# Patient Record
Sex: FEMALE | Race: ASIAN | NOT HISPANIC OR LATINO
[De-identification: names, ages, dates, MRNs, and addresses within clinical notes are randomized per-mention and may not be internally consistent; named-entity substitution may affect disease eponyms.]

---

## 2023-07-23 ENCOUNTER — APPOINTMENT (OUTPATIENT)
Dept: NEUROSURGERY | Facility: HOSPITAL | Age: 68
End: 2023-07-23

## 2023-07-23 ENCOUNTER — INPATIENT (INPATIENT)
Facility: HOSPITAL | Age: 68
LOS: 17 days | Discharge: HOME HEALTH | DRG: 24 | End: 2023-08-10
Attending: NEUROLOGICAL SURGERY | Admitting: NEUROLOGICAL SURGERY
Payer: MEDICAID

## 2023-07-23 VITALS
OXYGEN SATURATION: 95 % | SYSTOLIC BLOOD PRESSURE: 119 MMHG | TEMPERATURE: 98 F | DIASTOLIC BLOOD PRESSURE: 73 MMHG | RESPIRATION RATE: 20 BRPM | WEIGHT: 125 LBS | HEIGHT: 60 IN | HEART RATE: 79 BPM

## 2023-07-23 DIAGNOSIS — I63.9 CEREBRAL INFARCTION, UNSPECIFIED: ICD-10-CM

## 2023-07-23 LAB
ALBUMIN SERPL ELPH-MCNC: 4.1 G/DL — SIGNIFICANT CHANGE UP (ref 3.3–5)
ALBUMIN SERPL ELPH-MCNC: 4.4 G/DL — SIGNIFICANT CHANGE UP (ref 3.3–5)
ALP SERPL-CCNC: 84 U/L — SIGNIFICANT CHANGE UP (ref 40–120)
ALP SERPL-CCNC: 86 U/L — SIGNIFICANT CHANGE UP (ref 40–120)
ALT FLD-CCNC: 25 U/L — SIGNIFICANT CHANGE UP (ref 10–45)
ALT FLD-CCNC: 27 U/L — SIGNIFICANT CHANGE UP (ref 10–45)
ANION GAP SERPL CALC-SCNC: 10 MMOL/L — SIGNIFICANT CHANGE UP (ref 5–17)
ANION GAP SERPL CALC-SCNC: 13 MMOL/L — SIGNIFICANT CHANGE UP (ref 5–17)
ANISOCYTOSIS BLD QL: SLIGHT — SIGNIFICANT CHANGE UP
APTT BLD: 25.3 SEC — LOW (ref 27.5–35.5)
APTT BLD: 25.4 SEC — LOW (ref 27.5–35.5)
AST SERPL-CCNC: 25 U/L — SIGNIFICANT CHANGE UP (ref 10–40)
AST SERPL-CCNC: 25 U/L — SIGNIFICANT CHANGE UP (ref 10–40)
BASOPHILS # BLD AUTO: 0.24 K/UL — HIGH (ref 0–0.2)
BASOPHILS NFR BLD AUTO: 3.5 % — HIGH (ref 0–2)
BILIRUB SERPL-MCNC: 0.1 MG/DL — LOW (ref 0.2–1.2)
BILIRUB SERPL-MCNC: 0.2 MG/DL — SIGNIFICANT CHANGE UP (ref 0.2–1.2)
BLD GP AB SCN SERPL QL: NEGATIVE — SIGNIFICANT CHANGE UP
BUN SERPL-MCNC: 24 MG/DL — HIGH (ref 7–23)
BUN SERPL-MCNC: 32 MG/DL — HIGH (ref 7–23)
CALCIUM SERPL-MCNC: 8.8 MG/DL — SIGNIFICANT CHANGE UP (ref 8.4–10.5)
CALCIUM SERPL-MCNC: 9.1 MG/DL — SIGNIFICANT CHANGE UP (ref 8.4–10.5)
CHLORIDE SERPL-SCNC: 103 MMOL/L — SIGNIFICANT CHANGE UP (ref 96–108)
CHLORIDE SERPL-SCNC: 106 MMOL/L — SIGNIFICANT CHANGE UP (ref 96–108)
CO2 SERPL-SCNC: 24 MMOL/L — SIGNIFICANT CHANGE UP (ref 22–31)
CO2 SERPL-SCNC: 25 MMOL/L — SIGNIFICANT CHANGE UP (ref 22–31)
CREAT SERPL-MCNC: 0.69 MG/DL — SIGNIFICANT CHANGE UP (ref 0.5–1.3)
CREAT SERPL-MCNC: 0.81 MG/DL — SIGNIFICANT CHANGE UP (ref 0.5–1.3)
DACRYOCYTES BLD QL SMEAR: SLIGHT — SIGNIFICANT CHANGE UP
EGFR: 79 ML/MIN/1.73M2 — SIGNIFICANT CHANGE UP
EGFR: 94 ML/MIN/1.73M2 — SIGNIFICANT CHANGE UP
ELLIPTOCYTES BLD QL SMEAR: SLIGHT — SIGNIFICANT CHANGE UP
EOSINOPHIL # BLD AUTO: 0.06 K/UL — SIGNIFICANT CHANGE UP (ref 0–0.5)
EOSINOPHIL NFR BLD AUTO: 0.9 % — SIGNIFICANT CHANGE UP (ref 0–6)
GLUCOSE SERPL-MCNC: 116 MG/DL — HIGH (ref 70–99)
GLUCOSE SERPL-MCNC: 255 MG/DL — HIGH (ref 70–99)
HCT VFR BLD CALC: 31.1 % — LOW (ref 34.5–45)
HCT VFR BLD CALC: 32.7 % — LOW (ref 34.5–45)
HGB BLD-MCNC: 10.2 G/DL — LOW (ref 11.5–15.5)
HGB BLD-MCNC: 9.7 G/DL — LOW (ref 11.5–15.5)
INR BLD: 0.97 RATIO — SIGNIFICANT CHANGE UP (ref 0.88–1.16)
INR BLD: 0.98 RATIO — SIGNIFICANT CHANGE UP (ref 0.88–1.16)
LYMPHOCYTES # BLD AUTO: 2.37 K/UL — SIGNIFICANT CHANGE UP (ref 1–3.3)
LYMPHOCYTES # BLD AUTO: 33.9 % — SIGNIFICANT CHANGE UP (ref 13–44)
MAGNESIUM SERPL-MCNC: 2.4 MG/DL — SIGNIFICANT CHANGE UP (ref 1.6–2.6)
MANUAL SMEAR VERIFICATION: SIGNIFICANT CHANGE UP
MCHC RBC-ENTMCNC: 20.9 PG — LOW (ref 27–34)
MCHC RBC-ENTMCNC: 21.1 PG — LOW (ref 27–34)
MCHC RBC-ENTMCNC: 31.2 GM/DL — LOW (ref 32–36)
MCHC RBC-ENTMCNC: 31.2 GM/DL — LOW (ref 32–36)
MCV RBC AUTO: 67 FL — LOW (ref 80–100)
MCV RBC AUTO: 67.6 FL — LOW (ref 80–100)
MICROCYTES BLD QL: SLIGHT — SIGNIFICANT CHANGE UP
MONOCYTES # BLD AUTO: 0 K/UL — SIGNIFICANT CHANGE UP (ref 0–0.9)
MONOCYTES NFR BLD AUTO: 0 % — LOW (ref 2–14)
NEUTROPHILS # BLD AUTO: 4.31 K/UL — SIGNIFICANT CHANGE UP (ref 1.8–7.4)
NEUTROPHILS NFR BLD AUTO: 61.7 % — SIGNIFICANT CHANGE UP (ref 43–77)
NRBC # BLD: 0 /100 WBCS — SIGNIFICANT CHANGE UP (ref 0–0)
OVALOCYTES BLD QL SMEAR: SLIGHT — SIGNIFICANT CHANGE UP
PHOSPHATE SERPL-MCNC: 3.9 MG/DL — SIGNIFICANT CHANGE UP (ref 2.5–4.5)
PLAT MORPH BLD: NORMAL — SIGNIFICANT CHANGE UP
PLATELET # BLD AUTO: 247 K/UL — SIGNIFICANT CHANGE UP (ref 150–400)
PLATELET # BLD AUTO: 264 K/UL — SIGNIFICANT CHANGE UP (ref 150–400)
POIKILOCYTOSIS BLD QL AUTO: SIGNIFICANT CHANGE UP
POTASSIUM SERPL-MCNC: 3.8 MMOL/L — SIGNIFICANT CHANGE UP (ref 3.5–5.3)
POTASSIUM SERPL-MCNC: 4.2 MMOL/L — SIGNIFICANT CHANGE UP (ref 3.5–5.3)
POTASSIUM SERPL-SCNC: 3.8 MMOL/L — SIGNIFICANT CHANGE UP (ref 3.5–5.3)
POTASSIUM SERPL-SCNC: 4.2 MMOL/L — SIGNIFICANT CHANGE UP (ref 3.5–5.3)
PROT SERPL-MCNC: 7.1 G/DL — SIGNIFICANT CHANGE UP (ref 6–8.3)
PROT SERPL-MCNC: 7.6 G/DL — SIGNIFICANT CHANGE UP (ref 6–8.3)
PROTHROM AB SERPL-ACNC: 11.2 SEC — SIGNIFICANT CHANGE UP (ref 10.5–13.4)
PROTHROM AB SERPL-ACNC: 11.4 SEC — SIGNIFICANT CHANGE UP (ref 10.5–13.4)
RBC # BLD: 4.64 M/UL — SIGNIFICANT CHANGE UP (ref 3.8–5.2)
RBC # BLD: 4.84 M/UL — SIGNIFICANT CHANGE UP (ref 3.8–5.2)
RBC # FLD: 15.7 % — HIGH (ref 10.3–14.5)
RBC # FLD: 15.7 % — HIGH (ref 10.3–14.5)
RBC BLD AUTO: ABNORMAL
RH IG SCN BLD-IMP: POSITIVE — SIGNIFICANT CHANGE UP
SCHISTOCYTES BLD QL AUTO: SLIGHT — SIGNIFICANT CHANGE UP
SODIUM SERPL-SCNC: 140 MMOL/L — SIGNIFICANT CHANGE UP (ref 135–145)
SODIUM SERPL-SCNC: 141 MMOL/L — SIGNIFICANT CHANGE UP (ref 135–145)
TROPONIN T, HIGH SENSITIVITY RESULT: 22 NG/L — SIGNIFICANT CHANGE UP (ref 0–51)
WBC # BLD: 6.99 K/UL — SIGNIFICANT CHANGE UP (ref 3.8–10.5)
WBC # BLD: 7.22 K/UL — SIGNIFICANT CHANGE UP (ref 3.8–10.5)
WBC # FLD AUTO: 6.99 K/UL — SIGNIFICANT CHANGE UP (ref 3.8–10.5)
WBC # FLD AUTO: 7.22 K/UL — SIGNIFICANT CHANGE UP (ref 3.8–10.5)

## 2023-07-23 PROCEDURE — 70498 CT ANGIOGRAPHY NECK: CPT | Mod: 26,MA

## 2023-07-23 PROCEDURE — 99291 CRITICAL CARE FIRST HOUR: CPT

## 2023-07-23 PROCEDURE — 70496 CT ANGIOGRAPHY HEAD: CPT | Mod: 26,MA

## 2023-07-23 PROCEDURE — 70450 CT HEAD/BRAIN W/O DYE: CPT | Mod: 26,MA,59

## 2023-07-23 PROCEDURE — 0042T: CPT | Mod: MA

## 2023-07-23 PROCEDURE — 76377 3D RENDER W/INTRP POSTPROCES: CPT | Mod: 26

## 2023-07-23 PROCEDURE — 61645 PERQ ART M-THROMBECT &/NFS: CPT | Mod: LT

## 2023-07-23 RX ORDER — ACETAMINOPHEN 500 MG
1000 TABLET ORAL ONCE
Refills: 0 | Status: COMPLETED | OUTPATIENT
Start: 2023-07-23 | End: 2023-07-23

## 2023-07-23 RX ADMIN — Medication 1000 MILLIGRAM(S): at 23:30

## 2023-07-23 RX ADMIN — Medication 400 MILLIGRAM(S): at 23:00

## 2023-07-23 NOTE — ED ADULT NURSE NOTE - NSFALLHARMRISKINTERV_ED_ALL_ED
Assistance OOB with selected safe patient handling equipment if applicable/Assistance with ambulation/Communicate risk of Fall with Harm to all staff, patient, and family/Monitor gait and stability/Provide visual cue: red socks, yellow wristband, yellow gown, etc/Reinforce activity limits and safety measures with patient and family/Bed in lowest position, wheels locked, appropriate side rails in place/Call bell, personal items and telephone in reach/Instruct patient to call for assistance before getting out of bed/chair/stretcher/Non-slip footwear applied when patient is off stretcher/[place] to call system/Physically safe environment - no spills, clutter or unnecessary equipment/Purposeful Proactive Rounding/Room/bathroom lighting operational, light cord in reach

## 2023-07-23 NOTE — H&P ADULT - NSHPADDITIONALINFOADULT_GEN_ALL_CORE
Ms. Gomez is a 67 year old lady with history of  CVA (on clopidogrel), epilepsy (on LEV), who ordinally presented with AMS. NIHSS of 12 on initial evaluation. There was aphasia, R facial droop and RUE drift. CTH/CTA with concern for right M2 LVO. Patient was taken for angio suit for possible EVT and was found to have right M2 stenosis with distal reconstitution. NeuroIR was not able to aspirate the thrombus, which suggests that it is likely more chronic in nature. On the rounds this AM patient is almost near her baseline per daughter with some mild aphasia. Notably history is also significant for several prior episodes of speech arrests lasting 20-30 min within the past 2-3 weeks. Patient is right handed which makes less likely right hemisphere lesion to produce aphasia. Constellation of clinical findings is more concerning for seizure rather than ischemic event.     - NC q2   - MRI brain   - Continue ASA 81 and Clopidogrel 75   - Continue home  mg BID for now, may need an increased dose   - cvEEG  - DVT ppx     Casey Juan MD   Vascular Nerology Fellow

## 2023-07-23 NOTE — ED PROVIDER NOTE - PHYSICAL EXAMINATION
Gen: Patient is NAD, AAOx2, able to follow commands  HEENT: NCAT, EOMI, PERRLA, normal conjunctiva, tongue midline, oral mucosa moist  Lung: CTAB, no respiratory distress, no wheezes/rhonchi/rales B/L  CV: RRR, no murmurs, rubs or gallops, distal pulses 2+ b/l  Abd: soft, NT, ND, no guarding, no rigidity, no rebound tenderness, no CVA tenderness   MSK: no visible deformities, ROM normal in UE/LE, no back TTP  Neuro: difficulty finding words, No focal sensory or motor deficits, reduced strength 4/5 of b/l LE, strength is 5/5 in b/l UE, no dysmetria, unable to see with R eye, otherwise normal CN exam   Skin: Warm, well perfused, no leg swelling  Psych: normal affect, calm

## 2023-07-23 NOTE — CHART NOTE - NSCHARTNOTEFT_GEN_A_CORE
Interventional Neuro Radiology  Pre-Procedure Note    This is a 66yo female, history of diabetes, ?Stroke, on Plavix, right eye blindness, presenting with 1 day of cute onset of difficulty speech, difficulty ambulating.  Patient is accompanied by daughter who reports that patient started developing acute onset of symptoms at 12 PM while having lunch.  Daughters report that patient had similar symptoms in the past intermittently which resolved spontaneously.  Today patient's symptoms been persistent which is why she brought her to the emergency room for further evaluation.  Patient was noted notably having difficulty finding words during the interview.  Poor historian.  History obtained via Cantonese translation by daughter.    Neuro exam: visual acuity is 0 with R eye, no facial droop noted, slow speech noted, difficulty finding words at times, normal strength 5/5 of b/l UE, strength is 4/5 in both LE, no dysmetria, no sensory deficit.    [facility] on arrival    PAST MEDICAL & SURGICAL HISTORY:  DM (diabetes mellitus)  HTN (hypertension)      Social History:   Denies tobacco use    FAMILY HISTORY:  No pertinent family history    Allergies:   No Known Allergies      Current Medications:     Labs:                         10.2   6.99  )-----------( 264      ( 23 Jul 2023 16:17 )             32.7       07-23    141  |  103  |  32<H>  ----------------------------<  255<H>  4.2   |  25  |  0.81    Ca    9.1      23 Jul 2023 16:17    TPro  7.6  /  Alb  4.4  /  TBili  0.1<L>  /  DBili  x   /  AST  25  /  ALT  27  /  AlkPhos  86  07-23        Assessment/Plan:   This is a 66yo female  presents with  left M2 occlusion. Procedure/ risks/ benefits/ goals/ alternatives were explained. Risks include but are not limited to stroke/ vessel injury/ hemorrhage/ groin hematoma. All questions answered.   Informed content obtained from patient's family. Consent placed in chart.     H & P not completed prior to pre procedure note due to emergent nature of procedure.     INR fellow contacted at 1806h.     Dania Maciel PA-C  [phone]

## 2023-07-23 NOTE — ED PROVIDER NOTE - OBJECTIVE STATEMENT
67-year-old female, history of diabetes, ?Stroke, on Plavix, right eye blindness, presenting with 1 day of cute onset of difficulty speech, difficulty ambulating.  Patient is accompanied by daughter who reports that patient started developing acute onset of symptoms at 12 PM while having lunch.  Daughters report that patient had similar symptoms in the past intermittently which resolved spontaneously.  Today patient's symptoms been persistent which is why she brought her to the emergency room for further evaluation.  Patient was noted notably having difficulty finding words during the interview.  Poor historian.  History obtained via Cantonese translation by daughter.

## 2023-07-23 NOTE — H&P ADULT - REASON FOR ADMISSION
stroke
I performed the initial face to face bedside interview with this patient regarding history of present illness, review of symptoms and relevant past medical, social and family history.  I completed an independent physical examination.  I was the initial provider who evaluated this patient. I have signed out the follow up of any pending tests (i.e. labs, radiological studies) to the resident.  I have communicated the patient’s plan of care and disposition with the resident.

## 2023-07-23 NOTE — ED ADULT NURSE NOTE - OBJECTIVE STATEMENT
67 yr old female with h/o strokes, diabetes, high bp came in after having lunch with her daughter around 12 and then started to have speech issues and dizziness. this has been happening for 2-3 weeks. on assessment a and o x 1 daughter states shes not really making much sense. lungs clear abd soft non tender no swelling in extremities no n/v/d no fevers no other complaints, normally walks slowly but now needs some assistance, follows commands. blind in right eye

## 2023-07-23 NOTE — CONSULT NOTE ADULT - SUBJECTIVE AND OBJECTIVE BOX
Neurology - Consult Note    -  Spectra: [phone] ([facility]), [phone] ([facility])  -    HPI: Patient GABY HARDY is a 67y (1955) woman with a PMHx significant for CVA (on Plavix), seizure (on Keppra), HLD, HTN, who presents w/ CC of AMS. Patient unable to provide any history. Daughter at bedside assisting w/ history. Patient had stroke in past, but unclear when this happened and if any residual deficits. Per daughter, patient is usually independent, fully oriented, and speaks in normal conversation. However, over the past 2-3 weeks, she has been having 20-30 min episodes of sudden blank staring and unresponsiveness. She will be barely able to speak and the words will be nonsensical. She does not have generalized shaking, incontinence, or tongue biting. She comes back to her baseline afterwards. These episodes have generally occurred every other day. Daughter became concerned when she had episode starting at noon today that persisted for hours. Brought to ED for further evaluation. Code stroke called for AMS within 24h window. 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 When asked orientation questions, she stutters an answer ("[age]") and continues speaking nonsense. She follows simple commands with help of daughter speaking Cantonese. Home medications are: Plavix, amlodipine-ibresartan, metoprolol, rosuvastatin, Ceumid (aka Keppra), omeprezole, jarinu, folic acid, B complex, ferrous sulfate      Review of Systems:  INCOMPLETE   CONSTITUTIONAL: No fevers or chills  EYES AND ENT: No visual changes or no throat pain   NECK: No pain or stiffness  RESPIRATORY: No hemoptysis or shortness of breath  CARDIOVASCULAR: No chest pain or palpitations  GASTROINTESTINAL: No melena or hematochezia  GENITOURINARY: No dysuria or hematuria  NEUROLOGICAL: +As stated in HPI above  SKIN: No itching, burning, rashes, or lesions   All other review of systems is negative unless indicated above.    Allergies:  No Known Allergies      PMHx/PSHx/Family Hx: As above, otherwise see below   DM (diabetes mellitus)    HTN (hypertension)        Social Hx:  No current use of tobacco, alcohol, or illicit drugs  Lives with ***    Medications:  MEDICATIONS  (STANDING):    MEDICATIONS  (PRN):      Vitals:  T(C): 36.8 (07-23-23 @ 15:58), Max: 36.8 (07-23-23 @ 15:58)  HR: 79 (07-23-23 @ 15:58) (79 - 79)  BP: 119/73 (07-23-23 @ 15:58) (119/73 - 119/73)  RR: 20 (07-23-23 @ 15:58) (20 - 20)  SpO2: 95% (07-23-23 @ 15:58) (95% - 95%)    Physical Examination: INCOMPLETE  General - NAD  Cardiovascular - Peripheral pulses palpable, no edema  Eyes - Fundoscopy with flat, sharp optic discs and no hemorrhage or exudates; Fundoscopy not well visualized; Fundoscopy not performed due to safety precautions in the setting of the COVID-19 pandemic    Neurologic Exam:  Mental status - Awake, Alert, Oriented to person, place, and time. Speech fluent, repetition and naming intact. Follows simple and complex commands. Attention/concentration, recent and remote memory (including registration and recall), and fund of knowledge intact    Cranial nerves - PERRLA, VFF, EOMI, face sensation (V1-V3) intact b/l, facial strength intact without asymmetry b/l, hearing intact b/l, palate with symmetric elevation, trapezius OR sternocleidomastiod 5/5 strength b/l, tongue midline on protrusion with full lateral movement    Motor - Normal bulk and tone throughout. No pronator drift.  Strength testing            Deltoid      Biceps      Triceps     Wrist Extension    Wrist Flexion     Interossei         R            5                 5               5                     5                              5                        5                 5  L             5                 5               5                     5                              5                        5                 5              Hip Flexion    Hip Extension    Knee Flexion    Knee Extension    Dorsiflexion    Plantar Flexion  R              5                           5                       5                           5                            5                          5  L              5                           5                        5                           5                            5                          5    Sensation - Light touch/temperature OR pain/vibration intact throughout    DTR's -             Biceps      Triceps     Brachioradialis      Patellar    Ankle    Toes/plantar response  R             2+             2+                  2+                       2+            2+                 Down  L              2+             2+                 2+                        2+           2+                 Down    Coordination - Finger to Nose intact b/l. No tremors appreciated    Gait and station - Normal casual gait. Romberg (-)    Labs:                        10.2   6.99  )-----------( 264      ( 23 Jul 2023 16:17 )             32.7     07-23    141  |  103  |  32<H>  ----------------------------<  255<H>  4.2   |  25  |  0.81    Ca    9.1      23 Jul 2023 16:17    TPro  7.6  /  Alb  4.4  /  TBili  0.1<L>  /  DBili  x   /  AST  25  /  ALT  27  /  AlkPhos  86  07-23    CAPILLARY BLOOD GLUCOSE      POCT Blood Glucose.: 270 mg/dL (23 Jul 2023 16:02)    LIVER FUNCTIONS - ( 23 Jul 2023 16:17 )  Alb: 4.4 g/dL / Pro: 7.6 g/dL / ALK PHOS: 86 U/L / ALT: 27 U/L / AST: 25 U/L / GGT: x             PT/INR - ( 23 Jul 2023 16:17 )   PT: 11.2 sec;   INR: 0.97 ratio         PTT - ( 23 Jul 2023 16:17 )  PTT:25.4 sec  CSF:                  Radiology:     Neurology - Consult Note    -  Spectra: [phone] ([facility]), [phone] ([facility])  -    HPI: Patient GABY HARDY is a 67y (1955) woman with a PMHx significant for CVA (on Plavix), seizure (on Keppra), HLD, HTN, who presents w/ CC of AMS. Patient unable to provide any history. Daughter at bedside assisting w/ history. Patient had stroke in past, but unclear when this happened and if any residual deficits. Per daughter, patient is usually independent, fully oriented, and speaks in normal conversation. However, over the past 2-3 weeks, she has been having 20-30 min episodes of sudden blank staring and unresponsiveness. She will be barely able to speak and the words will be nonsensical. She does not have generalized shaking, incontinence, or tongue biting. She comes back to her baseline afterwards. These episodes have generally occurred every other day. Daughter became concerned when she had episode starting at noon today that persisted for hours. Brought to ED for further evaluation. Code stroke called for AMS within 24h window. 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 When asked orientation questions, she stutters an answer ("[age]") and continues speaking nonsense. She follows simple commands with help of daughter speaking Cantonese. Home medications are: Plavix, amlodipine-ibresartan, metoprolol, rosuvastatin, Ceumid (aka Keppra), omeprezole, jarinu, folic acid, B complex, ferrous sulfate      Review of Systems:  unable to assess due to mental status      Allergies:  No Known Allergies      PMHx/PSHx/Family Hx: As above, otherwise see below   DM (diabetes mellitus)    HTN (hypertension)        Social Hx:  Cantonese speaking    Medications:  MEDICATIONS  (STANDING):    MEDICATIONS  (PRN):      Vitals:  T(C): 36.8 (07-23-23 @ 15:58), Max: 36.8 (07-23-23 @ 15:58)  HR: 79 (07-23-23 @ 15:58) (79 - 79)  BP: 119/73 (07-23-23 @ 15:58) (119/73 - 119/73)  RR: 20 (07-23-23 @ 15:58) (20 - 20)  SpO2: 95% (07-23-23 @ 15:58) (95% - 95%)    Physical Examination: INCOMPLETE  General - NAD, confused female  Cardiovascular - Peripheral pulses palpable, no edema  Eyes - Fundoscopy with flat, sharp optic discs and no hemorrhage or exudates; Fundoscopy not well visualized; Fundoscopy not performed due to safety precautions in the setting of the COVID-19 pandemic    Neurologic Exam:  Mental status - Awake, Alert, Oriented to person, place, and time. Speech fluent, repetition and naming intact. Follows simple and complex commands. Attention/concentration, recent and remote memory (including registration and recall), and fund of knowledge intact    Cranial nerves - PERRLA, VFF, EOMI, face sensation (V1-V3) intact b/l, facial strength intact without asymmetry b/l, hearing intact b/l, palate with symmetric elevation, trapezius OR sternocleidomastiod 5/5 strength b/l, tongue midline on protrusion with full lateral movement    Motor - Normal bulk and tone throughout. No pronator drift.  Strength testing            Deltoid      Biceps      Triceps     Wrist Extension    Wrist Flexion     Interossei         R            5                 5               5                     5                              5                        5                 5  L             5                 5               5                     5                              5                        5                 5              Hip Flexion    Hip Extension    Knee Flexion    Knee Extension    Dorsiflexion    Plantar Flexion  R              5                           5                       5                           5                            5                          5  L              5                           5                        5                           5                            5                          5    Sensation - Light touch/temperature OR pain/vibration intact throughout    DTR's -             Biceps      Triceps     Brachioradialis      Patellar    Ankle    Toes/plantar response  R             2+             2+                  2+                       2+            2+                 Down  L              2+             2+                 2+                        2+           2+                 Down    Coordination - Finger to Nose intact b/l. No tremors appreciated    Gait and station - Normal casual gait. Romberg (-)    Labs:                        10.2   6.99  )-----------( 264      ( 23 Jul 2023 16:17 )             32.7     07-23    141  |  103  |  32<H>  ----------------------------<  255<H>  4.2   |  25  |  0.81    Ca    9.1      23 Jul 2023 16:17    TPro  7.6  /  Alb  4.4  /  TBili  0.1<L>  /  DBili  x   /  AST  25  /  ALT  27  /  AlkPhos  86  07-23    CAPILLARY BLOOD GLUCOSE      POCT Blood Glucose.: 270 mg/dL (23 Jul 2023 16:02)    LIVER FUNCTIONS - ( 23 Jul 2023 16:17 )  Alb: 4.4 g/dL / Pro: 7.6 g/dL / ALK PHOS: 86 U/L / ALT: 27 U/L / AST: 25 U/L / GGT: x             PT/INR - ( 23 Jul 2023 16:17 )   PT: 11.2 sec;   INR: 0.97 ratio         PTT - ( 23 Jul 2023 16:17 )  PTT:25.4 sec  CSF:                  Radiology:     Neurology - Consult Note    -  Spectra: [phone] ([facility]), [phone] ([facility])  -    HPI: Patient GABY HARDY is a 67y (1955) woman with a PMHx significant for CVA (on Plavix), seizure (on Keppra), HLD, HTN, who presents w/ CC of AMS. Patient unable to provide any history. Daughter at bedside assisting w/ history. Patient had stroke in past, but unclear when this happened and if any residual deficits. Per daughter, patient is usually independent, fully oriented, and speaks in normal conversation. However, over the past 2-3 weeks, she has been having 20-30 min episodes of sudden blank staring and unresponsiveness. She will be barely able to speak and the words will be nonsensical. She does not have generalized shaking, incontinence, or tongue biting. She comes back to her baseline afterwards. These episodes have generally occurred every other day. Daughter became concerned when she had episode starting at noon today that persisted for hours. Brought to ED for further evaluation. Code stroke called for AMS within 24h window. 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 Initial NIHSS 12. When asked orientation questions, she stutters an answer ("[age]") and continues speaking nonsense. She follows simple commands with help of daughter speaking Cantonese. Home medications are: Plavix, amlodipine-ibresartan, metoprolol, rosuvastatin, Ceumid (aka Keppra), omeprezole, jarinu, folic acid, B complex, ferrous sulfate. After discussions with stroke/neuro-IR fellows and re-assessment, CTA showing concerning occlusion in L MCA distribution. Although NIHSS improving to 5 (mild dysarthria, aphasia, droop, RLE/RUE drift), patient taken for diagnostic cerebral angiogram. Family aware of diagnosis and provided written consent to plan.      Review of Systems:  unable to assess due to mental status      Allergies:  No Known Allergies      PMHx/PSHx/Family Hx: As above, otherwise see below   DM (diabetes mellitus)    HTN (hypertension)        Social Hx:  Cantonese speaking    Medications:  MEDICATIONS  (STANDING):    MEDICATIONS  (PRN):      Vitals:  T(C): 36.8 (07-23-23 @ 15:58), Max: 36.8 (07-23-23 @ 15:58)  HR: 79 (07-23-23 @ 15:58) (79 - 79)  BP: 119/73 (07-23-23 @ 15:58) (119/73 - 119/73)  RR: 20 (07-23-23 @ 15:58) (20 - 20)  SpO2: 95% (07-23-23 @ 15:58) (95% - 95%)    Physical Examination:   General - NAD, confused female  Cardiovascular - Peripheral pulses palpable, no edema  Eyes - Fundoscopy not performed due to safety precautions in the setting of the COVID-19 pandemic    Neurologic Exam:  Mental status - Awake, Alert, not oriented to name or month. Speech mildly/moderately dysarthric. Follows simple commands. Unable to elaborate on own history.    Cranial nerves - PERRLA, R monocular blindness, counting fingers in L eye, EOMI, face sensation (V1-V3) intact b/l, moderate/severe droop, hearing intact b/l, palate with symmetric elevation, trapezius 5/5 strength b/l, tongue midline on protrusion    Motor - Normal bulk throughout. Mild drift in RUE/RLE. Hand  5/5 b/l. Moving all extremities anti-gravity      Sensation - Light touch intact throughout    DTR's - did not assess due to acute illness    Coordination - Finger to Nose intact b/l. No tremors appreciated    Gait and station - did not assess due to acute illness and fall risk    Labs:                        10.2   6.99  )-----------( 264      ( 23 Jul 2023 16:17 )             32.7     07-23    141  |  103  |  32<H>  ----------------------------<  255<H>  4.2   |  25  |  0.81    Ca    9.1      23 Jul 2023 16:17    TPro  7.6  /  Alb  4.4  /  TBili  0.1<L>  /  DBili  x   /  AST  25  /  ALT  27  /  AlkPhos  86  07-23    CAPILLARY BLOOD GLUCOSE      POCT Blood Glucose.: 270 mg/dL (23 Jul 2023 16:02)    LIVER FUNCTIONS - ( 23 Jul 2023 16:17 )  Alb: 4.4 g/dL / Pro: 7.6 g/dL / ALK PHOS: 86 U/L / ALT: 27 U/L / AST: 25 U/L / GGT: x             PT/INR - ( 23 Jul 2023 16:17 )   PT: 11.2 sec;   INR: 0.97 ratio         PTT - ( 23 Jul 2023 16:17 )  PTT:25.4 sec                  Radiology:  CT brain 7/23/23:  No acute hemorrhage or midline shift.

## 2023-07-23 NOTE — PROGRESS NOTE ADULT - ASSESSMENT
ASSESSMENT/PLAN: chronic M2 occlusion    NEURO:  cont plavix  blood pressure augmentation and Q1 hr neurochecks  MRI  cont keppra home dose, check vEEG given presenting symptomrs  stroke core measures, stroke neurology following   Activity: [] mobilize as tolerated [x] Bedrest till 1am groin safeguard in place [x] PT [x] OT [] PMNR    PULM:  incentive spirometry as able  O2sat>92%    CV:  SBP goal 140-180  phenylephrine prn  start midodrine 5mg tid  EKGsinus  TTE pending  cont atorvastatin  hold all home antihypertensives    RENAL:  Fluids: IVF    GI:  Diet: dysphagia post op and advance diet as tolerated  GI prophylaxis [] not indicated [x] PPI home med [] other:  Bowel regimen [] colace [x] senna [x] other: miralax     ENDO:   Goal euglycemia (-180)  A1C pending  TFT pending   EDISON    HEME/ONC:  VTE prophylaxis: [x] SCDs [x] chemoprophylaxis [] hold chemoprophylaxis due to: [] high risk of DVT/PE on admission due to:    ID: monitor for fever    MISC:    SOCIAL/FAMILY:  [] awaiting [x] updated at bedside--two daughters [] family meeting    CODE STATUS:  [x] Full Code [] DNR [] DNI [] Palliative/Comfort Care    DISPOSITION:  [x] ICU [] Stroke Unit [] Floor [] EMU [] RCU [] PCU    [x] Patient is at high risk of neurologic deterioration/death due to: acute stroke, seizures     Contact: [phone]

## 2023-07-23 NOTE — H&P ADULT - ASSESSMENT
67y M with Hx HTN, HLD, CVA, presenting w/ CC of AMS    LKW: 12pm 7/23/23  NIHSS: 12 --> 5  MRS: 0  Not tenecteplase candidate due to outside of time window    Impression: acute disorientation, aphasia, dysarthria, R facial droop, RLE/RUE drift. Consistent with L brain dysfunction, possibly due to ischemia. Found to have CTA findings concerning for occlusion in L MCA distribution. Taken to IR suite for emergent diagnostic cerebral angiogram with possible intervention (thrombectomy and/or intra-arterial thrombolytics)    Plan:  [] permissive HTN for now (goal BP parameters per neurosurgery)  [] c/w home Plavix and Lipitor  [] hold home anti-hypertensives  [] MRI brain w/o  [] repeat CTH w/o in 24h  [] TTE  [] telemetry  [] check A1c and lipid panel  [] frequent neuro checks and vitals per stroke protocol  [] PT/OT/SLP evaluations when able    Case discussed w/ stroke fellow Dr. Kurtis Chappell under supervision of attending Dr. Santino Recinos. Recommendations preliminary until attested. Will be formally staffed on attending rounds.

## 2023-07-23 NOTE — H&P ADULT - ATTENDING COMMENTS
I reviewed available diagnostic studies, and reviewed images personally. I agree with resident's history, exam, orders placed, and plan of care. Medical issues needing to be addressed include: evaluation for cerebral ischemia, R hemipareiss, aphasia, R M2 stenosis/occlusion, AMS, hx of CVA on plavix, sz on keppra, HLD, HTN. Exam improved. cEEG so far unremarkable, would be ok with DC. Cont Keppra. Vessel findings possibly chronic and ddx include cerebral infarction vs sz. CTH appears unremarkable.  Clinically blind R eye. Per daughter pt appears to be baseline, except for some slowing with her speech and occasional difficulty with words.

## 2023-07-23 NOTE — ED PROVIDER NOTE - NS ED ROS FT
Constitutional:  (-) fever, (-) chills, (-) lethargy  Eyes:  (-) eye pain (-) visual changes  ENMT: (-) nasal discharge, (-) sore throat. (-) neck pain or stiffness  Cardiac: (-) chest pain (-) palpitations  Respiratory:  (-) cough (-) respiratory distress.   GI:  (-) nausea (-) vomiting (-) diarrhea (-) abdominal pain.  :  (-) dysuria (-) frequency (-) burning.  MS:  (-) back pain (-) joint pain.  Neuro:  (-) headache (-) numbness (-) tingling (-) focal weakness (+) difficulty ambulating, finding words   Skin:  (-) rash  Except as documented in the HPI,  all other systems are negative

## 2023-07-23 NOTE — PROGRESS NOTE ADULT - SUBJECTIVE AND OBJECTIVE BOX
HOSPITAL COURSE:      Admission Scores  GCS:   HH:   MF:   NIHSS:   RASS:    CAM-ICU:   ICH:    24 hour Events:       Allergies    No Known Allergies    Intolerances        REVIEW OF SYSTEMS: [ ] Unable to Assess due to neurologic exam   [ ] All ROS addressed below are non-contributory, except:  Neuro: [ ] Headache [ ] Back pain [ ] Numbness [ ] Weakness [ ] Ataxia [ ] Dizziness [ ] Aphasia [ ] Dysarthria [ ] Visual disturbance  Resp: [ ] Shortness of breath/dyspnea, [ ] Orthopnea [ ] Cough  CV: [ ] Chest pain [ ] Palpitation [ ] Lightheadedness [ ] Syncope  Renal: [ ] Thirst [ ] Edema  GI: [ ] Nausea [ ] Emesis [ ] Abdominal pain [ ] Constipation [ ] Diarrhea  Hem: [ ] Hematemesis [ ] bright red blood per rectum  ID: [ ] Fever [ ] Chills [ ] Dysuria  ENT: [ ] Rhinorrhea      DEVICES:   [ ] Restraints [ ] ET tube [ ] central line [ ] arterial line [ ] gonzalez [ ] NGT/OGT [ ] EVD [ ] LD [ ] FRANTZ/HMV [ ] Trach [ ] PEG [ ] Chest Tube     VITALS:   Vital Signs Last 24 Hrs  T(C): 36.5 (23 Jul 2023 20:41), Max: 36.8 (23 Jul 2023 15:58)  T(F): 97.7 (23 Jul 2023 20:41), Max: 98.2 (23 Jul 2023 15:58)  HR: 76 (23 Jul 2023 21:30) (76 - 82)  BP: 129/71 (23 Jul 2023 21:15) (116/62 - 182/84)  BP(mean): 88 (23 Jul 2023 21:15) (79 - 113)  RR: 20 (23 Jul 2023 21:30) (14 - 20)  SpO2: 99% (23 Jul 2023 21:30) (95% - 100%)    Parameters below as of 23 Jul 2023 20:41  Patient On (Oxygen Delivery Method): nasal cannula  O2 Flow (L/min): 2    CAPILLARY BLOOD GLUCOSE      POCT Blood Glucose.: 270 mg/dL (23 Jul 2023 16:02)    I&O's Summary      Respiratory:        LABS:                        9.7    7.22  )-----------( 247      ( 23 Jul 2023 21:37 )             31.1     07-23    140  |  106  |  24<H>  ----------------------------<  116<H>  3.8   |  24  |  0.69             MEDICATION LEVELS:     IVF FLUIDS/MEDICATIONS:   MEDICATIONS  (STANDING):  acetaminophen   IVPB .. 1000 milliGRAM(s) IV Intermittent once    MEDICATIONS  (PRN):        IMAGING:      EXAMINATION:  PHYSICAL EXAM:    Constitutional: No Acute Distress     Neurological: Awake, alert oriented to person, place and time, Following Commands, PERRL, EOMI, No Gaze Preference, Face Symmetrical, Speech Fluent, No dysmetria, No ataxia, No nystagmus     Motor exam:          Upper extremity                         Delt     Bicep     Tricep    HG                                                 R         5/5        5/5        5/5       5/5                                               L          5/5        5/5        5/5       5/5          Lower extremity                        HF         KF        KE       DF         PF                                                  R        5/5        5/5        5/5       5/5         5/5                                               L         5/5        5/5       5/5       5/5          5/5                                                 Sensation: [ ] intact to light touch  [ ] decreased:     Reflexes: Deep Tendon Reflexes Intact     Pulmonary: Clear to Auscultation, No rales, No rhonchi, No wheezes     Cardiovascular: S1, S2, Regular rate and rhythm     Gastrointestinal: Soft, Non-tender, Non-distended     Extremities: No calf tenderness     Incision:    HOSPITAL COURSE: 66yo woman PMH seizure on keppra, CVA on plavix with unknown residual deficit, HLD, HTN p/w worsening and more prolonged episodes of aphasia/blank staring/unresponsiveness over two weeks. The episodes became more frequent and today, per daughter, it lasted for hours. Brought to ED, where she was found to have R facial droop, RUE weakness, dysarthria, and global aphasia. NIHSS 12. CTA showing c/f L MCA occlusion.     7/23 Adm NSCU s/p angiogram showing R M2/MCA occlusion with distal retrograde reconstitution, aspiration thrombectomy attempted without recanalization.     Admission Scores  GCS:   HH:   MF:   NIHSS: 12  RASS:    CAM-ICU:   ICH:    Allergies    No Known Allergies    REVIEW OF SYSTEMS: [ x] Unable to Assess due to neurologic exam   [ ] All ROS addressed below are non-contributory, except:  Neuro: [ ] Headache [ ] Back pain [ ] Numbness [ ] Weakness [ ] Ataxia [ ] Dizziness [ ] Aphasia [ ] Dysarthria [ ] Visual disturbance  Resp: [ ] Shortness of breath/dyspnea, [ ] Orthopnea [ ] Cough  CV: [ ] Chest pain [ ] Palpitation [ ] Lightheadedness [ ] Syncope  Renal: [ ] Thirst [ ] Edema  GI: [ ] Nausea [ ] Emesis [ ] Abdominal pain [ ] Constipation [ ] Diarrhea  Hem: [ ] Hematemesis [ ] bright red blood per rectum  ID: [ ] Fever [ ] Chills [ ] Dysuria  ENT: [ ] Rhinorrhea      DEVICES:   [ ] Restraints [ ] ET tube [ ] central line [ ] arterial line [ ] gonzalez [ ] NGT/OGT [ ] EVD [ ] LD [ ] FRANTZ/HMV [ ] Trach [ ] PEG [ ] Chest Tube     VITALS:   Vital Signs Last 24 Hrs  T(C): 36.5 (23 Jul 2023 20:41), Max: 36.8 (23 Jul 2023 15:58)  T(F): 97.7 (23 Jul 2023 20:41), Max: 98.2 (23 Jul 2023 15:58)  HR: 76 (23 Jul 2023 21:30) (76 - 82)  BP: 129/71 (23 Jul 2023 21:15) (116/62 - 182/84)  BP(mean): 88 (23 Jul 2023 21:15) (79 - 113)  RR: 20 (23 Jul 2023 21:30) (14 - 20)  SpO2: 99% (23 Jul 2023 21:30) (95% - 100%)    Parameters below as of 23 Jul 2023 20:41  Patient On (Oxygen Delivery Method): nasal cannula  O2 Flow (L/min): 2    CAPILLARY BLOOD GLUCOSE      POCT Blood Glucose.: 270 mg/dL (23 Jul 2023 16:02)    I&O's Summary      LABS:                        9.7    7.22  )-----------( 247      ( 23 Jul 2023 21:37 )             31.1     07-23    140  |  106  |  24<H>  ----------------------------<  116<H>  3.8   |  24  |  0.69      MEDICATION LEVELS:     IVF FLUIDS/MEDICATIONS:   MEDICATIONS  (STANDING):  acetaminophen   IVPB .. 1000 milliGRAM(s) IV Intermittent once    MEDICATIONS  (PRN):    IMAGING:    EXAMINATION:  PHYSICAL EXAM:    Constitutional: No Acute Distress     Neurological: Awake, alert oriented to person and place with choices, expressive aphasia>>receptive, Following Commands, PERRL, EOMI, No Gaze Preference, Face Symmetrical,   Motor exam:          Upper extremity                         Delt     Bicep     Tricep    HG                                                 R         4+/5        4+/5        4+/5       4+/5                                               L          5/5        5/5        5/5       5/5          Lower extremity                        HF         KF        KE       DF         PF                                                  R       no tested, safeguard in place, distally  4+/5                                               L         5/5        5/5       5/5       5/5          5/5                                                 Sensation: [s ] intact to light touch  [ ] decreased:     Reflexes: Deep Tendon Reflexes Intact     Pulmonary: Clear to Auscultation, No rales, No rhonchi, No wheezes     Cardiovascular: S1, S2, Regular rate and rhythm     Gastrointestinal: Soft, Non-tender, Non-distended     Extremities: No calf tenderness     Incision:    HOSPITAL COURSE: 68yo woman PMH seizure on keppra, CVA on plavix with unknown residual deficit, HLD, HTN p/w worsening and more prolonged episodes of aphasia/blank staring/unresponsiveness over two weeks. The episodes became more frequent and today, per daughter, it lasted for hours. Brought to ED, where she was found to have R facial droop, RUE weakness, dysarthria, and global aphasia. NIHSS 12. CTA showing c/f L MCA occlusion.     7/23 Adm NSCU s/p angiogram showing R M2/MCA occlusion with distal retrograde reconstitution, aspiration thrombectomy attempted without recanalization.     Admission Scores  GCS:   HH:   MF:   NIHSS: 12  RASS:    CAM-ICU:   ICH:    Allergies    No Known Allergies    REVIEW OF SYSTEMS: [ x] Unable to Assess due to neurologic exam   [ ] All ROS addressed below are non-contributory, except:  Neuro: [ ] Headache [ ] Back pain [ ] Numbness [ ] Weakness [ ] Ataxia [ ] Dizziness [ ] Aphasia [ ] Dysarthria [ ] Visual disturbance  Resp: [ ] Shortness of breath/dyspnea, [ ] Orthopnea [ ] Cough  CV: [ ] Chest pain [ ] Palpitation [ ] Lightheadedness [ ] Syncope  Renal: [ ] Thirst [ ] Edema  GI: [ ] Nausea [ ] Emesis [ ] Abdominal pain [ ] Constipation [ ] Diarrhea  Hem: [ ] Hematemesis [ ] bright red blood per rectum  ID: [ ] Fever [ ] Chills [ ] Dysuria  ENT: [ ] Rhinorrhea      DEVICES:   [ ] Restraints [ ] ET tube [ ] central line [ ] arterial line [ ] gonzalez [ ] NGT/OGT [ ] EVD [ ] LD [ ] FRANTZ/HMV [ ] Trach [ ] PEG [ ] Chest Tube     VITALS:   Vital Signs Last 24 Hrs  T(C): 36.5 (23 Jul 2023 20:41), Max: 36.8 (23 Jul 2023 15:58)  T(F): 97.7 (23 Jul 2023 20:41), Max: 98.2 (23 Jul 2023 15:58)  HR: 76 (23 Jul 2023 21:30) (76 - 82)  BP: 129/71 (23 Jul 2023 21:15) (116/62 - 182/84)  BP(mean): 88 (23 Jul 2023 21:15) (79 - 113)  RR: 20 (23 Jul 2023 21:30) (14 - 20)  SpO2: 99% (23 Jul 2023 21:30) (95% - 100%)    Parameters below as of 23 Jul 2023 20:41  Patient On (Oxygen Delivery Method): nasal cannula  O2 Flow (L/min): 2    CAPILLARY BLOOD GLUCOSE      POCT Blood Glucose.: 270 mg/dL (23 Jul 2023 16:02)    I&O's Summary      LABS:                        9.7    7.22  )-----------( 247      ( 23 Jul 2023 21:37 )             31.1     07-23    140  |  106  |  24<H>  ----------------------------<  116<H>  3.8   |  24  |  0.69      MEDICATION LEVELS:     IVF FLUIDS/MEDICATIONS:   MEDICATIONS  (STANDING):  acetaminophen   IVPB .. 1000 milliGRAM(s) IV Intermittent once    MEDICATIONS  (PRN):    IMAGING:    EXAMINATION:  PHYSICAL EXAM:    Constitutional: No Acute Distress     Neurological: Awake, alert oriented to person and place with choices, expressive aphasia>>receptive, Following Commands, 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, L pupil 3mm reactive, EOMI, No Gaze Preference, Face Symmetrical,   Motor exam:          Upper extremity                         Delt     Bicep     Tricep    HG                                                 R         4+/5        4+/5        4+/5       4+/5                                               L          5/5        5/5        5/5       5/5          Lower extremity                        HF         KF        KE       DF         PF                                                  R       no tested, safeguard in place, distally  4+/5                                               L         5/5        5/5       5/5       5/5          5/5                                                 Sensation: [s ] intact to light touch  [ ] decreased:     Reflexes: Deep Tendon Reflexes Intact     Pulmonary: Clear to Auscultation, No rales, No rhonchi, No wheezes     Cardiovascular: S1, S2, Regular rate and rhythm     Gastrointestinal: Soft, Non-tender, Non-distended     Extremities: No calf tenderness     Incision:

## 2023-07-23 NOTE — ED PROVIDER NOTE - ATTENDING CONTRIBUTION TO CARE
67-year-old female Cantonese speaking with history of stroke back in December and [place] as well as possible coronary artery disease presented here today with 1 week of difficulty with her speech and ambulation signified by right-sided facial droop as well as right-sided weakness progressively worse since  Today patient's daughter says that she has been feeling well and she had prior episodes of waxing and waning neurologic symptoms but this time she is not got better and she is not making any sense  No report of infectious symptoms  Code stroke was called  exam notable for rt sided facial droop, rt sided motor weakness. nl motor on contral lateral side. clear lungs, S1-2. soft non tender abd.   CT imaging shows a possible area of ischemia, discussed with neuro who involved neuro interventionalist and plan for endovascular procedure.

## 2023-07-23 NOTE — ED PROVIDER NOTE - CLINICAL SUMMARY MEDICAL DECISION MAKING FREE TEXT BOX
67-year-old female, history of diabetes, ?Stroke, on Plavix, right eye blindness, presenting with 1 day of cute onset of difficulty speech, difficulty ambulating.  Patient is accompanied by daughter who reports that patient started developing acute onset of symptoms at 12 PM while having lunch.  Daughters report that patient had similar symptoms in the past intermittently which resolved spontaneously.  Today patient's symptoms been persistent which is why she brought her to the emergency room for further evaluation.  Patient was noted notably having difficulty finding words during the interview.  Poor historian.  History obtained via Cantonese translation by daughter. Code stroke activated.  Fingerstick was 270.  Vital signs were within normal limits.  Physical exam as noted above.  Patient's not in acute distress.  Normal respiratory effort, clear lung exam bilaterally, abdomen soft, nontender.  Neuro exam: visual acuity is 0 with R eye, no facial droop noted, slow speech noted, difficulty finding words at times, normal strength 5/5 of b/l UE, strength is 4/5 in both LE, no dysmetria, no sensory deficit. Will follow stroke labs, CT findings, touch base with neurology to coordinate care.

## 2023-07-23 NOTE — CHART NOTE - NSCHARTNOTEFT_GEN_A_CORE
Interventional Neuro- Radiology   Procedure Note    Procedure: Selective Cerebral Angiography and thrombectomy   Pre- Procedure Diagnosis: Left M2 occlusion     : Dr. Dung MD  Fellow: Dr. Tiffany MD   Physician Assistant: Dania Maciel PA-C    RN: Chidi   Tech: Clyde/ Miles     Anesthesia: Dr. Chew    (general anesthesia)    I/Os:  Fluids: 800cc  Posada: 1200cc  Contrast: 42cc  Estimated Blood Loss: <10cc    NIHSS post- 5     Preliminary Report:  Under general anesthesia, using a 8Fr short sheath to the right femoral artery examination of left internal carotid artery via selective cerebral angiography demonstrates left M2 occlsion, s/p thrombectomy. ( Official note to follow).    Patient tolerated procedure well, vital signs stable, hemodynamically stable, no change in neurological status compared to baseline. Results discussed with patient and their family. Groin sheath d/c'ed, manual compression held to hemostasis, no active bleeding, no hematoma, Vascade applied, quick clot and safeguard balloon dressing applied at 2030h.  Patient transferred to P NSCU for further care/ monitoring.

## 2023-07-23 NOTE — ED ADULT TRIAGE NOTE - CHIEF COMPLAINT QUOTE
as per family, pt "is word searching and has slurred speech" as of 1200   right sided numbness, loss of balance  hx stroke

## 2023-07-23 NOTE — H&P ADULT - NSHPPHYSICALEXAM_GEN_ALL_CORE
General - NAD, confused female  Cardiovascular - Peripheral pulses palpable, no edema  Eyes - Fundoscopy not performed due to safety precautions in the setting of the COVID-19 pandemic    Neurologic Exam:  Mental status - Awake, Alert, not oriented to name or month. Speech mildly/moderately dysarthric. Follows simple commands. Unable to elaborate on own history.    Cranial nerves - PERRLA, R monocular blindness, counting fingers in L eye, EOMI, face sensation (V1-V3) intact b/l, moderate/severe droop, hearing intact b/l, palate with symmetric elevation, trapezius 5/5 strength b/l, tongue midline on protrusion    Motor - Normal bulk throughout. Mild drift in RUE/RLE. Hand  5/5 b/l. Moving all extremities anti-gravity    Sensation - Light touch intact throughout    DTR's - did not assess due to acute illness    Coordination - Finger to Nose intact b/l. No tremors appreciated    Gait and station - did not assess due to acute illness and fall risk

## 2023-07-23 NOTE — H&P ADULT - HISTORY OF PRESENT ILLNESS
Patient GABY HARDY is a 67y (1955) woman with a PMHx significant for CVA (on Plavix), seizure (on Keppra), HLD, HTN, who presents w/ CC of AMS. Patient unable to provide any history. Daughter at bedside assisting w/ history. Patient had stroke in past, but unclear when this happened and if any residual deficits. Per daughter, patient is usually independent, fully oriented, and speaks in normal conversation. However, over the past 2-3 weeks, she has been having 20-30 min episodes of sudden blank staring and unresponsiveness. She will be barely able to speak and the words will be nonsensical. She does not have generalized shaking, incontinence, or tongue biting. She comes back to her baseline afterwards. These episodes have generally occurred every other day. Daughter became concerned when she had episode starting at noon today that persisted for hours. Brought to ED for further evaluation. Code stroke called for AMS within 24h window. 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 Initial NIHSS 12. When asked orientation questions, she stutters an answer ("[age] and continues speaking nonsense. She follows simple commands with help of daughter speaking Cantonese. Home medications are: Plavix, amlodipine-ibresartan, metoprolol, rosuvastatin, Ceumid (aka Keppra), omeprezole, jarinu, folic acid, B complex, ferrous sulfate. After discussions with stroke/neuro-IR fellows and re-assessment, CTA showing concerning occlusion in L MCA distribution. Although NIHSS improving to 5 (mild dysarthria, aphasia, droop, RLE/RUE drift), patient taken for diagnostic cerebral angiogram. Family aware of diagnosis and provided written consent to plan.

## 2023-07-24 ENCOUNTER — TRANSCRIPTION ENCOUNTER (OUTPATIENT)
Age: 68
End: 2023-07-24

## 2023-07-24 LAB
A1C WITH ESTIMATED AVERAGE GLUCOSE RESULT: 8.5 % — HIGH (ref 4–5.6)
CHOLEST SERPL-MCNC: 142 MG/DL — SIGNIFICANT CHANGE UP
ESTIMATED AVERAGE GLUCOSE: 197 MG/DL — HIGH (ref 68–114)
GLUCOSE BLDC GLUCOMTR-MCNC: 114 MG/DL — HIGH (ref 70–99)
GLUCOSE BLDC GLUCOMTR-MCNC: 166 MG/DL — HIGH (ref 70–99)
GLUCOSE BLDC GLUCOMTR-MCNC: 213 MG/DL — HIGH (ref 70–99)
GLUCOSE BLDC GLUCOMTR-MCNC: 66 MG/DL — LOW (ref 70–99)
GLUCOSE BLDC GLUCOMTR-MCNC: 68 MG/DL — LOW (ref 70–99)
HDLC SERPL-MCNC: 65 MG/DL — SIGNIFICANT CHANGE UP
LIPID PNL WITH DIRECT LDL SERPL: 59 MG/DL — SIGNIFICANT CHANGE UP
NON HDL CHOLESTEROL: 77 MG/DL — SIGNIFICANT CHANGE UP
TRIGL SERPL-MCNC: 96 MG/DL — SIGNIFICANT CHANGE UP
TSH SERPL-MCNC: 1.45 UIU/ML — SIGNIFICANT CHANGE UP (ref 0.27–4.2)

## 2023-07-24 PROCEDURE — 70450 CT HEAD/BRAIN W/O DYE: CPT | Mod: 26

## 2023-07-24 PROCEDURE — 93306 TTE W/DOPPLER COMPLETE: CPT | Mod: 26

## 2023-07-24 PROCEDURE — 99223 1ST HOSP IP/OBS HIGH 75: CPT

## 2023-07-24 PROCEDURE — 95720 EEG PHY/QHP EA INCR W/VEEG: CPT

## 2023-07-24 PROCEDURE — 99291 CRITICAL CARE FIRST HOUR: CPT

## 2023-07-24 RX ORDER — DEXTROSE 50 % IN WATER 50 %
5 SYRINGE (ML) INTRAVENOUS ONCE
Refills: 0 | Status: COMPLETED | OUTPATIENT
Start: 2023-07-24 | End: 2023-07-24

## 2023-07-24 RX ORDER — MIDODRINE HYDROCHLORIDE 2.5 MG/1
5 TABLET ORAL ONCE
Refills: 0 | Status: COMPLETED | OUTPATIENT
Start: 2023-07-24 | End: 2023-07-24

## 2023-07-24 RX ORDER — PANTOPRAZOLE SODIUM 20 MG/1
40 TABLET, DELAYED RELEASE ORAL
Refills: 0 | Status: DISCONTINUED | OUTPATIENT
Start: 2023-07-24 | End: 2023-07-25

## 2023-07-24 RX ORDER — SENNA PLUS 8.6 MG/1
2 TABLET ORAL AT BEDTIME
Refills: 0 | Status: DISCONTINUED | OUTPATIENT
Start: 2023-07-24 | End: 2023-08-02

## 2023-07-24 RX ORDER — MIDODRINE HYDROCHLORIDE 2.5 MG/1
10 TABLET ORAL EVERY 8 HOURS
Refills: 0 | Status: DISCONTINUED | OUTPATIENT
Start: 2023-07-24 | End: 2023-07-24

## 2023-07-24 RX ORDER — LEVETIRACETAM 250 MG/1
500 TABLET, FILM COATED ORAL
Refills: 0 | Status: DISCONTINUED | OUTPATIENT
Start: 2023-07-24 | End: 2023-08-02

## 2023-07-24 RX ORDER — SODIUM CHLORIDE 9 MG/ML
1000 INJECTION INTRAMUSCULAR; INTRAVENOUS; SUBCUTANEOUS
Refills: 0 | Status: DISCONTINUED | OUTPATIENT
Start: 2023-07-24 | End: 2023-07-24

## 2023-07-24 RX ORDER — MIDODRINE HYDROCHLORIDE 2.5 MG/1
5 TABLET ORAL EVERY 8 HOURS
Refills: 0 | Status: DISCONTINUED | OUTPATIENT
Start: 2023-07-24 | End: 2023-07-24

## 2023-07-24 RX ORDER — LEVETIRACETAM 250 MG/1
500 TABLET, FILM COATED ORAL EVERY 12 HOURS
Refills: 0 | Status: DISCONTINUED | OUTPATIENT
Start: 2023-07-24 | End: 2023-07-24

## 2023-07-24 RX ORDER — ATORVASTATIN CALCIUM 80 MG/1
80 TABLET, FILM COATED ORAL AT BEDTIME
Refills: 0 | Status: DISCONTINUED | OUTPATIENT
Start: 2023-07-24 | End: 2023-08-02

## 2023-07-24 RX ORDER — SODIUM CHLORIDE 9 MG/ML
500 INJECTION INTRAMUSCULAR; INTRAVENOUS; SUBCUTANEOUS ONCE
Refills: 0 | Status: COMPLETED | OUTPATIENT
Start: 2023-07-24 | End: 2023-07-24

## 2023-07-24 RX ORDER — PHENYLEPHRINE HYDROCHLORIDE 10 MG/ML
0.2 INJECTION INTRAVENOUS
Qty: 40 | Refills: 0 | Status: DISCONTINUED | OUTPATIENT
Start: 2023-07-24 | End: 2023-07-24

## 2023-07-24 RX ORDER — HYDRALAZINE HCL 50 MG
10 TABLET ORAL ONCE
Refills: 0 | Status: COMPLETED | OUTPATIENT
Start: 2023-07-24 | End: 2023-07-24

## 2023-07-24 RX ORDER — CLOPIDOGREL BISULFATE 75 MG/1
75 TABLET, FILM COATED ORAL DAILY
Refills: 0 | Status: DISCONTINUED | OUTPATIENT
Start: 2023-07-24 | End: 2023-07-26

## 2023-07-24 RX ORDER — SODIUM CHLORIDE 9 MG/ML
250 INJECTION INTRAMUSCULAR; INTRAVENOUS; SUBCUTANEOUS ONCE
Refills: 0 | Status: COMPLETED | OUTPATIENT
Start: 2023-07-24 | End: 2023-07-24

## 2023-07-24 RX ORDER — ENOXAPARIN SODIUM 100 MG/ML
40 INJECTION SUBCUTANEOUS
Refills: 0 | Status: DISCONTINUED | OUTPATIENT
Start: 2023-07-24 | End: 2023-08-02

## 2023-07-24 RX ORDER — MIDODRINE HYDROCHLORIDE 2.5 MG/1
0.5 TABLET ORAL EVERY 8 HOURS
Refills: 0 | Status: DISCONTINUED | OUTPATIENT
Start: 2023-07-24 | End: 2023-07-24

## 2023-07-24 RX ORDER — INSULIN LISPRO 100/ML
VIAL (ML) SUBCUTANEOUS
Refills: 0 | Status: DISCONTINUED | OUTPATIENT
Start: 2023-07-24 | End: 2023-07-31

## 2023-07-24 RX ORDER — ASPIRIN/CALCIUM CARB/MAGNESIUM 324 MG
81 TABLET ORAL DAILY
Refills: 0 | Status: DISCONTINUED | OUTPATIENT
Start: 2023-07-24 | End: 2023-07-24

## 2023-07-24 RX ORDER — INSULIN LISPRO 100/ML
VIAL (ML) SUBCUTANEOUS EVERY 6 HOURS
Refills: 0 | Status: DISCONTINUED | OUTPATIENT
Start: 2023-07-24 | End: 2023-07-24

## 2023-07-24 RX ADMIN — Medication 10 MILLIGRAM(S): at 17:20

## 2023-07-24 RX ADMIN — Medication 2: at 17:13

## 2023-07-24 RX ADMIN — Medication 5 MILLILITER(S): at 08:44

## 2023-07-24 RX ADMIN — CLOPIDOGREL BISULFATE 75 MILLIGRAM(S): 75 TABLET, FILM COATED ORAL at 12:15

## 2023-07-24 RX ADMIN — LEVETIRACETAM 400 MILLIGRAM(S): 250 TABLET, FILM COATED ORAL at 17:12

## 2023-07-24 RX ADMIN — MIDODRINE HYDROCHLORIDE 5 MILLIGRAM(S): 2.5 TABLET ORAL at 09:35

## 2023-07-24 RX ADMIN — MIDODRINE HYDROCHLORIDE 5 MILLIGRAM(S): 2.5 TABLET ORAL at 03:00

## 2023-07-24 RX ADMIN — PANTOPRAZOLE SODIUM 40 MILLIGRAM(S): 20 TABLET, DELAYED RELEASE ORAL at 07:30

## 2023-07-24 RX ADMIN — ATORVASTATIN CALCIUM 80 MILLIGRAM(S): 80 TABLET, FILM COATED ORAL at 22:27

## 2023-07-24 RX ADMIN — ENOXAPARIN SODIUM 40 MILLIGRAM(S): 100 INJECTION SUBCUTANEOUS at 17:13

## 2023-07-24 RX ADMIN — LEVETIRACETAM 400 MILLIGRAM(S): 250 TABLET, FILM COATED ORAL at 06:00

## 2023-07-24 RX ADMIN — MIDODRINE HYDROCHLORIDE 5 MILLIGRAM(S): 2.5 TABLET ORAL at 22:27

## 2023-07-24 RX ADMIN — SENNA PLUS 2 TABLET(S): 8.6 TABLET ORAL at 22:00

## 2023-07-24 RX ADMIN — PHENYLEPHRINE HYDROCHLORIDE 4.25 MICROGRAM(S)/KG/MIN: 10 INJECTION INTRAVENOUS at 07:48

## 2023-07-24 RX ADMIN — SODIUM CHLORIDE 500 MILLILITER(S): 9 INJECTION INTRAMUSCULAR; INTRAVENOUS; SUBCUTANEOUS at 18:27

## 2023-07-24 RX ADMIN — SODIUM CHLORIDE 1000 MILLILITER(S): 9 INJECTION INTRAMUSCULAR; INTRAVENOUS; SUBCUTANEOUS at 09:35

## 2023-07-24 RX ADMIN — SODIUM CHLORIDE 50 MILLILITER(S): 9 INJECTION INTRAMUSCULAR; INTRAVENOUS; SUBCUTANEOUS at 07:48

## 2023-07-24 RX ADMIN — MIDODRINE HYDROCHLORIDE 5 MILLIGRAM(S): 2.5 TABLET ORAL at 18:49

## 2023-07-24 RX ADMIN — MIDODRINE HYDROCHLORIDE 10 MILLIGRAM(S): 2.5 TABLET ORAL at 13:47

## 2023-07-24 NOTE — PROGRESS NOTE ADULT - SUBJECTIVE AND OBJECTIVE BOX
Patient seen and examined at bedside.    --Anticoagulation--  clopidogrel Tablet 75 milliGRAM(s) Oral daily    T(C): 36.5 (07-23-23 @ 20:41), Max: 36.8 (07-23-23 @ 15:58)  HR: 76 (07-23-23 @ 21:30) (76 - 82)  BP: 129/71 (07-23-23 @ 21:15) (116/62 - 182/84)  RR: 20 (07-23-23 @ 21:30) (14 - 20)  SpO2: 99% (07-23-23 @ 21:30) (95% - 100%)  Wt(kg): --    Exam: AOx2-3 (w/choices), FC, FRANCIS antigravity    .  LABS:                         9.7    7.22  )-----------( 247      ( 23 Jul 2023 21:37 )             31.1     07-23    140  |  106  |  24<H>  ----------------------------<  116<H>  3.8   |  24  |  0.69    Ca    8.8      23 Jul 2023 21:37  Phos  3.9     07-23  Mg     2.4     07-23    TPro  7.1  /  Alb  4.1  /  TBili  0.2  /  DBili  x   /  AST  25  /  ALT  25  /  AlkPhos  84  07-23    PT/INR - ( 23 Jul 2023 21:37 )   PT: 11.4 sec;   INR: 0.98 ratio         PTT - ( 23 Jul 2023 21:37 )  PTT:25.3 sec  Urinalysis Basic - ( 23 Jul 2023 21:37 )    Color: x / Appearance: x / SG: x / pH: x  Gluc: 116 mg/dL / Ketone: x  / Bili: x / Urobili: x   Blood: x / Protein: x / Nitrite: x   Leuk Esterase: x / RBC: x / WBC x   Sq Epi: x / Non Sq Epi: x / Bacteria: x            RADIOLOGY, EKG & ADDITIONAL TESTS: Reviewed.

## 2023-07-24 NOTE — PHYSICAL THERAPY INITIAL EVALUATION ADULT - PERTINENT HX OF CURRENT PROBLEM, REHAB EVAL
67y F with a PMHx significant for CVA (on Plavix), seizure (on Keppra), HLD, HTN, who presents w/ CC of AMS. Patient unable to provide any history. Daughter at bedside assisting w/ history. Patient had stroke in past, but unclear when this happened and if any residual deficits. Per daughter, patient is usually independent, fully oriented, and speaks in normal conversation. However, over the past 2-3 weeks, she has been having 20-30 min episodes of sudden blank staring and unresponsiveness. She will be barely able to speak and the words will be nonsensical. She does not have generalized shaking, incontinence, or tongue biting. She comes back to her baseline afterwards. These episodes have generally occurred every other day. Daughter became concerned when she had episode starting at noon today that persisted for hours. Brought to ED for further evaluation. Code stroke called for AMS within 24h window. 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 Initial NIHSS 12. When asked orientation questions, she stutters an answer ("[age]") and continues speaking nonsense. She follows simple commands with help of daughter speaking Cantonese. After discussions with stroke/neuro-IR fellows and re-assessment, CTA showing concerning occlusion in L MCA distribution. Although NIHSS improving to 5 (mild dysarthria, aphasia, droop, RLE/RUE drift), patient taken for diagnostic cerebral angiogram. Family aware of diagnosis and provided written consent to plan.7/23 Adm NSCU s/p angiogram showing R M2/MCA occlusion with distal retrograde reconstitution, aspiration thrombectomy attempted without recanalization. CT Brain 7/23: No acute hemorrhage or midline shift. CT angiography neck: No hemodynamically significant stenosis by NASCET criteria. No vascular dissection. CT angiography brain: No major vessel occlusion or proximal stenosis. 2 mm left M1 bifurcation aneurysm CT perfusion: Approximately 7 mL of ischemic penumbra is seen in the left frontoparietal cortex. Pt is a 68 y/o woman PMH seizure on keppra, CVA on plavix with unknown residual deficit, HLD, HTN p/w worsening and more prolonged episodes of aphasia/blank staring/unresponsiveness over two weeks. The episodes became more frequent and today, per daughter, it lasted for hours. Brought to ED, where she was found to have R facial droop, RUE weakness, dysarthria, and global aphasia. NIHSS 12. 7/23 Adm NSCU s/p angiogram showing R M2/MCA occlusion with distal retrograde reconstitution, aspiration thrombectomy attempted without recanalization. CT Brain 7/23: No acute hemorrhage or midline shift. CT angiography neck: No hemodynamically significant stenosis by NASCET criteria. No vascular dissection. CT angiography brain: No major vessel occlusion or proximal stenosis. 2 mm left M1 bifurcation aneurysm CT perfusion: Approximately 7 mL of ischemic penumbra is seen in the left frontoparietal cortex. 7/24 showing R M2/MCA occlusion with distal retrograde reconstitution, aspiration thrombectomy attempted without recanalization.

## 2023-07-24 NOTE — PROGRESS NOTE ADULT - SUBJECTIVE AND OBJECTIVE BOX
HOSPITAL COURSE: 66yo woman PMH seizure on keppra, CVA on plavix with unknown residual deficit, HLD, HTN p/w worsening and more prolonged episodes of aphasia/blank staring/unresponsiveness over two weeks. The episodes became more frequent and today, per daughter, it lasted for hours. Brought to ED, where she was found to have R facial droop, RUE weakness, dysarthria, and global aphasia. NIHSS 12. CTA showing c/f L MCA occlusion.     7/23 Adm NSCU s/p angiogram showing R M2/MCA occlusion with distal retrograde reconstitution, aspiration thrombectomy attempted without recanalization.   7/24:     Admission Scores  GCS:   HH:   MF:   NIHSS: 12  RASS:    CAM-ICU:   ICH:    Allergies    No Known Allergies    REVIEW OF SYSTEMS: [ x] Unable to Assess due to neurologic exam   [ ] All ROS addressed below are non-contributory, except:  Neuro: [ ] Headache [ ] Back pain [ ] Numbness [ ] Weakness [ ] Ataxia [ ] Dizziness [ ] Aphasia [ ] Dysarthria [ ] Visual disturbance  Resp: [ ] Shortness of breath/dyspnea, [ ] Orthopnea [ ] Cough  CV: [ ] Chest pain [ ] Palpitation [ ] Lightheadedness [ ] Syncope  Renal: [ ] Thirst [ ] Edema  GI: [ ] Nausea [ ] Emesis [ ] Abdominal pain [ ] Constipation [ ] Diarrhea  Hem: [ ] Hematemesis [ ] bright red blood per rectum  ID: [ ] Fever [ ] Chills [ ] Dysuria  ENT: [ ] Rhinorrhea      DEVICES:   [ ] Restraints [ ] ET tube [ ] central line [ ] arterial line [ ] gonzalez [ ] NGT/OGT [ ] EVD [ ] LD [ ] FRANTZ/HMV [ ] Trach [ ] PEG [ ] Chest Tube     ICU Vital Signs Last 24 Hrs  T(C): 36.8 (24 Jul 2023 03:00), Max: 36.8 (23 Jul 2023 15:58)  T(F): 98.3 (24 Jul 2023 03:00), Max: 98.3 (24 Jul 2023 03:00)  HR: 77 (24 Jul 2023 07:00) (70 - 84)  BP: 143/50 (24 Jul 2023 02:00) (101/56 - 210/90)  BP(mean): 72 (24 Jul 2023 02:00) (70 - 124)  ABP: 159/62 (24 Jul 2023 06:00) (140/58 - 179/69)  ABP(mean): 98 (24 Jul 2023 06:00) (88 - 130)  RR: 13 (24 Jul 2023 06:00) (10 - 20)  SpO2: 100% (24 Jul 2023 06:00) (94% - 100%)    O2 Parameters below as of 23 Jul 2023 20:41  Patient On (Oxygen Delivery Method): nasal cannula  O2 Flow (L/min): 2    I&O's Detail    23 Jul 2023 07:01  -  24 Jul 2023 07:00  --------------------------------------------------------  IN:    Phenylephrine: 36.9 mL    sodium chloride 0.9%: 200 mL  Total IN: 236.9 mL    OUT:    Indwelling Catheter - Urethral (mL): 600 mL  Total OUT: 600 mL    Total NET: -363.1 mL    MEDICATIONS  (STANDING):  atorvastatin 80 milliGRAM(s) Oral at bedtime  clopidogrel Tablet 75 milliGRAM(s) Oral daily  enoxaparin Injectable 40 milliGRAM(s) SubCutaneous <User Schedule>  insulin lispro (ADMELOG) corrective regimen sliding scale   SubCutaneous three times a day before meals  levETIRAcetam  IVPB 500 milliGRAM(s) IV Intermittent every 12 hours  midodrine 5 milliGRAM(s) Oral every 8 hours  pantoprazole    Tablet 40 milliGRAM(s) Oral before breakfast  phenylephrine    Infusion 0.2 MICROgram(s)/kG/Min (4.25 mL/Hr) IV Continuous <Continuous>  senna 2 Tablet(s) Oral at bedtime  sodium chloride 0.9%. 1000 milliLiter(s) (50 mL/Hr) IV Continuous <Continuous>    MEDICATIONS  (PRN):      IMAGING:  CTP 7/23  IMPRESSION:    CT angiography neck: No hemodynamically significant stenosis byNASCET   criteria. No vascular dissection.    CT angiography brain: No major vessel occlusion or proximal stenosis. 2   mm left M1 bifurcation aneurysm    CT perfusion: Approximately 7 mL of ischemic penumbra is seen in the left   frontoparietal cortex.      EXAMINATION:  PHYSICAL EXAM:    Constitutional: No Acute Distress     Neurological: Awake, alert oriented to person and place with choices, expressive aphasia>>receptive, Following Commands, 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, L pupil 3mm reactive, EOMI, No Gaze Preference, Face Symmetrical,   Motor exam:          Upper extremity                         Delt     Bicep     Tricep    HG                                                 R         4+/5        4+/5        4+/5       4+/5                                               L          5/5        5/5        5/5       5/5          Lower extremity                        HF         KF        KE       DF         PF                                                  R       no tested, safeguard in place, distally  4+/5                                               L         5/5        5/5       5/5       5/5          5/5                                                 Sensation: [s ] intact to light touch  [ ] decreased:     Reflexes: Deep Tendon Reflexes Intact     Pulmonary: Clear to Auscultation, No rales, No rhonchi, No wheezes     Cardiovascular: S1, S2, Regular rate and rhythm     Gastrointestinal: Soft, Non-tender, Non-distended     Extremities: No calf tenderness     Incision:       LABS:                          9.7    7.22  )-----------( 247      ( 23 Jul 2023 21:37 )             31.1     07-23    140  |  106  |  24<H>  ----------------------------<  116<H>  3.8   |  24  |  0.69    Ca    8.8      23 Jul 2023 21:37  Phos  3.9     07-23  Mg     2.4     07-23    TPro  7.1  /  Alb  4.1  /  TBili  0.2  /  DBili  x   /  AST  25  /  ALT  25  /  AlkPhos  84  07-23    LIVER FUNCTIONS - ( 23 Jul 2023 21:37 )  Alb: 4.1 g/dL / Pro: 7.1 g/dL / ALK PHOS: 84 U/L / ALT: 25 U/L / AST: 25 U/L / GGT: x           PT/INR - ( 23 Jul 2023 21:37 )   PT: 11.4 sec;   INR: 0.98 ratio         PTT - ( 23 Jul 2023 21:37 )  PTT:25.3 sec  Urinalysis Basic - ( 23 Jul 2023 21:37 )    Color: x / Appearance: x / SG: x / pH: x  Gluc: 116 mg/dL / Ketone: x  / Bili: x / Urobili: x   Blood: x / Protein: x / Nitrite: x   Leuk Esterase: x / RBC: x / WBC x   Sq Epi: x / Non Sq Epi: x / Bacteria: x       HOSPITAL COURSE: 66yo woman PMH seizure on keppra, CVA on plavix with unknown residual deficit, HLD, HTN p/w worsening and more prolonged episodes of aphasia/blank staring/unresponsiveness over two weeks. The episodes became more frequent and today, per daughter, it lasted for hours. Brought to ED, where she was found to have R facial droop, RUE weakness, dysarthria, and global aphasia. NIHSS 12. CTA showing c/f L MCA occlusion.     7/23 Adm NSCU s/p angiogram showing R M2/MCA occlusion with distal retrograde reconstitution, aspiration thrombectomy attempted without recanalization.   7/24: no acute overnight events    Admission Scores  GCS:   HH:   MF:   NIHSS: 12  RASS:    CAM-ICU:   ICH:    Allergies    No Known Allergies    REVIEW OF SYSTEMS: [ x] Unable to Assess due to neurologic exam   [ ] All ROS addressed below are non-contributory, except:  Neuro: [ ] Headache [ ] Back pain [ ] Numbness [ ] Weakness [ ] Ataxia [ ] Dizziness [ ] Aphasia [ ] Dysarthria [ ] Visual disturbance  Resp: [ ] Shortness of breath/dyspnea, [ ] Orthopnea [ ] Cough  CV: [ ] Chest pain [ ] Palpitation [ ] Lightheadedness [ ] Syncope  Renal: [ ] Thirst [ ] Edema  GI: [ ] Nausea [ ] Emesis [ ] Abdominal pain [ ] Constipation [ ] Diarrhea  Hem: [ ] Hematemesis [ ] bright red blood per rectum  ID: [ ] Fever [ ] Chills [ ] Dysuria  ENT: [ ] Rhinorrhea      DEVICES:   [ ] Restraints [ ] ET tube [ ] central line [ ] arterial line [ ] gonzalez [ ] NGT/OGT [ ] EVD [ ] LD [ ] FRANTZ/HMV [ ] Trach [ ] PEG [ ] Chest Tube     ICU Vital Signs Last 24 Hrs  T(C): 36.8 (24 Jul 2023 03:00), Max: 36.8 (23 Jul 2023 15:58)  T(F): 98.3 (24 Jul 2023 03:00), Max: 98.3 (24 Jul 2023 03:00)  HR: 77 (24 Jul 2023 07:00) (70 - 84)  BP: 143/50 (24 Jul 2023 02:00) (101/56 - 210/90)  BP(mean): 72 (24 Jul 2023 02:00) (70 - 124)  ABP: 159/62 (24 Jul 2023 06:00) (140/58 - 179/69)  ABP(mean): 98 (24 Jul 2023 06:00) (88 - 130)  RR: 13 (24 Jul 2023 06:00) (10 - 20)  SpO2: 100% (24 Jul 2023 06:00) (94% - 100%)    O2 Parameters below as of 23 Jul 2023 20:41  Patient On (Oxygen Delivery Method): nasal cannula  O2 Flow (L/min): 2    I&O's Detail    23 Jul 2023 07:01  -  24 Jul 2023 07:00  --------------------------------------------------------  IN:    Phenylephrine: 36.9 mL    sodium chloride 0.9%: 200 mL  Total IN: 236.9 mL    OUT:    Indwelling Catheter - Urethral (mL): 600 mL  Total OUT: 600 mL    Total NET: -363.1 mL    MEDICATIONS  (STANDING):  atorvastatin 80 milliGRAM(s) Oral at bedtime  clopidogrel Tablet 75 milliGRAM(s) Oral daily  enoxaparin Injectable 40 milliGRAM(s) SubCutaneous <User Schedule>  insulin lispro (ADMELOG) corrective regimen sliding scale   SubCutaneous three times a day before meals  levETIRAcetam  IVPB 500 milliGRAM(s) IV Intermittent every 12 hours  midodrine 5 milliGRAM(s) Oral every 8 hours  pantoprazole    Tablet 40 milliGRAM(s) Oral before breakfast  phenylephrine    Infusion 0.2 MICROgram(s)/kG/Min (4.25 mL/Hr) IV Continuous <Continuous>  senna 2 Tablet(s) Oral at bedtime  sodium chloride 0.9%. 1000 milliLiter(s) (50 mL/Hr) IV Continuous <Continuous>    MEDICATIONS  (PRN):      IMAGING:  CTP 7/23  IMPRESSION:    CT angiography neck: No hemodynamically significant stenosis byNASCET   criteria. No vascular dissection.    CT angiography brain: No major vessel occlusion or proximal stenosis. 2   mm left M1 bifurcation aneurysm    CT perfusion: Approximately 7 mL of ischemic penumbra is seen in the left   frontoparietal cortex.      EXAMINATION:  PHYSICAL EXAM:    Constitutional: No Acute Distress     Neurological: Awake, alert oriented to person and place with choices, expressive aphasia>>receptive, Following Commands, 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, L pupil 3mm reactive, EOMI, No Gaze Preference, Face Symmetrical,   Motor exam:          Upper extremity                         Delt     Bicep     Tricep    HG                                                 R         4+/5        4+/5        4+/5       4+/5                                               L          5/5        5/5        5/5       5/5          Lower extremity                        HF         KF        KE       DF         PF                                                  R       no tested, safeguard in place, distally  4+/5                                               L         5/5        5/5       5/5       5/5          5/5                                                 Sensation: [s ] intact to light touch  [ ] decreased:     Reflexes: Deep Tendon Reflexes Intact     Pulmonary: Clear to Auscultation, No rales, No rhonchi, No wheezes     Cardiovascular: S1, S2, Regular rate and rhythm     Gastrointestinal: Soft, Non-tender, Non-distended     Extremities: No calf tenderness     Incision:       LABS:                          9.7    7.22  )-----------( 247      ( 23 Jul 2023 21:37 )             31.1     07-23    140  |  106  |  24<H>  ----------------------------<  116<H>  3.8   |  24  |  0.69    Ca    8.8      23 Jul 2023 21:37  Phos  3.9     07-23  Mg     2.4     07-23    TPro  7.1  /  Alb  4.1  /  TBili  0.2  /  DBili  x   /  AST  25  /  ALT  25  /  AlkPhos  84  07-23    LIVER FUNCTIONS - ( 23 Jul 2023 21:37 )  Alb: 4.1 g/dL / Pro: 7.1 g/dL / ALK PHOS: 84 U/L / ALT: 25 U/L / AST: 25 U/L / GGT: x           PT/INR - ( 23 Jul 2023 21:37 )   PT: 11.4 sec;   INR: 0.98 ratio         PTT - ( 23 Jul 2023 21:37 )  PTT:25.3 sec  Urinalysis Basic - ( 23 Jul 2023 21:37 )    Color: x / Appearance: x / SG: x / pH: x  Gluc: 116 mg/dL / Ketone: x  / Bili: x / Urobili: x   Blood: x / Protein: x / Nitrite: x   Leuk Esterase: x / RBC: x / WBC x   Sq Epi: x / Non Sq Epi: x / Bacteria: x       HOSPITAL COURSE: 68yo woman PMH seizure on keppra, CVA on plavix with unknown residual deficit, HLD, HTN p/w worsening and more prolonged episodes of aphasia/blank staring/unresponsiveness over two weeks. The episodes became more frequent and today, per daughter, it lasted for hours. Brought to ED, where she was found to have R facial droop, RUE weakness, dysarthria, and global aphasia. NIHSS 12. CTA showing c/f L MCA occlusion.     7/23 Adm NSCU s/p angiogram showing R M2/MCA occlusion with distal retrograde reconstitution, aspiration thrombectomy attempted without recanalization.   7/24: no acute overnight events    Admission Scores  GCS:   HH:   MF:   NIHSS: 12  RASS:    CAM-ICU:   ICH:    Allergies    No Known Allergies    REVIEW OF SYSTEMS: [ x] Unable to Assess due to neurologic exam   [ ] All ROS addressed below are non-contributory, except:  Neuro: [ ] Headache [ ] Back pain [ ] Numbness [ ] Weakness [ ] Ataxia [ ] Dizziness [ ] Aphasia [ ] Dysarthria [ ] Visual disturbance  Resp: [ ] Shortness of breath/dyspnea, [ ] Orthopnea [ ] Cough  CV: [ ] Chest pain [ ] Palpitation [ ] Lightheadedness [ ] Syncope  Renal: [ ] Thirst [ ] Edema  GI: [ ] Nausea [ ] Emesis [ ] Abdominal pain [ ] Constipation [ ] Diarrhea  Hem: [ ] Hematemesis [ ] bright red blood per rectum  ID: [ ] Fever [ ] Chills [ ] Dysuria  ENT: [ ] Rhinorrhea      DEVICES:   [ ] Restraints [ ] ET tube [ ] central line [ ] arterial line [ ] gonzalez [ ] NGT/OGT [ ] EVD [ ] LD [ ] FRANTZ/HMV [ ] Trach [ ] PEG [ ] Chest Tube     ICU Vital Signs Last 24 Hrs  T(C): 36.8 (24 Jul 2023 03:00), Max: 36.8 (23 Jul 2023 15:58)  T(F): 98.3 (24 Jul 2023 03:00), Max: 98.3 (24 Jul 2023 03:00)  HR: 77 (24 Jul 2023 07:00) (70 - 84)  BP: 143/50 (24 Jul 2023 02:00) (101/56 - 210/90)  BP(mean): 72 (24 Jul 2023 02:00) (70 - 124)  ABP: 159/62 (24 Jul 2023 06:00) (140/58 - 179/69)  ABP(mean): 98 (24 Jul 2023 06:00) (88 - 130)  RR: 13 (24 Jul 2023 06:00) (10 - 20)  SpO2: 100% (24 Jul 2023 06:00) (94% - 100%)    O2 Parameters below as of 23 Jul 2023 20:41  Patient On (Oxygen Delivery Method): nasal cannula  O2 Flow (L/min): 2    I&O's Detail    23 Jul 2023 07:01  -  24 Jul 2023 07:00  --------------------------------------------------------  IN:    Phenylephrine: 36.9 mL    sodium chloride 0.9%: 200 mL  Total IN: 236.9 mL    OUT:    Indwelling Catheter - Urethral (mL): 600 mL  Total OUT: 600 mL    Total NET: -363.1 mL    MEDICATIONS  (STANDING):  atorvastatin 80 milliGRAM(s) Oral at bedtime  clopidogrel Tablet 75 milliGRAM(s) Oral daily  enoxaparin Injectable 40 milliGRAM(s) SubCutaneous <User Schedule>  insulin lispro (ADMELOG) corrective regimen sliding scale   SubCutaneous three times a day before meals  levETIRAcetam  IVPB 500 milliGRAM(s) IV Intermittent every 12 hours  midodrine 5 milliGRAM(s) Oral every 8 hours  pantoprazole    Tablet 40 milliGRAM(s) Oral before breakfast  phenylephrine    Infusion 0.2 MICROgram(s)/kG/Min (4.25 mL/Hr) IV Continuous <Continuous>  senna 2 Tablet(s) Oral at bedtime  sodium chloride 0.9%. 1000 milliLiter(s) (50 mL/Hr) IV Continuous <Continuous>    MEDICATIONS  (PRN):      IMAGING:  CTP 7/23  IMPRESSION:    CT angiography neck: No hemodynamically significant stenosis byNASCET   criteria. No vascular dissection.    CT angiography brain: No major vessel occlusion or proximal stenosis. 2   mm left M1 bifurcation aneurysm    CT perfusion: Approximately 7 mL of ischemic penumbra is seen in the left   frontoparietal cortex.      EXAMINATION:  PHYSICAL EXAM:    Constitutional: No Acute Distress     Neurological: Awake, alert oriented to person and place with choices, expressive aphasia>>receptive, Following Commands, 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, L pupil 3mm reactive, EOMI, No Gaze Preference, Face Symmetrical,   Motor exam:          Upper extremity                         Delt     Bicep     Tricep    HG                                                 R         5/5        5/5        5/5       5/5                                               L          5/5        5/5        5/5       5/5          Lower extremity                        HF         KF        KE       DF         PF                                                  R       no tested, safeguard in place, distally  4+/5                                               L         5/5        5/5       5/5       5/5          5/5                                                 Sensation: [s ] intact to light touch  [ ] decreased:     Reflexes: Deep Tendon Reflexes Intact     Pulmonary: Clear to Auscultation, No rales, No rhonchi, No wheezes     Cardiovascular: S1, S2, Regular rate and rhythm     Gastrointestinal: Soft, Non-tender, Non-distended     Extremities: No calf tenderness     LABS:                          9.7    7.22  )-----------( 247      ( 23 Jul 2023 21:37 )             31.1     07-23    140  |  106  |  24<H>  ----------------------------<  116<H>  3.8   |  24  |  0.69    Ca    8.8      23 Jul 2023 21:37  Phos  3.9     07-23  Mg     2.4     07-23    TPro  7.1  /  Alb  4.1  /  TBili  0.2  /  DBili  x   /  AST  25  /  ALT  25  /  AlkPhos  84  07-23    LIVER FUNCTIONS - ( 23 Jul 2023 21:37 )  Alb: 4.1 g/dL / Pro: 7.1 g/dL / ALK PHOS: 84 U/L / ALT: 25 U/L / AST: 25 U/L / GGT: x           PT/INR - ( 23 Jul 2023 21:37 )   PT: 11.4 sec;   INR: 0.98 ratio         PTT - ( 23 Jul 2023 21:37 )  PTT:25.3 sec  Urinalysis Basic - ( 23 Jul 2023 21:37 )    Color: x / Appearance: x / SG: x / pH: x  Gluc: 116 mg/dL / Ketone: x  / Bili: x / Urobili: x   Blood: x / Protein: x / Nitrite: x   Leuk Esterase: x / RBC: x / WBC x   Sq Epi: x / Non Sq Epi: x / Bacteria: x

## 2023-07-24 NOTE — PROVIDER CONTACT NOTE (HYPOGLYCEMIA EVENT) - NS PROVIDER CONTACT BACKGROUND-HYPO
Age: 67y    Gender: Female    POCT Blood Glucose:  213 mg/dL (07-24-23 @ 08:59)  68 mg/dL (07-24-23 @ 08:31)  66 mg/dL (07-24-23 @ 08:28)  270 mg/dL (07-23-23 @ 16:02)      eMAR:  dextrose 50% Injectable   5 milliLiter(s) IV Push (07-24-23 @ 08:44)

## 2023-07-24 NOTE — DISCHARGE NOTE NURSING/CASE MANAGEMENT/SOCIAL WORK - NSSCTYPOFSERV_GEN_ALL_CORE
; Nurse and physical therapist to arrange visit to your home 24-48 hrs after discharge from the hospital

## 2023-07-24 NOTE — PATIENT PROFILE ADULT - NSPROPTRIGHTSUPPORTPERSON_GEN_A_NUR
Niurka Schaeffer 7 hours ago (11:09 PM)         Xifaxan Pending     Insurance response  Prescription Drug Insurance: [employer]  Notes: Prior authorization submitted - will update provider when decision has been made by insurance.            same name as above

## 2023-07-24 NOTE — PROGRESS NOTE ADULT - SUBJECTIVE AND OBJECTIVE BOX
HOSPITAL COURSE: 68yo woman PMH seizure on keppra, CVA on plavix with unknown residual deficit, HLD, HTN p/w worsening and more prolonged episodes of aphasia/blank staring/unresponsiveness over two weeks. The episodes became more frequent and today, per daughter, it lasted for hours. Brought to ED, where she was found to have R facial droop, RUE weakness, dysarthria, and global aphasia. NIHSS 12. CTA showing c/f L MCA occlusion.     7/23 Adm NSCU s/p angiogram showing R M2/MCA occlusion with distal retrograde reconstitution, aspiration thrombectomy attempted without recanalization.   started on midodrine     Allergies    No Known Allergies    REVIEW OF SYSTEMS: [ x] Unable to Assess due to neurologic exam   [ ] All ROS addressed below are non-contributory, except:  Neuro: [ ] Headache [ ] Back pain [ ] Numbness [ ] Weakness [ ] Ataxia [ ] Dizziness [ ] Aphasia [ ] Dysarthria [ ] Visual disturbance  Resp: [ ] Shortness of breath/dyspnea, [ ] Orthopnea [ ] Cough  CV: [ ] Chest pain [ ] Palpitation [ ] Lightheadedness [ ] Syncope  Renal: [ ] Thirst [ ] Edema  GI: [ ] Nausea [ ] Emesis [ ] Abdominal pain [ ] Constipation [ ] Diarrhea  Hem: [ ] Hematemesis [ ] bright red blood per rectum  ID: [ ] Fever [ ] Chills [ ] Dysuria  ENT: [ ] Rhinorrhea    DEVICES:   [ ] Restraints [ ] ET tube [ ] central line [x ] arterial line [ ] gonzalez [ ] NGT/OGT [ ] EVD [ ] LD [ ] FRANTZ/HMV [ ] Trach [ ] PEG [ ] Chest Tube     ICU Vital Signs Last 24 Hrs  T(C): 36.6 (24 Jul 2023 15:00), Max: 36.8 (24 Jul 2023 03:00)  T(F): 97.9 (24 Jul 2023 15:00), Max: 98.3 (24 Jul 2023 03:00)  HR: 89 (24 Jul 2023 19:15) (70 - 89)  BP: 218/86 (24 Jul 2023 17:20) (101/56 - 218/86)  BP(mean): 124 (24 Jul 2023 17:20) (70 - 124)  ABP: 141/51 (24 Jul 2023 19:15) (133/48 - 236/91)  ABP(mean): 83 (24 Jul 2023 19:15) (77 - 150)  RR: 20 (24 Jul 2023 19:15) (10 - 20)  SpO2: 98% (24 Jul 2023 19:15) (93% - 100%)    O2 Parameters below as of 24 Jul 2023 07:00  Patient On (Oxygen Delivery Method): nasal cannula  O2 Flow (L/min): 2    LABS: reviewed    MEDICATIONS  (STANDING):  atorvastatin 80 milliGRAM(s) Oral at bedtime  clopidogrel Tablet 75 milliGRAM(s) Oral daily  enoxaparin Injectable 40 milliGRAM(s) SubCutaneous <User Schedule>  insulin lispro (ADMELOG) corrective regimen sliding scale   SubCutaneous three times a day before meals  levETIRAcetam  IVPB 500 milliGRAM(s) IV Intermittent every 12 hours  midodrine 5 milliGRAM(s) Oral every 8 hours  pantoprazole    Tablet 40 milliGRAM(s) Oral before breakfast  senna 2 Tablet(s) Oral at bedtime    MEDICATIONS  (PRN):    PHYSICAL EXAM:    Constitutional: No Acute Distress     Neurological: Awake, alert oriented to person and place with choices, expressive aphasia, Following Commands, 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, L pupil 3mm reactive, EOMI, No Gaze Preference, Face Symmetrical,   Motor exam:          Upper extremity                         Delt     Bicep     Tricep    HG                                                 R         5/5        5/5       5/5       4+/5                                               L          5/5        5/5        5/5       5/5          Lower extremity                        HF         KF        KE       DF         PF                                                  R       full strength 5/5                                                L         5/5        5/5       5/5       5/5          5/5                                                 Sensation: [s ] intact to light touch  [ ] decreased:     Reflexes: Deep Tendon Reflexes Intact     Pulmonary: Clear to Auscultation, No rales, No rhonchi, No wheezes     Cardiovascular: S1, S2, Regular rate and rhythm     Gastrointestinal: Soft, Non-tender, Non-distended     Extremities: No calf tenderness     Incision:

## 2023-07-24 NOTE — PROGRESS NOTE ADULT - ASSESSMENT
ASSESSMENT/PLAN: chronic M2 occlusion    NEURO:  cont plavix  blood pressure augmentation and Q2 hr neurochecks  MRI  cont keppra home dose, check vEEG negative thus far   stroke core measures, stroke neurology following   Activity: [x] mobilize as tolerated [] Bedrest till 1am groin safeguard in place [x] PT [x] OT [] PMNR    PULM:  incentive spirometry as able  O2sat>92%    CV:  SBP goal 140-180  cont midodrine 5mg tid  off phenylephrine drip   EKG sinus  TTE EF62%  cont atorvastatin  hold all home antihypertensives    RENAL:  Fluids: IVL    GI:  Diet: CCD  GI prophylaxis [] not indicated [x] PPI home med [] other:  Bowel regimen [] colace [x] senna [x] other: miralax     ENDO:   Goal euglycemia (-180)  A1C 8.5  TST WNL  EDISON    HEME/ONC:  VTE prophylaxis: [x] SCDs [x] chemoprophylaxis [] hold chemoprophylaxis due to: [] high risk of DVT/PE on admission due to:    ID: monitor for fever    MISC:    SOCIAL/FAMILY:  [] awaiting [x] updated at bedside--daughters [] family meeting    CODE STATUS:  [x] Full Code [] DNR [] DNI [] Palliative/Comfort Care    DISPOSITION:  [x] ICU [] Stroke Unit [] Floor [] EMU [] RCU [] PCU    [x] Patient is at high risk of neurologic deterioration/death due to: acute stroke, seizures     Contact: [phone]

## 2023-07-24 NOTE — CHART NOTE - NSCHARTNOTEFT_GEN_A_CORE
***Primary Vascular Neurology Chart Note***    Patient seen today with attending and team on rounds. Please also refer to attending addendum on initial admission note dated 7/23/23    Exam stable except: Improved mental status. Daughter reports patient speech mildly slow, otherwise she is close to baseline    Impression: Acute disorientation, aphasia, dysarthria, R facial droop, RLE/RUE drift due to L brain dysfunction possibly from seizure vs TIA vs acute ischemic stroke   Angiogram showed R M2/MCA occlusion with distal retrograde reconstitution, aspiration thrombectomy attempted without recanalization    Plan:  [] vEEG prelim no seizures, continue for total 24 hours  [] ANTITHROMBOTIC THERAPY: Continue home Plavix 75 mg daily   [] MRI B w/o   [x] TTE  [x] A1C 8.5, LDL 59  [x] Atorvastatin 80 mg HS  [] Physical therapy/OT/Speech Language    SBP goal: Gradual Normotension     Other:    Case & Plan discussed with patient and family. All questions answered.    CORE MEASURES:         Admission NIHSS: 12     ICH score: n/a     Ramos and Castellon Score:   n/a     Tenecteplase: [] YES [x] NO     Statin Therapy: [x] YES [] NO     Smoking [] YES [x] NO     Afib [] YES [x] NO     Stroke Education [x] YES [] NO    Dysphagia screen : [] Passed [] Failed- S&S pending [x] Pending    DVT ppx: [x] LMWH ***Primary Vascular Neurology Chart Note***    Patient seen today with attending and team on rounds. Please also refer to attending addendum on initial admission note dated 7/23/23    Exam stable except: Improved mental status. Daughter reports patient speech mildly slow, otherwise she is close to baseline    Impression: Acute disorientation, aphasia, dysarthria, R facial droop, RLE/RUE drift due to L brain dysfunction possibly from seizure vs TIA vs acute ischemic stroke   Angiogram showed R M2/MCA occlusion with distal retrograde reconstitution, aspiration thrombectomy attempted without recanalization    Plan:  [] vEEG prelim no seizures  [] ANTITHROMBOTIC THERAPY: Continue home Plavix 75 mg daily   [] MRI B w/o   [x] TTE  [x] A1C 8.5, LDL 59  [x] Atorvastatin 80 mg HS  [] Physical therapy/OT/Speech Language    SBP goal: Gradual Normotension     Other:    Case & Plan discussed with patient and family. All questions answered.    CORE MEASURES:         Admission NIHSS: 12     ICH score: n/a     Ramos and Castellon Score:   n/a     Tenecteplase: [] YES [x] NO     Statin Therapy: [x] YES [] NO     Smoking [] YES [x] NO     Afib [] YES [x] NO     Stroke Education [x] YES [] NO    Dysphagia screen : [] Passed [] Failed- S&S pending [x] Pending    DVT ppx: [x] LMWH

## 2023-07-24 NOTE — DISCHARGE NOTE NURSING/CASE MANAGEMENT/SOCIAL WORK - PATIENT PORTAL LINK FT
You can access the FollowMyHealth Patient Portal offered by [facility] by registering at the following website: [email or web address]. By joining [facility]’s FollowMyHealth portal, you will also be able to view your health information using other applications (apps) compatible with our system.

## 2023-07-24 NOTE — PHYSICAL THERAPY INITIAL EVALUATION ADULT - ADDITIONAL COMMENTS
Pt lives with daughter in PH [address] and 1 flight to second floor. Pt uses walk in shower. Pt reports independence with all aspects of self care and functional mobility. Per daughter, pt owns and occasionally uses RW. Pt requires some assistance for IADLs.

## 2023-07-24 NOTE — SPEECH LANGUAGE PATHOLOGY EVALUATION - COMMENTS
Code stroke called for AMS within 24h window. 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 Initial NIHSS 12. When asked orientation questions, she stutters an answer ("[age]") and continues speaking nonsense. She follows simple commands with help of daughter speaking Cantonese. 7/24/23 CTA findings concerning for occlusion in L MCA distribution. Taken to IR suite; cerebral angiography demonstrates left M2 occlusion, s/p thrombectomy.  Pt with episode of hypoglycemia.  PT/OT assessments are held today given vEEG however per d/w PA, Pt is cleared for speech language assessment at b/s. Pt self corrected error on clock drawing when spacing numbers however Pt unable to demonstrate time of 2;00. Pt evidenced difficulty with naming items and generating complete sentence during multi scene picture task although Pt did identify two actions of the picture and skills improved with moderate verbal cueing although naming difficulty persisted with some items.  Pt and daughter state Pt's skills have fluctuated pta and appear to be improving today. n/a given Pt is Cantonese speaking n/a

## 2023-07-24 NOTE — PATIENT PROFILE ADULT - FUNCTIONAL ASSESSMENT - BASIC MOBILITY 6.
ANTICOAGULATION MANAGEMENT     Haresh Rock 72 year old male is on warfarin with subtherapeutic INR result. (Goal INR 2.0-3.0)    Recent labs: (last 7 days)     01/15/22  0937   INR 2.2*       ASSESSMENT     Source(s): Chart Review and Patient/Caregiver Call       Warfarin doses taken: Warfarin taken as instructed    Diet: No new diet changes identified    New illness, injury, or hospitalization: No    Medication/supplement changes: None noted    Signs or symptoms of bleeding or clotting: No    Previous INR: Therapeutic last 2(+) visits    Additional findings: Refill needed today     PLAN     Recommended plan for no diet, medication or health factor changes affecting INR     Dosing Instructions: Continue your current warfarin dose with next INR in 1 week       Summary  As of 1/21/2022    Next INR check:               Detailed voice message left for Haresh with dosing instructions and follow up date.   Sent [employer] message with dosing and follow up instructions    Patient to recheck with home meter    Education provided: Please call back if any changes to your diet, medications or how you've been taking warfarin and Contact [phone]  with any changes, questions or concerns.     Plan made per [facility] anticoagulation protocol    Macrina Preston RN  Anticoagulation Clinic  1/21/2022    _______________________________________________________________________     Anticoagulation Episode Summary     Current INR goal:  2.0-3.0   TTR:  61.7 % (11.5 mo)   Target end date:  Indefinite   Send INR reminders to:  CHELSEA CHILEL    Indications    Long-term (current) use of anticoagulants [Z79.01] [Z79.01]  Atrial fibrillation (H) [I48.91]  Antiphospholipid antibody syndrome (H) (Resolved) [D68.61]  Personal history of DVT (deep vein thrombosis) (Resolved) [Z86.718]  Atrial fibrillation  unspecified type (H) (Resolved) [I48.91]  Paroxysmal atrial fibrillation (H) [I48.0]           Comments:  JESSICA okay to manage by exception          Anticoagulation Care Providers     Provider Role Specialty Phone number    Anya Nowak MD Referring Family Medicine [phone]          Macrina Avila RN, BSN, PHN  Anticoagulation Nurse  [phone]     3 = A little assistance

## 2023-07-24 NOTE — SPEECH LANGUAGE PATHOLOGY EVALUATION - SLP PERTINENT HISTORY OF CURRENT PROBLEM
Pt is a 67y (1955) woman with a PMHx significant for CVA (on Plavix), seizure (on Keppra), HLD, HTN, who presents w/ CC of AMS. Patient unable to provide any history. Daughter at bedside assisting w/ history. Patient had stroke in past, but unclear when this happened and if any residual deficits. Per daughter, patient is usually independent, fully oriented, and speaks in normal conversation. However, over the past 2-3 weeks, she has been having 20-30 min episodes of sudden blank staring and unresponsiveness. She will be barely able to speak and the words will be nonsensical.

## 2023-07-24 NOTE — PATIENT PROFILE ADULT - FALL HARM RISK - HARM RISK INTERVENTIONS

## 2023-07-24 NOTE — SPEECH LANGUAGE PATHOLOGY EVALUATION - SLP ORIENTATION
Problem: Wound:  Intervention: Assess ankle, calf, or foot circumference blilaterally  See flowsheet  Intervention: Assess pain status  See flowsheet  Intervention: Assess wound size, appearance and drainage  See flowsheet  Intervention: Assess pedal pulses bilaterally if patient has a foot or leg ulcer  See flowsheet  Intervention: Doppler if unable to palpate pedal pulse  See flowsheet    Goal: Will show signs of wound healing; wound closure and no evidence of infection  Will show signs of wound healing; wound closure and no evidence of infection  Outcome: Ongoing
to self and hospital

## 2023-07-24 NOTE — PHYSICAL THERAPY INITIAL EVALUATION ADULT - TRANSFER TRAINING, PT EVAL
GOAL: Patient will perform sit to stand transfers independently with least restrictive assistive device in 2 weeks with proper hand placement.

## 2023-07-24 NOTE — SPEECH LANGUAGE PATHOLOGY EVALUATION - SLP DIAGNOSIS
Pt presents with mild mixed aphasia as evident with deficits during more complex tasks. Articulatory precision and speech intelligibility judged to be good per translators for Cantonese. Pt's speech is fluent overall with occasional breakdown in setting of word finding difficulty. Pt presents with mild mixed aphasia as evident with deficits during more complex tasks. Articulatory precision and speech intelligibility judged to be good per translators for Cantonese. Pt's speech is fluent overall with occasional breakdown in setting of word finding difficulty. Auditory comprehension deficits noted with more complex y/n question and at paragraph level.  Suspected underlying cognitive deficits suspected as decreased sustained attention noted and repetition of verbal stimuli improved responses. Pt is a 68 y/o female s/p thrombectomy for left M2 occlusion without recanalization who presents with mild mixed aphasia as evident with deficits during more complex tasks per findings with use of  for Cantonese. Articulatory precision and speech intelligibility judged to be good per  and diadochokinetic task. Pt's speech is fluent overall with occasional breakdown in setting of word finding difficulty. Auditory comprehension deficits noted with more complex y/n question and at paragraph level.  Suspected underlying cognitive deficits suspected as decreased sustained attention noted and repetition of verbal stimuli improved responses. Of note, Pt's family report that she has had fluctuation in speech language skills prior to admission and they endorse improvement of her skills during this assessment. Further probing of more complex skills with Cantonese  is recommended during therapy, including reading/writing skills in Cantonese

## 2023-07-24 NOTE — PROVIDER CONTACT NOTE (HYPOGLYCEMIA EVENT) - NS PROVIDER CONTACT RECOMMEND-HYPO
First FS 66, repeat 68. CHELO Ronquillo notified, D50 5mL push ordered and given. Repeat , no insulin given as per ordered.

## 2023-07-24 NOTE — CHART NOTE - NSCHARTNOTEFT_GEN_A_CORE
EEG preliminary read (not final) on the initial recording hour(s) = Approximately 1.5 hr    No epileptiform abnormalities.  Left frontotemporal focal cerebral dysfunction can be structural or functional in etiology.     Final report to follow tomorrow morning after completion of study.    [facility] EEG Reading Room Ph#: [phone]  Epilepsy Answering Service after 5PM and before 8:30AM: Ph#: [phone]

## 2023-07-24 NOTE — PROGRESS NOTE ADULT - ASSESSMENT
ASSESSMENT/PLAN: chronic M2 occlusion    NEURO:  cont plavix  blood pressure augmentation and Q1 hr neurochecks  CT Head in AM  cont keppra home dose, check vEEG given presenting symptomrs  stroke core measures, stroke neurology following   Activity: [] mobilize as tolerated [x] Bedrest till 1am groin safeguard in place [x] PT [x] OT [] PMNR    PULM:  incentive spirometry as able  O2sat>92%    CV:  SBP goal 140-180  phenylephrine prn  start midodrine 5mg tid  EKGsinus  TTE pending  cont atorvastatin  hold all home antihypertensives    RENAL:  Fluids: IVF    GI:  Diet: dysphagia post op and advance diet as tolerated  GI prophylaxis [] not indicated [x] PPI home med [] other:  Bowel regimen [] colace [x] senna [x] other: miralax     ENDO:   Goal euglycemia (-180)  A1C pending  TFT pending   EDISON    HEME/ONC:  VTE prophylaxis: [x] SCDs [x] chemoprophylaxis [] hold chemoprophylaxis due to: [] high risk of DVT/PE on admission due to:    ID: monitor for fever    MISC:    SOCIAL/FAMILY:  [] awaiting [x] updated at bedside--two daughters [] family meeting    CODE STATUS:  [x] Full Code [] DNR [] DNI [] Palliative/Comfort Care    DISPOSITION:  [x] ICU [] Stroke Unit [] Floor [] EMU [] RCU [] PCU    [x] Patient is at high risk of neurologic deterioration/death due to: acute stroke, seizures     Contact: [phone] ASSESSMENT/PLAN: chronic M2 occlusion with recurrent TIAs    NEURO:  cont plavix  blood pressure augmentation and Q1 hr neurochecks  CT Head in AM  cont keppra home dose, check vEEG given presenting symptoms  stroke core measures, stroke neurology following   Activity: [] mobilize as tolerated [x] Bedrest till 1am groin safeguard in place [x] PT [x] OT [] PMNR    PULM:  incentive spirometry as able  O2sat>92%    CV:  SBP goal 140-180  phenylephrine prn  start midodrine 10mg tid  TTE pending  cont atorvastatin  hold all home antihypertensives    RENAL:  Fluids: IVF    GI:  Diet: dysphagia post op and advance diet as tolerated  GI prophylaxis [] not indicated [x] PPI home med [] other:  Bowel regimen [] colace [x] senna [x] other: miralax     ENDO:   Goal euglycemia (-180)  A1C pending  TFT pending   EDISON    HEME/ONC:  VTE prophylaxis: [x] SCDs [x] chemoprophylaxis [] hold chemoprophylaxis due to: [] high risk of DVT/PE on admission due to:    ID: monitor for fever

## 2023-07-25 LAB
ANION GAP SERPL CALC-SCNC: 10 MMOL/L — SIGNIFICANT CHANGE UP (ref 5–17)
BUN SERPL-MCNC: 16 MG/DL — SIGNIFICANT CHANGE UP (ref 7–23)
CALCIUM SERPL-MCNC: 8.5 MG/DL — SIGNIFICANT CHANGE UP (ref 8.4–10.5)
CHLORIDE SERPL-SCNC: 108 MMOL/L — SIGNIFICANT CHANGE UP (ref 96–108)
CO2 SERPL-SCNC: 22 MMOL/L — SIGNIFICANT CHANGE UP (ref 22–31)
CREAT SERPL-MCNC: 0.72 MG/DL — SIGNIFICANT CHANGE UP (ref 0.5–1.3)
EGFR: 91 ML/MIN/1.73M2 — SIGNIFICANT CHANGE UP
GLUCOSE BLDC GLUCOMTR-MCNC: 116 MG/DL — HIGH (ref 70–99)
GLUCOSE BLDC GLUCOMTR-MCNC: 121 MG/DL — HIGH (ref 70–99)
GLUCOSE BLDC GLUCOMTR-MCNC: 143 MG/DL — HIGH (ref 70–99)
GLUCOSE SERPL-MCNC: 146 MG/DL — HIGH (ref 70–99)
HCT VFR BLD CALC: 28.8 % — LOW (ref 34.5–45)
HGB BLD-MCNC: 8.9 G/DL — LOW (ref 11.5–15.5)
MAGNESIUM SERPL-MCNC: 2.3 MG/DL — SIGNIFICANT CHANGE UP (ref 1.6–2.6)
MCHC RBC-ENTMCNC: 20.6 PG — LOW (ref 27–34)
MCHC RBC-ENTMCNC: 30.9 GM/DL — LOW (ref 32–36)
MCV RBC AUTO: 66.7 FL — LOW (ref 80–100)
NRBC # BLD: 0 /100 WBCS — SIGNIFICANT CHANGE UP (ref 0–0)
PHOSPHATE SERPL-MCNC: 3.5 MG/DL — SIGNIFICANT CHANGE UP (ref 2.5–4.5)
PLATELET # BLD AUTO: 231 K/UL — SIGNIFICANT CHANGE UP (ref 150–400)
POTASSIUM SERPL-MCNC: 3.6 MMOL/L — SIGNIFICANT CHANGE UP (ref 3.5–5.3)
POTASSIUM SERPL-SCNC: 3.6 MMOL/L — SIGNIFICANT CHANGE UP (ref 3.5–5.3)
RBC # BLD: 4.32 M/UL — SIGNIFICANT CHANGE UP (ref 3.8–5.2)
RBC # FLD: 15.7 % — HIGH (ref 10.3–14.5)
SODIUM SERPL-SCNC: 140 MMOL/L — SIGNIFICANT CHANGE UP (ref 135–145)
WBC # BLD: 7.05 K/UL — SIGNIFICANT CHANGE UP (ref 3.8–10.5)
WBC # FLD AUTO: 7.05 K/UL — SIGNIFICANT CHANGE UP (ref 3.8–10.5)

## 2023-07-25 PROCEDURE — 70551 MRI BRAIN STEM W/O DYE: CPT | Mod: 26

## 2023-07-25 PROCEDURE — 99233 SBSQ HOSP IP/OBS HIGH 50: CPT

## 2023-07-25 RX ORDER — MIDODRINE HYDROCHLORIDE 2.5 MG/1
5 TABLET ORAL
Refills: 0 | Status: DISCONTINUED | OUTPATIENT
Start: 2023-07-25 | End: 2023-07-25

## 2023-07-25 RX ORDER — POTASSIUM CHLORIDE 20 MEQ
40 PACKET (EA) ORAL ONCE
Refills: 0 | Status: COMPLETED | OUTPATIENT
Start: 2023-07-25 | End: 2023-07-25

## 2023-07-25 RX ORDER — POLYETHYLENE GLYCOL 3350 17 G/17G
17 POWDER, FOR SOLUTION ORAL
Refills: 0 | Status: DISCONTINUED | OUTPATIENT
Start: 2023-07-25 | End: 2023-08-02

## 2023-07-25 RX ORDER — CHLORHEXIDINE GLUCONATE 213 G/1000ML
1 SOLUTION TOPICAL DAILY
Refills: 0 | Status: DISCONTINUED | OUTPATIENT
Start: 2023-07-25 | End: 2023-08-02

## 2023-07-25 RX ORDER — HYDRALAZINE HCL 50 MG
5 TABLET ORAL ONCE
Refills: 0 | Status: COMPLETED | OUTPATIENT
Start: 2023-07-25 | End: 2023-07-25

## 2023-07-25 RX ORDER — ACETAMINOPHEN 500 MG
650 TABLET ORAL EVERY 6 HOURS
Refills: 0 | Status: DISCONTINUED | OUTPATIENT
Start: 2023-07-25 | End: 2023-08-02

## 2023-07-25 RX ORDER — PHENYLEPHRINE HYDROCHLORIDE 10 MG/ML
0.1 INJECTION INTRAVENOUS
Qty: 40 | Refills: 0 | Status: DISCONTINUED | OUTPATIENT
Start: 2023-07-25 | End: 2023-07-28

## 2023-07-25 RX ORDER — MIDODRINE HYDROCHLORIDE 2.5 MG/1
7.5 TABLET ORAL
Refills: 0 | Status: DISCONTINUED | OUTPATIENT
Start: 2023-07-25 | End: 2023-07-26

## 2023-07-25 RX ORDER — PHENYLEPHRINE HYDROCHLORIDE 10 MG/ML
0.1 INJECTION INTRAVENOUS
Qty: 40 | Refills: 0 | Status: DISCONTINUED | OUTPATIENT
Start: 2023-07-25 | End: 2023-07-25

## 2023-07-25 RX ADMIN — MIDODRINE HYDROCHLORIDE 7.5 MILLIGRAM(S): 2.5 TABLET ORAL at 22:18

## 2023-07-25 RX ADMIN — MIDODRINE HYDROCHLORIDE 5 MILLIGRAM(S): 2.5 TABLET ORAL at 17:19

## 2023-07-25 RX ADMIN — PANTOPRAZOLE SODIUM 40 MILLIGRAM(S): 20 TABLET, DELAYED RELEASE ORAL at 07:05

## 2023-07-25 RX ADMIN — POLYETHYLENE GLYCOL 3350 17 GRAM(S): 17 POWDER, FOR SOLUTION ORAL at 17:19

## 2023-07-25 RX ADMIN — LEVETIRACETAM 500 MILLIGRAM(S): 250 TABLET, FILM COATED ORAL at 05:11

## 2023-07-25 RX ADMIN — Medication 5 MILLIGRAM(S): at 20:00

## 2023-07-25 RX ADMIN — MIDODRINE HYDROCHLORIDE 5 MILLIGRAM(S): 2.5 TABLET ORAL at 11:27

## 2023-07-25 RX ADMIN — ATORVASTATIN CALCIUM 80 MILLIGRAM(S): 80 TABLET, FILM COATED ORAL at 22:18

## 2023-07-25 RX ADMIN — PHENYLEPHRINE HYDROCHLORIDE 2.13 MICROGRAM(S)/KG/MIN: 10 INJECTION INTRAVENOUS at 22:19

## 2023-07-25 RX ADMIN — Medication 40 MILLIEQUIVALENT(S): at 05:11

## 2023-07-25 RX ADMIN — ENOXAPARIN SODIUM 40 MILLIGRAM(S): 100 INJECTION SUBCUTANEOUS at 17:19

## 2023-07-25 RX ADMIN — Medication 650 MILLIGRAM(S): at 22:00

## 2023-07-25 RX ADMIN — CHLORHEXIDINE GLUCONATE 1 APPLICATION(S): 213 SOLUTION TOPICAL at 11:29

## 2023-07-25 RX ADMIN — LEVETIRACETAM 500 MILLIGRAM(S): 250 TABLET, FILM COATED ORAL at 17:19

## 2023-07-25 RX ADMIN — MIDODRINE HYDROCHLORIDE 5 MILLIGRAM(S): 2.5 TABLET ORAL at 05:11

## 2023-07-25 RX ADMIN — CLOPIDOGREL BISULFATE 75 MILLIGRAM(S): 75 TABLET, FILM COATED ORAL at 11:27

## 2023-07-25 RX ADMIN — SENNA PLUS 2 TABLET(S): 8.6 TABLET ORAL at 22:19

## 2023-07-25 RX ADMIN — Medication 650 MILLIGRAM(S): at 22:30

## 2023-07-25 NOTE — OCCUPATIONAL THERAPY INITIAL EVALUATION ADULT - DIAGNOSIS, OT EVAL
Pt p/w impaired balance, strength, endurance, cognition impacting independence with ADLs and functional mobility.

## 2023-07-25 NOTE — OCCUPATIONAL THERAPY INITIAL EVALUATION ADULT - PERTINENT HX OF CURRENT PROBLEM, REHAB EVAL
67y F with a PMHx significant for CVA (on Plavix), seizure (on Keppra), HLD, HTN, who presents w/ CC of AMS. Patient unable to provide any history. Daughter at bedside assisting w/ history. Patient had stroke in past, but unclear when this happened and if any residual deficits. Per daughter, patient is usually independent, fully oriented, and speaks in normal conversation. However, over the past 2-3 weeks, she has been having 20-30 min episodes of sudden blank staring and unresponsiveness. She will be barely able to speak and the words will be nonsensical. She does not have generalized shaking, incontinence, or tongue biting. She comes back to her baseline afterwards. These episodes have generally occurred every other day. Daughter became concerned when she had episode starting at noon today that persisted for hours. Brought to ED for further evaluation. Code stroke called for AMS within 24h window. 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 Initial NIHSS 12. When asked orientation questions, she stutters an answer ("[age]") and continues speaking nonsense. She follows simple commands with help of daughter speaking Cantonese. After discussions with stroke/neuro-IR fellows and re-assessment, CTA showing concerning occlusion in L MCA distribution. Although NIHSS improving to 5 (mild dysarthria, aphasia, droop, RLE/RUE drift), patient taken for diagnostic cerebral angiogram. Family aware of diagnosis and provided written consent to plan.7/23 Adm NSCU s/p angiogram showing R M2/MCA occlusion with distal retrograde reconstitution, aspiration thrombectomy attempted without recanalization. CT Brain 7/23: No acute hemorrhage or midline shift. CT angiography neck: No hemodynamically significant stenosis by NASCET criteria. No vascular dissection. CT angiography brain: No major vessel occlusion or proximal stenosis. 2 mm left M1 bifurcation aneurysm CT perfusion: Approximately 7 mL of ischemic penumbra is seen in the left frontoparietal cortex.
67F with a PMHx significant for CVA (on Plavix), seizure (on Keppra), HLD, HTN, who presents w/ CC of AMS. Patient unable to provide any history. Daughter at bedside assisting w/ history. Patient had stroke in past, but unclear when this happened and if any residual deficits. Per daughter, patient is usually independent, fully oriented, and speaks in normal conversation. However, over the past 2-3 weeks, she has been having 20-30 min episodes of sudden blank staring and unresponsiveness. She will be barely able to speak and the words will be nonsensical. She does not have generalized shaking, incontinence, or tongue biting. She comes back to her baseline afterwards. These episodes have generally occurred every other day. Daughter became concerned when she had episode starting at noon today that persisted for hours. Brought to ED for further evaluation. Code stroke called for AMS within 24h window. 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 Initial NIHSS 12. When asked orientation questions, she stutters an answer ("[age]") and continues speaking nonsense. She follows simple commands with help of daughter speaking Cantonese. Home medications are: Plavix, amlodipine-ibresartan, metoprolol, rosuvastatin, Ceumid (aka Keppra), omeprezole, jarinu, folic acid, B complex, ferrous sulfate. After discussions with stroke/neuro-IR fellows and re-assessment, CTA showing concerning occlusion in L MCA distribution. Although NIHSS improving to 5 (mild dysarthria, aphasia, droop, RLE/RUE drift), patient taken for diagnostic cerebral angiogram. Family aware of diagnosis and provided written consent to plan. MRI HEAD (7/25): Acute left subinsular and scattered left frontal and parietal white matter infarcts in the distribution of the left middle cerebral artery. Chronic left parietal/occipital infarct. CT HEAD (7/24): No acute intracranial hemorrhage CTA BRAIN (7/24): No major vessel occlusion or proximal stenosis. 2 mm left M1 bifurcation aneurysm. s/p angiogram 7/24 showing R M2/MCA occlusion with distal retrograde reconstitution, aspiration thrombectomy attempted without recanalization.

## 2023-07-25 NOTE — PROGRESS NOTE ADULT - SUBJECTIVE AND OBJECTIVE BOX
HOSPITAL COURSE: 68yo woman PMH seizure on keppra, CVA on plavix with unknown residual deficit, HLD, HTN p/w worsening and more prolonged episodes of aphasia/blank staring/unresponsiveness over two weeks. The episodes became more frequent and today, per daughter, it lasted for hours. Brought to ED, where she was found to have R facial droop, RUE weakness, dysarthria, and global aphasia. NIHSS 12. CTA showing c/f L MCA occlusion.     7/23 Adm NSCU s/p angiogram showing R M2/MCA occlusion with distal retrograde reconstitution, aspiration thrombectomy attempted without recanalization.   7/24: no acute overnight events  7/25:     Admission Scores  GCS:   HH:   MF:   NIHSS: 12  RASS:    CAM-ICU:   ICH:    Allergies    No Known Allergies    REVIEW OF SYSTEMS: [ x] Unable to Assess due to neurologic exam   [ ] All ROS addressed below are non-contributory, except:  Neuro: [ ] Headache [ ] Back pain [ ] Numbness [ ] Weakness [ ] Ataxia [ ] Dizziness [ ] Aphasia [ ] Dysarthria [ ] Visual disturbance  Resp: [ ] Shortness of breath/dyspnea, [ ] Orthopnea [ ] Cough  CV: [ ] Chest pain [ ] Palpitation [ ] Lightheadedness [ ] Syncope  Renal: [ ] Thirst [ ] Edema  GI: [ ] Nausea [ ] Emesis [ ] Abdominal pain [ ] Constipation [ ] Diarrhea  Hem: [ ] Hematemesis [ ] bright red blood per rectum  ID: [ ] Fever [ ] Chills [ ] Dysuria  ENT: [ ] Rhinorrhea      DEVICES:   [ ] Restraints [ ] ET tube [ ] central line [ ] arterial line [ ] gonzalez [ ] NGT/OGT [ ] EVD [ ] LD [ ] FRANTZ/HMV [ ] Trach [ ] PEG [ ] Chest Tube     ICU Vital Signs Last 24 Hrs  T(C): 37 (24 Jul 2023 23:00), Max: 37.1 (24 Jul 2023 19:00)  T(F): 98.6 (24 Jul 2023 23:00), Max: 98.8 (24 Jul 2023 19:00)  HR: 81 (25 Jul 2023 06:00) (72 - 89)  BP: 172/80 (25 Jul 2023 06:00) (130/63 - 218/86)  BP(mean): 108 (25 Jul 2023 06:00) (84 - 124)  ABP: 178/67 (25 Jul 2023 06:00) (129/55 - 236/91)  ABP(mean): 108 (25 Jul 2023 06:00) (77 - 150)  RR: 17 (25 Jul 2023 06:00) (10 - 20)  SpO2: 95% (25 Jul 2023 02:00) (93% - 100%)    I&O's Detail    24 Jul 2023 07:01  -  25 Jul 2023 07:00  --------------------------------------------------------  IN:    IV PiggyBack: 100 mL    Oral Fluid: 1680 mL    Phenylephrine: 6.3 mL    sodium chloride 0.9%: 350 mL    Sodium Chloride 0.9% Bolus: 750 mL  Total IN: 2886.3 mL    OUT:    Incontinent per Collection Bag (mL): 1770 mL    Phenylephrine: 0 mL    Voided (mL): 500 mL  Total OUT: 2270 mL    Total NET: 616.3 mL      MEDICATIONS  (STANDING):  atorvastatin 80 milliGRAM(s) Oral at bedtime  clopidogrel Tablet 75 milliGRAM(s) Oral daily  enoxaparin Injectable 40 milliGRAM(s) SubCutaneous <User Schedule>  insulin lispro (ADMELOG) corrective regimen sliding scale   SubCutaneous three times a day before meals  levETIRAcetam 500 milliGRAM(s) Oral two times a day  midodrine 5 milliGRAM(s) Oral <User Schedule>  pantoprazole    Tablet 40 milliGRAM(s) Oral before breakfast  phenylephrine    Infusion 0.1 MICROgram(s)/kG/Min (2.13 mL/Hr) IV Continuous <Continuous>  senna 2 Tablet(s) Oral at bedtime    MEDICATIONS  (PRN):    IMAGING:  CTP 7/23  IMPRESSION:    CT angiography neck: No hemodynamically significant stenosis byNASCET   criteria. No vascular dissection.    CT angiography brain: No major vessel occlusion or proximal stenosis. 2   mm left M1 bifurcation aneurysm    CT perfusion: Approximately 7 mL of ischemic penumbra is seen in the left   frontoparietal cortex.      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, L pupil 3mm reactive, EOMI, No Gaze Preference, Face Symmetrical,   Motor exam:          Upper extremity                         Delt     Bicep     Tricep    HG                                                 R         5/5        5/5        5/5       5/5                                               L          5/5        5/5        5/5       5/5          Lower extremity                        HF         KF        KE       DF         PF                                                  R       no tested, safeguard in place, distally  4+/5                                               L         5/5        5/5       5/5       5/5          5/5                                                 Sensation: [s ] intact to light touch  [ ] decreased:     Reflexes: Deep Tendon Reflexes Intact     Pulmonary: Clear to Auscultation, No rales, No rhonchi, No wheezes     Cardiovascular: S1, S2, Regular rate and rhythm     Gastrointestinal: Soft, Non-tender, Non-distended     Extremities: No calf tenderness       LABS:                          8.9    7.05  )-----------( 231      ( 25 Jul 2023 02:14 )             28.8     07-25    140  |  108  |  16  ----------------------------<  146<H>  3.6   |  22  |  0.72    Ca    8.5      25 Jul 2023 02:14  Phos  3.5     07-25  Mg     2.3     07-25    TPro  7.1  /  Alb  4.1  /  TBili  0.2  /  DBili  x   /  AST  25  /  ALT  25  /  AlkPhos  84  07-23    LIVER FUNCTIONS - ( 23 Jul 2023 21:37 )  Alb: 4.1 g/dL / Pro: 7.1 g/dL / ALK PHOS: 84 U/L / ALT: 25 U/L / AST: 25 U/L / GGT: x           PT/INR - ( 23 Jul 2023 21:37 )   PT: 11.4 sec;   INR: 0.98 ratio         PTT - ( 23 Jul 2023 21:37 )  PTT:25.3 sec  Urinalysis Basic - ( 25 Jul 2023 02:14 )    Color: x / Appearance: x / SG: x / pH: x  Gluc: 146 mg/dL / Ketone: x  / Bili: x / Urobili: x   Blood: x / Protein: x / Nitrite: x   Leuk Esterase: x / RBC: x / WBC x   Sq Epi: x / Non Sq Epi: x / Bacteria: x             HOSPITAL COURSE: 66yo woman PMH seizure on keppra, CVA on plavix with unknown residual deficit, HLD, HTN p/w worsening and more prolonged episodes of aphasia/blank staring/unresponsiveness over two weeks. The episodes became more frequent and today, per daughter, it lasted for hours. Brought to ED, where she was found to have R facial droop, RUE weakness, dysarthria, and global aphasia. NIHSS 12. CTA showing c/f L MCA occlusion.     7/23 Adm NSCU s/p angiogram showing R M2/MCA occlusion with distal retrograde reconstitution, aspiration thrombectomy attempted without recanalization.   7/24: no acute overnight events  7/25: Aphasic when SBP dropped to 110 overnight, daughter reports slowed speech this morning     Admission Scores  GCS:   HH:   MF:   NIHSS: 12  RASS:    CAM-ICU:   ICH:    Allergies    No Known Allergies    REVIEW OF SYSTEMS: [ x] Unable to Assess due to neurologic exam   [ ] All ROS addressed below are non-contributory, except:  Neuro: [ ] Headache [ ] Back pain [ ] Numbness [ ] Weakness [ ] Ataxia [ ] Dizziness [ ] Aphasia [ ] Dysarthria [ ] Visual disturbance  Resp: [ ] Shortness of breath/dyspnea, [ ] Orthopnea [ ] Cough  CV: [ ] Chest pain [ ] Palpitation [ ] Lightheadedness [ ] Syncope  Renal: [ ] Thirst [ ] Edema  GI: [ ] Nausea [ ] Emesis [ ] Abdominal pain [ ] Constipation [ ] Diarrhea  Hem: [ ] Hematemesis [ ] bright red blood per rectum  ID: [ ] Fever [ ] Chills [ ] Dysuria  ENT: [ ] Rhinorrhea      DEVICES:   [ ] Restraints [ ] ET tube [ ] central line [ ] arterial line [ ] gonzalez [ ] NGT/OGT [ ] EVD [ ] LD [ ] FRANTZ/HMV [ ] Trach [ ] PEG [ ] Chest Tube     ICU Vital Signs Last 24 Hrs  T(C): 37 (24 Jul 2023 23:00), Max: 37.1 (24 Jul 2023 19:00)  T(F): 98.6 (24 Jul 2023 23:00), Max: 98.8 (24 Jul 2023 19:00)  HR: 81 (25 Jul 2023 06:00) (72 - 89)  BP: 172/80 (25 Jul 2023 06:00) (130/63 - 218/86)  BP(mean): 108 (25 Jul 2023 06:00) (84 - 124)  ABP: 178/67 (25 Jul 2023 06:00) (129/55 - 236/91)  ABP(mean): 108 (25 Jul 2023 06:00) (77 - 150)  RR: 17 (25 Jul 2023 06:00) (10 - 20)  SpO2: 95% (25 Jul 2023 02:00) (93% - 100%)    I&O's Detail    24 Jul 2023 07:01  -  25 Jul 2023 07:00  --------------------------------------------------------  IN:    IV PiggyBack: 100 mL    Oral Fluid: 1680 mL    Phenylephrine: 6.3 mL    sodium chloride 0.9%: 350 mL    Sodium Chloride 0.9% Bolus: 750 mL  Total IN: 2886.3 mL    OUT:    Incontinent per Collection Bag (mL): 1770 mL    Phenylephrine: 0 mL    Voided (mL): 500 mL  Total OUT: 2270 mL    Total NET: 616.3 mL      MEDICATIONS  (STANDING):  atorvastatin 80 milliGRAM(s) Oral at bedtime  clopidogrel Tablet 75 milliGRAM(s) Oral daily  enoxaparin Injectable 40 milliGRAM(s) SubCutaneous <User Schedule>  insulin lispro (ADMELOG) corrective regimen sliding scale   SubCutaneous three times a day before meals  levETIRAcetam 500 milliGRAM(s) Oral two times a day  midodrine 5 milliGRAM(s) Oral <User Schedule>  pantoprazole    Tablet 40 milliGRAM(s) Oral before breakfast  phenylephrine    Infusion 0.1 MICROgram(s)/kG/Min (2.13 mL/Hr) IV Continuous <Continuous>  senna 2 Tablet(s) Oral at bedtime    MEDICATIONS  (PRN):    IMAGING:  CTP 7/23  IMPRESSION:    CT angiography neck: No hemodynamically significant stenosis byNASCET   criteria. No vascular dissection.    CT angiography brain: No major vessel occlusion or proximal stenosis. 2   mm left M1 bifurcation aneurysm    CT perfusion: Approximately 7 mL of ischemic penumbra is seen in the left   frontoparietal cortex.      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, L pupil 3mm reactive, EOMI, No Gaze Preference, Face Symmetrical,   Motor exam:          Upper extremity                         Delt     Bicep     Tricep    HG                                                 R         5/5        5/5        5/5       5/5                                               L          5/5        5/5        5/5       5/5          Lower extremity                        HF         KF        KE       DF         PF                                                  R       no tested, safeguard in place, distally  4+/5                                               L         5/5        5/5       5/5       5/5          5/5                                                 Sensation: [s ] intact to light touch  [ ] decreased:     Reflexes: Deep Tendon Reflexes Intact     Pulmonary: Clear to Auscultation, No rales, No rhonchi, No wheezes     Cardiovascular: S1, S2, Regular rate and rhythm     Gastrointestinal: Soft, Non-tender, Non-distended     Extremities: No calf tenderness       LABS:                          8.9    7.05  )-----------( 231      ( 25 Jul 2023 02:14 )             28.8     07-25    140  |  108  |  16  ----------------------------<  146<H>  3.6   |  22  |  0.72    Ca    8.5      25 Jul 2023 02:14  Phos  3.5     07-25  Mg     2.3     07-25    TPro  7.1  /  Alb  4.1  /  TBili  0.2  /  DBili  x   /  AST  25  /  ALT  25  /  AlkPhos  84  07-23    LIVER FUNCTIONS - ( 23 Jul 2023 21:37 )  Alb: 4.1 g/dL / Pro: 7.1 g/dL / ALK PHOS: 84 U/L / ALT: 25 U/L / AST: 25 U/L / GGT: x           PT/INR - ( 23 Jul 2023 21:37 )   PT: 11.4 sec;   INR: 0.98 ratio         PTT - ( 23 Jul 2023 21:37 )  PTT:25.3 sec  Urinalysis Basic - ( 25 Jul 2023 02:14 )    Color: x / Appearance: x / SG: x / pH: x  Gluc: 146 mg/dL / Ketone: x  / Bili: x / Urobili: x   Blood: x / Protein: x / Nitrite: x   Leuk Esterase: x / RBC: x / WBC x   Sq Epi: x / Non Sq Epi: x / Bacteria: x             HOSPITAL COURSE: 66yo woman PMH seizure on keppra, CVA on plavix with unknown residual deficit, HLD, HTN p/w worsening and more prolonged episodes of aphasia/blank staring/unresponsiveness over two weeks. The episodes became more frequent and today, per daughter, it lasted for hours. Brought to ED, where she was found to have R facial droop, RUE weakness, dysarthria, and global aphasia. NIHSS 12. CTA showing c/f L MCA occlusion.     7/23 Adm NSCU s/p angiogram showing R M2/MCA occlusion with distal retrograde reconstitution, aspiration thrombectomy attempted without recanalization.   7/24: no acute overnight events  7/25: Aphasic when SBP dropped to 110 overnight, daughter reports slowed speech this morning     Admission Scores  GCS:   HH:   MF:   NIHSS: 12  RASS:    CAM-ICU:   ICH:    Allergies    No Known Allergies    REVIEW OF SYSTEMS: [ x] Unable to Assess due to neurologic exam   [ ] All ROS addressed below are non-contributory, except:  Neuro: [ ] Headache [ ] Back pain [ ] Numbness [ ] Weakness [ ] Ataxia [ ] Dizziness [ ] Aphasia [ ] Dysarthria [ ] Visual disturbance  Resp: [ ] Shortness of breath/dyspnea, [ ] Orthopnea [ ] Cough  CV: [ ] Chest pain [ ] Palpitation [ ] Lightheadedness [ ] Syncope  Renal: [ ] Thirst [ ] Edema  GI: [ ] Nausea [ ] Emesis [ ] Abdominal pain [ ] Constipation [ ] Diarrhea  Hem: [ ] Hematemesis [ ] bright red blood per rectum  ID: [ ] Fever [ ] Chills [ ] Dysuria  ENT: [ ] Rhinorrhea      DEVICES:   [ ] Restraints [ ] ET tube [ ] central line [ ] arterial line [ ] gonzalez [ ] NGT/OGT [ ] EVD [ ] LD [ ] FRANTZ/HMV [ ] Trach [ ] PEG [ ] Chest Tube     ICU Vital Signs Last 24 Hrs  T(C): 37 (24 Jul 2023 23:00), Max: 37.1 (24 Jul 2023 19:00)  T(F): 98.6 (24 Jul 2023 23:00), Max: 98.8 (24 Jul 2023 19:00)  HR: 81 (25 Jul 2023 06:00) (72 - 89)  BP: 172/80 (25 Jul 2023 06:00) (130/63 - 218/86)  BP(mean): 108 (25 Jul 2023 06:00) (84 - 124)  ABP: 178/67 (25 Jul 2023 06:00) (129/55 - 236/91)  ABP(mean): 108 (25 Jul 2023 06:00) (77 - 150)  RR: 17 (25 Jul 2023 06:00) (10 - 20)  SpO2: 95% (25 Jul 2023 02:00) (93% - 100%)    I&O's Detail    24 Jul 2023 07:01  -  25 Jul 2023 07:00  --------------------------------------------------------  IN:    IV PiggyBack: 100 mL    Oral Fluid: 1680 mL    Phenylephrine: 6.3 mL    sodium chloride 0.9%: 350 mL    Sodium Chloride 0.9% Bolus: 750 mL  Total IN: 2886.3 mL    OUT:    Incontinent per Collection Bag (mL): 1770 mL    Phenylephrine: 0 mL    Voided (mL): 500 mL  Total OUT: 2270 mL    Total NET: 616.3 mL      MEDICATIONS  (STANDING):  atorvastatin 80 milliGRAM(s) Oral at bedtime  clopidogrel Tablet 75 milliGRAM(s) Oral daily  enoxaparin Injectable 40 milliGRAM(s) SubCutaneous <User Schedule>  insulin lispro (ADMELOG) corrective regimen sliding scale   SubCutaneous three times a day before meals  levETIRAcetam 500 milliGRAM(s) Oral two times a day  midodrine 5 milliGRAM(s) Oral <User Schedule>  pantoprazole    Tablet 40 milliGRAM(s) Oral before breakfast  phenylephrine    Infusion 0.1 MICROgram(s)/kG/Min (2.13 mL/Hr) IV Continuous <Continuous>  senna 2 Tablet(s) Oral at bedtime    MEDICATIONS  (PRN):    IMAGING:  < from: MR Head No Cont (07.25.23 @ 08:33) >  Acute left subinsular and scattered left frontal and parietal white   matter infarcts in the distribution of the left middle cerebral artery.  Chronic left parietal/occipital infarct.    < end of copied text >        CTP 7/23  IMPRESSION:    CT angiography neck: No hemodynamically significant stenosis byNASCET   criteria. No vascular dissection.    CT angiography brain: No major vessel occlusion or proximal stenosis. 2   mm left M1 bifurcation aneurysm    CT perfusion: Approximately 7 mL of ischemic penumbra is seen in the left   frontoparietal cortex.      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, L pupil 3mm reactive, EOMI, No Gaze Preference, Face Symmetrical,   Motor exam:          Upper extremity                         Delt     Bicep     Tricep    HG                                                 R         5/5        5/5        5/5       5/5                                               L          5/5        5/5        5/5       5/5          Lower extremity                        HF         KF        KE       DF         PF                                                  R       no tested, safeguard in place, distally  4+/5                                               L         5/5        5/5       5/5       5/5          5/5                                                 Sensation: [s ] intact to light touch  [ ] decreased:     Reflexes: Deep Tendon Reflexes Intact     Pulmonary: Clear to Auscultation, No rales, No rhonchi, No wheezes     Cardiovascular: S1, S2, Regular rate and rhythm     Gastrointestinal: Soft, Non-tender, Non-distended     Extremities: No calf tenderness       LABS:                          8.9    7.05  )-----------( 231      ( 25 Jul 2023 02:14 )             28.8     07-25    140  |  108  |  16  ----------------------------<  146<H>  3.6   |  22  |  0.72    Ca    8.5      25 Jul 2023 02:14  Phos  3.5     07-25  Mg     2.3     07-25    TPro  7.1  /  Alb  4.1  /  TBili  0.2  /  DBili  x   /  AST  25  /  ALT  25  /  AlkPhos  84  07-23    LIVER FUNCTIONS - ( 23 Jul 2023 21:37 )  Alb: 4.1 g/dL / Pro: 7.1 g/dL / ALK PHOS: 84 U/L / ALT: 25 U/L / AST: 25 U/L / GGT: x           PT/INR - ( 23 Jul 2023 21:37 )   PT: 11.4 sec;   INR: 0.98 ratio         PTT - ( 23 Jul 2023 21:37 )  PTT:25.3 sec  Urinalysis Basic - ( 25 Jul 2023 02:14 )    Color: x / Appearance: x / SG: x / pH: x  Gluc: 146 mg/dL / Ketone: x  / Bili: x / Urobili: x   Blood: x / Protein: x / Nitrite: x   Leuk Esterase: x / RBC: x / WBC x   Sq Epi: x / Non Sq Epi: x / Bacteria: x

## 2023-07-25 NOTE — EEG REPORT - NS EEG TEXT BOX
GABY HARDY [id number]     Study Date: 07/24/23 10:25 - 07/25/23 07:47 (EEG disconnected at that time)  Duration: 21 hr 12 min  --------------------------------------------------------------------------------------------------  History:  CC/ HPI Patient is a 67y old  Female who presents with a chief complaint of stroke (25 Jul 2023 07:03)    MEDICATIONS  (STANDING):  atorvastatin 80 milliGRAM(s) Oral at bedtime  chlorhexidine 4% Liquid 1 Application(s) Topical daily  clopidogrel Tablet 75 milliGRAM(s) Oral daily  enoxaparin Injectable 40 milliGRAM(s) SubCutaneous <User Schedule>  insulin lispro (ADMELOG) corrective regimen sliding scale   SubCutaneous three times a day before meals  levETIRAcetam 500 milliGRAM(s) Oral two times a day  midodrine 5 milliGRAM(s) Oral <User Schedule>  phenylephrine    Infusion 0.1 MICROgram(s)/kG/Min (2.13 mL/Hr) IV Continuous <Continuous>  polyethylene glycol 3350 17 Gram(s) Oral two times a day  senna 2 Tablet(s) Oral at bedtime    --------------------------------------------------------------------------------------------------  Study Interpretation:    [[[Abbreviation Key:  PDR=alpha rhythm/posterior dominant rhythm. A-P=anterior posterior.  Amplitude: ‘very low’:<20; ‘low’:20-49; ‘medium’:; ‘high’:>150uV.  Persistence for periodic/rhythmic patterns (% of epoch) ‘rare’:<1%; ‘occasional’:1-10%; ‘frequent’:10-50%; ‘abundant’:50-90%; ‘continuous’:>90%.  Persistence for sporadic discharges: ‘rare’:<1/hr; ‘occasional’:1/min-1/hr; ‘frequent’:>1/min; ‘abundant’:>1/10 sec.  RPP=rhythmic and periodic patterns; GRDA=generalized rhythmic delta activity; FIRDA=frontal intermittent GRDA; LRDA=lateralized rhythmic delta activity; TIRDA=temporal intermittent rhythmic delta activity;  LPD=PLED=lateralized periodic discharges; GPD=generalized periodic discharges; BIPDs =bilateral independent periodic discharges; Mf=multifocal; SIRPDs=stimulus induced rhythmic, periodic, or ictal appearing discharges; BIRDs=brief potentially ictal rhythmic discharges >4 Hz, lasting .5-10s; PFA (paroxysmal bursts >13 Hz or =8 Hz <10s).  Modifiers: +F=with fast component; +S=with spike component; +R=with rhythmic component.  S-B=burst suppression pattern.  Max=maximal. N1-drowsy; N2-stage II sleep; N3-slow wave sleep. SSS/BETS=small sharp spikes/benign epileptiform transients of sleep. HV=hyperventilation; PS=photic stimulation]]]    Daily EEG Visual Analysis    FINDINGS:      Background:  Continuity: Continuous  Symmetry: Asymmetric  Posterior dominant rhythm (PDR): 9 Hz, reactive to eye closure. Symmetric low-amplitude frontal beta in wakefulness.  State Change: Present  Voltage: Normal  Anterior Posterior Gradient: Present  Other background findings: None  Breach: Absent    Background Slowing:  Generalized slowing: None  Focal slowing: Abundant left frontotemporal focal polymorphic delta slowing    State Changes:   Drowsiness is characterized by fragmentation, attenuation, and slowing of the background activity.  Stage 2 sleep is characterized by symmetric vertex waves, K complexes and sleep spindles.     Interictal Findings:  None    Electrographic and Electroclinical seizures:  None    Other Clinical Events:  None    Activation Procedures:   Hyperventilation is not performed.    Photic stimulation is not performed.    Artifacts:  Intermittent myogenic and movement artifacts are present.    EKG:  Single-lead EKG shows regular rhythm with occasional premature complexes.    EEG Classification / Summary:  Abnormal EEG in the awake, drowsy, and asleep states.   Abundant left frontotemporal focal slowing.  No epileptiform abnormalities are captured.     Clinical Impression:  Left frontotemporal focal cerebral dysfunction can be structural or functional in etiology.   No epileptiform abnormalities are captured.         -------------------------------------------------------------------------------------------------------  [facility] EEG Reading Room Ph#: [phone]  Epilepsy Answering Service after 5PM and before 8:30AM: Ph#: [phone]    Lizette Isbell MD  Attending Physician, [facility]

## 2023-07-25 NOTE — PROVIDER CONTACT NOTE (CHANGE IN STATUS NOTIFICATION) - BACKGROUND
68 yo female admitted on 7/23 s/p AMS at home per daughter. 7/23 s/p mechanical thrombectomy w/o recanalization. Pt has been on and off jose for pressure dependent exam. Pt received 5 mg hydralazine at 20:00 for hypertension outside of parameters with no change in exam [id number] with brisk answers to questions. Pt off jose for the entire day shift as per day shift RN. Pt See A&I flowsheets for neuro exam.

## 2023-07-25 NOTE — PROGRESS NOTE ADULT - SUBJECTIVE AND OBJECTIVE BOX
HOSPITAL COURSE: 68yo woman PMH seizure on keppra, CVA on plavix with unknown residual deficit, HLD, HTN p/w worsening and more prolonged episodes of aphasia/blank staring/unresponsiveness over two weeks. The episodes became more frequent and today, per daughter, it lasted for hours. Brought to ED, where she was found to have R facial droop, RUE weakness, dysarthria, and global aphasia. NIHSS 12. CTA showing c/f L MCA occlusion.     7/23 Adm NSCU s/p angiogram showing R M2/MCA occlusion with distal retrograde reconstitution, aspiration thrombectomy attempted without recanalization.   started on midodrine     7/25 worsening aphasia at SBP in 150's, started back on phenylephrine with marked improvement, midodrine increased     Allergies    No Known Allergies    REVIEW OF SYSTEMS: [ x] Unable to Assess due to neurologic exam   [ ] All ROS addressed below are non-contributory, except:  Neuro: [ ] Headache [ ] Back pain [ ] Numbness [ ] Weakness [ ] Ataxia [ ] Dizziness [ ] Aphasia [ ] Dysarthria [ ] Visual disturbance  Resp: [ ] Shortness of breath/dyspnea, [ ] Orthopnea [ ] Cough  CV: [ ] Chest pain [ ] Palpitation [ ] Lightheadedness [ ] Syncope  Renal: [ ] Thirst [ ] Edema  GI: [ ] Nausea [ ] Emesis [ ] Abdominal pain [ ] Constipation [ ] Diarrhea  Hem: [ ] Hematemesis [ ] bright red blood per rectum  ID: [ ] Fever [ ] Chills [ ] Dysuria  ENT: [ ] Rhinorrhea    DEVICES:   [ ] Restraints [ ] ET tube [ ] central line [x ] arterial line [ ] gonzalez [ ] NGT/OGT [ ] EVD [ ] LD [ ] FRANTZ/HMV [ ] Trach [ ] PEG [ ] Chest Tube     ICU Vital Signs Last 24 Hrs  T(C): 37 (25 Jul 2023 23:00), Max: 37 (25 Jul 2023 23:00)  T(F): 98.6 (25 Jul 2023 23:00), Max: 98.6 (25 Jul 2023 23:00)  HR: 92 (25 Jul 2023 23:30) (74 - 99)  BP: 167/73 (25 Jul 2023 22:00) (154/71 - 191/85)  BP(mean): 100 (25 Jul 2023 22:00) (95 - 117)  ABP: 198/82 (25 Jul 2023 23:30) (125/52 - 211/85)  ABP(mean): 130 (25 Jul 2023 23:30) (74 - 155)  RR: 17 (25 Jul 2023 23:30) (12 - 20)  SpO2: 100% (25 Jul 2023 23:30) (95% - 100%)    O2 Parameters below as of 25 Jul 2023 19:00  Patient On (Oxygen Delivery Method): room air    LABS: reviewed    MEDICATIONS  (STANDING):  atorvastatin 80 milliGRAM(s) Oral at bedtime  clopidogrel Tablet 75 milliGRAM(s) Oral daily  enoxaparin Injectable 40 milliGRAM(s) SubCutaneous <User Schedule>  insulin lispro (ADMELOG) corrective regimen sliding scale   SubCutaneous three times a day before meals  levETIRAcetam  IVPB 500 milliGRAM(s) IV Intermittent every 12 hours  midodrine 5 milliGRAM(s) Oral every 8 hours  pantoprazole    Tablet 40 milliGRAM(s) Oral before breakfast  senna 2 Tablet(s) Oral at bedtime    MEDICATIONS  (PRN):    PHYSICAL EXAM:    Constitutional: No Acute Distress     Neurological: with Cantonese , Awake, alert oriented to self/hospital/month/year @196SBP, mild expressive aphasia, Following Commands, 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, L pupil 3mm reactive, EOMI, No Gaze Preference, Face Symmetrical,   Motor exam:          Upper extremity                         Delt     Bicep     Tricep    HG                                                 R         5/5        5/5       5/5       4+/5                                               L          5/5        5/5        5/5       5/5          Lower extremity                        HF         KF        KE       DF         PF                                                  R       full strength 5/5                                                L         5/5        5/5       5/5       5/5          5/5                                                 Sensation: [s ] intact to light touch  [ ] decreased:     Pulmonary: Clear to Auscultation, No rales, No rhonchi, No wheezes     Cardiovascular: S1, S2, Regular rate and rhythm     Gastrointestinal: Soft, Non-tender, Non-distended     Extremities: No calf tenderness

## 2023-07-25 NOTE — OCCUPATIONAL THERAPY INITIAL EVALUATION ADULT - ADL RETRAINING, OT EVAL
Initiate Treatment: clobetasol 0.05 % scalp solution - Apply to affected itchy areas on the scalp only BID 2 weeks per month
Detail Level: Zone
Plan: Discussed treatment options with patient at this time
Plan: Instructed for patient to refrain from scratching scalp and to avoid hot water, use cool/luke warm for washing. Recommended for patient to begin treatment as directed today
Otc Regimen: \\n
Pt will complete lower body dressing with independence and AD as needed within 4 weeks.

## 2023-07-25 NOTE — PROGRESS NOTE ADULT - ASSESSMENT
ASSESSMENT/PLAN: chronic M2 occlusion with recurrent TIAs    NEURO:  MRI today 7/25  cont plavix  blood pressure augmentation and Q1 hr neurochecks  CT Head in AM  cont keppra home dose, check vEEG given presenting symptoms  stroke core measures, stroke neurology following   Activity: [] mobilize as tolerated [x] Bedrest till 1am groin safeguard in place [x] PT [x] OT [] PMNR    PULM:  incentive spirometry as able  O2sat>92%    CV:  SBP goal 140-180  phenylephrine prn  start midodrine 10mg tid  TTE pending  cont atorvastatin  hold all home antihypertensives    RENAL:  Fluids: IVF    GI:  Diet: dysphagia post op and advance diet as tolerated  GI prophylaxis [] not indicated [x] PPI home med [] other:  Bowel regimen [] colace [x] senna [x] other: miralax     ENDO:   Goal euglycemia (-180)  A1C pending  TFT pending   EDISON    HEME/ONC:  VTE prophylaxis: [x] SCDs [x] chemoprophylaxis [] hold chemoprophylaxis due to: [] high risk of DVT/PE on admission due to:    ID: monitor for fever   ASSESSMENT/PLAN: chronic M2 occlusion with recurrent TIAs    NEURO:  MRI today 7/25  cont plavix  blood pressure augmentation and Q1 hr neurochecks  CT Head in AM  cont keppra home dose  d/c vEEG  stroke core measures, stroke neurology following   f/u on starting ASA  Activity: [] mobilize as tolerated [x] Bedrest till 1am groin safeguard in place [x] PT [x] OT [] PMNR    PULM:  incentive spirometry as able  O2sat>92%    CV:  SBP goal 140-180  midodrine  Phenylephrine for SBP<140  TTE - 63% ejection fraction  cont atorvastatin  hold all home antihypertensives    RENAL:  Fluids: IVF    GI:  Diet: dysphagia post op and advance diet as tolerated  GI prophylaxis [] not indicated [x] PPI home med [] other:  Bowel regimen [] colace [x] senna [x] other: miralax     ENDO:   Goal euglycemia (-180)  A1C 8.5  TSH WNL  EDISON    HEME/ONC:  VTE prophylaxis: [x] SCDs [x] chemoprophylaxis [] hold chemoprophylaxis due to: [] high risk of DVT/PE on admission due to:    ID: monitor for fever   ASSESSMENT/PLAN: chronic M2 occlusion with recurrent TIAs    NEURO:  cont plavix. consider adding aspirin  blood pressure augmentation and Q1 hr neurochecks  cont keppra home dose  d/c vEEG  stroke core measures, stroke neurology following   f/u on starting ASA  Activity: [] mobilize as tolerated [x] Bedrest till 1am groin safeguard in place [x] PT [x] OT [] PMNR    PULM:  incentive spirometry as able  O2sat>92%    CV:  SBP goal 140-180  midodrine  Phenylephrine for SBP<140  TTE - 63% ejection fraction  cont atorvastatin  hold all home antihypertensives    RENAL:  Fluids: IVF    GI:  Diet: dysphagia post op and advance diet as tolerated  GI prophylaxis [] not indicated [x] PPI home med [] other:  Bowel regimen [] colace [x] senna [x] other: miralax     ENDO:   Goal euglycemia (-180)  A1C 8.5  TSH WNL  EDISON    HEME/ONC:  VTE prophylaxis: [x] SCDs [x] chemoprophylaxis [] hold chemoprophylaxis due to: [] high risk of DVT/PE on admission due to:    ID: monitor for fever

## 2023-07-25 NOTE — PROGRESS NOTE ADULT - SUBJECTIVE AND OBJECTIVE BOX
Patient seen and examined at bedside.    --Anticoagulation--  clopidogrel Tablet 75 milliGRAM(s) Oral daily  enoxaparin Injectable 40 milliGRAM(s) SubCutaneous <User Schedule>    T(C): 37 (07-24-23 @ 23:00), Max: 37.1 (07-24-23 @ 19:00)  HR: 74 (07-25-23 @ 05:00) (70 - 89)  BP: 163/71 (07-25-23 @ 04:00) (130/63 - 218/86)  RR: 14 (07-25-23 @ 05:00) (10 - 20)  SpO2: 95% (07-25-23 @ 02:00) (93% - 100%)  Wt(kg): --    Exam: AOx3 w/ choices, FC, PERRL, All ext 5/5 except for Right upper 4+/5

## 2023-07-25 NOTE — PROGRESS NOTE ADULT - ASSESSMENT
ASSESSMENT/PLAN: chronic M2 occlusion    NEURO:  cont plavix ?d/w neurology re: DAPT   blood pressure augmentation and Q2 hr neurochecks  MRI Acute left subinsular and scattered left frontal and parietal white matter infarcts in the distribution of the left middle cerebral artery. Chronic left parietal/occipital infarct.  cont keppra home dose, check vEEG negative thus far d/c'ed   stroke core measures, stroke neurology following   Activity: [x] mobilize as tolerated [] Bedrest till 1am groin safeguard in place [x] PT [x] OT [] PMNR    PULM:  incentive spirometry as able  O2sat>92%    CV:  SBP goal 160-200  cont midodrine increase to 7.5mg Q6  phenylephrine drip prn   EKG sinus  TTE EF62%  cont atorvastatin  hold all home antihypertensives    RENAL:  Fluids: IVL    GI:  Diet: CCD  GI prophylaxis [] not indicated [x] PPI home med [] other:  Bowel regimen [] colace [x] senna [x] other: miralax     ENDO:   Goal euglycemia (-180)  A1C 8.5  TST WNL  EDISON    HEME/ONC:  VTE prophylaxis: [x] SCDs [x] chemoprophylaxis [] hold chemoprophylaxis due to: [] high risk of DVT/PE on admission due to:    ID: monitor for fever    MISC:    CODE STATUS:  [x] Full Code [] DNR [] DNI [] Palliative/Comfort Care    DISPOSITION:  [x] ICU [] Stroke Unit [] Floor [] EMU [] RCU [] PCU    [x] Patient is at high risk of neurologic deterioration/death due to: acute stroke, seizures     Contact: [phone]

## 2023-07-26 LAB
ANION GAP SERPL CALC-SCNC: 15 MMOL/L — SIGNIFICANT CHANGE UP (ref 5–17)
BUN SERPL-MCNC: 23 MG/DL — SIGNIFICANT CHANGE UP (ref 7–23)
CALCIUM SERPL-MCNC: 9.1 MG/DL — SIGNIFICANT CHANGE UP (ref 8.4–10.5)
CHLORIDE SERPL-SCNC: 103 MMOL/L — SIGNIFICANT CHANGE UP (ref 96–108)
CO2 SERPL-SCNC: 20 MMOL/L — LOW (ref 22–31)
CREAT SERPL-MCNC: 0.81 MG/DL — SIGNIFICANT CHANGE UP (ref 0.5–1.3)
EGFR: 79 ML/MIN/1.73M2 — SIGNIFICANT CHANGE UP
GLUCOSE BLDC GLUCOMTR-MCNC: 102 MG/DL — HIGH (ref 70–99)
GLUCOSE BLDC GLUCOMTR-MCNC: 176 MG/DL — HIGH (ref 70–99)
GLUCOSE BLDC GLUCOMTR-MCNC: 276 MG/DL — HIGH (ref 70–99)
GLUCOSE SERPL-MCNC: 113 MG/DL — HIGH (ref 70–99)
HCT VFR BLD CALC: 33 % — LOW (ref 34.5–45)
HGB BLD-MCNC: 10.4 G/DL — LOW (ref 11.5–15.5)
MAGNESIUM SERPL-MCNC: 2.2 MG/DL — SIGNIFICANT CHANGE UP (ref 1.6–2.6)
MCHC RBC-ENTMCNC: 20.8 PG — LOW (ref 27–34)
MCHC RBC-ENTMCNC: 31.5 GM/DL — LOW (ref 32–36)
MCV RBC AUTO: 66.1 FL — LOW (ref 80–100)
NRBC # BLD: 0 /100 WBCS — SIGNIFICANT CHANGE UP (ref 0–0)
PHOSPHATE SERPL-MCNC: 3.7 MG/DL — SIGNIFICANT CHANGE UP (ref 2.5–4.5)
PLATELET # BLD AUTO: 265 K/UL — SIGNIFICANT CHANGE UP (ref 150–400)
POTASSIUM SERPL-MCNC: 4 MMOL/L — SIGNIFICANT CHANGE UP (ref 3.5–5.3)
POTASSIUM SERPL-SCNC: 4 MMOL/L — SIGNIFICANT CHANGE UP (ref 3.5–5.3)
RBC # BLD: 4.99 M/UL — SIGNIFICANT CHANGE UP (ref 3.8–5.2)
RBC # FLD: 15.4 % — HIGH (ref 10.3–14.5)
SODIUM SERPL-SCNC: 138 MMOL/L — SIGNIFICANT CHANGE UP (ref 135–145)
TROPONIN T, HIGH SENSITIVITY RESULT: 29 NG/L — SIGNIFICANT CHANGE UP (ref 0–51)
WBC # BLD: 9.57 K/UL — SIGNIFICANT CHANGE UP (ref 3.8–10.5)
WBC # FLD AUTO: 9.57 K/UL — SIGNIFICANT CHANGE UP (ref 3.8–10.5)

## 2023-07-26 PROCEDURE — 99291 CRITICAL CARE FIRST HOUR: CPT

## 2023-07-26 PROCEDURE — 93970 EXTREMITY STUDY: CPT | Mod: 26

## 2023-07-26 RX ORDER — MIDODRINE HYDROCHLORIDE 2.5 MG/1
10 TABLET ORAL EVERY 8 HOURS
Refills: 0 | Status: DISCONTINUED | OUTPATIENT
Start: 2023-07-26 | End: 2023-07-28

## 2023-07-26 RX ORDER — ASPIRIN/CALCIUM CARB/MAGNESIUM 324 MG
81 TABLET ORAL DAILY
Refills: 0 | Status: DISCONTINUED | OUTPATIENT
Start: 2023-07-26 | End: 2023-08-02

## 2023-07-26 RX ORDER — SODIUM CHLORIDE 9 MG/ML
500 INJECTION INTRAMUSCULAR; INTRAVENOUS; SUBCUTANEOUS ONCE
Refills: 0 | Status: COMPLETED | OUTPATIENT
Start: 2023-07-26 | End: 2023-07-26

## 2023-07-26 RX ADMIN — Medication 81 MILLIGRAM(S): at 17:25

## 2023-07-26 RX ADMIN — MIDODRINE HYDROCHLORIDE 7.5 MILLIGRAM(S): 2.5 TABLET ORAL at 12:11

## 2023-07-26 RX ADMIN — POLYETHYLENE GLYCOL 3350 17 GRAM(S): 17 POWDER, FOR SOLUTION ORAL at 17:24

## 2023-07-26 RX ADMIN — LEVETIRACETAM 500 MILLIGRAM(S): 250 TABLET, FILM COATED ORAL at 05:00

## 2023-07-26 RX ADMIN — POLYETHYLENE GLYCOL 3350 17 GRAM(S): 17 POWDER, FOR SOLUTION ORAL at 05:01

## 2023-07-26 RX ADMIN — MIDODRINE HYDROCHLORIDE 10 MILLIGRAM(S): 2.5 TABLET ORAL at 20:02

## 2023-07-26 RX ADMIN — ENOXAPARIN SODIUM 40 MILLIGRAM(S): 100 INJECTION SUBCUTANEOUS at 17:27

## 2023-07-26 RX ADMIN — CLOPIDOGREL BISULFATE 75 MILLIGRAM(S): 75 TABLET, FILM COATED ORAL at 12:11

## 2023-07-26 RX ADMIN — SODIUM CHLORIDE 2000 MILLILITER(S): 9 INJECTION INTRAMUSCULAR; INTRAVENOUS; SUBCUTANEOUS at 15:05

## 2023-07-26 RX ADMIN — LEVETIRACETAM 500 MILLIGRAM(S): 250 TABLET, FILM COATED ORAL at 17:25

## 2023-07-26 RX ADMIN — SENNA PLUS 2 TABLET(S): 8.6 TABLET ORAL at 21:47

## 2023-07-26 RX ADMIN — MIDODRINE HYDROCHLORIDE 7.5 MILLIGRAM(S): 2.5 TABLET ORAL at 05:00

## 2023-07-26 RX ADMIN — ATORVASTATIN CALCIUM 80 MILLIGRAM(S): 80 TABLET, FILM COATED ORAL at 21:47

## 2023-07-26 RX ADMIN — Medication 2: at 12:24

## 2023-07-26 RX ADMIN — CHLORHEXIDINE GLUCONATE 1 APPLICATION(S): 213 SOLUTION TOPICAL at 12:11

## 2023-07-26 RX ADMIN — PHENYLEPHRINE HYDROCHLORIDE 2.13 MICROGRAM(S)/KG/MIN: 10 INJECTION INTRAVENOUS at 19:02

## 2023-07-26 RX ADMIN — Medication 6: at 17:25

## 2023-07-26 NOTE — PROGRESS NOTE ADULT - ASSESSMENT
ASSESSMENT/PLAN: chronic M2 occlusion with recurrent TIAs    NEURO:  cont plavix. consider adding aspirin  blood pressure augmentation and Q1 hr neurochecks  cont keppra home dose  d/c vEEG  stroke core measures, stroke neurology following   f/u on starting ASA  Activity: [] mobilize as tolerated [x] Bedrest till 1am groin safeguard in place [x] PT [x] OT [] PMNR    PULM:  incentive spirometry as able  O2sat>92%    CV:  SBP goal 140-180  midodrine  Phenylephrine for SBP<140  TTE - 63% ejection fraction  cont atorvastatin  hold all home antihypertensives    RENAL:  Fluids: IVF    GI:  Diet: dysphagia post op and advance diet as tolerated  GI prophylaxis [] not indicated [x] PPI home med [] other:  Bowel regimen [] colace [x] senna [x] other: miralax     ENDO:   Goal euglycemia (-180)  A1C 8.5  TSH WNL  EDISON    HEME/ONC:  VTE prophylaxis: [x] SCDs [x] chemoprophylaxis [] hold chemoprophylaxis due to: [] high risk of DVT/PE on admission due to:    ID: monitor for fever   ASSESSMENT/PLAN: chronic M2 occlusion with recurrent TIAs    NEURO:  cont plavix. consider adding aspirin  blood pressure augmentation and Q1 hr neurochecks  cont keppra home dose  stroke core measures, stroke neurology following   f/u on starting ASA  Activity: [] mobilize as tolerated [x] Bedrest till 1am groin safeguard in place [x] PT [x] OT [] PMNR    PULM:  incentive spirometry as able  O2sat>92%    CV:  SBP goal 1600-200  midodrine  Phenylephrine for SBP<140  TTE - 63% ejection fraction  cont atorvastatin  hold all home antihypertensives  Serial Troponins and EKG     RENAL:  Fluids: IVF    GI:  Diet: dysphagia post op and advance diet as tolerated  GI prophylaxis [] not indicated [x] PPI home med [] other:  Bowel regimen [] colace [x] senna [x] other: miralax     ENDO:   Goal euglycemia (-180)  A1C 8.5  TSH WNL  EDISON    HEME/ONC:  VTE prophylaxis: [x] SCDs [x] chemoprophylaxis [] hold chemoprophylaxis due to: [] high risk of DVT/PE on admission due to:    ID: monitor for fever   ASSESSMENT/PLAN: chronic M2 occlusion with recurrent TIAs    NEURO:  cont plavix. consider adding aspirin  blood pressure augmentation and Q1 hr neurochecks  cont keppra home dose  stroke core measures, stroke neurology following   f/u on starting ASA  Activity: [] mobilize as tolerated [x] Bedrest till 1am groin safeguard in place [x] PT [x] OT [] PMNR    PULM:  incentive spirometry as able  O2sat>92%    CV:  SBP goal 1600-200  increase midodrine 10 q8  Phenylephrine for SBP<140  TTE - 63% ejection fraction  cont atorvastatin  hold all home antihypertensives  Serial Troponins and EKG are within normal    RENAL:  Fluids: IVF    GI:  Diet: dysphagia post op and advance diet as tolerated  GI prophylaxis [] not indicated [x] PPI home med [] other:  Bowel regimen [] colace [x] senna [x] other: miralax     ENDO:   Goal euglycemia (-180)  A1C 8.5  TSH WNL  EDISON    HEME/ONC:  VTE prophylaxis: [x] SCDs [x] chemoprophylaxis [] hold chemoprophylaxis due to: [] high risk of DVT/PE on admission due to:    ID: monitor for fever

## 2023-07-26 NOTE — PROGRESS NOTE ADULT - SUBJECTIVE AND OBJECTIVE BOX
HOSPITAL COURSE: 68yo woman PMH seizure on keppra, CVA on plavix with unknown residual deficit, HLD, HTN p/w worsening and more prolonged episodes of aphasia/blank staring/unresponsiveness over two weeks. The episodes became more frequent and today, per daughter, it lasted for hours. Brought to ED, where she was found to have R facial droop, RUE weakness, dysarthria, and global aphasia. NIHSS 12. CTA showing c/f L MCA occlusion.     7/23 Adm NSCU s/p angiogram showing R M2/MCA occlusion with distal retrograde reconstitution, aspiration thrombectomy attempted without recanalization.   7/24: no acute overnight events  7/25: Aphasic when SBP dropped to 110 overnight, daughter reports slowed speech this morning   7/26: Pressure dependent exam, exam worsened when SBP <160, SBP goal 160-200    Admission Scores  GCS:   HH:   MF:   NIHSS: 12  RASS:    CAM-ICU:   ICH:    Allergies    No Known Allergies    REVIEW OF SYSTEMS: [ x] Unable to Assess due to neurologic exam   [ ] All ROS addressed below are non-contributory, except:  Neuro: [ ] Headache [ ] Back pain [ ] Numbness [ ] Weakness [ ] Ataxia [ ] Dizziness [ ] Aphasia [ ] Dysarthria [ ] Visual disturbance  Resp: [ ] Shortness of breath/dyspnea, [ ] Orthopnea [ ] Cough  CV: [ ] Chest pain [ ] Palpitation [ ] Lightheadedness [ ] Syncope  Renal: [ ] Thirst [ ] Edema  GI: [ ] Nausea [ ] Emesis [ ] Abdominal pain [ ] Constipation [ ] Diarrhea  Hem: [ ] Hematemesis [ ] bright red blood per rectum  ID: [ ] Fever [ ] Chills [ ] Dysuria  ENT: [ ] Rhinorrhea      DEVICES:   [ ] Restraints [ ] ET tube [ ] central line [ ] arterial line [ ] gonzalez [ ] NGT/OGT [ ] EVD [ ] LD [ ] FRANTZ/HMV [ ] Trach [ ] PEG [ ] Chest Tube     Vitals  T(C): 36.7 (07-26-23 @ 03:00), Max: 37 (07-25-23 @ 23:00)  T(F): 98 (07-26-23 @ 03:00), Max: 98.6 (07-25-23 @ 23:00)  HR: 91 (07-26-23 @ 06:45) (80 - 99)  BP: 167/73 (07-25-23 @ 22:00) (167/73 - 191/85)  RR: 16 (07-26-23 @ 06:45) (12 - 20)  SpO2: 98% (07-26-23 @ 06:45) (91% - 100%)  Wt(kg): --    I&O's Detail    24 Jul 2023 07:01  -  25 Jul 2023 07:00  --------------------------------------------------------  IN:    IV PiggyBack: 100 mL    Oral Fluid: 1680 mL    Phenylephrine: 6.3 mL    sodium chloride 0.9%: 350 mL    Sodium Chloride 0.9% Bolus: 750 mL  Total IN: 2886.3 mL    OUT:    Incontinent per Collection Bag (mL): 1770 mL    Phenylephrine: 0 mL    Voided (mL): 500 mL  Total OUT: 2270 mL    Total NET: 616.3 mL      25 Jul 2023 07:01  -  26 Jul 2023 06:56  --------------------------------------------------------  IN:    Oral Fluid: 1420 mL    Phenylephrine: 68.7 mL  Total IN: 1488.7 mL    OUT:    Incontinent per Collection Bag (mL): 2525 mL    Phenylephrine: 0 mL  Total OUT: 2525 mL    Total NET: -1036.3 mL    MEDICATIONS  (STANDING):  atorvastatin 80 milliGRAM(s) Oral at bedtime  chlorhexidine 4% Liquid 1 Application(s) Topical daily  clopidogrel Tablet 75 milliGRAM(s) Oral daily  enoxaparin Injectable 40 milliGRAM(s) SubCutaneous <User Schedule>  insulin lispro (ADMELOG) corrective regimen sliding scale   SubCutaneous three times a day before meals  levETIRAcetam 500 milliGRAM(s) Oral two times a day  midodrine 7.5 milliGRAM(s) Oral <User Schedule>  phenylephrine    Infusion 0.1 MICROgram(s)/kG/Min (2.13 mL/Hr) IV Continuous <Continuous>  polyethylene glycol 3350 17 Gram(s) Oral two times a day  senna 2 Tablet(s) Oral at bedtime    MEDICATIONS  (PRN):  acetaminophen     Tablet .. 650 milliGRAM(s) Oral every 6 hours PRN Mild Pain (1 - 3)    IMAGING:  < from: MR Head No Cont (07.25.23 @ 08:33) >  Acute left subinsular and scattered left frontal and parietal white   matter infarcts in the distribution of the left middle cerebral artery.  Chronic left parietal/occipital infarct.    < end of copied text >    CTP 7/23  IMPRESSION:    CT angiography neck: No hemodynamically significant stenosis byNASCET   criteria. No vascular dissection.    CT angiography brain: No major vessel occlusion or proximal stenosis. 2   mm left M1 bifurcation aneurysm    CT perfusion: Approximately 7 mL of ischemic penumbra is seen in the left   frontoparietal cortex.      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, L pupil 3mm reactive, EOMI, No Gaze Preference, Face Symmetrical,   Motor exam:          Upper extremity                         Delt     Bicep     Tricep    HG                                                 R         5/5        5/5        5/5       5/5                                               L          5/5        5/5        5/5       5/5          Lower extremity                        HF         KF        KE       DF         PF                                                  R       no tested, safeguard in place, distally  4+/5                                               L         5/5        5/5       5/5       5/5          5/5                                                 Sensation: [s ] intact to light touch  [ ] decreased:     Reflexes: Deep Tendon Reflexes Intact     Pulmonary: Clear to Auscultation, No rales, No rhonchi, No wheezes     Cardiovascular: S1, S2, Regular rate and rhythm     Gastrointestinal: Soft, Non-tender, Non-distended     Extremities: No calf tenderness     LABS:                          10.4   9.57  )-----------( 265      ( 26 Jul 2023 05:26 )             33.0     07-26    138  |  103  |  23  ----------------------------<  113<H>  4.0   |  20<L>  |  0.81    Ca    9.1      26 Jul 2023 05:26  Phos  3.7     07-26  Mg     2.2     07-26          Urinalysis Basic - ( 26 Jul 2023 05:26 )    Color: x / Appearance: x / SG: x / pH: x  Gluc: 113 mg/dL / Ketone: x  / Bili: x / Urobili: x   Blood: x / Protein: x / Nitrite: x   Leuk Esterase: x / RBC: x / WBC x   Sq Epi: x / Non Sq Epi: x / Bacteria: x

## 2023-07-26 NOTE — PROGRESS NOTE ADULT - SUBJECTIVE AND OBJECTIVE BOX
HOSPITAL COURSE: 66yo woman PMH seizure on keppra, CVA on plavix with unknown residual deficit, HLD, HTN p/w worsening and more prolonged episodes of aphasia/blank staring/unresponsiveness over two weeks. The episodes became more frequent and today, per daughter, it lasted for hours. Brought to ED, where she was found to have R facial droop, RUE weakness, dysarthria, and global aphasia. NIHSS 12. CTA showing c/f L MCA occlusion.     7/23 Adm NSCU s/p angiogram showing R M2/MCA occlusion with distal retrograde reconstitution, aspiration thrombectomy attempted without recanalization.   7/24: no acute overnight events  7/25: Aphasic when SBP dropped to 110 overnight, daughter reports slowed speech this morning   7/26: Pressure dependent exam    Admission Scores  GCS:   HH:   MF:   NIHSS: 12  RASS:    CAM-ICU:   ICH:    Allergies    No Known Allergies    REVIEW OF SYSTEMS: [ x] Unable to Assess due to neurologic exam   [ ] All ROS addressed below are non-contributory, except:  Neuro: [ ] Headache [ ] Back pain [ ] Numbness [ ] Weakness [ ] Ataxia [ ] Dizziness [ ] Aphasia [ ] Dysarthria [ ] Visual disturbance  Resp: [ ] Shortness of breath/dyspnea, [ ] Orthopnea [ ] Cough  CV: [ ] Chest pain [ ] Palpitation [ ] Lightheadedness [ ] Syncope  Renal: [ ] Thirst [ ] Edema  GI: [ ] Nausea [ ] Emesis [ ] Abdominal pain [ ] Constipation [ ] Diarrhea  Hem: [ ] Hematemesis [ ] bright red blood per rectum  ID: [ ] Fever [ ] Chills [ ] Dysuria  ENT: [ ] Rhinorrhea      DEVICES:   [ ] Restraints [ ] ET tube [ ] central line [ ] arterial line [ ] gonzalez [ ] NGT/OGT [ ] EVD [ ] LD [ ] FRANTZ/HMV [ ] Trach [ ] PEG [ ] Chest Tube     Vitals  T(C): 36.7 (07-26-23 @ 03:00), Max: 37 (07-25-23 @ 23:00)  T(F): 98 (07-26-23 @ 03:00), Max: 98.6 (07-25-23 @ 23:00)  HR: 91 (07-26-23 @ 06:45) (80 - 99)  BP: 167/73 (07-25-23 @ 22:00) (167/73 - 191/85)  RR: 16 (07-26-23 @ 06:45) (12 - 20)  SpO2: 98% (07-26-23 @ 06:45) (91% - 100%)  Wt(kg): --    I&O's Detail    24 Jul 2023 07:01  -  25 Jul 2023 07:00  --------------------------------------------------------  IN:    IV PiggyBack: 100 mL    Oral Fluid: 1680 mL    Phenylephrine: 6.3 mL    sodium chloride 0.9%: 350 mL    Sodium Chloride 0.9% Bolus: 750 mL  Total IN: 2886.3 mL    OUT:    Incontinent per Collection Bag (mL): 1770 mL    Phenylephrine: 0 mL    Voided (mL): 500 mL  Total OUT: 2270 mL    Total NET: 616.3 mL      25 Jul 2023 07:01  -  26 Jul 2023 06:56  --------------------------------------------------------  IN:    Oral Fluid: 1420 mL    Phenylephrine: 68.7 mL  Total IN: 1488.7 mL    OUT:    Incontinent per Collection Bag (mL): 2525 mL    Phenylephrine: 0 mL  Total OUT: 2525 mL    Total NET: -1036.3 mL    MEDICATIONS  (STANDING):  atorvastatin 80 milliGRAM(s) Oral at bedtime  chlorhexidine 4% Liquid 1 Application(s) Topical daily  clopidogrel Tablet 75 milliGRAM(s) Oral daily  enoxaparin Injectable 40 milliGRAM(s) SubCutaneous <User Schedule>  insulin lispro (ADMELOG) corrective regimen sliding scale   SubCutaneous three times a day before meals  levETIRAcetam 500 milliGRAM(s) Oral two times a day  midodrine 7.5 milliGRAM(s) Oral <User Schedule>  phenylephrine    Infusion 0.1 MICROgram(s)/kG/Min (2.13 mL/Hr) IV Continuous <Continuous>  polyethylene glycol 3350 17 Gram(s) Oral two times a day  senna 2 Tablet(s) Oral at bedtime    MEDICATIONS  (PRN):  acetaminophen     Tablet .. 650 milliGRAM(s) Oral every 6 hours PRN Mild Pain (1 - 3)    IMAGING:  < from: MR Head No Cont (07.25.23 @ 08:33) >  Acute left subinsular and scattered left frontal and parietal white   matter infarcts in the distribution of the left middle cerebral artery.  Chronic left parietal/occipital infarct.    < end of copied text >    CTP 7/23  IMPRESSION:    CT angiography neck: No hemodynamically significant stenosis byNASCET   criteria. No vascular dissection.    CT angiography brain: No major vessel occlusion or proximal stenosis. 2   mm left M1 bifurcation aneurysm    CT perfusion: Approximately 7 mL of ischemic penumbra is seen in the left   frontoparietal cortex.      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, L pupil 3mm reactive, EOMI, No Gaze Preference, Face Symmetrical,   Motor exam:          Upper extremity                         Delt     Bicep     Tricep    HG                                                 R         5/5        5/5        5/5       5/5                                               L          5/5        5/5        5/5       5/5          Lower extremity                        HF         KF        KE       DF         PF                                                  R       no tested, safeguard in place, distally  4+/5                                               L         5/5        5/5       5/5       5/5          5/5                                                 Sensation: [s ] intact to light touch  [ ] decreased:     Reflexes: Deep Tendon Reflexes Intact     Pulmonary: Clear to Auscultation, No rales, No rhonchi, No wheezes     Cardiovascular: S1, S2, Regular rate and rhythm     Gastrointestinal: Soft, Non-tender, Non-distended     Extremities: No calf tenderness     LABS:                          10.4   9.57  )-----------( 265      ( 26 Jul 2023 05:26 )             33.0     07-26    138  |  103  |  23  ----------------------------<  113<H>  4.0   |  20<L>  |  0.81    Ca    9.1      26 Jul 2023 05:26  Phos  3.7     07-26  Mg     2.2     07-26          Urinalysis Basic - ( 26 Jul 2023 05:26 )    Color: x / Appearance: x / SG: x / pH: x  Gluc: 113 mg/dL / Ketone: x  / Bili: x / Urobili: x   Blood: x / Protein: x / Nitrite: x   Leuk Esterase: x / RBC: x / WBC x   Sq Epi: x / Non Sq Epi: x / Bacteria: x               HOSPITAL COURSE: 66yo woman PMH seizure on keppra, CVA on plavix with unknown residual deficit, HLD, HTN p/w worsening and more prolonged episodes of aphasia/blank staring/unresponsiveness over two weeks. The episodes became more frequent and today, per daughter, it lasted for hours. Brought to ED, where she was found to have R facial droop, RUE weakness, dysarthria, and global aphasia. NIHSS 12. CTA showing c/f L MCA occlusion.     7/23 Adm NSCU s/p angiogram showing R M2/MCA occlusion with distal retrograde reconstitution, aspiration thrombectomy attempted without recanalization.   7/24: no acute overnight events  7/25: Aphasic when SBP dropped to 110 overnight, daughter reports slowed speech this morning   7/26: Pressure dependent exam, exam worsened when SBP <160    Admission Scores  GCS:   HH:   MF:   NIHSS: 12  RASS:    CAM-ICU:   ICH:    Allergies    No Known Allergies    REVIEW OF SYSTEMS: [ x] Unable to Assess due to neurologic exam   [ ] All ROS addressed below are non-contributory, except:  Neuro: [ ] Headache [ ] Back pain [ ] Numbness [ ] Weakness [ ] Ataxia [ ] Dizziness [ ] Aphasia [ ] Dysarthria [ ] Visual disturbance  Resp: [ ] Shortness of breath/dyspnea, [ ] Orthopnea [ ] Cough  CV: [ ] Chest pain [ ] Palpitation [ ] Lightheadedness [ ] Syncope  Renal: [ ] Thirst [ ] Edema  GI: [ ] Nausea [ ] Emesis [ ] Abdominal pain [ ] Constipation [ ] Diarrhea  Hem: [ ] Hematemesis [ ] bright red blood per rectum  ID: [ ] Fever [ ] Chills [ ] Dysuria  ENT: [ ] Rhinorrhea      DEVICES:   [ ] Restraints [ ] ET tube [ ] central line [ ] arterial line [ ] gonzalez [ ] NGT/OGT [ ] EVD [ ] LD [ ] FRANTZ/HMV [ ] Trach [ ] PEG [ ] Chest Tube     Vitals  T(C): 36.7 (07-26-23 @ 03:00), Max: 37 (07-25-23 @ 23:00)  T(F): 98 (07-26-23 @ 03:00), Max: 98.6 (07-25-23 @ 23:00)  HR: 91 (07-26-23 @ 06:45) (80 - 99)  BP: 167/73 (07-25-23 @ 22:00) (167/73 - 191/85)  RR: 16 (07-26-23 @ 06:45) (12 - 20)  SpO2: 98% (07-26-23 @ 06:45) (91% - 100%)  Wt(kg): --    I&O's Detail    24 Jul 2023 07:01  -  25 Jul 2023 07:00  --------------------------------------------------------  IN:    IV PiggyBack: 100 mL    Oral Fluid: 1680 mL    Phenylephrine: 6.3 mL    sodium chloride 0.9%: 350 mL    Sodium Chloride 0.9% Bolus: 750 mL  Total IN: 2886.3 mL    OUT:    Incontinent per Collection Bag (mL): 1770 mL    Phenylephrine: 0 mL    Voided (mL): 500 mL  Total OUT: 2270 mL    Total NET: 616.3 mL      25 Jul 2023 07:01  -  26 Jul 2023 06:56  --------------------------------------------------------  IN:    Oral Fluid: 1420 mL    Phenylephrine: 68.7 mL  Total IN: 1488.7 mL    OUT:    Incontinent per Collection Bag (mL): 2525 mL    Phenylephrine: 0 mL  Total OUT: 2525 mL    Total NET: -1036.3 mL    MEDICATIONS  (STANDING):  atorvastatin 80 milliGRAM(s) Oral at bedtime  chlorhexidine 4% Liquid 1 Application(s) Topical daily  clopidogrel Tablet 75 milliGRAM(s) Oral daily  enoxaparin Injectable 40 milliGRAM(s) SubCutaneous <User Schedule>  insulin lispro (ADMELOG) corrective regimen sliding scale   SubCutaneous three times a day before meals  levETIRAcetam 500 milliGRAM(s) Oral two times a day  midodrine 7.5 milliGRAM(s) Oral <User Schedule>  phenylephrine    Infusion 0.1 MICROgram(s)/kG/Min (2.13 mL/Hr) IV Continuous <Continuous>  polyethylene glycol 3350 17 Gram(s) Oral two times a day  senna 2 Tablet(s) Oral at bedtime    MEDICATIONS  (PRN):  acetaminophen     Tablet .. 650 milliGRAM(s) Oral every 6 hours PRN Mild Pain (1 - 3)    IMAGING:  < from: MR Head No Cont (07.25.23 @ 08:33) >  Acute left subinsular and scattered left frontal and parietal white   matter infarcts in the distribution of the left middle cerebral artery.  Chronic left parietal/occipital infarct.    < end of copied text >    CTP 7/23  IMPRESSION:    CT angiography neck: No hemodynamically significant stenosis byNASCET   criteria. No vascular dissection.    CT angiography brain: No major vessel occlusion or proximal stenosis. 2   mm left M1 bifurcation aneurysm    CT perfusion: Approximately 7 mL of ischemic penumbra is seen in the left   frontoparietal cortex.      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, L pupil 3mm reactive, EOMI, No Gaze Preference, Face Symmetrical,   Motor exam:          Upper extremity                         Delt     Bicep     Tricep    HG                                                 R         5/5        5/5        5/5       5/5                                               L          5/5        5/5        5/5       5/5          Lower extremity                        HF         KF        KE       DF         PF                                                  R       no tested, safeguard in place, distally  4+/5                                               L         5/5        5/5       5/5       5/5          5/5                                                 Sensation: [s ] intact to light touch  [ ] decreased:     Reflexes: Deep Tendon Reflexes Intact     Pulmonary: Clear to Auscultation, No rales, No rhonchi, No wheezes     Cardiovascular: S1, S2, Regular rate and rhythm     Gastrointestinal: Soft, Non-tender, Non-distended     Extremities: No calf tenderness     LABS:                          10.4   9.57  )-----------( 265      ( 26 Jul 2023 05:26 )             33.0     07-26    138  |  103  |  23  ----------------------------<  113<H>  4.0   |  20<L>  |  0.81    Ca    9.1      26 Jul 2023 05:26  Phos  3.7     07-26  Mg     2.2     07-26          Urinalysis Basic - ( 26 Jul 2023 05:26 )    Color: x / Appearance: x / SG: x / pH: x  Gluc: 113 mg/dL / Ketone: x  / Bili: x / Urobili: x   Blood: x / Protein: x / Nitrite: x   Leuk Esterase: x / RBC: x / WBC x   Sq Epi: x / Non Sq Epi: x / Bacteria: x

## 2023-07-26 NOTE — PROGRESS NOTE ADULT - ASSESSMENT
Impression:    Plan:      [x] EEG: No epileptiform abnormalities. L frontotemporal focal cerebral dysfunction   [x] 7/23 s/p angiogram showing R M2/MCA occlusion with distal retrograde reconstitution, aspiration thrombectomy attempted without recanalization.  [] permissive HTN for now (goal BP parameters per neurosurgery)  [x] c/w home Plavix  [x] Lipitor 80   [] hold home anti-hypertensives  [x] MRI brain w/o  [x] repeat CTH w/o in 24h: neg  [x] TTE 63%, trace MR, mild AR, normal LA  [x] A1C 8.5, LDL 59  [x] PT - Home PT  [X] Diet CC  [x] DVT ppx: lovenox    ***Vascular Neurology Follow-up Chart Note***    Patient seen today with attending on rounds. Please also refer to attending addendum on initial consult note dated __    Exam stable except: __      Impression: __      Plan:    [] ANTITHROMBOTIC THERAPY:  [] MRI  [] Vessel imaging  [] TTE                   [] A1C, LDL  [] Atorvastatin 80 [] not on high intensity statin due to  []  Physical therapy/OT/Speech Language    SBP goal:  Other:    Case & Plan discussed with patient and family. All questions answered. Plan discussed with primary team       CORE MEASURES:         Admission NIHSS:     ICH score:     Ramos and Castellon Score:       Tenecteplase: [] YES [] NO     Statin Therapy: [] YES [] NO     Smoking [] YES [] NO     Afib [] YES [] NO     Stroke Education [] YES [] NO    Dysphagia screen : [] Passed [] Failed- S&S pending [] Pending  (Ensure medications are ordered via appropriate route)    DVT ppx: Venodynes [] Heparin s.c [] LMWH [] Not on chemical DVT ppx due to:         Impression: acute disorientation, aphasia, dysarthria, R facial droop, RLE/RUE drift. Consistent with L brain dysfunction, possibly due to ischemia. Found to have CTA findings concerning for occlusion in L MCA distribution. Taken to IR suite for emergent diagnostic cerebral angiogram with possible intervention (thrombectomy and/or intra-arterial thrombolytics)    Plan:  [x] EEG: No epileptiform abnormalities. L frontotemporal focal cerebral dysfunction   [x] 7/23 s/p angiogram showing R M2/MCA occlusion with distal retrograde reconstitution, aspiration thrombectomy attempted without recanalization.  [] permissive HTN for now (goal BP parameters per neurosurgery)  [x] c/w home Plavix  [x] Lipitor 80   [] hold home anti-hypertensives  [x] MRI brain w/o  [x] repeat CTH w/o in 24h: neg  [x] TTE 63%, trace MR, mild AR, normal LA  [x] A1C 8.5, LDL 59  [x] PT - Home PT  [X] Diet CC  [x] DVT ppx: lovenox    CORE MEASURES:         Admission NIHSS:     ICH score:     Ramos and Castellon Score:       Tenecteplase: [] YES [] NO     Statin Therapy: [] YES [] NO     Smoking [] YES [] NO     Afib [] YES [] NO     Stroke Education [] YES [] NO    Dysphagia screen : [] Passed [] Failed- S&S pending [] Pending  (Ensure medications are ordered via appropriate route)    DVT ppx: Venodynes [] Heparin s.c [] LMWH [] Not on chemical DVT ppx due to:    LKW: 12pm 7/23/23  NIHSS: 12 --> 5  MRS: 0  Not tenecteplase candidate due to outside of time window 66yo woman with PMH seizure on keppra, CVA on plavix with unknown residual deficit, HLD, HTN p/w worsening and more prolonged episodes of aphasia/blank staring/unresponsiveness over two weeks. The episodes became more frequent and today, per daughter, it lasted for hours. Brought to ED, where she was found to have R facial droop, RUE weakness, dysarthria, and global aphasia. NIHSS 12. CTA showing c/f L MCA occlusion. s/p angiogram and attempted aspiration thrombectomy without recannulization Found to have Acute left subinsular and scattered left frontal and parietal white matter infarcts in the distribution of the left middle cerebral artery    Impression: acute disorientation, aphasia, dysarthria, R facial droop, RLE/RUE drift. Found to have acute left subinsular and scattered left frontal and parietal white matter infarcts in the distribution of the left middle cerebral artery    Plan:  Not accepted to stroke unit as patient requiring IV phenylephrine for goal -200 per NSICU    [x] MRI brain w/o  [x] EEG: No epileptiform abnormalities. L frontotemporal focal cerebral dysfunction   [x] 7/23 s/p angiogram showing R M2/MCA occlusion with distal retrograde reconstitution, aspiration thrombectomy attempted without recanalization.  [] permissive HTN for now (goal BP parameters per NSICU) sbp 160-200  [x] c/w home Plavix  [x] Lipitor 80   [ ] hold home anti-hypertensives  [x] repeat CTH w/o in 24h: stable  [x] TTE 63%, trace MR, mild AR, normal LA  [x] A1C 8.5, LDL 59  [x] PT/OT - Home PT/OT  [X] Diet Consistent carb  [x] DVT ppx: lovenox  [ ] Consider ILR    CORE MEASURES:         Admission NIHSS: 12     ICH score: 0     Ramos and Castellon Score:  n/a     Tenecteplase: [] YES [x] NO     Statin Therapy: [x] YES [] NO     Smoking [] YES [x] NO     Afib [] YES [x] NO     Stroke Education [x] YES [] NO    Dysphagia screen : [x] Passed [] Failed- S&S pending [] Pending  (Ensure medications are ordered via appropriate route)    DVT ppx: Venodynes [] Heparin s.c [x] LMWH [] Not on chemical DVT ppx due to:

## 2023-07-26 NOTE — CHART NOTE - NSCHARTNOTEFT_GEN_A_CORE
CAPRINI SCORE [CLOT]    AGE RELATED RISK FACTORS                                                       MOBILITY RELATED FACTORS  [ ] Age 41-60 years                                            (1 Point)                  [ x] Bed rest                                                        (1 Point)  [x ] Age: 61-74 years                                           (2 Points)                 [ ] Plaster cast                                                   (2 Points)  [ ] Age= 75 years                                              (3 Points)                 [ ] Bed bound for more than 72 hours                 (2 Points)    DISEASE RELATED RISK FACTORS                                               GENDER SPECIFIC FACTORS  [ ] Edema in the lower extremities                       (1 Point)                  [ ] Pregnancy                                                     (1 Point)  [ ] Varicose veins                                               (1 Point)                  [ ] Post-partum < 6 weeks                                   (1 Point)             [ ] BMI > 25 Kg/m2                                            (1 Point)                  [ ] Hormonal therapy  or oral contraception          (1 Point)                 [ ] Sepsis (in the previous month)                        (1 Point)                  [ ] History of pregnancy complications                 (1 point)  [ ] Pneumonia or serious lung disease                                               [ ] Unexplained or recurrent                     (1 Point)           (in the previous month)                               (1 Point)  [ ] Abnormal pulmonary function test                     (1 Point)                 SURGERY RELATED RISK FACTORS  [ ] Acute myocardial infarction                              (1 Point)                 [ ]  Section                                             (1 Point)  [ ] Congestive heart failure (in the previous month)  (1 Point)               [ ] Minor surgery                                                  (1 Point)   [ ] Inflammatory bowel disease                             (1 Point)                 [ ] Arthroscopic surgery                                        (2 Points)  [ ] Central venous access                                      (2 Points)                [ ] General surgery lasting more than 45 minutes   (2 Points)       [x] Stroke (in the previous month)                          (5 Points)               [ ] Elective arthroplasty                                         (5 Points)                                                                                                                                               HEMATOLOGY RELATED FACTORS                                                 TRAUMA RELATED RISK FACTORS  [ ] Prior episodes of VTE                                     (3 Points)                [ ] Fracture of the hip, pelvis, or leg                       (5 Points)  [ ] Positive family history for VTE                         (3 Points)                 [ ] Acute spinal cord injury (in the previous month)  (5 Points)  [ ] Prothrombin 59765 A                                     (3 Points)                 [ ] Paralysis  (less than 1 month)                             (5 Points)  [ ] Factor V Leiden                                             (3 Points)                  [ ] Multiple Trauma within 1 month                        (5 Points)  [ ] Lupus anticoagulants                                     (3 Points)                                                           [ ] Anticardiolipin antibodies                               (3 Points)                                                       [ ] High homocysteine in the blood                      (3 Points)                                             [ ] Other congenital or acquired thrombophilia      (3 Points)                                                [ ] Heparin induced thrombocytopenia                  (3 Points)                                          Total Score [     8     ]    Caprini Score 0 - 2:  Low Risk, No VTE Prophylaxis required for most patients, encourage ambulation  Caprini Score 3 - 6:  At Risk, pharmacologic VTE prophylaxis is indicated for most patients (in the absence of a contraindication)  Caprini Score Greater than or = 7:  High Risk, pharmacologic VTE prophylaxis is indicated for most patients (in the absence of a contraindication)

## 2023-07-26 NOTE — PROGRESS NOTE ADULT - ASSESSMENT
ASSESSMENT/PLAN: chronic M2 occlusion with recurrent TIAs    NEURO:  cont plavix. consider adding aspirin  blood pressure augmentation and Q1 hr neurochecks  cont keppra home dose  stroke core measures, stroke neurology following   f/u on starting ASA  No epileptiform abnormalities are captured on EEG  Activity: [] mobilize as tolerated [x] Bedrest till 1am groin safeguard in place [x] PT [x] OT [] PMNR    PULM:  incentive spirometry as able  O2sat>92%    CV:  SBP goal 1600-200  increase midodrine 10 q8  Phenylephrine for SBP<140  TTE - 63% ejection fraction  cont atorvastatin  hold all home antihypertensives  Serial Troponins and EKG are within normal    RENAL:  Fluids: IVF    GI:  Diet: dysphagia post op and advance diet as tolerated  GI prophylaxis [] not indicated [x] PPI home med [] other:  Bowel regimen [] colace [x] senna [x] other: miralax     ENDO:   Goal euglycemia (-180)  A1C 8.5  TSH WNL  EDISON    HEME/ONC:  VTE prophylaxis: [x] SCDs [x] chemoprophylaxis [] hold chemoprophylaxis due to: [] high risk of DVT/PE on admission due to:    ID: monitor for fever   ASSESSMENT/PLAN: chronic M2 occlusion with recurrent TIAs    NEURO:  ASA  blood pressure augmentation and Q1 hr neurochecks  cont keppra home dose  stroke core measures, stroke neurology following   f/u on starting ASA  No epileptiform abnormalities are captured on EEG  Activity: [] mobilize as tolerated [x] Bedrest till 1am groin safeguard in place [x] PT [x] OT [] PMNR    PULM:  incentive spirometry as able  O2sat>92%    CV:  SBP goal 1600-200  increase midodrine 10 q8  Phenylephrine for SBP<140  TTE - 63% ejection fraction  cont atorvastatin  hold all home antihypertensives  Serial Troponins and EKG are within normal    RENAL:  No fluids    GI:  Diet: ccd  GI prophylaxis [] not indicated [x] PPI home med [] other:  Bowel regimen [] colace [x] senna [x] other: miralax     ENDO:   Goal euglycemia (-180)  A1C 8.5  TSH WNL  EDISON    HEME/ONC:  VTE prophylaxis: [x] SCDs [x] chemoprophylaxis [] hold chemoprophylaxis due to: [] high risk of DVT/PE on admission due to:    ID: monitor for fever

## 2023-07-27 LAB
ANION GAP SERPL CALC-SCNC: 13 MMOL/L — SIGNIFICANT CHANGE UP (ref 5–17)
BUN SERPL-MCNC: 24 MG/DL — HIGH (ref 7–23)
CALCIUM SERPL-MCNC: 9.3 MG/DL — SIGNIFICANT CHANGE UP (ref 8.4–10.5)
CHLORIDE SERPL-SCNC: 103 MMOL/L — SIGNIFICANT CHANGE UP (ref 96–108)
CO2 SERPL-SCNC: 22 MMOL/L — SIGNIFICANT CHANGE UP (ref 22–31)
CREAT SERPL-MCNC: 0.82 MG/DL — SIGNIFICANT CHANGE UP (ref 0.5–1.3)
EGFR: 78 ML/MIN/1.73M2 — SIGNIFICANT CHANGE UP
GLUCOSE BLDC GLUCOMTR-MCNC: 141 MG/DL — HIGH (ref 70–99)
GLUCOSE BLDC GLUCOMTR-MCNC: 156 MG/DL — HIGH (ref 70–99)
GLUCOSE BLDC GLUCOMTR-MCNC: 222 MG/DL — HIGH (ref 70–99)
GLUCOSE SERPL-MCNC: 177 MG/DL — HIGH (ref 70–99)
HCT VFR BLD CALC: 31.5 % — LOW (ref 34.5–45)
HGB BLD-MCNC: 10 G/DL — LOW (ref 11.5–15.5)
MAGNESIUM SERPL-MCNC: 2.3 MG/DL — SIGNIFICANT CHANGE UP (ref 1.6–2.6)
MCHC RBC-ENTMCNC: 20.8 PG — LOW (ref 27–34)
MCHC RBC-ENTMCNC: 31.7 GM/DL — LOW (ref 32–36)
MCV RBC AUTO: 65.5 FL — LOW (ref 80–100)
NRBC # BLD: 0 /100 WBCS — SIGNIFICANT CHANGE UP (ref 0–0)
PHOSPHATE SERPL-MCNC: 4.3 MG/DL — SIGNIFICANT CHANGE UP (ref 2.5–4.5)
PLATELET # BLD AUTO: 277 K/UL — SIGNIFICANT CHANGE UP (ref 150–400)
POTASSIUM SERPL-MCNC: 3.8 MMOL/L — SIGNIFICANT CHANGE UP (ref 3.5–5.3)
POTASSIUM SERPL-SCNC: 3.8 MMOL/L — SIGNIFICANT CHANGE UP (ref 3.5–5.3)
RBC # BLD: 4.81 M/UL — SIGNIFICANT CHANGE UP (ref 3.8–5.2)
RBC # FLD: 15.5 % — HIGH (ref 10.3–14.5)
SODIUM SERPL-SCNC: 138 MMOL/L — SIGNIFICANT CHANGE UP (ref 135–145)
TROPONIN T, HIGH SENSITIVITY RESULT: 28 NG/L — SIGNIFICANT CHANGE UP (ref 0–51)
WBC # BLD: 9.24 K/UL — SIGNIFICANT CHANGE UP (ref 3.8–10.5)
WBC # FLD AUTO: 9.24 K/UL — SIGNIFICANT CHANGE UP (ref 3.8–10.5)

## 2023-07-27 PROCEDURE — 93010 ELECTROCARDIOGRAM REPORT: CPT

## 2023-07-27 PROCEDURE — 99291 CRITICAL CARE FIRST HOUR: CPT

## 2023-07-27 RX ORDER — SODIUM CHLORIDE 9 MG/ML
1000 INJECTION, SOLUTION INTRAVENOUS
Refills: 0 | Status: DISCONTINUED | OUTPATIENT
Start: 2023-07-27 | End: 2023-07-30

## 2023-07-27 RX ORDER — SODIUM CHLORIDE 9 MG/ML
1000 INJECTION, SOLUTION INTRAVENOUS ONCE
Refills: 0 | Status: COMPLETED | OUTPATIENT
Start: 2023-07-27 | End: 2023-07-27

## 2023-07-27 RX ORDER — POTASSIUM CHLORIDE 20 MEQ
40 PACKET (EA) ORAL ONCE
Refills: 0 | Status: COMPLETED | OUTPATIENT
Start: 2023-07-27 | End: 2023-07-27

## 2023-07-27 RX ORDER — METOCLOPRAMIDE HCL 10 MG
10 TABLET ORAL ONCE
Refills: 0 | Status: COMPLETED | OUTPATIENT
Start: 2023-07-27 | End: 2023-07-27

## 2023-07-27 RX ADMIN — Medication 10 MILLIGRAM(S): at 08:41

## 2023-07-27 RX ADMIN — SODIUM CHLORIDE 75 MILLILITER(S): 9 INJECTION, SOLUTION INTRAVENOUS at 13:05

## 2023-07-27 RX ADMIN — POLYETHYLENE GLYCOL 3350 17 GRAM(S): 17 POWDER, FOR SOLUTION ORAL at 05:13

## 2023-07-27 RX ADMIN — MIDODRINE HYDROCHLORIDE 10 MILLIGRAM(S): 2.5 TABLET ORAL at 13:05

## 2023-07-27 RX ADMIN — LEVETIRACETAM 500 MILLIGRAM(S): 250 TABLET, FILM COATED ORAL at 18:08

## 2023-07-27 RX ADMIN — MIDODRINE HYDROCHLORIDE 10 MILLIGRAM(S): 2.5 TABLET ORAL at 22:25

## 2023-07-27 RX ADMIN — LEVETIRACETAM 500 MILLIGRAM(S): 250 TABLET, FILM COATED ORAL at 05:12

## 2023-07-27 RX ADMIN — CHLORHEXIDINE GLUCONATE 1 APPLICATION(S): 213 SOLUTION TOPICAL at 13:05

## 2023-07-27 RX ADMIN — ENOXAPARIN SODIUM 40 MILLIGRAM(S): 100 INJECTION SUBCUTANEOUS at 18:08

## 2023-07-27 RX ADMIN — Medication 81 MILLIGRAM(S): at 12:39

## 2023-07-27 RX ADMIN — SODIUM CHLORIDE 75 MILLILITER(S): 9 INJECTION, SOLUTION INTRAVENOUS at 19:29

## 2023-07-27 RX ADMIN — MIDODRINE HYDROCHLORIDE 10 MILLIGRAM(S): 2.5 TABLET ORAL at 05:12

## 2023-07-27 RX ADMIN — Medication 40 MILLIEQUIVALENT(S): at 05:12

## 2023-07-27 RX ADMIN — Medication 4: at 18:08

## 2023-07-27 RX ADMIN — SODIUM CHLORIDE 2000 MILLILITER(S): 9 INJECTION, SOLUTION INTRAVENOUS at 12:04

## 2023-07-27 RX ADMIN — ATORVASTATIN CALCIUM 80 MILLIGRAM(S): 80 TABLET, FILM COATED ORAL at 22:25

## 2023-07-27 RX ADMIN — PHENYLEPHRINE HYDROCHLORIDE 2.13 MICROGRAM(S)/KG/MIN: 10 INJECTION INTRAVENOUS at 21:00

## 2023-07-27 RX ADMIN — Medication 2: at 12:12

## 2023-07-27 NOTE — DIETITIAN INITIAL EVALUATION ADULT - REASON INDICATOR FOR ASSESSMENT
Nutrition Assessment warranted for length of stay.  Information obtained from: RN, comprehensive chart review, interdisciplinary medical rounds  Nutrition Assessment warranted for length of stay.  Information obtained from: RN, comprehensive chart review, interdisciplinary medical rounds, patient with  (ID# [id number])

## 2023-07-27 NOTE — DIETITIAN INITIAL EVALUATION ADULT - OTHER INFO
Dosing wt (7/23): 124.7 lbs Dosing wt (7/23): 124.7 lbs  Pt unable to recall UBW. Believes she may have had some weight loss in setting of decreased appetite/PO intake.

## 2023-07-27 NOTE — DIETITIAN INITIAL EVALUATION ADULT - ORAL INTAKE PTA/DIET HISTORY
-Pt reports very good appetite/PO intake at baseline. Consumed three meals daily.   -Confirms poor appetite x few days PTA and in-house (<75% of meals x >/=7 days). Endorsed dislike of "American foods" and prefers porridge, noodles, rice noodles. Family has been bringing in food from outside. Denies allergies or intolerance to chewing/swallowing. Denies nausea/vomiting/constipation/diarrhea. Admits to taking vitamins/supplements, however unsure of which ones (states "my daughter knows").   -Denies adherence to therapeutic diet PTA. Hx of DM noted. Unclear if pt adhered to antihyperglycemic regimen. A1c 8.5%.

## 2023-07-27 NOTE — DIETITIAN INITIAL EVALUATION ADULT - NSFNSGIIOFT_GEN_A_CORE
-No documented BM's record thus far. On bowel regimen (senna, Miralax).   -On Midodrine, Phenylephrine for SBP control.  -Prescribed insulin lispro sliding scale to aid in management of BG. A1c 8.5%. History of DM noted. Unclear if pt adhered to antihyperglycemic regimen PTA.

## 2023-07-27 NOTE — DIETITIAN INITIAL EVALUATION ADULT - PERTINENT MEDS FT
MEDICATIONS  (STANDING):  aspirin  chewable 81 milliGRAM(s) Oral daily  atorvastatin 80 milliGRAM(s) Oral at bedtime  chlorhexidine 4% Liquid 1 Application(s) Topical daily  enoxaparin Injectable 40 milliGRAM(s) SubCutaneous <User Schedule>  insulin lispro (ADMELOG) corrective regimen sliding scale   SubCutaneous three times a day before meals  levETIRAcetam 500 milliGRAM(s) Oral two times a day  midodrine 10 milliGRAM(s) Oral every 8 hours  phenylephrine    Infusion 0.1 MICROgram(s)/kG/Min (2.13 mL/Hr) IV Continuous <Continuous>  polyethylene glycol 3350 17 Gram(s) Oral two times a day  senna 2 Tablet(s) Oral at bedtime    MEDICATIONS  (PRN):  acetaminophen     Tablet .. 650 milliGRAM(s) Oral every 6 hours PRN Mild Pain (1 - 3)

## 2023-07-27 NOTE — DIETITIAN INITIAL EVALUATION ADULT - REASON FOR ADMISSION
Pt is a 68 yo F with PMH: seizures on Keppra, CVA on Plavix with unknown residual deficit, HLD, HTN. Presented with worsening and more prolonged episode of aphasia/blank staring/unresponsiveness for two weeks. Admitted to ED with R facial droop, RUE weakness, dysarthria and global aphasia. CTA consistent for MCA occlusion. Aspiration thrombectomy attempted without recanalization.

## 2023-07-27 NOTE — PROGRESS NOTE ADULT - ASSESSMENT
ASSESSMENT/PLAN: chronic M2 occlusion with recurrent TIAs    NEURO:  cont plavix. add aspirin  blood pressure augmentation and Q1 hr neurochecks  cont keppra home dose  stroke core measures, stroke neurology following   f/u on starting ASA  Possible MCA bypass next week  Activity: [x] mobilize as tolerated [] Bedrest [x] PT [x] OT [] PMNR    PULM:  incentive spirometry as able  O2sat>92%    CV:  SBP goal 160-200  midodrine 10 q8  Phenylephrine for SBP<140  TTE - 63% ejection fraction  cont atorvastatin  hold all home antihypertensives  Serial Troponins and EKG are WNL  Wean pressors as tolerated    RENAL:  Fluids: Plasmalyte @ 75/hr    GI:  Diet: CCD  GI prophylaxis [] not indicated [x] PPI home med [] other:  Bowel regimen [] colace [x] senna [x] other: miralax     ENDO:   Goal euglycemia (-180)  A1C 8.5  TSH WNL  EDISON    HEME/ONC:  VTE prophylaxis: [x] SCDs [x] chemoprophylaxis [] hold chemoprophylaxis due to: [] high risk of DVT/PE on admission due to:    ID: monitor for fever   ASSESSMENT/PLAN: chronic M2 occlusion with recurrent TIAs    NEURO:  cont plavix. add aspirin  blood pressure augmentation and Q1 hr neurochecks  cont keppra home dose  stroke core measures, stroke neurology following   f/u on starting ASA  Possible MCA bypass next week  Activity: [x] mobilize as tolerated [] Bedrest [x] PT [x] OT [] PMNR    PULM:  incentive spirometry as able  O2sat>92%    CV:  SBP goal 140-200  midodrine 10 q8  Phenylephrine for SBP<140  TTE - 63% ejection fraction  cont atorvastatin  hold all home antihypertensives  Serial Troponins and EKG are WNL  Wean pressors as tolerated    RENAL:  Fluids: Plasmalyte @ 75/hr    GI:  Diet: CCD  GI prophylaxis [] not indicated [x] PPI home med [] other:  Bowel regimen [] colace [x] senna [x] other: miralax     ENDO:   Goal euglycemia (-180)  A1C 8.5  TSH WNL  EDISON    HEME/ONC:  VTE prophylaxis: [x] SCDs [x] chemoprophylaxis [] hold chemoprophylaxis due to: [] high risk of DVT/PE on admission due to:    ID: monitor for fever

## 2023-07-27 NOTE — DIETITIAN INITIAL EVALUATION ADULT - PERTINENT LABORATORY DATA
07-27    138  |  103  |  24<H>  ----------------------------<  177<H>  3.8   |  22  |  0.82    Ca    9.3      27 Jul 2023 02:42  Phos  4.3     07-27  Mg     2.3     07-27    POCT Blood Glucose.: 276 mg/dL (07-26-23 @ 17:17)  A1C with Estimated Average Glucose Result: 8.5 % (07-23-23 @ 21:37)

## 2023-07-27 NOTE — PROGRESS NOTE ADULT - ASSESSMENT
ASSESSMENT/PLAN: chronic M2 occlusion with recurrent TIAs    NEURO:  cont plavix. consider adding aspirin  blood pressure augmentation and Q1 hr neurochecks  cont keppra home dose  stroke core measures, stroke neurology following   f/u on starting ASA  Activity: [] mobilize as tolerated [x] Bedrest till 1am groin safeguard in place [x] PT [x] OT [] PMNR    PULM:  incentive spirometry as able  O2sat>92%    CV:  SBP goal 1600-200  increase midodrine 10 q8  Phenylephrine for SBP<140  TTE - 63% ejection fraction  cont atorvastatin  hold all home antihypertensives  Serial Troponins and EKG are within normal    RENAL:  Fluids: IVF    GI:  Diet: dysphagia post op and advance diet as tolerated  GI prophylaxis [] not indicated [x] PPI home med [] other:  Bowel regimen [] colace [x] senna [x] other: miralax     ENDO:   Goal euglycemia (-180)  A1C 8.5  TSH WNL  EDISON    HEME/ONC:  VTE prophylaxis: [x] SCDs [x] chemoprophylaxis [] hold chemoprophylaxis due to: [] high risk of DVT/PE on admission due to:    ID: monitor for fever   ASSESSMENT/PLAN: chronic M2 occlusion with recurrent TIAs    NEURO:  cont plavix. consider adding aspirin  blood pressure augmentation and Q1 hr neurochecks  cont keppra home dose  stroke core measures, stroke neurology following   f/u on starting ASA  Possible MCA bypass next week  Activity: [] mobilize as tolerated [x] Bedrest till 1am groin safeguard in place [x] PT [x] OT [] PMNR    PULM:  incentive spirometry as able  O2sat>92%    CV:  SBP goal 1600-200  increase midodrine 10 q8  Phenylephrine for SBP<140  TTE - 63% ejection fraction  cont atorvastatin  hold all home antihypertensives  Serial Troponins and EKG are WNL  Wean pressors     RENAL:  Fluids: IVF    GI:  Diet: dysphagia post op and advance diet as tolerated  GI prophylaxis [] not indicated [x] PPI home med [] other:  Bowel regimen [] colace [x] senna [x] other: miralax     ENDO:   Goal euglycemia (-180)  A1C 8.5  TSH WNL  EDISON    HEME/ONC:  VTE prophylaxis: [x] SCDs [x] chemoprophylaxis [] hold chemoprophylaxis due to: [] high risk of DVT/PE on admission due to:    ID: monitor for fever   ASSESSMENT/PLAN: chronic M2 occlusion with recurrent TIAs    NEURO:  cont plavix. consider adding aspirin  blood pressure augmentation and Q1 hr neurochecks  cont keppra home dose  stroke core measures, stroke neurology following   f/u on starting ASA  Possible MCA bypass next week  Activity: [x] mobilize as tolerated [] Bedrest [x] PT [x] OT [] PMNR    PULM:  incentive spirometry as able  O2sat>92%    CV:  SBP goal 160-200  midodrine 10 q8  Phenylephrine for SBP<140  TTE - 63% ejection fraction  cont atorvastatin  hold all home antihypertensives  Serial Troponins and EKG are WNL  Wean pressors as tolerated    RENAL:  Fluids: Plasmalyte @ 75/hr    GI:  Diet: CCD  GI prophylaxis [] not indicated [x] PPI home med [] other:  Bowel regimen [] colace [x] senna [x] other: miralax     ENDO:   Goal euglycemia (-180)  A1C 8.5  TSH WNL  EDISON    HEME/ONC:  VTE prophylaxis: [x] SCDs [x] chemoprophylaxis [] hold chemoprophylaxis due to: [] high risk of DVT/PE on admission due to:    ID: monitor for fever   ASSESSMENT/PLAN: chronic M2 occlusion with recurrent TIAs    NEURO:  cont plavix. add aspirin  blood pressure augmentation and Q1 hr neurochecks  cont keppra home dose  stroke core measures, stroke neurology following   f/u on starting ASA  Possible MCA bypass next week  Activity: [x] mobilize as tolerated [] Bedrest [x] PT [x] OT [] PMNR    PULM:  incentive spirometry as able  O2sat>92%    CV:  SBP goal 160-200  midodrine 10 q8  Phenylephrine for SBP<140  TTE - 63% ejection fraction  cont atorvastatin  hold all home antihypertensives  Serial Troponins and EKG are WNL  Wean pressors as tolerated    RENAL:  Fluids: Plasmalyte @ 75/hr    GI:  Diet: CCD  GI prophylaxis [] not indicated [x] PPI home med [] other:  Bowel regimen [] colace [x] senna [x] other: miralax     ENDO:   Goal euglycemia (-180)  A1C 8.5  TSH WNL  EDISON    HEME/ONC:  VTE prophylaxis: [x] SCDs [x] chemoprophylaxis [] hold chemoprophylaxis due to: [] high risk of DVT/PE on admission due to:    ID: monitor for fever

## 2023-07-27 NOTE — DIETITIAN INITIAL EVALUATION ADULT - ADD RECOMMEND
1. Continue Consistent Carbohydrate diet as prescribed. Defer consistency to medical team, SLP.   2. Encourage and monitor PO intake. Encourage use of daily menus. Honor dietary preferences as expressed as able.  1. Continue Consistent Carbohydrate diet as prescribed. Defer consistency to medical team, SLP.   2. Encourage and monitor PO intake. Encourage use of daily menus. Honor dietary preferences as expressed as able.   3. Recommend multivitamin (if no medication contraindications) to aid in prevention of micronutrient deficiencies.   4. Denies offer for nutrition supplement this time. RD to remain available; re-approach subject if appetite remains poor. Family noted to have been brining in food preferences.  5. Malnutrition sticker placed.

## 2023-07-27 NOTE — PROGRESS NOTE ADULT - SUBJECTIVE AND OBJECTIVE BOX
HOSPITAL COURSE: 66yo woman PMH seizure on keppra, CVA on plavix with unknown residual deficit, HLD, HTN p/w worsening and more prolonged episodes of aphasia/blank staring/unresponsiveness over two weeks. The episodes became more frequent and today, per daughter, it lasted for hours. Brought to ED, where she was found to have R facial droop, RUE weakness, dysarthria, and global aphasia. NIHSS 12. CTA showing c/f L MCA occlusion.     7/23 Adm NSCU s/p angiogram showing R M2/MCA occlusion with distal retrograde reconstitution, aspiration thrombectomy attempted without recanalization.   7/24: no acute overnight events  7/25: Aphasic when SBP dropped to 110 overnight, daughter reports slowed speech this morning   7/26: Pressure dependent exam, exam worsened when SBP <160  7/27:     Admission Scores  GCS:   HH:   MF:   NIHSS: 12  RASS:    CAM-ICU:   ICH:    Allergies    No Known Allergies    REVIEW OF SYSTEMS: [ x] Unable to Assess due to neurologic exam   [ ] All ROS addressed below are non-contributory, except:  Neuro: [ ] Headache [ ] Back pain [ ] Numbness [ ] Weakness [ ] Ataxia [ ] Dizziness [ ] Aphasia [ ] Dysarthria [ ] Visual disturbance  Resp: [ ] Shortness of breath/dyspnea, [ ] Orthopnea [ ] Cough  CV: [ ] Chest pain [ ] Palpitation [ ] Lightheadedness [ ] Syncope  Renal: [ ] Thirst [ ] Edema  GI: [ ] Nausea [ ] Emesis [ ] Abdominal pain [ ] Constipation [ ] Diarrhea  Hem: [ ] Hematemesis [ ] bright red blood per rectum  ID: [ ] Fever [ ] Chills [ ] Dysuria  ENT: [ ] Rhinorrhea      DEVICES:   [ ] Restraints [ ] ET tube [ ] central line [ ] arterial line [ ] gonzalez [ ] NGT/OGT [ ] EVD [ ] LD [ ] FRANTZ/HMV [ ] Trach [ ] PEG [ ] Chest Tube     Vitals  T(C): 37.1 (07-27-23 @ 03:00), Max: 37.2 (07-26-23 @ 11:00)  T(F): 98.8 (07-27-23 @ 03:00), Max: 98.9 (07-26-23 @ 11:00)  HR: 81 (07-27-23 @ 06:30) (76 - 93)  BP: --  RR: 17 (07-27-23 @ 06:30) (12 - 23)  SpO2: 95% (07-27-23 @ 06:30) (85% - 100%)  Wt(kg): --    I&O's Detail    25 Jul 2023 07:01  -  26 Jul 2023 07:00  --------------------------------------------------------  IN:    Oral Fluid: 1420 mL    Phenylephrine: 68.7 mL  Total IN: 1488.7 mL    OUT:    Incontinent per Collection Bag (mL): 2525 mL    Phenylephrine: 0 mL  Total OUT: 2525 mL    Total NET: -1036.3 mL      26 Jul 2023 07:01  -  27 Jul 2023 06:42  --------------------------------------------------------  IN:    Oral Fluid: 550 mL    Phenylephrine: 164.3 mL    Sodium Chloride 0.9% Bolus: 500 mL  Total IN: 1214.3 mL    OUT:    Incontinent per Collection Bag (mL): 2700 mL  Total OUT: 2700 mL    Total NET: -1485.7 mL    MEDICATIONS  (STANDING):  aspirin  chewable 81 milliGRAM(s) Oral daily  atorvastatin 80 milliGRAM(s) Oral at bedtime  chlorhexidine 4% Liquid 1 Application(s) Topical daily  enoxaparin Injectable 40 milliGRAM(s) SubCutaneous <User Schedule>  insulin lispro (ADMELOG) corrective regimen sliding scale   SubCutaneous three times a day before meals  levETIRAcetam 500 milliGRAM(s) Oral two times a day  midodrine 10 milliGRAM(s) Oral every 8 hours  phenylephrine    Infusion 0.1 MICROgram(s)/kG/Min (2.13 mL/Hr) IV Continuous <Continuous>  polyethylene glycol 3350 17 Gram(s) Oral two times a day  senna 2 Tablet(s) Oral at bedtime    MEDICATIONS  (PRN):  acetaminophen     Tablet .. 650 milliGRAM(s) Oral every 6 hours PRN Mild Pain (1 - 3)    IMAGING:  < from: MR Head No Cont (07.25.23 @ 08:33) >  Acute left subinsular and scattered left frontal and parietal white   matter infarcts in the distribution of the left middle cerebral artery.  Chronic left parietal/occipital infarct.    < end of copied text >    CTP 7/23  IMPRESSION:    CT angiography neck: No hemodynamically significant stenosis byNASCET   criteria. No vascular dissection.    CT angiography brain: No major vessel occlusion or proximal stenosis. 2   mm left M1 bifurcation aneurysm    CT perfusion: Approximately 7 mL of ischemic penumbra is seen in the left   frontoparietal cortex.      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, L pupil 3mm reactive, EOMI, No Gaze Preference, Face Symmetrical,   Motor exam:          Upper extremity                         Delt     Bicep     Tricep    HG                                                 R         5/5        5/5        5/5       5/5                                               L          5/5        5/5        5/5       5/5          Lower extremity                        HF         KF        KE       DF         PF                                                  R        5/5        5/5       5/5       5/5          5/5                                               L         5/5        5/5       5/5       5/5          5/5                                                 Sensation: [s ] intact to light touch  [ ] decreased:     Reflexes: Deep Tendon Reflexes Intact     Pulmonary: Clear to Auscultation, No rales, No rhonchi, No wheezes     Cardiovascular: S1, S2, Regular rate and rhythm     Gastrointestinal: Soft, Non-tender, Non-distended     Extremities: No calf tenderness       LABS:                          10.0   9.24  )-----------( 277      ( 27 Jul 2023 02:42 )             31.5     07-27    138  |  103  |  24<H>  ----------------------------<  177<H>  3.8   |  22  |  0.82    Ca    9.3      27 Jul 2023 02:42  Phos  4.3     07-27  Mg     2.3     07-27          Urinalysis Basic - ( 27 Jul 2023 02:42 )    Color: x / Appearance: x / SG: x / pH: x  Gluc: 177 mg/dL / Ketone: x  / Bili: x / Urobili: x   Blood: x / Protein: x / Nitrite: x   Leuk Esterase: x / RBC: x / WBC x   Sq Epi: x / Non Sq Epi: x / Bacteria: x                   HOSPITAL COURSE: 68yo woman PMH seizure on keppra, CVA on plavix with unknown residual deficit, HLD, HTN p/w worsening and more prolonged episodes of aphasia/blank staring/unresponsiveness over two weeks. The episodes became more frequent and today, per daughter, it lasted for hours. Brought to ED, where she was found to have R facial droop, RUE weakness, dysarthria, and global aphasia. NIHSS 12. CTA showing c/f L MCA occlusion.     7/23 Adm NSCU s/p angiogram showing R M2/MCA occlusion with distal retrograde reconstitution, aspiration thrombectomy attempted without recanalization.   7/24: no acute overnight events  7/25: Aphasic when SBP dropped to 110 overnight, daughter reports slowed speech this morning   7/26: Pressure dependent exam, exam worsened when SBP <160  7/27: No acute overnight events    Admission Scores  GCS:   HH:   MF:   NIHSS: 12  RASS:    CAM-ICU:   ICH:    Allergies    No Known Allergies    REVIEW OF SYSTEMS: [ x] Unable to Assess due to neurologic exam   [ ] All ROS addressed below are non-contributory, except:  Neuro: [ ] Headache [ ] Back pain [ ] Numbness [ ] Weakness [ ] Ataxia [ ] Dizziness [ ] Aphasia [ ] Dysarthria [ ] Visual disturbance  Resp: [ ] Shortness of breath/dyspnea, [ ] Orthopnea [ ] Cough  CV: [ ] Chest pain [ ] Palpitation [ ] Lightheadedness [ ] Syncope  Renal: [ ] Thirst [ ] Edema  GI: [ ] Nausea [ ] Emesis [ ] Abdominal pain [ ] Constipation [ ] Diarrhea  Hem: [ ] Hematemesis [ ] bright red blood per rectum  ID: [ ] Fever [ ] Chills [ ] Dysuria  ENT: [ ] Rhinorrhea      DEVICES:   [ ] Restraints [ ] ET tube [ ] central line [ ] arterial line [ ] gonzalez [ ] NGT/OGT [ ] EVD [ ] LD [ ] FRANTZ/HMV [ ] Trach [ ] PEG [ ] Chest Tube     Vitals  T(C): 37.1 (07-27-23 @ 03:00), Max: 37.2 (07-26-23 @ 11:00)  T(F): 98.8 (07-27-23 @ 03:00), Max: 98.9 (07-26-23 @ 11:00)  HR: 81 (07-27-23 @ 06:30) (76 - 93)  BP: --  RR: 17 (07-27-23 @ 06:30) (12 - 23)  SpO2: 95% (07-27-23 @ 06:30) (85% - 100%)  Wt(kg): --    I&O's Detail    25 Jul 2023 07:01  -  26 Jul 2023 07:00  --------------------------------------------------------  IN:    Oral Fluid: 1420 mL    Phenylephrine: 68.7 mL  Total IN: 1488.7 mL    OUT:    Incontinent per Collection Bag (mL): 2525 mL    Phenylephrine: 0 mL  Total OUT: 2525 mL    Total NET: -1036.3 mL      26 Jul 2023 07:01  -  27 Jul 2023 06:42  --------------------------------------------------------  IN:    Oral Fluid: 550 mL    Phenylephrine: 164.3 mL    Sodium Chloride 0.9% Bolus: 500 mL  Total IN: 1214.3 mL    OUT:    Incontinent per Collection Bag (mL): 2700 mL  Total OUT: 2700 mL    Total NET: -1485.7 mL    MEDICATIONS  (STANDING):  aspirin  chewable 81 milliGRAM(s) Oral daily  atorvastatin 80 milliGRAM(s) Oral at bedtime  chlorhexidine 4% Liquid 1 Application(s) Topical daily  enoxaparin Injectable 40 milliGRAM(s) SubCutaneous <User Schedule>  insulin lispro (ADMELOG) corrective regimen sliding scale   SubCutaneous three times a day before meals  levETIRAcetam 500 milliGRAM(s) Oral two times a day  midodrine 10 milliGRAM(s) Oral every 8 hours  phenylephrine    Infusion 0.1 MICROgram(s)/kG/Min (2.13 mL/Hr) IV Continuous <Continuous>  polyethylene glycol 3350 17 Gram(s) Oral two times a day  senna 2 Tablet(s) Oral at bedtime    MEDICATIONS  (PRN):  acetaminophen     Tablet .. 650 milliGRAM(s) Oral every 6 hours PRN Mild Pain (1 - 3)    IMAGING:  < from: MR Head No Cont (07.25.23 @ 08:33) >  Acute left subinsular and scattered left frontal and parietal white   matter infarcts in the distribution of the left middle cerebral artery.  Chronic left parietal/occipital infarct.    < end of copied text >    CTP 7/23  IMPRESSION:    CT angiography neck: No hemodynamically significant stenosis byNASCET   criteria. No vascular dissection.    CT angiography brain: No major vessel occlusion or proximal stenosis. 2   mm left M1 bifurcation aneurysm    CT perfusion: Approximately 7 mL of ischemic penumbra is seen in the left   frontoparietal cortex.      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, L pupil 3mm reactive, EOMI, No Gaze Preference, Face Symmetrical,   Motor exam:          Upper extremity                         Delt     Bicep     Tricep    HG                                                 R         5/5        5/5        5/5       5/5                                               L          5/5        5/5        5/5       5/5          Lower extremity                        HF         KF        KE       DF         PF                                                  R        5/5        5/5       5/5       5/5          5/5                                               L         5/5        5/5       5/5       5/5          5/5                                                 Sensation: [s ] intact to light touch  [ ] decreased:     Reflexes: Deep Tendon Reflexes Intact     Pulmonary: Clear to Auscultation, No rales, No rhonchi, No wheezes     Cardiovascular: S1, S2, Regular rate and rhythm     Gastrointestinal: Soft, Non-tender, Non-distended     Extremities: No calf tenderness       LABS:                          10.0   9.24  )-----------( 277      ( 27 Jul 2023 02:42 )             31.5     07-27    138  |  103  |  24<H>  ----------------------------<  177<H>  3.8   |  22  |  0.82    Ca    9.3      27 Jul 2023 02:42  Phos  4.3     07-27  Mg     2.3     07-27      Urinalysis Basic - ( 27 Jul 2023 02:42 )    Color: x / Appearance: x / SG: x / pH: x  Gluc: 177 mg/dL / Ketone: x  / Bili: x / Urobili: x   Blood: x / Protein: x / Nitrite: x   Leuk Esterase: x / RBC: x / WBC x   Sq Epi: x / Non Sq Epi: x / Bacteria: x                   HOSPITAL COURSE: 68yo woman PMH seizure on keppra, CVA on plavix with unknown residual deficit, HLD, HTN p/w worsening and more prolonged episodes of aphasia/blank staring/unresponsiveness over two weeks. The episodes became more frequent and today, per daughter, it lasted for hours. Brought to ED, where she was found to have R facial droop, RUE weakness, dysarthria, and global aphasia. NIHSS 12. CTA showing c/f L MCA occlusion.     7/23 Adm NSCU s/p angiogram showing R M2/MCA occlusion with distal retrograde reconstitution, aspiration thrombectomy attempted without recanalization.   7/24: no acute overnight events  7/25: Aphasic when SBP dropped to 110 overnight, daughter reports slowed speech this morning   7/26: Pressure dependent exam, exam worsened when SBP <160  7/27: Mild confusion over night. Negative balance. Will put her on maintenance fluids to decrease the pressors requirements    Admission Scores  GCS:   HH:   MF:   NIHSS: 12  RASS:    CAM-ICU:   ICH:    Allergies    No Known Allergies    REVIEW OF SYSTEMS: [ x] Unable to Assess due to neurologic exam   [ ] All ROS addressed below are non-contributory, except:  Neuro: [ ] Headache [ ] Back pain [ ] Numbness [ ] Weakness [ ] Ataxia [ ] Dizziness [ ] Aphasia [ ] Dysarthria [ ] Visual disturbance  Resp: [ ] Shortness of breath/dyspnea, [ ] Orthopnea [ ] Cough  CV: [ ] Chest pain [ ] Palpitation [ ] Lightheadedness [ ] Syncope  Renal: [ ] Thirst [ ] Edema  GI: [ ] Nausea [ ] Emesis [ ] Abdominal pain [ ] Constipation [ ] Diarrhea  Hem: [ ] Hematemesis [ ] bright red blood per rectum  ID: [ ] Fever [ ] Chills [ ] Dysuria  ENT: [ ] Rhinorrhea      DEVICES:   [ ] Restraints [ ] ET tube [ ] central line [ ] arterial line [ ] gonzalez [ ] NGT/OGT [ ] EVD [ ] LD [ ] FRANTZ/HMV [ ] Trach [ ] PEG [ ] Chest Tube     Vitals  T(C): 37.1 (07-27-23 @ 03:00), Max: 37.2 (07-26-23 @ 11:00)  T(F): 98.8 (07-27-23 @ 03:00), Max: 98.9 (07-26-23 @ 11:00)  HR: 81 (07-27-23 @ 06:30) (76 - 93)  BP: --  RR: 17 (07-27-23 @ 06:30) (12 - 23)  SpO2: 95% (07-27-23 @ 06:30) (85% - 100%)  Wt(kg): --    I&O's Detail    25 Jul 2023 07:01  -  26 Jul 2023 07:00  --------------------------------------------------------  IN:    Oral Fluid: 1420 mL    Phenylephrine: 68.7 mL  Total IN: 1488.7 mL    OUT:    Incontinent per Collection Bag (mL): 2525 mL    Phenylephrine: 0 mL  Total OUT: 2525 mL    Total NET: -1036.3 mL      26 Jul 2023 07:01  -  27 Jul 2023 06:42  --------------------------------------------------------  IN:    Oral Fluid: 550 mL    Phenylephrine: 164.3 mL    Sodium Chloride 0.9% Bolus: 500 mL  Total IN: 1214.3 mL    OUT:    Incontinent per Collection Bag (mL): 2700 mL  Total OUT: 2700 mL    Total NET: -1485.7 mL    MEDICATIONS  (STANDING):  aspirin  chewable 81 milliGRAM(s) Oral daily  atorvastatin 80 milliGRAM(s) Oral at bedtime  chlorhexidine 4% Liquid 1 Application(s) Topical daily  enoxaparin Injectable 40 milliGRAM(s) SubCutaneous <User Schedule>  insulin lispro (ADMELOG) corrective regimen sliding scale   SubCutaneous three times a day before meals  levETIRAcetam 500 milliGRAM(s) Oral two times a day  midodrine 10 milliGRAM(s) Oral every 8 hours  phenylephrine    Infusion 0.1 MICROgram(s)/kG/Min (2.13 mL/Hr) IV Continuous <Continuous>  polyethylene glycol 3350 17 Gram(s) Oral two times a day  senna 2 Tablet(s) Oral at bedtime    MEDICATIONS  (PRN):  acetaminophen     Tablet .. 650 milliGRAM(s) Oral every 6 hours PRN Mild Pain (1 - 3)    IMAGING:  < from: MR Head No Cont (07.25.23 @ 08:33) >  Acute left subinsular and scattered left frontal and parietal white   matter infarcts in the distribution of the left middle cerebral artery.  Chronic left parietal/occipital infarct.    < end of copied text >    CTP 7/23  IMPRESSION:    CT angiography neck: No hemodynamically significant stenosis byNASCET   criteria. No vascular dissection.    CT angiography brain: No major vessel occlusion or proximal stenosis. 2   mm left M1 bifurcation aneurysm    CT perfusion: Approximately 7 mL of ischemic penumbra is seen in the left   frontoparietal cortex.      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, L pupil 3mm reactive, EOMI, No Gaze Preference, Face Symmetrical,   Motor exam:          Upper extremity                         Delt     Bicep     Tricep    HG                                                 R         5/5        5/5        5/5       5/5                                               L          5/5        5/5        5/5       5/5          Lower extremity                        HF         KF        KE       DF         PF                                                  R        5/5        5/5       5/5       5/5          5/5                                               L         5/5        5/5       5/5       5/5          5/5                                                 Sensation: [s ] intact to light touch  [ ] decreased:     Reflexes: Deep Tendon Reflexes Intact     Pulmonary: Clear to Auscultation, No rales, No rhonchi, No wheezes     Cardiovascular: S1, S2, Regular rate and rhythm     Gastrointestinal: Soft, Non-tender, Non-distended     Extremities: No calf tenderness       LABS:                          10.0   9.24  )-----------( 277      ( 27 Jul 2023 02:42 )             31.5     07-27    138  |  103  |  24<H>  ----------------------------<  177<H>  3.8   |  22  |  0.82    Ca    9.3      27 Jul 2023 02:42  Phos  4.3     07-27  Mg     2.3     07-27      Urinalysis Basic - ( 27 Jul 2023 02:42 )    Color: x / Appearance: x / SG: x / pH: x  Gluc: 177 mg/dL / Ketone: x  / Bili: x / Urobili: x   Blood: x / Protein: x / Nitrite: x   Leuk Esterase: x / RBC: x / WBC x   Sq Epi: x / Non Sq Epi: x / Bacteria: x

## 2023-07-27 NOTE — PROGRESS NOTE ADULT - SUBJECTIVE AND OBJECTIVE BOX
HOSPITAL COURSE: 68yo woman PMH seizure on keppra, CVA on plavix with unknown residual deficit, HLD, HTN p/w worsening and more prolonged episodes of aphasia/blank staring/unresponsiveness over two weeks. The episodes became more frequent and today, per daughter, it lasted for hours. Brought to ED, where she was found to have R facial droop, RUE weakness, dysarthria, and global aphasia. NIHSS 12. CTA showing c/f L MCA occlusion.     7/23 Adm NSCU s/p angiogram showing R M2/MCA occlusion with distal retrograde reconstitution, aspiration thrombectomy attempted without recanalization.   7/24: no acute overnight events  7/25: Aphasic when SBP dropped to 110 overnight, daughter reports slowed speech this morning   7/26: Pressure dependent exam, exam worsened when SBP <160  7/27: Mild confusion over night. Negative balance. Will put her on maintenance fluids to decrease the pressors requirements  7/28: no overnight events, exam stable    Admission Scores  GCS:   HH:   MF:   NIHSS: 12  RASS:    CAM-ICU:   ICH:    Allergies    No Known Allergies    REVIEW OF SYSTEMS: [ x] Unable to Assess due to neurologic exam   [ ] All ROS addressed below are non-contributory, except:  Neuro: [ ] Headache [ ] Back pain [ ] Numbness [ ] Weakness [ ] Ataxia [ ] Dizziness [ ] Aphasia [ ] Dysarthria [ ] Visual disturbance  Resp: [ ] Shortness of breath/dyspnea, [ ] Orthopnea [ ] Cough  CV: [ ] Chest pain [ ] Palpitation [ ] Lightheadedness [ ] Syncope  Renal: [ ] Thirst [ ] Edema  GI: [ ] Nausea [ ] Emesis [ ] Abdominal pain [ ] Constipation [ ] Diarrhea  Hem: [ ] Hematemesis [ ] bright red blood per rectum  ID: [ ] Fever [ ] Chills [ ] Dysuria  ENT: [ ] Rhinorrhea      DEVICES:   [ ] Restraints [ ] ET tube [ ] central line [ ] arterial line [ ] gonzalez [ ] NGT/OGT [ ] EVD [ ] LD [ ] FRANTZ/HMV [ ] Trach [ ] PEG [ ] Chest Tube     Vitals  T(C): 37.1 (07-27-23 @ 03:00), Max: 37.2 (07-26-23 @ 11:00)  T(F): 98.8 (07-27-23 @ 03:00), Max: 98.9 (07-26-23 @ 11:00)  HR: 81 (07-27-23 @ 06:30) (76 - 93)  BP: --  RR: 17 (07-27-23 @ 06:30) (12 - 23)  SpO2: 95% (07-27-23 @ 06:30) (85% - 100%)  Wt(kg): --    I&O's Detail    25 Jul 2023 07:01  -  26 Jul 2023 07:00  --------------------------------------------------------  IN:    Oral Fluid: 1420 mL    Phenylephrine: 68.7 mL  Total IN: 1488.7 mL    OUT:    Incontinent per Collection Bag (mL): 2525 mL    Phenylephrine: 0 mL  Total OUT: 2525 mL    Total NET: -1036.3 mL      26 Jul 2023 07:01  -  27 Jul 2023 06:42  --------------------------------------------------------  IN:    Oral Fluid: 550 mL    Phenylephrine: 164.3 mL    Sodium Chloride 0.9% Bolus: 500 mL  Total IN: 1214.3 mL    OUT:    Incontinent per Collection Bag (mL): 2700 mL  Total OUT: 2700 mL    Total NET: -1485.7 mL    MEDICATIONS  (STANDING):  aspirin  chewable 81 milliGRAM(s) Oral daily  atorvastatin 80 milliGRAM(s) Oral at bedtime  chlorhexidine 4% Liquid 1 Application(s) Topical daily  enoxaparin Injectable 40 milliGRAM(s) SubCutaneous <User Schedule>  insulin lispro (ADMELOG) corrective regimen sliding scale   SubCutaneous three times a day before meals  levETIRAcetam 500 milliGRAM(s) Oral two times a day  midodrine 10 milliGRAM(s) Oral every 8 hours  phenylephrine    Infusion 0.1 MICROgram(s)/kG/Min (2.13 mL/Hr) IV Continuous <Continuous>  polyethylene glycol 3350 17 Gram(s) Oral two times a day  senna 2 Tablet(s) Oral at bedtime    MEDICATIONS  (PRN):  acetaminophen     Tablet .. 650 milliGRAM(s) Oral every 6 hours PRN Mild Pain (1 - 3)    IMAGING:  < from: MR Head No Cont (07.25.23 @ 08:33) >  Acute left subinsular and scattered left frontal and parietal white   matter infarcts in the distribution of the left middle cerebral artery.  Chronic left parietal/occipital infarct.    < end of copied text >    CTP 7/23  IMPRESSION:    CT angiography neck: No hemodynamically significant stenosis byNASCET   criteria. No vascular dissection.    CT angiography brain: No major vessel occlusion or proximal stenosis. 2   mm left M1 bifurcation aneurysm    CT perfusion: Approximately 7 mL of ischemic penumbra is seen in the left   frontoparietal cortex.      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, L pupil 3mm reactive, EOMI, No Gaze Preference, Face Symmetrical,   Motor exam:          Upper extremity                         Delt     Bicep     Tricep    HG                                                 R         5/5        5/5        5/5       5/5                                               L          5/5        5/5        5/5       5/5          Lower extremity                        HF         KF        KE       DF         PF                                                  R        5/5        5/5       5/5       5/5          5/5                                               L         5/5        5/5       5/5       5/5          5/5                                                 Sensation: [s ] intact to light touch  [ ] decreased:     Reflexes: Deep Tendon Reflexes Intact     Pulmonary: Clear to Auscultation, No rales, No rhonchi, No wheezes     Cardiovascular: S1, S2, Regular rate and rhythm     Gastrointestinal: Soft, Non-tender, Non-distended     Extremities: No calf tenderness       LABS:                          10.0   9.24  )-----------( 277      ( 27 Jul 2023 02:42 )             31.5     07-27    138  |  103  |  24<H>  ----------------------------<  177<H>  3.8   |  22  |  0.82    Ca    9.3      27 Jul 2023 02:42  Phos  4.3     07-27  Mg     2.3     07-27      Urinalysis Basic - ( 27 Jul 2023 02:42 )    Color: x / Appearance: x / SG: x / pH: x  Gluc: 177 mg/dL / Ketone: x  / Bili: x / Urobili: x   Blood: x / Protein: x / Nitrite: x   Leuk Esterase: x / RBC: x / WBC x   Sq Epi: x / Non Sq Epi: x / Bacteria: x                   HOSPITAL COURSE: 66yo woman PMH seizure on keppra, CVA on plavix with unknown residual deficit, HLD, HTN p/w worsening and more prolonged episodes of aphasia/blank staring/unresponsiveness over two weeks. The episodes became more frequent and today, per daughter, it lasted for hours. Brought to ED, where she was found to have R facial droop, RUE weakness, dysarthria, and global aphasia. NIHSS 12. CTA showing c/f L MCA occlusion.     7/23 Adm NSCU s/p angiogram showing R M2/MCA occlusion with distal retrograde reconstitution, aspiration thrombectomy attempted without recanalization.   7/24: no acute overnight events  7/25: Aphasic when SBP dropped to 110 overnight, daughter reports slowed speech this morning   7/26: Pressure dependent exam, exam worsened when SBP <160  7/27: Mild confusion over night. Negative balance. Will put her on maintenance fluids to decrease the pressors requirements  7/28: no overnight events, exam stable  ID: [id number]    Admission Scores  GCS:   HH:   MF:   NIHSS: 12  RASS:    CAM-ICU:   ICH:    Allergies    No Known Allergies    REVIEW OF SYSTEMS: [ x] Unable to Assess due to neurologic exam   [ ] All ROS addressed below are non-contributory, except:  Neuro: [ ] Headache [ ] Back pain [ ] Numbness [ ] Weakness [ ] Ataxia [ ] Dizziness [ ] Aphasia [ ] Dysarthria [ ] Visual disturbance  Resp: [ ] Shortness of breath/dyspnea, [ ] Orthopnea [ ] Cough  CV: [ ] Chest pain [ ] Palpitation [ ] Lightheadedness [ ] Syncope  Renal: [ ] Thirst [ ] Edema  GI: [ ] Nausea [ ] Emesis [ ] Abdominal pain [ ] Constipation [ ] Diarrhea  Hem: [ ] Hematemesis [ ] bright red blood per rectum  ID: [ ] Fever [ ] Chills [ ] Dysuria  ENT: [ ] Rhinorrhea      DEVICES:   [ ] Restraints [ ] ET tube [ ] central line [ ] arterial line [ ] gonzalez [ ] NGT/OGT [ ] EVD [ ] LD [ ] FRANTZ/HMV [ ] Trach [ ] PEG [ ] Chest Tube     Vitals  T(C): 37.1 (07-27-23 @ 03:00), Max: 37.2 (07-26-23 @ 11:00)  T(F): 98.8 (07-27-23 @ 03:00), Max: 98.9 (07-26-23 @ 11:00)  HR: 81 (07-27-23 @ 06:30) (76 - 93)  BP: --  RR: 17 (07-27-23 @ 06:30) (12 - 23)  SpO2: 95% (07-27-23 @ 06:30) (85% - 100%)  Wt(kg): --    I&O's Detail    25 Jul 2023 07:01  -  26 Jul 2023 07:00  --------------------------------------------------------  IN:    Oral Fluid: 1420 mL    Phenylephrine: 68.7 mL  Total IN: 1488.7 mL    OUT:    Incontinent per Collection Bag (mL): 2525 mL    Phenylephrine: 0 mL  Total OUT: 2525 mL    Total NET: -1036.3 mL      26 Jul 2023 07:01  -  27 Jul 2023 06:42  --------------------------------------------------------  IN:    Oral Fluid: 550 mL    Phenylephrine: 164.3 mL    Sodium Chloride 0.9% Bolus: 500 mL  Total IN: 1214.3 mL    OUT:    Incontinent per Collection Bag (mL): 2700 mL  Total OUT: 2700 mL    Total NET: -1485.7 mL    MEDICATIONS  (STANDING):  aspirin  chewable 81 milliGRAM(s) Oral daily  atorvastatin 80 milliGRAM(s) Oral at bedtime  chlorhexidine 4% Liquid 1 Application(s) Topical daily  enoxaparin Injectable 40 milliGRAM(s) SubCutaneous <User Schedule>  insulin lispro (ADMELOG) corrective regimen sliding scale   SubCutaneous three times a day before meals  levETIRAcetam 500 milliGRAM(s) Oral two times a day  midodrine 10 milliGRAM(s) Oral every 8 hours  phenylephrine    Infusion 0.1 MICROgram(s)/kG/Min (2.13 mL/Hr) IV Continuous <Continuous>  polyethylene glycol 3350 17 Gram(s) Oral two times a day  senna 2 Tablet(s) Oral at bedtime    MEDICATIONS  (PRN):  acetaminophen     Tablet .. 650 milliGRAM(s) Oral every 6 hours PRN Mild Pain (1 - 3)    IMAGING:  < from: MR Head No Cont (07.25.23 @ 08:33) >  Acute left subinsular and scattered left frontal and parietal white   matter infarcts in the distribution of the left middle cerebral artery.  Chronic left parietal/occipital infarct.    < end of copied text >    CTP 7/23  IMPRESSION:    CT angiography neck: No hemodynamically significant stenosis byNASCET   criteria. No vascular dissection.    CT angiography brain: No major vessel occlusion or proximal stenosis. 2   mm left M1 bifurcation aneurysm    CT perfusion: Approximately 7 mL of ischemic penumbra is seen in the left   frontoparietal cortex.      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, L pupil 3mm reactive, EOMI, No Gaze Preference, Face Symmetrical,   Motor exam:          Upper extremity                         Delt     Bicep     Tricep    HG                                                 R         5/5        5/5        5/5       5/5                                               L          5/5        5/5        5/5       5/5          Lower extremity                        HF         KF        KE       DF         PF                                                  R        5/5        5/5       5/5       5/5          5/5                                               L         5/5        5/5       5/5       5/5          5/5                                                 Sensation: [s ] intact to light touch  [ ] decreased:     Reflexes: Deep Tendon Reflexes Intact     Pulmonary: Clear to Auscultation, No rales, No rhonchi, No wheezes     Cardiovascular: S1, S2, Regular rate and rhythm     Gastrointestinal: Soft, Non-tender, Non-distended     Extremities: No calf tenderness       LABS:                          10.0   9.24  )-----------( 277      ( 27 Jul 2023 02:42 )             31.5     07-27    138  |  103  |  24<H>  ----------------------------<  177<H>  3.8   |  22  |  0.82    Ca    9.3      27 Jul 2023 02:42  Phos  4.3     07-27  Mg     2.3     07-27      Urinalysis Basic - ( 27 Jul 2023 02:42 )    Color: x / Appearance: x / SG: x / pH: x  Gluc: 177 mg/dL / Ketone: x  / Bili: x / Urobili: x   Blood: x / Protein: x / Nitrite: x   Leuk Esterase: x / RBC: x / WBC x   Sq Epi: x / Non Sq Epi: x / Bacteria: x

## 2023-07-28 PROBLEM — E11.9 TYPE 2 DIABETES MELLITUS WITHOUT COMPLICATIONS: Chronic | Status: ACTIVE | Noted: 2023-07-23

## 2023-07-28 LAB
ANION GAP SERPL CALC-SCNC: 10 MMOL/L — SIGNIFICANT CHANGE UP (ref 5–17)
BUN SERPL-MCNC: 22 MG/DL — SIGNIFICANT CHANGE UP (ref 7–23)
CALCIUM SERPL-MCNC: 8.5 MG/DL — SIGNIFICANT CHANGE UP (ref 8.4–10.5)
CHLORIDE SERPL-SCNC: 105 MMOL/L — SIGNIFICANT CHANGE UP (ref 96–108)
CO2 SERPL-SCNC: 24 MMOL/L — SIGNIFICANT CHANGE UP (ref 22–31)
CREAT SERPL-MCNC: 0.73 MG/DL — SIGNIFICANT CHANGE UP (ref 0.5–1.3)
EGFR: 90 ML/MIN/1.73M2 — SIGNIFICANT CHANGE UP
GLUCOSE BLDC GLUCOMTR-MCNC: 178 MG/DL — HIGH (ref 70–99)
GLUCOSE BLDC GLUCOMTR-MCNC: 181 MG/DL — HIGH (ref 70–99)
GLUCOSE BLDC GLUCOMTR-MCNC: 229 MG/DL — HIGH (ref 70–99)
GLUCOSE SERPL-MCNC: 142 MG/DL — HIGH (ref 70–99)
HCT VFR BLD CALC: 29.2 % — LOW (ref 34.5–45)
HGB BLD-MCNC: 9.1 G/DL — LOW (ref 11.5–15.5)
MAGNESIUM SERPL-MCNC: 2.4 MG/DL — SIGNIFICANT CHANGE UP (ref 1.6–2.6)
MCHC RBC-ENTMCNC: 20.9 PG — LOW (ref 27–34)
MCHC RBC-ENTMCNC: 31.2 GM/DL — LOW (ref 32–36)
MCV RBC AUTO: 67 FL — LOW (ref 80–100)
NRBC # BLD: 0 /100 WBCS — SIGNIFICANT CHANGE UP (ref 0–0)
PHOSPHATE SERPL-MCNC: 3.1 MG/DL — SIGNIFICANT CHANGE UP (ref 2.5–4.5)
PLATELET # BLD AUTO: 252 K/UL — SIGNIFICANT CHANGE UP (ref 150–400)
POTASSIUM SERPL-MCNC: 4.2 MMOL/L — SIGNIFICANT CHANGE UP (ref 3.5–5.3)
POTASSIUM SERPL-SCNC: 4.2 MMOL/L — SIGNIFICANT CHANGE UP (ref 3.5–5.3)
RBC # BLD: 4.36 M/UL — SIGNIFICANT CHANGE UP (ref 3.8–5.2)
RBC # FLD: 15.5 % — HIGH (ref 10.3–14.5)
SODIUM SERPL-SCNC: 139 MMOL/L — SIGNIFICANT CHANGE UP (ref 135–145)
TROPONIN T, HIGH SENSITIVITY RESULT: 22 NG/L — SIGNIFICANT CHANGE UP (ref 0–51)
WBC # BLD: 8.25 K/UL — SIGNIFICANT CHANGE UP (ref 3.8–10.5)
WBC # FLD AUTO: 8.25 K/UL — SIGNIFICANT CHANGE UP (ref 3.8–10.5)

## 2023-07-28 PROCEDURE — 93010 ELECTROCARDIOGRAM REPORT: CPT

## 2023-07-28 PROCEDURE — 99291 CRITICAL CARE FIRST HOUR: CPT

## 2023-07-28 RX ORDER — MIDODRINE HYDROCHLORIDE 2.5 MG/1
15 TABLET ORAL EVERY 8 HOURS
Refills: 0 | Status: DISCONTINUED | OUTPATIENT
Start: 2023-07-28 | End: 2023-07-29

## 2023-07-28 RX ORDER — PHENYLEPHRINE HYDROCHLORIDE 10 MG/ML
0.1 INJECTION INTRAVENOUS
Qty: 40 | Refills: 0 | Status: DISCONTINUED | OUTPATIENT
Start: 2023-07-28 | End: 2023-08-02

## 2023-07-28 RX ORDER — POTASSIUM CHLORIDE 20 MEQ
20 PACKET (EA) ORAL ONCE
Refills: 0 | Status: DISCONTINUED | OUTPATIENT
Start: 2023-07-28 | End: 2023-07-28

## 2023-07-28 RX ORDER — PHENYLEPHRINE HYDROCHLORIDE 10 MG/ML
0.1 INJECTION INTRAVENOUS
Qty: 40 | Refills: 0 | Status: DISCONTINUED | OUTPATIENT
Start: 2023-07-28 | End: 2023-07-28

## 2023-07-28 RX ORDER — FERROUS SULFATE 325(65) MG
325 TABLET ORAL
Refills: 0 | Status: DISCONTINUED | OUTPATIENT
Start: 2023-07-28 | End: 2023-08-02

## 2023-07-28 RX ADMIN — MIDODRINE HYDROCHLORIDE 10 MILLIGRAM(S): 2.5 TABLET ORAL at 14:40

## 2023-07-28 RX ADMIN — SENNA PLUS 2 TABLET(S): 8.6 TABLET ORAL at 21:39

## 2023-07-28 RX ADMIN — MIDODRINE HYDROCHLORIDE 10 MILLIGRAM(S): 2.5 TABLET ORAL at 05:01

## 2023-07-28 RX ADMIN — PHENYLEPHRINE HYDROCHLORIDE 2.13 MICROGRAM(S)/KG/MIN: 10 INJECTION INTRAVENOUS at 17:22

## 2023-07-28 RX ADMIN — Medication 81 MILLIGRAM(S): at 12:28

## 2023-07-28 RX ADMIN — POLYETHYLENE GLYCOL 3350 17 GRAM(S): 17 POWDER, FOR SOLUTION ORAL at 17:21

## 2023-07-28 RX ADMIN — ATORVASTATIN CALCIUM 80 MILLIGRAM(S): 80 TABLET, FILM COATED ORAL at 21:39

## 2023-07-28 RX ADMIN — MIDODRINE HYDROCHLORIDE 15 MILLIGRAM(S): 2.5 TABLET ORAL at 21:39

## 2023-07-28 RX ADMIN — ENOXAPARIN SODIUM 40 MILLIGRAM(S): 100 INJECTION SUBCUTANEOUS at 17:27

## 2023-07-28 RX ADMIN — Medication 4: at 17:18

## 2023-07-28 RX ADMIN — SODIUM CHLORIDE 75 MILLILITER(S): 9 INJECTION, SOLUTION INTRAVENOUS at 08:15

## 2023-07-28 RX ADMIN — LEVETIRACETAM 500 MILLIGRAM(S): 250 TABLET, FILM COATED ORAL at 05:01

## 2023-07-28 RX ADMIN — Medication 2: at 12:28

## 2023-07-28 RX ADMIN — LEVETIRACETAM 500 MILLIGRAM(S): 250 TABLET, FILM COATED ORAL at 17:20

## 2023-07-28 RX ADMIN — Medication 2: at 09:14

## 2023-07-28 NOTE — PHARMACOTHERAPY INTERVENTION NOTE - COMMENTS
Reviewed appropriate routes of medication administration  MEDICATIONS  (STANDING):  aspirin  chewable 81 milliGRAM(s) Oral daily  atorvastatin 80 milliGRAM(s) Oral at bedtime  chlorhexidine 4% Liquid 1 Application(s) Topical daily  enoxaparin Injectable 40 milliGRAM(s) SubCutaneous <User Schedule>  ferrous    sulfate 325 milliGRAM(s) Oral <User Schedule>  insulin lispro (ADMELOG) corrective regimen sliding scale   SubCutaneous three times a day before meals  levETIRAcetam 500 milliGRAM(s) Oral two times a day  midodrine 10 milliGRAM(s) Oral every 8 hours  multiple electrolytes Injection Type 1 1000 milliLiter(s) (75 mL/Hr) IV Continuous <Continuous>  phenylephrine    Infusion 0.1 MICROgram(s)/kG/Min (2.13 mL/Hr) IV Continuous <Continuous>  polyethylene glycol 3350 17 Gram(s) Oral two times a day  senna 2 Tablet(s) Oral at bedtime    MEDICATIONS  (PRN):  acetaminophen     Tablet .. 650 milliGRAM(s) Oral every 6 hours PRN Mild Pain (1 - 3)

## 2023-07-28 NOTE — CHART NOTE - NSCHARTNOTEFT_GEN_A_CORE
***Vascular Neurology Chart Note***    68yo woman with PMH seizure on keppra, CVA on plavix with unknown residual deficit, HLD, HTN p/w worsening and more prolonged episodes of aphasia/blank staring/unresponsiveness over two weeks. The episodes became more frequent and today, per daughter, it lasted for hours. Brought to ED, where she was found to have R facial droop, RUE weakness, dysarthria, and global aphasia. NIHSS 12. CTA showing c/f L MCA occlusion. s/p angiogram and attempted aspiration thrombectomy without recannulization Found to have Acute left subinsular and scattered left frontal and parietal white matter infarcts in the distribution of the left middle cerebral artery.       Impression: Acute disorientation, aphasia, dysarthria, R facial droop, RLE/RUE drift due to L brain dysfunction possibly from seizure vs TIA vs acute ischemic stroke   Angiogram showed L M2/MCA occlusion with distal retrograde reconstitution, aspiration thrombectomy attempted without recanalization    Plan:  []  BP augmentation in ICU  []  Per neurosurgery, bypass Wednesday 8/2  [x] vEEG prelim no seizures  [] ANTITHROMBOTIC THERAPY: Continue home Plavix 75 mg daily   [x] MRI B w/o (reviewed)  [x] TTE  [x] A1C 8.5, LDL 59  [x] Atorvastatin 80 mg HS  [] Physical therapy/OT/Speech Language    SBP goal:  BP augmentation in ICU, Avoid hypotension     Other:    CORE MEASURES:         Admission NIHSS: 12     ICH score: n/a     Ramos and Castellon Score:   n/a     Tenecteplase: [] YES [x] NO     Statin Therapy: [x] YES [] NO     Smoking [] YES [x] NO     Afib [] YES [x] NO     Stroke Education [x] YES [] NO    Dysphagia screen : [x] Passed [] Failed- S&S pending [] Pending    DVT ppx:  LMWH.    Discussed with Dr. Schreiber attending..

## 2023-07-28 NOTE — PROGRESS NOTE ADULT - ASSESSMENT
ASSESSMENT/PLAN: chronic M2 occlusion with recurrent TIAs    NEURO:  blood pressure augmentation and Q1 hr neurochecks  cont keppra home dose  stroke core measures, stroke neurology following   ASA, hold plavix for bypass planned 7/31  Possible MCA bypass next week  Activity: [x] mobilize as tolerated [] Bedrest [x] PT [x] OT [] PMNR    PULM:  incentive spirometry as able  O2sat>92%    CV:  SBP goal 120-160  midodrine 10 q8  Hold Phenylephrine  TTE - 63% ejection fraction  cont atorvastatin  hold all home antihypertensives  Serial Troponins and EKG are WNL  Wean pressors as tolerated    RENAL:  Fluids: Plasmalyte @ 75/hr    GI:  Diet: CCD  GI prophylaxis [] not indicated [x] PPI home med [] other:  Bowel regimen [] colace [x] senna [x] other: miralax     ENDO:   Goal euglycemia (-180)  A1C 8.5  TSH WNL  EDISON    HEME/ONC:  VTE prophylaxis: [x] SCDs [x] chemoprophylaxis [] hold chemoprophylaxis due to: [] high risk of DVT/PE on admission due to:  Start iron for microcytic anemia, previously on iron o/p for AJAY    ID: monitor for fever

## 2023-07-28 NOTE — PROGRESS NOTE ADULT - SUBJECTIVE AND OBJECTIVE BOX
HOSPITAL COURSE: 68yo woman PMH seizure on keppra, CVA on plavix with unknown residual deficit, HLD, HTN p/w worsening and more prolonged episodes of aphasia/blank staring/unresponsiveness over two weeks. The episodes became more frequent and today, per daughter, it lasted for hours. Brought to ED, where she was found to have R facial droop, RUE weakness, dysarthria, and global aphasia. NIHSS 12. CTA showing c/f L MCA occlusion.     7/23 Adm NSCU s/p angiogram showing R M2/MCA occlusion with distal retrograde reconstitution, aspiration thrombectomy attempted without recanalization.   7/24: no acute overnight events  7/25: Aphasic when SBP dropped to 110 overnight, daughter reports slowed speech this morning   7/26: Pressure dependent exam, exam worsened when SBP <160  7/27: Mild confusion over night. Negative balance. Will put her on maintenance fluids to decrease the pressors requirements  7/28:     Admission Scores  GCS:   HH:   MF:   NIHSS: 12  RASS:    CAM-ICU:   ICH:    Allergies    No Known Allergies    REVIEW OF SYSTEMS: [ x] Unable to Assess due to neurologic exam   [ ] All ROS addressed below are non-contributory, except:  Neuro: [ ] Headache [ ] Back pain [ ] Numbness [ ] Weakness [ ] Ataxia [ ] Dizziness [ ] Aphasia [ ] Dysarthria [ ] Visual disturbance  Resp: [ ] Shortness of breath/dyspnea, [ ] Orthopnea [ ] Cough  CV: [ ] Chest pain [ ] Palpitation [ ] Lightheadedness [ ] Syncope  Renal: [ ] Thirst [ ] Edema  GI: [ ] Nausea [ ] Emesis [ ] Abdominal pain [ ] Constipation [ ] Diarrhea  Hem: [ ] Hematemesis [ ] bright red blood per rectum  ID: [ ] Fever [ ] Chills [ ] Dysuria  ENT: [ ] Rhinorrhea      DEVICES:   [ ] Restraints [ ] ET tube [ ] central line [ ] arterial line [ ] gonzalez [ ] NGT/OGT [ ] EVD [ ] LD [ ] FRANTZ/HMV [ ] Trach [ ] PEG [ ] Chest Tube     ICU Vital Signs Last 24 Hrs  T(C): 36.8 (28 Jul 2023 03:00), Max: 37 (27 Jul 2023 23:00)  T(F): 98.2 (28 Jul 2023 03:00), Max: 98.6 (27 Jul 2023 23:00)  HR: 80 (28 Jul 2023 06:30) (75 - 93)  BP: --  BP(mean): --  ABP: 163/65 (28 Jul 2023 06:30) (118/58 - 222/91)  ABP(mean): 104 (28 Jul 2023 06:30) (79 - 147)  RR: 14 (28 Jul 2023 06:30) (11 - 23)  SpO2: 100% (28 Jul 2023 06:30) (95% - 100%)    O2 Parameters below as of 27 Jul 2023 19:00  Patient On (Oxygen Delivery Method): room air    I&O's Detail    26 Jul 2023 07:01  -  27 Jul 2023 07:00  --------------------------------------------------------  IN:    Oral Fluid: 550 mL    Phenylephrine: 170.6 mL    Sodium Chloride 0.9% Bolus: 500 mL  Total IN: 1220.6 mL    OUT:    Incontinent per Collection Bag (mL): 2700 mL  Total OUT: 2700 mL    Total NET: -1479.4 mL      27 Jul 2023 07:01  -  28 Jul 2023 06:43  --------------------------------------------------------  IN:    multiple electrolytes Injection Type 1 Bolus: 2000 mL    multiple electrolytes Injection Type 1.: 1275 mL    Oral Fluid: 360 mL    Phenylephrine: 78.5 mL  Total IN: 3713.5 mL    OUT:    Incontinent per Collection Bag (mL): 2600 mL  Total OUT: 2600 mL    Total NET: 1113.5 mL    MEDICATIONS  (STANDING):  aspirin  chewable 81 milliGRAM(s) Oral daily  atorvastatin 80 milliGRAM(s) Oral at bedtime  chlorhexidine 4% Liquid 1 Application(s) Topical daily  enoxaparin Injectable 40 milliGRAM(s) SubCutaneous <User Schedule>  insulin lispro (ADMELOG) corrective regimen sliding scale   SubCutaneous three times a day before meals  levETIRAcetam 500 milliGRAM(s) Oral two times a day  midodrine 10 milliGRAM(s) Oral every 8 hours  multiple electrolytes Injection Type 1 1000 milliLiter(s) (75 mL/Hr) IV Continuous <Continuous>  phenylephrine    Infusion 0.1 MICROgram(s)/kG/Min (2.13 mL/Hr) IV Continuous <Continuous>  polyethylene glycol 3350 17 Gram(s) Oral two times a day  potassium chloride    Tablet ER 20 milliEquivalent(s) Oral once  senna 2 Tablet(s) Oral at bedtime    MEDICATIONS  (PRN):  acetaminophen     Tablet .. 650 milliGRAM(s) Oral every 6 hours PRN Mild Pain (1 - 3)      IMAGING:  < from: MR Head No Cont (07.25.23 @ 08:33) >  Acute left subinsular and scattered left frontal and parietal white   matter infarcts in the distribution of the left middle cerebral artery.  Chronic left parietal/occipital infarct.    < end of copied text >    CTP 7/23  IMPRESSION:    CT angiography neck: No hemodynamically significant stenosis byNASCET   criteria. No vascular dissection.    CT angiography brain: No major vessel occlusion or proximal stenosis. 2   mm left M1 bifurcation aneurysm    CT perfusion: Approximately 7 mL of ischemic penumbra is seen in the left   frontoparietal cortex.      EXAMINATION:  PHYSICAL EXAM:    Constitutional:     Neurological:   Motor exam:          Upper extremity                         Delt     Bicep     Tricep    HG                                                 R                                                        L                    Lower extremity                        HF         KF        KE       DF         PF                                                  R                                                      L                                                          Sensation: [s ] intact to light touch  [ ] decreased:     Reflexes: Deep Tendon Reflexes Intact     Pulmonary: Clear to Auscultation, No rales, No rhonchi, No wheezes     Cardiovascular: S1, S2, Regular rate and rhythm     Gastrointestinal: Soft, Non-tender, Non-distended     Extremities: No calf tenderness       LABS:                          9.1    8.25  )-----------( 252      ( 28 Jul 2023 04:03 )             29.2     07-28    139  |  105  |  22  ----------------------------<  142<H>  4.2   |  24  |  0.73    Ca    8.5      28 Jul 2023 04:03  Phos  3.1     07-28  Mg     2.4     07-28          Urinalysis Basic - ( 28 Jul 2023 04:03 )    Color: x / Appearance: x / SG: x / pH: x  Gluc: 142 mg/dL / Ketone: x  / Bili: x / Urobili: x   Blood: x / Protein: x / Nitrite: x   Leuk Esterase: x / RBC: x / WBC x   Sq Epi: x / Non Sq Epi: x / Bacteria: x                     HOSPITAL COURSE: 68yo woman PMH seizure on keppra, CVA on plavix with unknown residual deficit, HLD, HTN p/w worsening and more prolonged episodes of aphasia/blank staring/unresponsiveness over two weeks. The episodes became more frequent and today, per daughter, it lasted for hours. Brought to ED, where she was found to have R facial droop, RUE weakness, dysarthria, and global aphasia. NIHSS 12. CTA showing c/f L MCA occlusion.     7/23 Adm NSCU s/p angiogram showing R M2/MCA occlusion with distal retrograde reconstitution, aspiration thrombectomy attempted without recanalization.   7/24: no acute overnight events  7/25: Aphasic when SBP dropped to 110 overnight, daughter reports slowed speech this morning   7/26: Pressure dependent exam, exam worsened when SBP <160  7/27: Mild confusion over night. Negative balance. Will put her on maintenance fluids to decrease the pressors requirements  7/28:     Admission Scores  GCS:   HH:   MF:   NIHSS: 12  RASS:    CAM-ICU:   ICH:    Allergies    No Known Allergies    REVIEW OF SYSTEMS: [ x] Unable to Assess due to neurologic exam   [ ] All ROS addressed below are non-contributory, except:  Neuro: [ ] Headache [ ] Back pain [ ] Numbness [ ] Weakness [ ] Ataxia [ ] Dizziness [ ] Aphasia [ ] Dysarthria [ ] Visual disturbance  Resp: [ ] Shortness of breath/dyspnea, [ ] Orthopnea [ ] Cough  CV: [ ] Chest pain [ ] Palpitation [ ] Lightheadedness [ ] Syncope  Renal: [ ] Thirst [ ] Edema  GI: [ ] Nausea [ ] Emesis [ ] Abdominal pain [ ] Constipation [ ] Diarrhea  Hem: [ ] Hematemesis [ ] bright red blood per rectum  ID: [ ] Fever [ ] Chills [ ] Dysuria  ENT: [ ] Rhinorrhea      DEVICES:   [ ] Restraints [ ] ET tube [ ] central line [ ] arterial line [ ] gonzalez [ ] NGT/OGT [ ] EVD [ ] LD [ ] FRANTZ/HMV [ ] Trach [ ] PEG [ ] Chest Tube     ICU Vital Signs Last 24 Hrs  T(C): 36.8 (28 Jul 2023 03:00), Max: 37 (27 Jul 2023 23:00)  T(F): 98.2 (28 Jul 2023 03:00), Max: 98.6 (27 Jul 2023 23:00)  HR: 80 (28 Jul 2023 06:30) (75 - 93)  BP: --  BP(mean): --  ABP: 163/65 (28 Jul 2023 06:30) (118/58 - 222/91)  ABP(mean): 104 (28 Jul 2023 06:30) (79 - 147)  RR: 14 (28 Jul 2023 06:30) (11 - 23)  SpO2: 100% (28 Jul 2023 06:30) (95% - 100%)    O2 Parameters below as of 27 Jul 2023 19:00  Patient On (Oxygen Delivery Method): room air    I&O's Detail    26 Jul 2023 07:01  -  27 Jul 2023 07:00  --------------------------------------------------------  IN:    Oral Fluid: 550 mL    Phenylephrine: 170.6 mL    Sodium Chloride 0.9% Bolus: 500 mL  Total IN: 1220.6 mL    OUT:    Incontinent per Collection Bag (mL): 2700 mL  Total OUT: 2700 mL    Total NET: -1479.4 mL      27 Jul 2023 07:01  -  28 Jul 2023 06:43  --------------------------------------------------------  IN:    multiple electrolytes Injection Type 1 Bolus: 2000 mL    multiple electrolytes Injection Type 1.: 1275 mL    Oral Fluid: 360 mL    Phenylephrine: 78.5 mL  Total IN: 3713.5 mL    OUT:    Incontinent per Collection Bag (mL): 2600 mL  Total OUT: 2600 mL    Total NET: 1113.5 mL    MEDICATIONS  (STANDING):  aspirin  chewable 81 milliGRAM(s) Oral daily  atorvastatin 80 milliGRAM(s) Oral at bedtime  chlorhexidine 4% Liquid 1 Application(s) Topical daily  enoxaparin Injectable 40 milliGRAM(s) SubCutaneous <User Schedule>  insulin lispro (ADMELOG) corrective regimen sliding scale   SubCutaneous three times a day before meals  levETIRAcetam 500 milliGRAM(s) Oral two times a day  midodrine 10 milliGRAM(s) Oral every 8 hours  multiple electrolytes Injection Type 1 1000 milliLiter(s) (75 mL/Hr) IV Continuous <Continuous>  phenylephrine    Infusion 0.1 MICROgram(s)/kG/Min (2.13 mL/Hr) IV Continuous <Continuous>  polyethylene glycol 3350 17 Gram(s) Oral two times a day  potassium chloride    Tablet ER 20 milliEquivalent(s) Oral once  senna 2 Tablet(s) Oral at bedtime    MEDICATIONS  (PRN):  acetaminophen     Tablet .. 650 milliGRAM(s) Oral every 6 hours PRN Mild Pain (1 - 3)      IMAGING:  < from: MR Head No Cont (07.25.23 @ 08:33) >  Acute left subinsular and scattered left frontal and parietal white   matter infarcts in the distribution of the left middle cerebral artery.  Chronic left parietal/occipital infarct.    < end of copied text >    CTP 7/23  IMPRESSION:    CT angiography neck: No hemodynamically significant stenosis byNASCET   criteria. No vascular dissection.    CT angiography brain: No major vessel occlusion or proximal stenosis. 2   mm left M1 bifurcation aneurysm    CT perfusion: Approximately 7 mL of ischemic penumbra is seen in the left   frontoparietal cortex.      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, L EOMI, FC, speech improved per daughter  Motor exam:           Upper extremity                         Delt     Bicep     Tricep    HG                                                 R         5/5       5/5        5/5        5/5                                               L          5/5       5/5        5/5        5/5             Lower extremity                        HF         KF        KE       DF         PF                                                  R        5/5       5/5        5/5        5/5      5/5                                               L         5/5       5/5        5/5        5/5      5/5                                                  Sensation: [x] intact to light touch  [ ] decreased:     Reflexes: Deep Tendon Reflexes Intact     Pulmonary: Clear to Auscultation, No rales, No rhonchi, No wheezes     Cardiovascular: S1, S2, Regular rate and rhythm     Gastrointestinal: Soft, Non-tender, Non-distended     Extremities: No calf tenderness       LABS:                          9.1    8.25  )-----------( 252      ( 28 Jul 2023 04:03 )             29.2     07-28    139  |  105  |  22  ----------------------------<  142<H>  4.2   |  24  |  0.73    Ca    8.5      28 Jul 2023 04:03  Phos  3.1     07-28  Mg     2.4     07-28          Urinalysis Basic - ( 28 Jul 2023 04:03 )    Color: x / Appearance: x / SG: x / pH: x  Gluc: 142 mg/dL / Ketone: x  / Bili: x / Urobili: x   Blood: x / Protein: x / Nitrite: x   Leuk Esterase: x / RBC: x / WBC x   Sq Epi: x / Non Sq Epi: x / Bacteria: x                     HOSPITAL COURSE: 66yo woman PMH seizure on keppra, CVA on plavix with unknown residual deficit, HLD, HTN p/w worsening and more prolonged episodes of aphasia/blank staring/unresponsiveness over two weeks. The episodes became more frequent and today, per daughter, it lasted for hours. Brought to ED, where she was found to have R facial droop, RUE weakness, dysarthria, and global aphasia. NIHSS 12. CTA showing c/f L MCA occlusion.     7/23 Adm NSCU s/p angiogram showing R M2/MCA occlusion with distal retrograde reconstitution, aspiration thrombectomy attempted without recanalization.   7/24: no acute overnight events  7/25: Aphasic when SBP dropped to 110 overnight, daughter reports slowed speech this morning   7/26: Pressure dependent exam, exam worsened when SBP <160  7/27: Mild confusion over night. Negative balance. Will put her on maintenance fluids to decrease the pressors requirements  7/28: No acute overnight events    Admission Scores  GCS:   HH:   MF:   NIHSS: 12  RASS:    CAM-ICU:   ICH:    Allergies    No Known Allergies    REVIEW OF SYSTEMS: [ x] Unable to Assess due to neurologic exam   [ ] All ROS addressed below are non-contributory, except:  Neuro: [ ] Headache [ ] Back pain [ ] Numbness [ ] Weakness [ ] Ataxia [ ] Dizziness [ ] Aphasia [ ] Dysarthria [ ] Visual disturbance  Resp: [ ] Shortness of breath/dyspnea, [ ] Orthopnea [ ] Cough  CV: [ ] Chest pain [ ] Palpitation [ ] Lightheadedness [ ] Syncope  Renal: [ ] Thirst [ ] Edema  GI: [ ] Nausea [ ] Emesis [ ] Abdominal pain [ ] Constipation [ ] Diarrhea  Hem: [ ] Hematemesis [ ] bright red blood per rectum  ID: [ ] Fever [ ] Chills [ ] Dysuria  ENT: [ ] Rhinorrhea      DEVICES:   [ ] Restraints [ ] ET tube [ ] central line [ ] arterial line [ ] gonzalez [ ] NGT/OGT [ ] EVD [ ] LD [ ] FRANTZ/HMV [ ] Trach [ ] PEG [ ] Chest Tube     ICU Vital Signs Last 24 Hrs  T(C): 36.8 (28 Jul 2023 03:00), Max: 37 (27 Jul 2023 23:00)  T(F): 98.2 (28 Jul 2023 03:00), Max: 98.6 (27 Jul 2023 23:00)  HR: 80 (28 Jul 2023 06:30) (75 - 93)  BP: --  BP(mean): --  ABP: 163/65 (28 Jul 2023 06:30) (118/58 - 222/91)  ABP(mean): 104 (28 Jul 2023 06:30) (79 - 147)  RR: 14 (28 Jul 2023 06:30) (11 - 23)  SpO2: 100% (28 Jul 2023 06:30) (95% - 100%)    O2 Parameters below as of 27 Jul 2023 19:00  Patient On (Oxygen Delivery Method): room air    I&O's Detail    26 Jul 2023 07:01  -  27 Jul 2023 07:00  --------------------------------------------------------  IN:    Oral Fluid: 550 mL    Phenylephrine: 170.6 mL    Sodium Chloride 0.9% Bolus: 500 mL  Total IN: 1220.6 mL    OUT:    Incontinent per Collection Bag (mL): 2700 mL  Total OUT: 2700 mL    Total NET: -1479.4 mL      27 Jul 2023 07:01  -  28 Jul 2023 06:43  --------------------------------------------------------  IN:    multiple electrolytes Injection Type 1 Bolus: 2000 mL    multiple electrolytes Injection Type 1.: 1275 mL    Oral Fluid: 360 mL    Phenylephrine: 78.5 mL  Total IN: 3713.5 mL    OUT:    Incontinent per Collection Bag (mL): 2600 mL  Total OUT: 2600 mL    Total NET: 1113.5 mL    MEDICATIONS  (STANDING):  aspirin  chewable 81 milliGRAM(s) Oral daily  atorvastatin 80 milliGRAM(s) Oral at bedtime  chlorhexidine 4% Liquid 1 Application(s) Topical daily  enoxaparin Injectable 40 milliGRAM(s) SubCutaneous <User Schedule>  insulin lispro (ADMELOG) corrective regimen sliding scale   SubCutaneous three times a day before meals  levETIRAcetam 500 milliGRAM(s) Oral two times a day  midodrine 10 milliGRAM(s) Oral every 8 hours  multiple electrolytes Injection Type 1 1000 milliLiter(s) (75 mL/Hr) IV Continuous <Continuous>  phenylephrine    Infusion 0.1 MICROgram(s)/kG/Min (2.13 mL/Hr) IV Continuous <Continuous>  polyethylene glycol 3350 17 Gram(s) Oral two times a day  potassium chloride    Tablet ER 20 milliEquivalent(s) Oral once  senna 2 Tablet(s) Oral at bedtime    MEDICATIONS  (PRN):  acetaminophen     Tablet .. 650 milliGRAM(s) Oral every 6 hours PRN Mild Pain (1 - 3)      IMAGING:  < from: MR Head No Cont (07.25.23 @ 08:33) >  Acute left subinsular and scattered left frontal and parietal white   matter infarcts in the distribution of the left middle cerebral artery.  Chronic left parietal/occipital infarct.    < end of copied text >    CTP 7/23  IMPRESSION:    CT angiography neck: No hemodynamically significant stenosis byNASCET   criteria. No vascular dissection.    CT angiography brain: No major vessel occlusion or proximal stenosis. 2   mm left M1 bifurcation aneurysm    CT perfusion: Approximately 7 mL of ischemic penumbra is seen in the left   frontoparietal cortex.      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, L EOMI, FC, speech improved per daughter  Motor exam:           Upper extremity                         Delt     Bicep     Tricep    HG                                                 R         5/5       5/5        5/5        5/5                                               L          5/5       5/5        5/5        5/5             Lower extremity                        HF         KF        KE       DF         PF                                                  R        5/5       5/5        5/5        5/5      5/5                                               L         5/5       5/5        5/5        5/5      5/5                                                  Sensation: [x] intact to light touch  [ ] decreased:     Reflexes: Deep Tendon Reflexes Intact     Pulmonary: Clear to Auscultation, No rales, No rhonchi, No wheezes     Cardiovascular: S1, S2, Regular rate and rhythm     Gastrointestinal: Soft, Non-tender, Non-distended     Extremities: No calf tenderness       LABS:                          9.1    8.25  )-----------( 252      ( 28 Jul 2023 04:03 )             29.2     07-28    139  |  105  |  22  ----------------------------<  142<H>  4.2   |  24  |  0.73    Ca    8.5      28 Jul 2023 04:03  Phos  3.1     07-28  Mg     2.4     07-28          Urinalysis Basic - ( 28 Jul 2023 04:03 )    Color: x / Appearance: x / SG: x / pH: x  Gluc: 142 mg/dL / Ketone: x  / Bili: x / Urobili: x   Blood: x / Protein: x / Nitrite: x   Leuk Esterase: x / RBC: x / WBC x   Sq Epi: x / Non Sq Epi: x / Bacteria: x                     HOSPITAL COURSE: 68yo woman PMH seizure on keppra, CVA on plavix with unknown residual deficit, HLD, HTN p/w worsening and more prolonged episodes of aphasia/blank staring/unresponsiveness over two weeks. The episodes became more frequent and today, per daughter, it lasted for hours. Brought to ED, where she was found to have R facial droop, RUE weakness, dysarthria, and global aphasia. NIHSS 12. CTA showing c/f L MCA occlusion.     7/23 Adm NSCU s/p angiogram showing R M2/MCA occlusion with distal retrograde reconstitution, aspiration thrombectomy attempted without recanalization.   7/24: no acute overnight events  7/25: Aphasic when SBP dropped to 110 overnight, daughter reports slowed speech this morning   7/26: Pressure dependent exam, exam worsened when SBP <160  7/27: Mild confusion over night. Negative balance. Will put her on maintenance fluids to decrease the pressors requirements  7/28: No acute overnight events. Challenge with a lower BP parameter: 120-180    Admission Scores  GCS:   HH:   MF:   NIHSS: 12  RASS:    CAM-ICU:   ICH:    Allergies    No Known Allergies    REVIEW OF SYSTEMS: [ x] Unable to Assess due to neurologic exam   [ ] All ROS addressed below are non-contributory, except:  Neuro: [ ] Headache [ ] Back pain [ ] Numbness [ ] Weakness [ ] Ataxia [ ] Dizziness [ ] Aphasia [ ] Dysarthria [ ] Visual disturbance  Resp: [ ] Shortness of breath/dyspnea, [ ] Orthopnea [ ] Cough  CV: [ ] Chest pain [ ] Palpitation [ ] Lightheadedness [ ] Syncope  Renal: [ ] Thirst [ ] Edema  GI: [ ] Nausea [ ] Emesis [ ] Abdominal pain [ ] Constipation [ ] Diarrhea  Hem: [ ] Hematemesis [ ] bright red blood per rectum  ID: [ ] Fever [ ] Chills [ ] Dysuria  ENT: [ ] Rhinorrhea      DEVICES:   [ ] Restraints [ ] ET tube [ ] central line [ ] arterial line [ ] gonzalez [ ] NGT/OGT [ ] EVD [ ] LD [ ] FRANTZ/HMV [ ] Trach [ ] PEG [ ] Chest Tube     ICU Vital Signs Last 24 Hrs  T(C): 36.8 (28 Jul 2023 03:00), Max: 37 (27 Jul 2023 23:00)  T(F): 98.2 (28 Jul 2023 03:00), Max: 98.6 (27 Jul 2023 23:00)  HR: 80 (28 Jul 2023 06:30) (75 - 93)  BP: --  BP(mean): --  ABP: 163/65 (28 Jul 2023 06:30) (118/58 - 222/91)  ABP(mean): 104 (28 Jul 2023 06:30) (79 - 147)  RR: 14 (28 Jul 2023 06:30) (11 - 23)  SpO2: 100% (28 Jul 2023 06:30) (95% - 100%)    O2 Parameters below as of 27 Jul 2023 19:00  Patient On (Oxygen Delivery Method): room air    I&O's Detail    26 Jul 2023 07:01  -  27 Jul 2023 07:00  --------------------------------------------------------  IN:    Oral Fluid: 550 mL    Phenylephrine: 170.6 mL    Sodium Chloride 0.9% Bolus: 500 mL  Total IN: 1220.6 mL    OUT:    Incontinent per Collection Bag (mL): 2700 mL  Total OUT: 2700 mL    Total NET: -1479.4 mL      27 Jul 2023 07:01  -  28 Jul 2023 06:43  --------------------------------------------------------  IN:    multiple electrolytes Injection Type 1 Bolus: 2000 mL    multiple electrolytes Injection Type 1.: 1275 mL    Oral Fluid: 360 mL    Phenylephrine: 78.5 mL  Total IN: 3713.5 mL    OUT:    Incontinent per Collection Bag (mL): 2600 mL  Total OUT: 2600 mL    Total NET: 1113.5 mL    MEDICATIONS  (STANDING):  aspirin  chewable 81 milliGRAM(s) Oral daily  atorvastatin 80 milliGRAM(s) Oral at bedtime  chlorhexidine 4% Liquid 1 Application(s) Topical daily  enoxaparin Injectable 40 milliGRAM(s) SubCutaneous <User Schedule>  insulin lispro (ADMELOG) corrective regimen sliding scale   SubCutaneous three times a day before meals  levETIRAcetam 500 milliGRAM(s) Oral two times a day  midodrine 10 milliGRAM(s) Oral every 8 hours  multiple electrolytes Injection Type 1 1000 milliLiter(s) (75 mL/Hr) IV Continuous <Continuous>  phenylephrine    Infusion 0.1 MICROgram(s)/kG/Min (2.13 mL/Hr) IV Continuous <Continuous>  polyethylene glycol 3350 17 Gram(s) Oral two times a day  potassium chloride    Tablet ER 20 milliEquivalent(s) Oral once  senna 2 Tablet(s) Oral at bedtime    MEDICATIONS  (PRN):  acetaminophen     Tablet .. 650 milliGRAM(s) Oral every 6 hours PRN Mild Pain (1 - 3)      IMAGING:  < from: MR Head No Cont (07.25.23 @ 08:33) >  Acute left subinsular and scattered left frontal and parietal white   matter infarcts in the distribution of the left middle cerebral artery.  Chronic left parietal/occipital infarct.    < end of copied text >    CTP 7/23  IMPRESSION:    CT angiography neck: No hemodynamically significant stenosis byNASCET   criteria. No vascular dissection.    CT angiography brain: No major vessel occlusion or proximal stenosis. 2   mm left M1 bifurcation aneurysm    CT perfusion: Approximately 7 mL of ischemic penumbra is seen in the left   frontoparietal cortex.      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, L EOMI, FC, speech improved per daughter  Motor exam:           Upper extremity                         Delt     Bicep     Tricep    HG                                                 R         5/5       5/5        5/5        5/5                                               L          5/5       5/5        5/5        5/5             Lower extremity                        HF         KF        KE       DF         PF                                                  R        5/5       5/5        5/5        5/5      5/5                                               L         5/5       5/5        5/5        5/5      5/5                                                  Sensation: [x] intact to light touch  [ ] decreased:     Reflexes: Deep Tendon Reflexes Intact     Pulmonary: Clear to Auscultation, No rales, No rhonchi, No wheezes     Cardiovascular: S1, S2, Regular rate and rhythm     Gastrointestinal: Soft, Non-tender, Non-distended     Extremities: No calf tenderness       LABS:                          9.1    8.25  )-----------( 252      ( 28 Jul 2023 04:03 )             29.2     07-28    139  |  105  |  22  ----------------------------<  142<H>  4.2   |  24  |  0.73    Ca    8.5      28 Jul 2023 04:03  Phos  3.1     07-28  Mg     2.4     07-28          Urinalysis Basic - ( 28 Jul 2023 04:03 )    Color: x / Appearance: x / SG: x / pH: x  Gluc: 142 mg/dL / Ketone: x  / Bili: x / Urobili: x   Blood: x / Protein: x / Nitrite: x   Leuk Esterase: x / RBC: x / WBC x   Sq Epi: x / Non Sq Epi: x / Bacteria: x

## 2023-07-28 NOTE — PROGRESS NOTE ADULT - SUBJECTIVE AND OBJECTIVE BOX
HOSPITAL COURSE: 68yo woman PMH seizure on keppra, CVA on plavix with unknown residual deficit, HLD, HTN p/w worsening and more prolonged episodes of aphasia/blank staring/unresponsiveness over two weeks. The episodes became more frequent and today, per daughter, it lasted for hours. Brought to ED, where she was found to have R facial droop, RUE weakness, dysarthria, and global aphasia. NIHSS 12. CTA showing c/f L MCA occlusion.     7/23 Adm NSCU s/p angiogram showing R M2/MCA occlusion with distal retrograde reconstitution, aspiration thrombectomy attempted without recanalization.   7/24: no acute overnight events  7/25: Aphasic when SBP dropped to 110 overnight, daughter reports slowed speech this morning   7/26: Pressure dependent exam, exam worsened when SBP <160  7/27: Mild confusion over night. Negative balance. Will put her on maintenance fluids to decrease the pressors requirements  7/28: No acute overnight events. Challenge with a lower BP parameter: 120-180  7/28: Increased midodrine to 15 q 8 to attempt to win off pressors.    Admission Scores  GCS:   HH:   MF:   NIHSS: 12  RASS:    CAM-ICU:   ICH:    Allergies    No Known Allergies    REVIEW OF SYSTEMS: [ x] Unable to Assess due to neurologic exam   [ ] All ROS addressed below are non-contributory, except:  Neuro: [ ] Headache [ ] Back pain [ ] Numbness [ ] Weakness [ ] Ataxia [ ] Dizziness [ ] Aphasia [ ] Dysarthria [ ] Visual disturbance  Resp: [ ] Shortness of breath/dyspnea, [ ] Orthopnea [ ] Cough  CV: [ ] Chest pain [ ] Palpitation [ ] Lightheadedness [ ] Syncope  Renal: [ ] Thirst [ ] Edema  GI: [ ] Nausea [ ] Emesis [ ] Abdominal pain [ ] Constipation [ ] Diarrhea  Hem: [ ] Hematemesis [ ] bright red blood per rectum  ID: [ ] Fever [ ] Chills [ ] Dysuria  ENT: [ ] Rhinorrhea      DEVICES:   [ ] Restraints [ ] ET tube [ ] central line [ ] arterial line [ ] gonzalez [ ] NGT/OGT [ ] EVD [ ] LD [ ] FRANTZ/HMV [ ] Trach [ ] PEG [ ] Chest Tube     ICU Vital Signs Last 24 Hrs  T(C): 36.8 (28 Jul 2023 03:00), Max: 37 (27 Jul 2023 23:00)  T(F): 98.2 (28 Jul 2023 03:00), Max: 98.6 (27 Jul 2023 23:00)  HR: 80 (28 Jul 2023 06:30) (75 - 93)  BP: --  BP(mean): --  ABP: 163/65 (28 Jul 2023 06:30) (118/58 - 222/91)  ABP(mean): 104 (28 Jul 2023 06:30) (79 - 147)  RR: 14 (28 Jul 2023 06:30) (11 - 23)  SpO2: 100% (28 Jul 2023 06:30) (95% - 100%)    O2 Parameters below as of 27 Jul 2023 19:00  Patient On (Oxygen Delivery Method): room air    I&O's Detail    26 Jul 2023 07:01  -  27 Jul 2023 07:00  --------------------------------------------------------  IN:    Oral Fluid: 550 mL    Phenylephrine: 170.6 mL    Sodium Chloride 0.9% Bolus: 500 mL  Total IN: 1220.6 mL    OUT:    Incontinent per Collection Bag (mL): 2700 mL  Total OUT: 2700 mL    Total NET: -1479.4 mL      27 Jul 2023 07:01  -  28 Jul 2023 06:43  --------------------------------------------------------  IN:    multiple electrolytes Injection Type 1 Bolus: 2000 mL    multiple electrolytes Injection Type 1.: 1275 mL    Oral Fluid: 360 mL    Phenylephrine: 78.5 mL  Total IN: 3713.5 mL    OUT:    Incontinent per Collection Bag (mL): 2600 mL  Total OUT: 2600 mL    Total NET: 1113.5 mL    MEDICATIONS  (STANDING):  aspirin  chewable 81 milliGRAM(s) Oral daily  atorvastatin 80 milliGRAM(s) Oral at bedtime  chlorhexidine 4% Liquid 1 Application(s) Topical daily  enoxaparin Injectable 40 milliGRAM(s) SubCutaneous <User Schedule>  insulin lispro (ADMELOG) corrective regimen sliding scale   SubCutaneous three times a day before meals  levETIRAcetam 500 milliGRAM(s) Oral two times a day  midodrine 10 milliGRAM(s) Oral every 8 hours  multiple electrolytes Injection Type 1 1000 milliLiter(s) (75 mL/Hr) IV Continuous <Continuous>  phenylephrine    Infusion 0.1 MICROgram(s)/kG/Min (2.13 mL/Hr) IV Continuous <Continuous>  polyethylene glycol 3350 17 Gram(s) Oral two times a day  potassium chloride    Tablet ER 20 milliEquivalent(s) Oral once  senna 2 Tablet(s) Oral at bedtime    MEDICATIONS  (PRN):  acetaminophen     Tablet .. 650 milliGRAM(s) Oral every 6 hours PRN Mild Pain (1 - 3)      IMAGING:  < from: MR Head No Cont (07.25.23 @ 08:33) >  Acute left subinsular and scattered left frontal and parietal white   matter infarcts in the distribution of the left middle cerebral artery.  Chronic left parietal/occipital infarct.    < end of copied text >    CTP 7/23  IMPRESSION:    CT angiography neck: No hemodynamically significant stenosis byNASCET   criteria. No vascular dissection.    CT angiography brain: No major vessel occlusion or proximal stenosis. 2   mm left M1 bifurcation aneurysm    CT perfusion: Approximately 7 mL of ischemic penumbra is seen in the left   frontoparietal cortex.      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, L EOMI, FC, speech improved per daughter  Motor exam:           Upper extremity                         Delt     Bicep     Tricep    HG                                                 R         5/5       5/5        5/5        5/5                                               L          5/5       5/5        5/5        5/5             Lower extremity                        HF         KF        KE       DF         PF                                                  R        5/5       5/5        5/5        5/5      5/5                                               L         5/5       5/5        5/5        5/5      5/5                                                  Sensation: [x] intact to light touch  [ ] decreased:     Reflexes: Deep Tendon Reflexes Intact     Pulmonary: Clear to Auscultation, No rales, No rhonchi, No wheezes     Cardiovascular: S1, S2, Regular rate and rhythm     Gastrointestinal: Soft, Non-tender, Non-distended     Extremities: No calf tenderness       LABS:                          9.1    8.25  )-----------( 252      ( 28 Jul 2023 04:03 )             29.2     07-28    139  |  105  |  22  ----------------------------<  142<H>  4.2   |  24  |  0.73    Ca    8.5      28 Jul 2023 04:03  Phos  3.1     07-28  Mg     2.4     07-28          Urinalysis Basic - ( 28 Jul 2023 04:03 )    Color: x / Appearance: x / SG: x / pH: x  Gluc: 142 mg/dL / Ketone: x  / Bili: x / Urobili: x   Blood: x / Protein: x / Nitrite: x   Leuk Esterase: x / RBC: x / WBC x   Sq Epi: x / Non Sq Epi: x / Bacteria: x

## 2023-07-28 NOTE — PROGRESS NOTE ADULT - ASSESSMENT
ASSESSMENT/PLAN: chronic M2 occlusion with recurrent TIAs    NEURO:  cont plavix. add aspirin  blood pressure augmentation and Q1 hr neurochecks  cont keppra home dose  stroke core measures, stroke neurology following   f/u on starting ASA  Possible MCA bypass next week  Activity: [x] mobilize as tolerated [] Bedrest [x] PT [x] OT [] PMNR    PULM:  incentive spirometry as able  O2sat>92%    CV:  SBP goal 160-200  midodrine 10 q8  Phenylephrine for SBP<140  TTE - 63% ejection fraction  cont atorvastatin  hold all home antihypertensives  Serial Troponins and EKG are WNL  Wean pressors as tolerated    RENAL:  Fluids: Plasmalyte @ 75/hr    GI:  Diet: CCD  GI prophylaxis [] not indicated [x] PPI home med [] other:  Bowel regimen [] colace [x] senna [x] other: miralax     ENDO:   Goal euglycemia (-180)  A1C 8.5  TSH WNL  EDISON    HEME/ONC:  VTE prophylaxis: [x] SCDs [x] chemoprophylaxis [] hold chemoprophylaxis due to: [] high risk of DVT/PE on admission due to:    ID: monitor for fever   ASSESSMENT/PLAN: chronic M2 occlusion with recurrent TIAs    NEURO:  cont plavix. add aspirin  blood pressure augmentation and Q1 hr neurochecks  cont keppra home dose  stroke core measures, stroke neurology following   ASA/plavix  Possible MCA bypass next week  Activity: [x] mobilize as tolerated [] Bedrest [x] PT [x] OT [] PMNR    PULM:  incentive spirometry as able  O2sat>92%    CV:  SBP goal 120-200  midodrine 10 q8  Phenylephrine for SBP control  TTE - 63% ejection fraction  cont atorvastatin  hold all home antihypertensives  Serial Troponins and EKG are WNL  Wean pressors as tolerated    RENAL:  Fluids: Plasmalyte @ 75/hr    GI:  Diet: CCD  GI prophylaxis [] not indicated [x] PPI home med [] other:  Bowel regimen [] colace [x] senna [x] other: miralax     ENDO:   Goal euglycemia (-180)  A1C 8.5  TSH WNL  EDISON    HEME/ONC:  VTE prophylaxis: [x] SCDs [x] chemoprophylaxis [] hold chemoprophylaxis due to: [] high risk of DVT/PE on admission due to:  Start iron for microcytic anemia, previously on iron o/p for AJAY    ID: monitor for fever   ASSESSMENT/PLAN: chronic M2 occlusion with recurrent TIAs    NEURO:  cont plavix. add aspirin  blood pressure augmentation and Q1 hr neurochecks  cont keppra home dose  stroke core measures, stroke neurology following   ASA, hold plavix for bypass planned 7/31  Possible MCA bypass next week  Activity: [x] mobilize as tolerated [] Bedrest [x] PT [x] OT [] PMNR    PULM:  incentive spirometry as able  O2sat>92%    CV:  SBP goal 120-200  midodrine 10 q8  Phenylephrine for SBP control  TTE - 63% ejection fraction  cont atorvastatin  hold all home antihypertensives  Serial Troponins and EKG are WNL  Wean pressors as tolerated    RENAL:  Fluids: Plasmalyte @ 75/hr    GI:  Diet: CCD  GI prophylaxis [] not indicated [x] PPI home med [] other:  Bowel regimen [] colace [x] senna [x] other: miralax     ENDO:   Goal euglycemia (-180)  A1C 8.5  TSH WNL  EDISON    HEME/ONC:  VTE prophylaxis: [x] SCDs [x] chemoprophylaxis [] hold chemoprophylaxis due to: [] high risk of DVT/PE on admission due to:  Start iron for microcytic anemia, previously on iron o/p for AJAY    ID: monitor for fever   ASSESSMENT/PLAN: chronic M2 occlusion with recurrent TIAs    NEURO:  cont plavix. add aspirin  blood pressure augmentation and Q1 hr neurochecks  cont keppra home dose  stroke core measures, stroke neurology following   ASA, hold plavix for bypass planned 7/31  Possible MCA bypass next week  Activity: [x] mobilize as tolerated [] Bedrest [x] PT [x] OT [] PMNR    PULM:  incentive spirometry as able  O2sat>92%    CV:  SBP goal 120-160  midodrine 10 q8  Hold Phenylephrine  TTE - 63% ejection fraction  cont atorvastatin  hold all home antihypertensives  Serial Troponins and EKG are WNL  Wean pressors as tolerated    RENAL:  Fluids: Plasmalyte @ 75/hr    GI:  Diet: CCD  GI prophylaxis [] not indicated [x] PPI home med [] other:  Bowel regimen [] colace [x] senna [x] other: miralax     ENDO:   Goal euglycemia (-180)  A1C 8.5  TSH WNL  EDISON    HEME/ONC:  VTE prophylaxis: [x] SCDs [x] chemoprophylaxis [] hold chemoprophylaxis due to: [] high risk of DVT/PE on admission due to:  Start iron for microcytic anemia, previously on iron o/p for AJAY    ID: monitor for fever   ASSESSMENT/PLAN: chronic M2 occlusion with recurrent TIAs    NEURO:  blood pressure augmentation and Q1 hr neurochecks  cont keppra home dose  stroke core measures, stroke neurology following   ASA, hold plavix for bypass planned 7/31  Possible MCA bypass next week  Activity: [x] mobilize as tolerated [] Bedrest [x] PT [x] OT [] PMNR    PULM:  incentive spirometry as able  O2sat>92%    CV:  SBP goal 120-160  midodrine 10 q8  Hold Phenylephrine  TTE - 63% ejection fraction  cont atorvastatin  hold all home antihypertensives  Serial Troponins and EKG are WNL  Wean pressors as tolerated    RENAL:  Fluids: Plasmalyte @ 75/hr    GI:  Diet: CCD  GI prophylaxis [] not indicated [x] PPI home med [] other:  Bowel regimen [] colace [x] senna [x] other: miralax     ENDO:   Goal euglycemia (-180)  A1C 8.5  TSH WNL  EDISON    HEME/ONC:  VTE prophylaxis: [x] SCDs [x] chemoprophylaxis [] hold chemoprophylaxis due to: [] high risk of DVT/PE on admission due to:  Start iron for microcytic anemia, previously on iron o/p for AJAY    ID: monitor for fever

## 2023-07-28 NOTE — PROGRESS NOTE ADULT - ASSESSMENT
Keep -200  Preop for bypass next week (Wednesday 8/2)  Wean pressors as tolerated  Daily trops and EKGs while on Pressors.

## 2023-07-28 NOTE — PROGRESS NOTE ADULT - SUBJECTIVE AND OBJECTIVE BOX
Patient seen and examined at bedside.    --Anticoagulation--  aspirin  chewable 81 milliGRAM(s) Oral daily  enoxaparin Injectable 40 milliGRAM(s) SubCutaneous <User Schedule>    T(C): 37 (07-27-23 @ 23:00), Max: 37 (07-27-23 @ 23:00)  HR: 76 (07-28-23 @ 02:30) (76 - 93)  BP: --  RR: 15 (07-28-23 @ 02:30) (11 - 23)  SpO2: 100% (07-28-23 @ 02:30) (94% - 100%)  Wt(kg): --     Exam: AOx3 w/ choices, FC, Rt eye blind, FRANCIS 5/5

## 2023-07-29 LAB
ANION GAP SERPL CALC-SCNC: 12 MMOL/L — SIGNIFICANT CHANGE UP (ref 5–17)
BUN SERPL-MCNC: 20 MG/DL — SIGNIFICANT CHANGE UP (ref 7–23)
CALCIUM SERPL-MCNC: 9.1 MG/DL — SIGNIFICANT CHANGE UP (ref 8.4–10.5)
CHLORIDE SERPL-SCNC: 103 MMOL/L — SIGNIFICANT CHANGE UP (ref 96–108)
CO2 SERPL-SCNC: 24 MMOL/L — SIGNIFICANT CHANGE UP (ref 22–31)
CREAT SERPL-MCNC: 0.75 MG/DL — SIGNIFICANT CHANGE UP (ref 0.5–1.3)
EGFR: 87 ML/MIN/1.73M2 — SIGNIFICANT CHANGE UP
GLUCOSE BLDC GLUCOMTR-MCNC: 188 MG/DL — HIGH (ref 70–99)
GLUCOSE BLDC GLUCOMTR-MCNC: 217 MG/DL — HIGH (ref 70–99)
GLUCOSE BLDC GLUCOMTR-MCNC: 217 MG/DL — HIGH (ref 70–99)
GLUCOSE BLDC GLUCOMTR-MCNC: 260 MG/DL — HIGH (ref 70–99)
GLUCOSE SERPL-MCNC: 195 MG/DL — HIGH (ref 70–99)
HCT VFR BLD CALC: 29.4 % — LOW (ref 34.5–45)
HGB BLD-MCNC: 9.3 G/DL — LOW (ref 11.5–15.5)
MAGNESIUM SERPL-MCNC: 2.5 MG/DL — SIGNIFICANT CHANGE UP (ref 1.6–2.6)
MCHC RBC-ENTMCNC: 21.1 PG — LOW (ref 27–34)
MCHC RBC-ENTMCNC: 31.6 GM/DL — LOW (ref 32–36)
MCV RBC AUTO: 66.8 FL — LOW (ref 80–100)
NRBC # BLD: 0 /100 WBCS — SIGNIFICANT CHANGE UP (ref 0–0)
PHOSPHATE SERPL-MCNC: 3.6 MG/DL — SIGNIFICANT CHANGE UP (ref 2.5–4.5)
PLATELET # BLD AUTO: 265 K/UL — SIGNIFICANT CHANGE UP (ref 150–400)
POTASSIUM SERPL-MCNC: 4.1 MMOL/L — SIGNIFICANT CHANGE UP (ref 3.5–5.3)
POTASSIUM SERPL-SCNC: 4.1 MMOL/L — SIGNIFICANT CHANGE UP (ref 3.5–5.3)
RBC # BLD: 4.4 M/UL — SIGNIFICANT CHANGE UP (ref 3.8–5.2)
RBC # FLD: 15.3 % — HIGH (ref 10.3–14.5)
SODIUM SERPL-SCNC: 139 MMOL/L — SIGNIFICANT CHANGE UP (ref 135–145)
TROPONIN T, HIGH SENSITIVITY RESULT: 17 NG/L — SIGNIFICANT CHANGE UP (ref 0–51)
WBC # BLD: 8.35 K/UL — SIGNIFICANT CHANGE UP (ref 3.8–10.5)
WBC # FLD AUTO: 8.35 K/UL — SIGNIFICANT CHANGE UP (ref 3.8–10.5)

## 2023-07-29 PROCEDURE — 93010 ELECTROCARDIOGRAM REPORT: CPT

## 2023-07-29 PROCEDURE — 99291 CRITICAL CARE FIRST HOUR: CPT

## 2023-07-29 RX ORDER — MIDODRINE HYDROCHLORIDE 2.5 MG/1
20 TABLET ORAL EVERY 8 HOURS
Refills: 0 | Status: DISCONTINUED | OUTPATIENT
Start: 2023-07-29 | End: 2023-07-29

## 2023-07-29 RX ORDER — MIDODRINE HYDROCHLORIDE 2.5 MG/1
15 TABLET ORAL EVERY 8 HOURS
Refills: 0 | Status: DISCONTINUED | OUTPATIENT
Start: 2023-07-29 | End: 2023-07-29

## 2023-07-29 RX ORDER — INSULIN GLARGINE 100 [IU]/ML
5 INJECTION, SOLUTION SUBCUTANEOUS AT BEDTIME
Refills: 0 | Status: DISCONTINUED | OUTPATIENT
Start: 2023-07-29 | End: 2023-07-30

## 2023-07-29 RX ORDER — MIDODRINE HYDROCHLORIDE 2.5 MG/1
10 TABLET ORAL
Refills: 0 | Status: DISCONTINUED | OUTPATIENT
Start: 2023-07-29 | End: 2023-07-30

## 2023-07-29 RX ORDER — MIDODRINE HYDROCHLORIDE 2.5 MG/1
10 TABLET ORAL EVERY 8 HOURS
Refills: 0 | Status: DISCONTINUED | OUTPATIENT
Start: 2023-07-29 | End: 2023-07-29

## 2023-07-29 RX ORDER — MIDODRINE HYDROCHLORIDE 2.5 MG/1
10 TABLET ORAL
Refills: 0 | Status: DISCONTINUED | OUTPATIENT
Start: 2023-07-29 | End: 2023-07-29

## 2023-07-29 RX ADMIN — ENOXAPARIN SODIUM 40 MILLIGRAM(S): 100 INJECTION SUBCUTANEOUS at 17:05

## 2023-07-29 RX ADMIN — CHLORHEXIDINE GLUCONATE 1 APPLICATION(S): 213 SOLUTION TOPICAL at 06:30

## 2023-07-29 RX ADMIN — Medication 6: at 17:05

## 2023-07-29 RX ADMIN — MIDODRINE HYDROCHLORIDE 10 MILLIGRAM(S): 2.5 TABLET ORAL at 21:16

## 2023-07-29 RX ADMIN — Medication 2: at 12:44

## 2023-07-29 RX ADMIN — CHLORHEXIDINE GLUCONATE 1 APPLICATION(S): 213 SOLUTION TOPICAL at 21:20

## 2023-07-29 RX ADMIN — MIDODRINE HYDROCHLORIDE 15 MILLIGRAM(S): 2.5 TABLET ORAL at 06:30

## 2023-07-29 RX ADMIN — MIDODRINE HYDROCHLORIDE 15 MILLIGRAM(S): 2.5 TABLET ORAL at 14:48

## 2023-07-29 RX ADMIN — Medication 81 MILLIGRAM(S): at 12:45

## 2023-07-29 RX ADMIN — ATORVASTATIN CALCIUM 80 MILLIGRAM(S): 80 TABLET, FILM COATED ORAL at 21:16

## 2023-07-29 RX ADMIN — SODIUM CHLORIDE 75 MILLILITER(S): 9 INJECTION, SOLUTION INTRAVENOUS at 07:30

## 2023-07-29 RX ADMIN — LEVETIRACETAM 500 MILLIGRAM(S): 250 TABLET, FILM COATED ORAL at 17:06

## 2023-07-29 RX ADMIN — POLYETHYLENE GLYCOL 3350 17 GRAM(S): 17 POWDER, FOR SOLUTION ORAL at 06:55

## 2023-07-29 RX ADMIN — LEVETIRACETAM 500 MILLIGRAM(S): 250 TABLET, FILM COATED ORAL at 06:30

## 2023-07-29 RX ADMIN — Medication 4: at 08:30

## 2023-07-29 RX ADMIN — PHENYLEPHRINE HYDROCHLORIDE 2.13 MICROGRAM(S)/KG/MIN: 10 INJECTION INTRAVENOUS at 07:15

## 2023-07-29 RX ADMIN — INSULIN GLARGINE 5 UNIT(S): 100 INJECTION, SOLUTION SUBCUTANEOUS at 21:16

## 2023-07-29 NOTE — PROGRESS NOTE ADULT - SUBJECTIVE AND OBJECTIVE BOX
HOSPITAL COURSE: 66yo woman PMH seizure on keppra, CVA on plavix with unknown residual deficit, HLD, HTN p/w worsening and more prolonged episodes of aphasia/blank staring/unresponsiveness over two weeks. The episodes became more frequent and today, per daughter, it lasted for hours. Brought to ED, where she was found to have R facial droop, RUE weakness, dysarthria, and global aphasia. NIHSS 12. CTA showing c/f L MCA occlusion.     7/23 Adm NSCU s/p angiogram showing R M2/MCA occlusion with distal retrograde reconstitution, aspiration thrombectomy attempted without recanalization.   7/24: no acute overnight events  7/25: Aphasic when SBP dropped to 110 overnight, daughter reports slowed speech this morning   7/26: Pressure dependent exam, exam worsened when SBP <160  7/27: Mild confusion over night. Negative balance. Will put her on maintenance fluids to decrease the pressors requirements  7/28: No acute overnight events. Failed a challenge with a lower BP parameter: 120-160. Increase midodrine to 15 q8  7/29: no acute overnight events. STA-MCA Bypass on Monday?  7/30: no acute events overnight, exam stable    Admission Scores  GCS:   HH:   MF:   NIHSS: 12  RASS:    CAM-ICU:   ICH:    Allergies    No Known Allergies    REVIEW OF SYSTEMS: [ x] Unable to Assess due to neurologic exam   [ ] All ROS addressed below are non-contributory, except:  Neuro: [ ] Headache [ ] Back pain [ ] Numbness [ ] Weakness [ ] Ataxia [ ] Dizziness [ ] Aphasia [ ] Dysarthria [ ] Visual disturbance  Resp: [ ] Shortness of breath/dyspnea, [ ] Orthopnea [ ] Cough  CV: [ ] Chest pain [ ] Palpitation [ ] Lightheadedness [ ] Syncope  Renal: [ ] Thirst [ ] Edema  GI: [ ] Nausea [ ] Emesis [ ] Abdominal pain [ ] Constipation [ ] Diarrhea  Hem: [ ] Hematemesis [ ] bright red blood per rectum  ID: [ ] Fever [ ] Chills [ ] Dysuria  ENT: [ ] Rhinorrhea      DEVICES:   [ ] Restraints [ ] ET tube [ ] central line [ ] arterial line [ ] gonzalez [ ] NGT/OGT [ ] EVD [ ] LD [ ] FRANTZ/HMV [ ] Trach [ ] PEG [ ] Chest Tube     ICU Vital Signs Last 24 Hrs  ICU Vital Signs Last 24 Hrs  T(C): 36.8 (29 Jul 2023 11:00), Max: 37 (28 Jul 2023 19:00)  T(F): 98.2 (29 Jul 2023 11:00), Max: 98.6 (28 Jul 2023 19:00)  HR: 77 (29 Jul 2023 12:00) (72 - 91)  BP: --  BP(mean): --  ABP: 190/74 (29 Jul 2023 12:00) (121/50 - 221/91)  ABP(mean): 120 (29 Jul 2023 12:00) (73 - 143)  RR: 14 (29 Jul 2023 12:00) (10 - 22)  SpO2: 100% (29 Jul 2023 12:00) (92% - 100%)    O2 Parameters below as of 29 Jul 2023 07:00  Patient On (Oxygen Delivery Method): room air      I&O's Detail  I&O's Detail    28 Jul 2023 07:01  -  29 Jul 2023 07:00  --------------------------------------------------------  IN:    multiple electrolytes Injection Type 1.: 1800 mL    Oral Fluid: 960 mL    Phenylephrine: 5.7 mL    Phenylephrine: 11.6 mL    Phenylephrine: 18 mL  Total IN: 2795.2 mL    OUT:    Incontinent per Collection Bag (mL): 2400 mL  Total OUT: 2400 mL    Total NET: 395.2 mL      29 Jul 2023 07:01  -  29 Jul 2023 13:00  --------------------------------------------------------  IN:    multiple electrolytes Injection Type 1.: 375 mL    Phenylephrine: 2.2 mL  Total IN: 377.2 mL    OUT:  Total OUT: 0 mL    Total NET: 377.2 mL          MEDICATIONS  (STANDING):  aspirin  chewable 81 milliGRAM(s) Oral daily  atorvastatin 80 milliGRAM(s) Oral at bedtime  chlorhexidine 4% Liquid 1 Application(s) Topical daily  enoxaparin Injectable 40 milliGRAM(s) SubCutaneous <User Schedule>  insulin lispro (ADMELOG) corrective regimen sliding scale   SubCutaneous three times a day before meals  levETIRAcetam 500 milliGRAM(s) Oral two times a day  midodrine 15 milliGRAM(s) Oral every 8 hours  multiple electrolytes Injection Type 1 1000 milliLiter(s) (75 mL/Hr) IV Continuous <Continuous>  phenylephrine    Infusion 0.1 MICROgram(s)/kG/Min (2.13 mL/Hr) IV Continuous <Continuous>  polyethylene glycol 3350 17 Gram(s) Oral two times a day  potassium chloride    Tablet ER 20 milliEquivalent(s) Oral once  senna 2 Tablet(s) Oral at bedtime    MEDICATIONS  (PRN):  acetaminophen     Tablet .. 650 milliGRAM(s) Oral every 6 hours PRN Mild Pain (1 - 3)      IMAGING:  < from: MR Head No Cont (07.25.23 @ 08:33) >  Acute left subinsular and scattered left frontal and parietal white   matter infarcts in the distribution of the left middle cerebral artery.  Chronic left parietal/occipital infarct.    < end of copied text >    CTP 7/23  IMPRESSION:    CT angiography neck: No hemodynamically significant stenosis byNASCET   criteria. No vascular dissection.    CT angiography brain: No major vessel occlusion or proximal stenosis. 2   mm left M1 bifurcation aneurysm    CT perfusion: Approximately 7 mL of ischemic penumbra is seen in the left   frontoparietal cortex.      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, L EOMI, FC, speech improved, still has some word finding difficulties  Motor exam:           Upper extremity                         Delt     Bicep     Tricep    HG                                                 R         5/5       5/5        5/5        5/5                                               L          5/5       5/5        5/5        5/5             Lower extremity                        HF         KF        KE       DF         PF                                                  R        5/5       5/5        5/5        5/5      5/5                                               L         5/5       5/5        5/5        5/5      5/5                                                  Sensation: [x] intact to light touch  [ ] decreased:     Reflexes: Deep Tendon Reflexes Intact     Pulmonary: Clear to Auscultation, No rales, No rhonchi, No wheezes     Cardiovascular: S1, S2, Regular rate and rhythm     Gastrointestinal: Soft, Non-tender, Non-distended     Extremities: No calf tenderness       LABS:                          9.1    8.25  )-----------( 252      ( 28 Jul 2023 04:03 )             29.2     07-28    139  |  105  |  22  ----------------------------<  142<H>  4.2   |  24  |  0.73    Ca    8.5      28 Jul 2023 04:03  Phos  3.1     07-28  Mg     2.4     07-28          Urinalysis Basic - ( 28 Jul 2023 04:03 )    Color: x / Appearance: x / SG: x / pH: x  Gluc: 142 mg/dL / Ketone: x  / Bili: x / Urobili: x   Blood: x / Protein: x / Nitrite: x   Leuk Esterase: x / RBC: x / WBC x   Sq Epi: x / Non Sq Epi: x / Bacteria: x                     Reports has started new job where she in on her feet for about 10 hours/day

## 2023-07-29 NOTE — PROGRESS NOTE ADULT - SUBJECTIVE AND OBJECTIVE BOX
Patient seen and examined at bedside.    --Anticoagulation--  aspirin  chewable 81 milliGRAM(s) Oral daily    T(C): 37 (07-28-23 @ 19:00), Max: 37 (07-28-23 @ 19:00)  HR: 77 (07-29-23 @ 03:00) (75 - 91)  BP: --  RR: 11 (07-29-23 @ 03:00) (11 - 22)  SpO2: 100% (07-29-23 @ 03:00) (94% - 100%)  Wt(kg): --    Exam: SBP >160 : AOx3, FC, Rt eye blind, FRANCIS 5/5    .  LABS:                         9.3    8.35  )-----------( 265      ( 29 Jul 2023 00:43 )             29.4     07-29    139  |  103  |  20  ----------------------------<  195<H>  4.1   |  24  |  0.75    Ca    9.1      29 Jul 2023 00:44  Phos  3.6     07-29  Mg     2.5     07-29        Urinalysis Basic - ( 29 Jul 2023 00:44 )    Color: x / Appearance: x / SG: x / pH: x  Gluc: 195 mg/dL / Ketone: x  / Bili: x / Urobili: x   Blood: x / Protein: x / Nitrite: x   Leuk Esterase: x / RBC: x / WBC x   Sq Epi: x / Non Sq Epi: x / Bacteria: x            RADIOLOGY, EKG & ADDITIONAL TESTS: Reviewed.

## 2023-07-29 NOTE — PROGRESS NOTE ADULT - ASSESSMENT
Keep -200  Asa 81  Preop for bypass next week(Wednesday 8/2)  Wean pressors as tolerated (pressure dependent)  Daily trops and EKGs while on Pressors.

## 2023-07-29 NOTE — PROGRESS NOTE ADULT - SUBJECTIVE AND OBJECTIVE BOX
HOSPITAL COURSE: 66yo woman PMH seizure on keppra, CVA on plavix with unknown residual deficit, HLD, HTN p/w worsening and more prolonged episodes of aphasia/blank staring/unresponsiveness over two weeks. The episodes became more frequent and today, per daughter, it lasted for hours. Brought to ED, where she was found to have R facial droop, RUE weakness, dysarthria, and global aphasia. NIHSS 12. CTA showing c/f L MCA occlusion.     7/23 Adm NSCU s/p angiogram showing R M2/MCA occlusion with distal retrograde reconstitution, aspiration thrombectomy attempted without recanalization.   7/24: no acute overnight events  7/25: Aphasic when SBP dropped to 110 overnight, daughter reports slowed speech this morning   7/26: Pressure dependent exam, exam worsened when SBP <160  7/27: Mild confusion over night. Negative balance. Will put her on maintenance fluids to decrease the pressors requirements  7/28: No acute overnight events. Failed a challenge with a lower BP parameter: 120-160. Increase midodrine to 15 q8  7/29: no acute overnight events. STA-MCA Bypass on Monday?    Admission Scores  GCS:   HH:   MF:   NIHSS: 12  RASS:    CAM-ICU:   ICH:    Allergies    No Known Allergies    REVIEW OF SYSTEMS: [ x] Unable to Assess due to neurologic exam   [ ] All ROS addressed below are non-contributory, except:  Neuro: [ ] Headache [ ] Back pain [ ] Numbness [ ] Weakness [ ] Ataxia [ ] Dizziness [ ] Aphasia [ ] Dysarthria [ ] Visual disturbance  Resp: [ ] Shortness of breath/dyspnea, [ ] Orthopnea [ ] Cough  CV: [ ] Chest pain [ ] Palpitation [ ] Lightheadedness [ ] Syncope  Renal: [ ] Thirst [ ] Edema  GI: [ ] Nausea [ ] Emesis [ ] Abdominal pain [ ] Constipation [ ] Diarrhea  Hem: [ ] Hematemesis [ ] bright red blood per rectum  ID: [ ] Fever [ ] Chills [ ] Dysuria  ENT: [ ] Rhinorrhea      DEVICES:   [ ] Restraints [ ] ET tube [ ] central line [ ] arterial line [ ] gonzalez [ ] NGT/OGT [ ] EVD [ ] LD [ ] FRANTZ/HMV [ ] Trach [ ] PEG [ ] Chest Tube     ICU Vital Signs Last 24 Hrs  ICU Vital Signs Last 24 Hrs  T(C): 36.8 (29 Jul 2023 11:00), Max: 37 (28 Jul 2023 19:00)  T(F): 98.2 (29 Jul 2023 11:00), Max: 98.6 (28 Jul 2023 19:00)  HR: 77 (29 Jul 2023 12:00) (72 - 91)  BP: --  BP(mean): --  ABP: 190/74 (29 Jul 2023 12:00) (121/50 - 221/91)  ABP(mean): 120 (29 Jul 2023 12:00) (73 - 143)  RR: 14 (29 Jul 2023 12:00) (10 - 22)  SpO2: 100% (29 Jul 2023 12:00) (92% - 100%)    O2 Parameters below as of 29 Jul 2023 07:00  Patient On (Oxygen Delivery Method): room air      I&O's Detail  I&O's Detail    28 Jul 2023 07:01  -  29 Jul 2023 07:00  --------------------------------------------------------  IN:    multiple electrolytes Injection Type 1.: 1800 mL    Oral Fluid: 960 mL    Phenylephrine: 5.7 mL    Phenylephrine: 11.6 mL    Phenylephrine: 18 mL  Total IN: 2795.2 mL    OUT:    Incontinent per Collection Bag (mL): 2400 mL  Total OUT: 2400 mL    Total NET: 395.2 mL      29 Jul 2023 07:01  -  29 Jul 2023 13:00  --------------------------------------------------------  IN:    multiple electrolytes Injection Type 1.: 375 mL    Phenylephrine: 2.2 mL  Total IN: 377.2 mL    OUT:  Total OUT: 0 mL    Total NET: 377.2 mL          MEDICATIONS  (STANDING):  aspirin  chewable 81 milliGRAM(s) Oral daily  atorvastatin 80 milliGRAM(s) Oral at bedtime  chlorhexidine 4% Liquid 1 Application(s) Topical daily  enoxaparin Injectable 40 milliGRAM(s) SubCutaneous <User Schedule>  insulin lispro (ADMELOG) corrective regimen sliding scale   SubCutaneous three times a day before meals  levETIRAcetam 500 milliGRAM(s) Oral two times a day  midodrine 15 milliGRAM(s) Oral every 8 hours  multiple electrolytes Injection Type 1 1000 milliLiter(s) (75 mL/Hr) IV Continuous <Continuous>  phenylephrine    Infusion 0.1 MICROgram(s)/kG/Min (2.13 mL/Hr) IV Continuous <Continuous>  polyethylene glycol 3350 17 Gram(s) Oral two times a day  potassium chloride    Tablet ER 20 milliEquivalent(s) Oral once  senna 2 Tablet(s) Oral at bedtime    MEDICATIONS  (PRN):  acetaminophen     Tablet .. 650 milliGRAM(s) Oral every 6 hours PRN Mild Pain (1 - 3)      IMAGING:  < from: MR Head No Cont (07.25.23 @ 08:33) >  Acute left subinsular and scattered left frontal and parietal white   matter infarcts in the distribution of the left middle cerebral artery.  Chronic left parietal/occipital infarct.    < end of copied text >    CTP 7/23  IMPRESSION:    CT angiography neck: No hemodynamically significant stenosis byNASCET   criteria. No vascular dissection.    CT angiography brain: No major vessel occlusion or proximal stenosis. 2   mm left M1 bifurcation aneurysm    CT perfusion: Approximately 7 mL of ischemic penumbra is seen in the left   frontoparietal cortex.      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, L EOMI, FC, speech improved, still has some word finding difficulties  Motor exam:           Upper extremity                         Delt     Bicep     Tricep    HG                                                 R         5/5       5/5        5/5        5/5                                               L          5/5       5/5        5/5        5/5             Lower extremity                        HF         KF        KE       DF         PF                                                  R        5/5       5/5        5/5        5/5      5/5                                               L         5/5       5/5        5/5        5/5      5/5                                                  Sensation: [x] intact to light touch  [ ] decreased:     Reflexes: Deep Tendon Reflexes Intact     Pulmonary: Clear to Auscultation, No rales, No rhonchi, No wheezes     Cardiovascular: S1, S2, Regular rate and rhythm     Gastrointestinal: Soft, Non-tender, Non-distended     Extremities: No calf tenderness       LABS:                          9.1    8.25  )-----------( 252      ( 28 Jul 2023 04:03 )             29.2     07-28    139  |  105  |  22  ----------------------------<  142<H>  4.2   |  24  |  0.73    Ca    8.5      28 Jul 2023 04:03  Phos  3.1     07-28  Mg     2.4     07-28          Urinalysis Basic - ( 28 Jul 2023 04:03 )    Color: x / Appearance: x / SG: x / pH: x  Gluc: 142 mg/dL / Ketone: x  / Bili: x / Urobili: x   Blood: x / Protein: x / Nitrite: x   Leuk Esterase: x / RBC: x / WBC x   Sq Epi: x / Non Sq Epi: x / Bacteria: x

## 2023-07-29 NOTE — PROGRESS NOTE ADULT - CRITICAL CARE ATTENDING COMMENT
left MCA occlusion, yesterash had worsening neuro exam when her SBP was at 130 mmhg, she was augmented with phenyleprhine back to -200, she is now with no focal deficits very mild expressive aphasia  , aspirin 81 mg daily, continue plasmalyte 75 ml/hr , midodrine 15 mg q 8 hr, continue lipitor 80 mg daily , neuro checks q 1 hr. possible bypass next week, lovenox 40 mg sc qhs    at risk for stroke, MI

## 2023-07-29 NOTE — PROGRESS NOTE ADULT - ASSESSMENT
ASSESSMENT/PLAN: chronic M2 occlusion with recurrent TIAs    NEURO:  blood pressure augmentation and Q1 hr neurochecks  cont keppra home dose  stroke core measures, stroke neurology following   ASA, hold plavix for bypass  Possible MCA bypass next week  Activity: [x] mobilize as tolerated [] Bedrest [x] PT [x] OT [] PMNR    PULM:  incentive spirometry as able  O2sat>92%    CV:  SBP goal 120-160  midodrine 10 q8  Hold Phenylephrine  TTE - 63% ejection fraction  cont atorvastatin  hold all home antihypertensives  Serial Troponins and EKG are WNL  Wean pressors as tolerated    RENAL:  Fluids: Plasmalyte @ 75/hr    GI:  Diet: CCD  GI prophylaxis [] not indicated [x] PPI home med [] other:  Bowel regimen [] colace [x] senna [x] other: miralax     ENDO:   Goal euglycemia (-180)  A1C 8.5  TSH WNL  EDISON    HEME/ONC:  VTE prophylaxis: [x] SCDs [x] chemoprophylaxis [] hold chemoprophylaxis due to: [] high risk of DVT/PE on admission due to:  Start iron for microcytic anemia, previously on iron o/p for AJAY    ID: monitor for fever

## 2023-07-30 LAB
GLUCOSE BLDC GLUCOMTR-MCNC: 147 MG/DL — HIGH (ref 70–99)
GLUCOSE BLDC GLUCOMTR-MCNC: 224 MG/DL — HIGH (ref 70–99)
GLUCOSE BLDC GLUCOMTR-MCNC: 234 MG/DL — HIGH (ref 70–99)
GLUCOSE BLDC GLUCOMTR-MCNC: 250 MG/DL — HIGH (ref 70–99)
TROPONIN T, HIGH SENSITIVITY RESULT: 21 NG/L — SIGNIFICANT CHANGE UP (ref 0–51)

## 2023-07-30 PROCEDURE — 99291 CRITICAL CARE FIRST HOUR: CPT

## 2023-07-30 PROCEDURE — 93010 ELECTROCARDIOGRAM REPORT: CPT

## 2023-07-30 PROCEDURE — 36620 INSERTION CATHETER ARTERY: CPT

## 2023-07-30 RX ORDER — INSULIN GLARGINE 100 [IU]/ML
10 INJECTION, SOLUTION SUBCUTANEOUS AT BEDTIME
Refills: 0 | Status: DISCONTINUED | OUTPATIENT
Start: 2023-07-30 | End: 2023-07-31

## 2023-07-30 RX ORDER — INSULIN LISPRO 100/ML
2 VIAL (ML) SUBCUTANEOUS
Refills: 0 | Status: DISCONTINUED | OUTPATIENT
Start: 2023-07-30 | End: 2023-07-31

## 2023-07-30 RX ORDER — MIDODRINE HYDROCHLORIDE 2.5 MG/1
15 TABLET ORAL EVERY 8 HOURS
Refills: 0 | Status: DISCONTINUED | OUTPATIENT
Start: 2023-07-30 | End: 2023-07-31

## 2023-07-30 RX ADMIN — ENOXAPARIN SODIUM 40 MILLIGRAM(S): 100 INJECTION SUBCUTANEOUS at 17:30

## 2023-07-30 RX ADMIN — MIDODRINE HYDROCHLORIDE 15 MILLIGRAM(S): 2.5 TABLET ORAL at 14:00

## 2023-07-30 RX ADMIN — INSULIN GLARGINE 10 UNIT(S): 100 INJECTION, SOLUTION SUBCUTANEOUS at 21:35

## 2023-07-30 RX ADMIN — PHENYLEPHRINE HYDROCHLORIDE 2.13 MICROGRAM(S)/KG/MIN: 10 INJECTION INTRAVENOUS at 08:32

## 2023-07-30 RX ADMIN — Medication 81 MILLIGRAM(S): at 11:58

## 2023-07-30 RX ADMIN — CHLORHEXIDINE GLUCONATE 1 APPLICATION(S): 213 SOLUTION TOPICAL at 11:58

## 2023-07-30 RX ADMIN — ATORVASTATIN CALCIUM 80 MILLIGRAM(S): 80 TABLET, FILM COATED ORAL at 21:34

## 2023-07-30 RX ADMIN — SODIUM CHLORIDE 75 MILLILITER(S): 9 INJECTION, SOLUTION INTRAVENOUS at 19:21

## 2023-07-30 RX ADMIN — LEVETIRACETAM 500 MILLIGRAM(S): 250 TABLET, FILM COATED ORAL at 05:41

## 2023-07-30 RX ADMIN — MIDODRINE HYDROCHLORIDE 10 MILLIGRAM(S): 2.5 TABLET ORAL at 05:15

## 2023-07-30 RX ADMIN — LEVETIRACETAM 500 MILLIGRAM(S): 250 TABLET, FILM COATED ORAL at 17:30

## 2023-07-30 RX ADMIN — SODIUM CHLORIDE 75 MILLILITER(S): 9 INJECTION, SOLUTION INTRAVENOUS at 08:32

## 2023-07-30 RX ADMIN — Medication 4: at 12:22

## 2023-07-30 RX ADMIN — MIDODRINE HYDROCHLORIDE 10 MILLIGRAM(S): 2.5 TABLET ORAL at 08:31

## 2023-07-30 RX ADMIN — MIDODRINE HYDROCHLORIDE 15 MILLIGRAM(S): 2.5 TABLET ORAL at 21:34

## 2023-07-30 RX ADMIN — Medication 4: at 17:35

## 2023-07-30 NOTE — PROGRESS NOTE ADULT - SUBJECTIVE AND OBJECTIVE BOX
NSCU ATTENDING -- ADDITIONAL PROGRESS NOTE    Nighttime rounds were performed -- please refer to earlier Progress Note for HPI details.    T(C): 36.5 (07-30-23 @ 15:00), Max: 36.9 (07-29-23 @ 23:00)  HR: 83 (07-30-23 @ 19:00) (73 - 87)  BP: 209/86 (07-30-23 @ 18:30) (139/101 - 209/86)  RR: 17 (07-30-23 @ 19:00) (13 - 22)  SpO2: 100% (07-30-23 @ 19:00) (95% - 100%)  Wt(kg): --    Relevant labwork and imaging reviewed.    CHIEF COMPLAINT: L MCA occlusion     [A/P] Adm for L MCA occlusion, now has been on blood pressure augmentation with slightly fluctuating aphasia, but mostly improved  oriented x3 with Cantonese    cont -200  on midodrine 15mg Q8 now  on and off phenylephrine as needed   per neurosurgery, planning for STA/MCA bypass Wed  hyperglycemia increased lantus to 10U from 5 and added premeals 2U tid today   Q2 neuro/vitals    ATTENDING STATEMENT:    Patient is critically ill, requiring critical care services.     Attending: I have personally and independently provided 30 minutes of critical care services.  This excludes any time spent on separate procedures or teaching.

## 2023-07-30 NOTE — PROGRESS NOTE ADULT - SUBJECTIVE AND OBJECTIVE BOX
· Subjective and Objective:   HOSPITAL COURSE: 68yo woman PMH seizure on keppra, CVA on plavix with unknown residual deficit, HLD, HTN p/w worsening and more prolonged episodes of aphasia/blank staring/unresponsiveness over two weeks. The episodes became more frequent and today, per daughter, it lasted for hours. Brought to ED, where she was found to have R facial droop, RUE weakness, dysarthria, and global aphasia. NIHSS 12. CTA showing c/f L MCA occlusion.     7/23 Adm NSCU s/p angiogram showing R M2/MCA occlusion with distal retrograde reconstitution, aspiration thrombectomy attempted without recanalization.   7/24: no acute overnight events  7/25: Aphasic when SBP dropped to 110 overnight, daughter reports slowed speech this morning   7/26: Pressure dependent exam, exam worsened when SBP <160  7/27: Mild confusion over night. Negative balance. Will put her on maintenance fluids to decrease the pressors requirements  7/28: No acute overnight events. Failed a challenge with a lower BP parameter: 120-160. Increase midodrine to 15 q8  7/29: no acute overnight events. STA-MCA Bypass on Monday?  7/30 BP dropped < 160 mm her neuro exam got worse, phenylephrine     Admission Scores  GCS:   HH:   MF:   NIHSS: 12  RASS:    CAM-ICU:   ICH:    Allergies    No Known Allergies    REVIEW OF SYSTEMS: [ x] Unable to Assess due to neurologic exam   [ ] All ROS addressed below are non-contributory, except:  Neuro: [ ] Headache [ ] Back pain [ ] Numbness [ ] Weakness [ ] Ataxia [ ] Dizziness [ ] Aphasia [ ] Dysarthria [ ] Visual disturbance  Resp: [ ] Shortness of breath/dyspnea, [ ] Orthopnea [ ] Cough  CV: [ ] Chest pain [ ] Palpitation [ ] Lightheadedness [ ] Syncope  Renal: [ ] Thirst [ ] Edema  GI: [ ] Nausea [ ] Emesis [ ] Abdominal pain [ ] Constipation [ ] Diarrhea  Hem: [ ] Hematemesis [ ] bright red blood per rectum  ID: [ ] Fever [ ] Chills [ ] Dysuria  ENT: [ ] Rhinorrhea      DEVICES:   [ ] Restraints [ ] ET tube [ ] central line [ ] arterial line [ ] gonzalez [ ] NGT/OGT [ ] EVD [ ] LD [ ] FRANTZ/HMV [ ] Trach [ ] PEG [ ] Chest Tube     T(C): 36.9 (07-30-23 @ 03:00), Max: 36.9 (07-29-23 @ 19:00)  HR: 73 (07-30-23 @ 07:00) (73 - 89)  BP: 174/98 (07-30-23 @ 05:00) (139/101 - 195/81)  RR: 16 (07-30-23 @ 07:00) (7 - 23)  SpO2: 98% (07-30-23 @ 07:00) (92% - 100%)  07-29-23 @ 07:01  -  07-30-23 @ 07:00  --------------------------------------------------------  IN: 3427.5 mL / OUT: 2900 mL / NET: 527.5 mL    acetaminophen     Tablet .. 650 milliGRAM(s) Oral every 6 hours PRN  aspirin  chewable 81 milliGRAM(s) Oral daily  atorvastatin 80 milliGRAM(s) Oral at bedtime  chlorhexidine 4% Liquid 1 Application(s) Topical daily  enoxaparin Injectable 40 milliGRAM(s) SubCutaneous <User Schedule>  ferrous    sulfate 325 milliGRAM(s) Oral <User Schedule>  insulin glargine Injectable (LANTUS) 5 Unit(s) SubCutaneous at bedtime  insulin lispro (ADMELOG) corrective regimen sliding scale   SubCutaneous three times a day before meals  levETIRAcetam 500 milliGRAM(s) Oral two times a day  midodrine 10 milliGRAM(s) Oral <User Schedule>  multiple electrolytes Injection Type 1 1000 milliLiter(s) IV Continuous <Continuous>  phenylephrine    Infusion 0.1 MICROgram(s)/kG/Min IV Continuous <Continuous>  polyethylene glycol 3350 17 Gram(s) Oral two times a day  senna 2 Tablet(s) Oral at bedtime      CTP 7/23  IMPRESSION:    CT angiography neck: No hemodynamically significant stenosis byNASCET   criteria. No vascular dissection.    CT angiography brain: No major vessel occlusion or proximal stenosis. 2   mm left M1 bifurcation aneurysm    CT perfusion: Approximately 7 mL of ischemic penumbra is seen in the left   frontoparietal cortex.      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, L EOMI, FC, speech improved, still has some word finding difficulties  Motor exam:           Upper extremity                         Delt     Bicep     Tricep    HG                                                 R         5/5       5/5        5/5        5/5                                               L          5/5       5/5        5/5        5/5             Lower extremity                        HF         KF        KE       DF         PF                                                  R        5/5       5/5        5/5        5/5      5/5                                               L         5/5       5/5        5/5        5/5      5/5                                                  Sensation: [x] intact to light touch  [ ] decreased:     Reflexes: Deep Tendon Reflexes Intact     Pulmonary: Clear to Auscultation, No rales, No rhonchi, No wheezes     Cardiovascular: S1, S2, Regular rate and rhythm     Gastrointestinal: Soft, Non-tender, Non-distended     Extremities: No calf tenderness       LABS:       LABS:  Na: 139 (07-29 @ 00:44), 139 (07-28 @ 04:03)  K: 4.1 (07-29 @ 00:44), 4.2 (07-28 @ 04:03)  Cl: 103 (07-29 @ 00:44), 105 (07-28 @ 04:03)  CO2: 24 (07-29 @ 00:44), 24 (07-28 @ 04:03)  BUN: 20 (07-29 @ 00:44), 22 (07-28 @ 04:03)  Cr: 0.75 (07-29 @ 00:44), 0.73 (07-28 @ 04:03)  Glu: 195(07-29 @ 00:44), 142(07-28 @ 04:03)    Hgb: 9.3 (07-29 @ 00:43), 9.1 (07-28 @ 04:03)  Hct: 29.4 (07-29 @ 00:43), 29.2 (07-28 @ 04:03)  WBC: 8.35 (07-29 @ 00:43), 8.25 (07-28 @ 04:03)  Plt: 265 (07-29 @ 00:43), 252 (07-28 @ 04:03)    INR:   PTT:                                 Assessment and Plan: 	  ASSESSMENT/PLAN: chronic M2 occlusion BP dependent     NEURO:  neuro checks q 1 hr   cont keppra home medication   ASA, hold plavix for bypass  continue phenylephrine -200   Activity: [x] mobilize as tolerated [] Bedrest [x] PT [x] OT [] PMNR    PULM:  RA     CV:  SBP goal 160-200 mmhg  right  radial a line   increase midodrine to  15 q8 hr   continue  Phenylephrine 0.6   TTE - 63% ejection fraction  cont atorvastatin  Serial Troponins and EKG are WNL     RENAL:  Fluids: Plasmalyte @ 75/hr    GI:  Diet: CCD  GI prophylaxis [] not indicated [x] PPI home med [] other:  Bowel regimen [] colace [x] senna [x] other: miralax     ENDO:   Goal euglycemia (-180)  DM lantus 5 units , ISS   TSH WNL  EDISON    HEME/ONC:  VTE prophylaxis: [x] SCDs [x] chemoprophylaxis [] hold chemoprophylaxis due to: [] high risk of DVT/PE on admission due to:  Start iron for microcytic anemia, previously on iron o/p for AJAY    ID: afebrile     30 critical care at risk for stroke, MI

## 2023-07-31 LAB
ANION GAP SERPL CALC-SCNC: 13 MMOL/L — SIGNIFICANT CHANGE UP (ref 5–17)
BUN SERPL-MCNC: 22 MG/DL — SIGNIFICANT CHANGE UP (ref 7–23)
CALCIUM SERPL-MCNC: 9.1 MG/DL — SIGNIFICANT CHANGE UP (ref 8.4–10.5)
CHLORIDE SERPL-SCNC: 102 MMOL/L — SIGNIFICANT CHANGE UP (ref 96–108)
CO2 SERPL-SCNC: 24 MMOL/L — SIGNIFICANT CHANGE UP (ref 22–31)
CREAT SERPL-MCNC: 0.86 MG/DL — SIGNIFICANT CHANGE UP (ref 0.5–1.3)
EGFR: 74 ML/MIN/1.73M2 — SIGNIFICANT CHANGE UP
GLUCOSE BLDC GLUCOMTR-MCNC: 180 MG/DL — HIGH (ref 70–99)
GLUCOSE BLDC GLUCOMTR-MCNC: 202 MG/DL — HIGH (ref 70–99)
GLUCOSE BLDC GLUCOMTR-MCNC: 219 MG/DL — HIGH (ref 70–99)
GLUCOSE BLDC GLUCOMTR-MCNC: 288 MG/DL — HIGH (ref 70–99)
GLUCOSE SERPL-MCNC: 196 MG/DL — HIGH (ref 70–99)
HCT VFR BLD CALC: 30.3 % — LOW (ref 34.5–45)
HGB BLD-MCNC: 9.4 G/DL — LOW (ref 11.5–15.5)
MAGNESIUM SERPL-MCNC: 2.3 MG/DL — SIGNIFICANT CHANGE UP (ref 1.6–2.6)
MCHC RBC-ENTMCNC: 20.8 PG — LOW (ref 27–34)
MCHC RBC-ENTMCNC: 31 GM/DL — LOW (ref 32–36)
MCV RBC AUTO: 67 FL — LOW (ref 80–100)
NRBC # BLD: 0 /100 WBCS — SIGNIFICANT CHANGE UP (ref 0–0)
PHOSPHATE SERPL-MCNC: 3.9 MG/DL — SIGNIFICANT CHANGE UP (ref 2.5–4.5)
PLATELET # BLD AUTO: 294 K/UL — SIGNIFICANT CHANGE UP (ref 150–400)
POTASSIUM SERPL-MCNC: 3.8 MMOL/L — SIGNIFICANT CHANGE UP (ref 3.5–5.3)
POTASSIUM SERPL-SCNC: 3.8 MMOL/L — SIGNIFICANT CHANGE UP (ref 3.5–5.3)
RBC # BLD: 4.52 M/UL — SIGNIFICANT CHANGE UP (ref 3.8–5.2)
RBC # FLD: 15.3 % — HIGH (ref 10.3–14.5)
SODIUM SERPL-SCNC: 139 MMOL/L — SIGNIFICANT CHANGE UP (ref 135–145)
TROPONIN T, HIGH SENSITIVITY RESULT: 23 NG/L — SIGNIFICANT CHANGE UP (ref 0–51)
WBC # BLD: 9.41 K/UL — SIGNIFICANT CHANGE UP (ref 3.8–10.5)
WBC # FLD AUTO: 9.41 K/UL — SIGNIFICANT CHANGE UP (ref 3.8–10.5)

## 2023-07-31 PROCEDURE — 93010 ELECTROCARDIOGRAM REPORT: CPT

## 2023-07-31 PROCEDURE — 99255 IP/OBS CONSLTJ NEW/EST HI 80: CPT | Mod: 57

## 2023-07-31 PROCEDURE — 99291 CRITICAL CARE FIRST HOUR: CPT

## 2023-07-31 RX ORDER — MIDODRINE HYDROCHLORIDE 2.5 MG/1
10 TABLET ORAL
Refills: 0 | Status: DISCONTINUED | OUTPATIENT
Start: 2023-07-31 | End: 2023-08-02

## 2023-07-31 RX ORDER — INSULIN LISPRO 100/ML
6 VIAL (ML) SUBCUTANEOUS
Refills: 0 | Status: DISCONTINUED | OUTPATIENT
Start: 2023-07-31 | End: 2023-08-02

## 2023-07-31 RX ORDER — INSULIN GLARGINE 100 [IU]/ML
12 INJECTION, SOLUTION SUBCUTANEOUS AT BEDTIME
Refills: 0 | Status: DISCONTINUED | OUTPATIENT
Start: 2023-07-31 | End: 2023-08-01

## 2023-07-31 RX ORDER — INSULIN LISPRO 100/ML
VIAL (ML) SUBCUTANEOUS
Refills: 0 | Status: DISCONTINUED | OUTPATIENT
Start: 2023-07-31 | End: 2023-08-02

## 2023-07-31 RX ORDER — INSULIN LISPRO 100/ML
4 VIAL (ML) SUBCUTANEOUS
Refills: 0 | Status: DISCONTINUED | OUTPATIENT
Start: 2023-07-31 | End: 2023-07-31

## 2023-07-31 RX ADMIN — ENOXAPARIN SODIUM 40 MILLIGRAM(S): 100 INJECTION SUBCUTANEOUS at 17:27

## 2023-07-31 RX ADMIN — LEVETIRACETAM 500 MILLIGRAM(S): 250 TABLET, FILM COATED ORAL at 17:27

## 2023-07-31 RX ADMIN — Medication 4 UNIT(S): at 12:24

## 2023-07-31 RX ADMIN — INSULIN GLARGINE 12 UNIT(S): 100 INJECTION, SOLUTION SUBCUTANEOUS at 22:59

## 2023-07-31 RX ADMIN — MIDODRINE HYDROCHLORIDE 10 MILLIGRAM(S): 2.5 TABLET ORAL at 22:59

## 2023-07-31 RX ADMIN — Medication 2 UNIT(S): at 08:40

## 2023-07-31 RX ADMIN — Medication 6 UNIT(S): at 17:47

## 2023-07-31 RX ADMIN — Medication 4: at 12:24

## 2023-07-31 RX ADMIN — MIDODRINE HYDROCHLORIDE 15 MILLIGRAM(S): 2.5 TABLET ORAL at 06:15

## 2023-07-31 RX ADMIN — PHENYLEPHRINE HYDROCHLORIDE 2.13 MICROGRAM(S)/KG/MIN: 10 INJECTION INTRAVENOUS at 08:34

## 2023-07-31 RX ADMIN — CHLORHEXIDINE GLUCONATE 1 APPLICATION(S): 213 SOLUTION TOPICAL at 11:54

## 2023-07-31 RX ADMIN — Medication 325 MILLIGRAM(S): at 08:34

## 2023-07-31 RX ADMIN — Medication 2: at 22:57

## 2023-07-31 RX ADMIN — SENNA PLUS 2 TABLET(S): 8.6 TABLET ORAL at 22:58

## 2023-07-31 RX ADMIN — Medication 6: at 17:47

## 2023-07-31 RX ADMIN — Medication 81 MILLIGRAM(S): at 11:54

## 2023-07-31 RX ADMIN — LEVETIRACETAM 500 MILLIGRAM(S): 250 TABLET, FILM COATED ORAL at 06:16

## 2023-07-31 RX ADMIN — Medication 4: at 08:40

## 2023-07-31 RX ADMIN — ATORVASTATIN CALCIUM 80 MILLIGRAM(S): 80 TABLET, FILM COATED ORAL at 22:58

## 2023-07-31 RX ADMIN — MIDODRINE HYDROCHLORIDE 10 MILLIGRAM(S): 2.5 TABLET ORAL at 17:27

## 2023-07-31 RX ADMIN — PHENYLEPHRINE HYDROCHLORIDE 2.13 MICROGRAM(S)/KG/MIN: 10 INJECTION INTRAVENOUS at 05:00

## 2023-07-31 RX ADMIN — MIDODRINE HYDROCHLORIDE 10 MILLIGRAM(S): 2.5 TABLET ORAL at 11:54

## 2023-07-31 NOTE — PROGRESS NOTE ADULT - SUBJECTIVE AND OBJECTIVE BOX
Patient seen and examined at bedside.    --Anticoagulation--  aspirin  chewable 81 milliGRAM(s) Oral daily    T(C): 37 (07-30-23 @ 23:00), Max: 37 (07-30-23 @ 23:00)  HR: 83 (07-30-23 @ 23:00) (73 - 87)  BP: 209/86 (07-30-23 @ 18:30) (139/101 - 209/86)  RR: 13 (07-30-23 @ 23:00) (13 - 22)  SpO2: 100% (07-30-23 @ 23:00) (95% - 100%)  Wt(kg): --    Exam:  AOx3, dysarthric, FC, Rt eye blind, FRANCIS 5/5

## 2023-07-31 NOTE — PROGRESS NOTE ADULT - ASSESSMENT
Assessment and Plan: 	  ASSESSMENT/PLAN: chronic M2 occlusion BP dependent     NEURO:  neuro checks q 2hr   cont keppra home medication   ASA, hold plavix for bypass  continue phenylephrine -200   Possible plan for bypass  Activity: [x] mobilize as tolerated [] Bedrest [x] PT [x] OT [] PMNR    PULM:  RA     CV:  SBP goal 160-200 mmhg  right  radial a line   increase midodrine to 10 mg Q6H   continue  Phenylephrine 0.15  TTE - 63% ejection fraction  cont atorvastatin    RENAL:  Fluids: IVL     GI:  Diet: CCD  GI prophylaxis [] not indicated [x] PPI home med [] other:  Bowel regimen [] colace [x] senna [x] other: miralax     ENDO:   A1C 8.5  Goal euglycemia (-180)  DM lantus 12 units, ISS  Will continue premeal 2 units TID  TSH WNL  EDISON    HEME/ONC:  VTE prophylaxis: [x] SCDs [x] chemoprophylaxis [] hold chemoprophylaxis due to: [] high risk of DVT/PE on admission due to:  Start iron for microcytic anemia, previously on iron o/p for AJAY    ID: afebrile     30 critical care at risk for stroke, MI

## 2023-07-31 NOTE — PROGRESS NOTE ADULT - SUBJECTIVE AND OBJECTIVE BOX
Subjective and Objective:   HOSPITAL COURSE: 68yo woman PMH seizure on keppra, CVA on plavix with unknown residual deficit, HLD, HTN p/w worsening and more prolonged episodes of aphasia/blank staring/unresponsiveness over two weeks. The episodes became more frequent and today, per daughter, it lasted for hours. Brought to ED, where she was found to have R facial droop, RUE weakness, dysarthria, and global aphasia. NIHSS 12. CTA showing c/f L MCA occlusion.     7/23 Adm NSCU s/p angiogram showing R M2/MCA occlusion with distal retrograde reconstitution, aspiration thrombectomy attempted without recanalization.   7/24: no acute overnight events  7/25: Aphasic when SBP dropped to 110 overnight, daughter reports slowed speech this morning   7/26: Pressure dependent exam, exam worsened when SBP <160  7/27: Mild confusion over night. Negative balance. Will put her on maintenance fluids to decrease the pressors requirements  7/28: No acute overnight events. Failed a challenge with a lower BP parameter: 120-160. Increase midodrine to 15 q8  7/29: no acute overnight events. STA-MCA Bypass on Monday?  7/30 BP dropped < 160 mm her neuro exam got worse, phenylephrine   7/31: Patient still symptomatic on Low BP, responds extremely well to midodrine and BP rises significantly       Allergies    No Known Allergies    REVIEW OF SYSTEMS: [ x] Unable to Assess due to neurologic exam   [ ] All ROS addressed below are non-contributory, except:  Neuro: [ ] Headache [ ] Back pain [ ] Numbness [ ] Weakness [ ] Ataxia [ ] Dizziness [ ] Aphasia [ ] Dysarthria [ ] Visual disturbance  Resp: [ ] Shortness of breath/dyspnea, [ ] Orthopnea [ ] Cough  CV: [ ] Chest pain [ ] Palpitation [ ] Lightheadedness [ ] Syncope  Renal: [ ] Thirst [ ] Edema  GI: [ ] Nausea [ ] Emesis [ ] Abdominal pain [ ] Constipation [ ] Diarrhea  Hem: [ ] Hematemesis [ ] bright red blood per rectum  ID: [ ] Fever [ ] Chills [ ] Dysuria  ENT: [ ] Rhinorrhea      DEVICES:   [ ] Restraints [ ] ET tube [ ] central line [ ] arterial line [ ] gonzalez [ ] NGT/OGT [ ] EVD [ ] LD [ ] FRANTZ/HMV [ ] Trach [ ] PEG [ ] Chest Tube     T(C): 36.9 (07-30-23 @ 03:00), Max: 36.9 (07-29-23 @ 19:00)  HR: 73 (07-30-23 @ 07:00) (73 - 89)  BP: 174/98 (07-30-23 @ 05:00) (139/101 - 195/81)  RR: 16 (07-30-23 @ 07:00) (7 - 23)  SpO2: 98% (07-30-23 @ 07:00) (92% - 100%)  07-29-23 @ 07:01  -  07-30-23 @ 07:00  --------------------------------------------------------  IN: 3427.5 mL / OUT: 2900 mL / NET: 527.5 mL    acetaminophen     Tablet .. 650 milliGRAM(s) Oral every 6 hours PRN  aspirin  chewable 81 milliGRAM(s) Oral daily  atorvastatin 80 milliGRAM(s) Oral at bedtime  chlorhexidine 4% Liquid 1 Application(s) Topical daily  enoxaparin Injectable 40 milliGRAM(s) SubCutaneous <User Schedule>  ferrous    sulfate 325 milliGRAM(s) Oral <User Schedule>  insulin glargine Injectable (LANTUS) 5 Unit(s) SubCutaneous at bedtime  insulin lispro (ADMELOG) corrective regimen sliding scale   SubCutaneous three times a day before meals  levETIRAcetam 500 milliGRAM(s) Oral two times a day  midodrine 10 milliGRAM(s) Oral <User Schedule>  multiple electrolytes Injection Type 1 1000 milliLiter(s) IV Continuous <Continuous>  phenylephrine    Infusion 0.1 MICROgram(s)/kG/Min IV Continuous <Continuous>  polyethylene glycol 3350 17 Gram(s) Oral two times a day  senna 2 Tablet(s) Oral at bedtime      CTP 7/23  IMPRESSION:    CT angiography neck: No hemodynamically significant stenosis byNASCET   criteria. No vascular dissection.    CT angiography brain: No major vessel occlusion or proximal stenosis. 2   mm left M1 bifurcation aneurysm    CT perfusion: Approximately 7 mL of ischemic penumbra is seen in the left   frontoparietal cortex.      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, L EOMI, FC, speech improved, still has some word finding difficulties  Motor exam:           Upper extremity                         Delt     Bicep     Tricep    HG                                                 R         5/5       5/5        5/5        5/5                                               L          5/5       5/5        5/5        5/5             Lower extremity                        HF         KF        KE       DF         PF                                                  R        5/5       5/5        5/5        5/5      5/5                                               L         5/5       5/5        5/5        5/5      5/5                                                  Sensation: [x] intact to light touch  [ ] decreased:     Reflexes: Deep Tendon Reflexes Intact     Pulmonary: Clear to Auscultation, No rales, No rhonchi, No wheezes     Cardiovascular: S1, S2, Regular rate and rhythm     Gastrointestinal: Soft, Non-tender, Non-distended     Extremities: No calf tenderness       LABS:       LABS:  Na: 139 (07-29 @ 00:44), 139 (07-28 @ 04:03)  K: 4.1 (07-29 @ 00:44), 4.2 (07-28 @ 04:03)  Cl: 103 (07-29 @ 00:44), 105 (07-28 @ 04:03)  CO2: 24 (07-29 @ 00:44), 24 (07-28 @ 04:03)  BUN: 20 (07-29 @ 00:44), 22 (07-28 @ 04:03)  Cr: 0.75 (07-29 @ 00:44), 0.73 (07-28 @ 04:03)  Glu: 195(07-29 @ 00:44), 142(07-28 @ 04:03)    Hgb: 9.3 (07-29 @ 00:43), 9.1 (07-28 @ 04:03)  Hct: 29.4 (07-29 @ 00:43), 29.2 (07-28 @ 04:03)  WBC: 8.35 (07-29 @ 00:43), 8.25 (07-28 @ 04:03)  Plt: 265 (07-29 @ 00:43), 252 (07-28 @ 04:03)    INR:   PTT:

## 2023-07-31 NOTE — PROGRESS NOTE ADULT - ASSESSMENT
67F hx seizures on keppra, CVA on plavix with unknown residual deficit, HLD, HTN p/w worsening and more prolonged episodes of aphasia/blank staring/unresponsiveness over two weeks. Brought to ED, where she was found to have R facial droop, RUE weakness, dysarthria, and global aphasia. NIHSS 12. CTA showing c/f L MCA occlusion.    --200  -Preop for bypass Wednesday 8/2  -Wean pressors as tolerated (pressure dependent)  -Daily trops and EKGs while on Pressors.

## 2023-08-01 ENCOUNTER — TRANSCRIPTION ENCOUNTER (OUTPATIENT)
Age: 68
End: 2023-08-01

## 2023-08-01 LAB
ANION GAP SERPL CALC-SCNC: 13 MMOL/L — SIGNIFICANT CHANGE UP (ref 5–17)
APTT BLD: 25 SEC — SIGNIFICANT CHANGE UP (ref 24.5–35.6)
BLD GP AB SCN SERPL QL: NEGATIVE — SIGNIFICANT CHANGE UP
BUN SERPL-MCNC: 18 MG/DL — SIGNIFICANT CHANGE UP (ref 7–23)
CALCIUM SERPL-MCNC: 9.3 MG/DL — SIGNIFICANT CHANGE UP (ref 8.4–10.5)
CHLORIDE SERPL-SCNC: 102 MMOL/L — SIGNIFICANT CHANGE UP (ref 96–108)
CO2 SERPL-SCNC: 25 MMOL/L — SIGNIFICANT CHANGE UP (ref 22–31)
CREAT SERPL-MCNC: 0.6 MG/DL — SIGNIFICANT CHANGE UP (ref 0.5–1.3)
EGFR: 98 ML/MIN/1.73M2 — SIGNIFICANT CHANGE UP
GLUCOSE BLDC GLUCOMTR-MCNC: 149 MG/DL — HIGH (ref 70–99)
GLUCOSE BLDC GLUCOMTR-MCNC: 174 MG/DL — HIGH (ref 70–99)
GLUCOSE BLDC GLUCOMTR-MCNC: 179 MG/DL — HIGH (ref 70–99)
GLUCOSE BLDC GLUCOMTR-MCNC: 201 MG/DL — HIGH (ref 70–99)
GLUCOSE SERPL-MCNC: 155 MG/DL — HIGH (ref 70–99)
HCT VFR BLD CALC: 30.1 % — LOW (ref 34.5–45)
HGB BLD-MCNC: 9.5 G/DL — LOW (ref 11.5–15.5)
INR BLD: 0.97 RATIO — SIGNIFICANT CHANGE UP (ref 0.85–1.18)
MAGNESIUM SERPL-MCNC: 2.3 MG/DL — SIGNIFICANT CHANGE UP (ref 1.6–2.6)
MCHC RBC-ENTMCNC: 21.4 PG — LOW (ref 27–34)
MCHC RBC-ENTMCNC: 31.6 GM/DL — LOW (ref 32–36)
MCV RBC AUTO: 67.8 FL — LOW (ref 80–100)
NRBC # BLD: 0 /100 WBCS — SIGNIFICANT CHANGE UP (ref 0–0)
PHOSPHATE SERPL-MCNC: 4.4 MG/DL — SIGNIFICANT CHANGE UP (ref 2.5–4.5)
PLATELET # BLD AUTO: 313 K/UL — SIGNIFICANT CHANGE UP (ref 150–400)
POTASSIUM SERPL-MCNC: 3.5 MMOL/L — SIGNIFICANT CHANGE UP (ref 3.5–5.3)
POTASSIUM SERPL-SCNC: 3.5 MMOL/L — SIGNIFICANT CHANGE UP (ref 3.5–5.3)
PROTHROM AB SERPL-ACNC: 10.7 SEC — SIGNIFICANT CHANGE UP (ref 9.5–13)
RBC # BLD: 4.44 M/UL — SIGNIFICANT CHANGE UP (ref 3.8–5.2)
RBC # FLD: 15.5 % — HIGH (ref 10.3–14.5)
RH IG SCN BLD-IMP: POSITIVE — SIGNIFICANT CHANGE UP
SODIUM SERPL-SCNC: 140 MMOL/L — SIGNIFICANT CHANGE UP (ref 135–145)
TROPONIN T, HIGH SENSITIVITY RESULT: 22 NG/L — SIGNIFICANT CHANGE UP (ref 0–51)
WBC # BLD: 8.3 K/UL — SIGNIFICANT CHANGE UP (ref 3.8–10.5)
WBC # FLD AUTO: 8.3 K/UL — SIGNIFICANT CHANGE UP (ref 3.8–10.5)

## 2023-08-01 PROCEDURE — 36620 INSERTION CATHETER ARTERY: CPT

## 2023-08-01 PROCEDURE — 99291 CRITICAL CARE FIRST HOUR: CPT

## 2023-08-01 RX ORDER — FLUDROCORTISONE ACETATE 0.1 MG/1
0.1 TABLET ORAL EVERY 12 HOURS
Refills: 0 | Status: DISCONTINUED | OUTPATIENT
Start: 2023-08-01 | End: 2023-08-02

## 2023-08-01 RX ORDER — INSULIN GLARGINE 100 [IU]/ML
15 INJECTION, SOLUTION SUBCUTANEOUS AT BEDTIME
Refills: 0 | Status: DISCONTINUED | OUTPATIENT
Start: 2023-08-01 | End: 2023-08-02

## 2023-08-01 RX ORDER — SODIUM CHLORIDE 9 MG/ML
1000 INJECTION, SOLUTION INTRAVENOUS
Refills: 0 | Status: DISCONTINUED | OUTPATIENT
Start: 2023-08-01 | End: 2023-08-01

## 2023-08-01 RX ORDER — POTASSIUM CHLORIDE 20 MEQ
20 PACKET (EA) ORAL DAILY
Refills: 0 | Status: DISCONTINUED | OUTPATIENT
Start: 2023-08-01 | End: 2023-08-02

## 2023-08-01 RX ORDER — SODIUM CHLORIDE 9 MG/ML
1000 INJECTION INTRAMUSCULAR; INTRAVENOUS; SUBCUTANEOUS ONCE
Refills: 0 | Status: COMPLETED | OUTPATIENT
Start: 2023-08-01 | End: 2023-08-01

## 2023-08-01 RX ORDER — POTASSIUM CHLORIDE 20 MEQ
40 PACKET (EA) ORAL ONCE
Refills: 0 | Status: COMPLETED | OUTPATIENT
Start: 2023-08-01 | End: 2023-08-01

## 2023-08-01 RX ORDER — SODIUM CHLORIDE 9 MG/ML
1000 INJECTION INTRAMUSCULAR; INTRAVENOUS; SUBCUTANEOUS
Refills: 0 | Status: DISCONTINUED | OUTPATIENT
Start: 2023-08-01 | End: 2023-08-02

## 2023-08-01 RX ADMIN — SODIUM CHLORIDE 1000 MILLILITER(S): 9 INJECTION INTRAMUSCULAR; INTRAVENOUS; SUBCUTANEOUS at 22:20

## 2023-08-01 RX ADMIN — ATORVASTATIN CALCIUM 80 MILLIGRAM(S): 80 TABLET, FILM COATED ORAL at 21:22

## 2023-08-01 RX ADMIN — Medication 4: at 21:40

## 2023-08-01 RX ADMIN — MIDODRINE HYDROCHLORIDE 10 MILLIGRAM(S): 2.5 TABLET ORAL at 17:48

## 2023-08-01 RX ADMIN — FLUDROCORTISONE ACETATE 0.1 MILLIGRAM(S): 0.1 TABLET ORAL at 17:48

## 2023-08-01 RX ADMIN — Medication 20 MILLIEQUIVALENT(S): at 11:44

## 2023-08-01 RX ADMIN — CHLORHEXIDINE GLUCONATE 1 APPLICATION(S): 213 SOLUTION TOPICAL at 11:44

## 2023-08-01 RX ADMIN — MIDODRINE HYDROCHLORIDE 10 MILLIGRAM(S): 2.5 TABLET ORAL at 06:22

## 2023-08-01 RX ADMIN — ENOXAPARIN SODIUM 40 MILLIGRAM(S): 100 INJECTION SUBCUTANEOUS at 17:49

## 2023-08-01 RX ADMIN — INSULIN GLARGINE 15 UNIT(S): 100 INJECTION, SOLUTION SUBCUTANEOUS at 22:00

## 2023-08-01 RX ADMIN — Medication 81 MILLIGRAM(S): at 11:44

## 2023-08-01 RX ADMIN — LEVETIRACETAM 500 MILLIGRAM(S): 250 TABLET, FILM COATED ORAL at 17:48

## 2023-08-01 RX ADMIN — Medication 2: at 12:48

## 2023-08-01 RX ADMIN — MIDODRINE HYDROCHLORIDE 10 MILLIGRAM(S): 2.5 TABLET ORAL at 21:56

## 2023-08-01 RX ADMIN — PHENYLEPHRINE HYDROCHLORIDE 2.13 MICROGRAM(S)/KG/MIN: 10 INJECTION INTRAVENOUS at 07:49

## 2023-08-01 RX ADMIN — Medication 40 MILLIEQUIVALENT(S): at 06:21

## 2023-08-01 RX ADMIN — Medication 2: at 17:10

## 2023-08-01 RX ADMIN — LEVETIRACETAM 500 MILLIGRAM(S): 250 TABLET, FILM COATED ORAL at 06:22

## 2023-08-01 RX ADMIN — MIDODRINE HYDROCHLORIDE 10 MILLIGRAM(S): 2.5 TABLET ORAL at 12:48

## 2023-08-01 RX ADMIN — SODIUM CHLORIDE 50 MILLILITER(S): 9 INJECTION, SOLUTION INTRAVENOUS at 09:48

## 2023-08-01 RX ADMIN — Medication 6 UNIT(S): at 17:09

## 2023-08-01 RX ADMIN — Medication 6 UNIT(S): at 12:47

## 2023-08-01 RX ADMIN — FLUDROCORTISONE ACETATE 0.1 MILLIGRAM(S): 0.1 TABLET ORAL at 11:43

## 2023-08-01 NOTE — PROGRESS NOTE ADULT - SUBJECTIVE AND OBJECTIVE BOX
NSCU ATTENDING -- ADDITIONAL PROGRESS NOTE    Nighttime rounds were performed -- please refer to earlier Progress Note for HPI details.    ICU Vital Signs Last 24 Hrs  T(C): 36.9 (31 Jul 2023 15:00), Max: 36.9 (31 Jul 2023 15:00)  T(F): 98.4 (31 Jul 2023 15:00), Max: 98.4 (31 Jul 2023 15:00)  HR: 82 (31 Jul 2023 23:15) (74 - 92)  BP: --  BP(mean): --  ABP: 218/98 (31 Jul 2023 23:15) (120/55 - 221/84)  ABP(mean): 146 (31 Jul 2023 23:15) (77 - 146)  RR: 18 (31 Jul 2023 23:15) (12 - 22)  SpO2: 99% (31 Jul 2023 23:15) (88% - 100%)      Relevant labwork and imaging reviewed.    CHIEF COMPLAINT: L MCA occlusion     [A/P] Adm for L MCA occlusion, now has been on blood pressure augmentation with slightly fluctuating aphasia, but mostly improved  oriented x3 with Cantonese    cont -200  on midodrine 10mg Q6 now  on and off phenylephrine as needed   per neurosurgery, planning for STA/MCA bypass Wed; ?pre op for possible angio tomorrow   hyperglycemia increased lantus to 12U and premeals 6U tid today   Q2 neuro/vitals; Q4 overnight     ATTENDING STATEMENT:    Patient is critically ill, requiring critical care services.     Attending: I have personally and independently provided 30 minutes of critical care services.  This excludes any time spent on separate procedures or teaching.

## 2023-08-01 NOTE — PROCEDURE NOTE - NSPROCDETAILS_GEN_ALL_CORE
Faxed CMN to [facility].  
location identified, draped/prepped, sterile technique used, needle inserted/introduced/positive blood return obtained via catheter/connected to a pressurized flush line/sutured in place/hemostasis with direct pressure, dressing applied/all materials/supplies accounted for at end of procedure
location identified, draped/prepped, sterile technique used, needle inserted/introduced/positive blood return obtained via catheter/connected to a pressurized flush line/sutured in place/hemostasis with direct pressure, dressing applied/all materials/supplies accounted for at end of procedure

## 2023-08-01 NOTE — PROCEDURE NOTE - NSINDICATIONS_GEN_A_CORE
arterial puncture to obtain ABG's/blood sampling/cannulation purposes/critical patient/monitoring purposes
arterial puncture to obtain ABG's/blood sampling/critical patient/monitoring purposes

## 2023-08-01 NOTE — PROGRESS NOTE ADULT - SUBJECTIVE AND OBJECTIVE BOX
Patient seen and examined at bedside.    --Anticoagulation--    T(C): 36.8 (08-01-23 @ 01:23), Max: 36.9 (07-31-23 @ 15:00)  HR: 79 (08-01-23 @ 01:23) (73 - 92)  BP: 165/75 (08-01-23 @ 01:23) (165/75 - 165/75)  RR: 16 (08-01-23 @ 01:23) (12 - 22)  SpO2: 100% (08-01-23 @ 01:23) (88% - 100%)  Wt(kg): --    Exam: AOx3, dysarthric, FC, Rt eye blind, FRANCIS 5/5

## 2023-08-01 NOTE — PROGRESS NOTE ADULT - ASSESSMENT
Assessment and Plan: 	  ASSESSMENT/PLAN: chronic M2 occlusion BP dependent     NEURO:  neuro checks q 2hr   cont keppra home medication   ASA, hold plavix for bypass  continue phenylephrine -200   Possible plan for bypass 8/2  Activity: [x] mobilize as tolerated [] Bedrest [x] PT [x] OT [] PMNR    PULM:  RA     CV:  SBP goal 160-200 mmhg  right  radial a line   increase midodrine to 10 mg Q6H   continue  Phenylephrine 0.15  TTE - 63% ejection fraction  cont atorvastatin    RENAL:  Fluids: IVL     GI:  Diet: CCD  GI prophylaxis [] not indicated [x] PPI home med [] other:  Bowel regimen [] colace [x] senna [x] other: miralax     ENDO:   A1C 8.5  Goal euglycemia (-180)  DM lantus 12 units, ISS  Will continue premeal 2 units TID  TSH WNL  EDISON    HEME/ONC:  VTE prophylaxis: [x] SCDs [x] chemoprophylaxis [] hold chemoprophylaxis due to: [] high risk of DVT/PE on admission due to:  Start iron for microcytic anemia, previously on iron o/p for AJAY    ID: afebrile     30 critical care at risk for stroke, MI       Assessment and Plan: 	  ASSESSMENT/PLAN: chronic M2 occlusion BP dependent     NEURO:  neuro checks q 2hr   cont keppra home medication   ASA, hold plavix for bypass  continue phenylephrine -200   Possible plan for bypass 8/2  Activity: [x] mobilize as tolerated [] Bedrest [x] PT [x] OT [] PMNR    PULM:  RA     CV:  SBP goal 160-200 mmhg  Right radial not functioning, will replace it with axillary  Midodrine to 10 mg Q6H, Will add florinef- watch K  continue  Phenylephrine 0.6  TTE - 63% ejection fraction  cont atorvastatin    RENAL:  Fluids: IVL     GI:  Diet: CCD  GI prophylaxis [] not indicated [x] PPI home med [] other:  Bowel regimen [] colace [x] senna [x] other: miralax   LBM 8/1    ENDO:   A1C 8.5  Goal euglycemia (-180)  DM lantus 12 units, ISS  Will continue premeal 6 units TID  TSH WNL  EDISON    HEME/ONC:  H/H stable   VTE prophylaxis: [x] SCDs [x] chemoprophylaxis [] hold chemoprophylaxis due to: [] high risk of DVT/PE on admission due to:  continue iron for microcytic anemia, previously on iron o/p for AJAY    ID: afebrile, no leukocytosis     30 critical care at risk for stroke, MI

## 2023-08-01 NOTE — PROGRESS NOTE ADULT - SUBJECTIVE AND OBJECTIVE BOX
Subjective and Objective:   HOSPITAL COURSE: 68yo woman PMH seizure on keppra, CVA on plavix with unknown residual deficit, HLD, HTN p/w worsening and more prolonged episodes of aphasia/blank staring/unresponsiveness over two weeks. The episodes became more frequent and today, per daughter, it lasted for hours. Brought to ED, where she was found to have R facial droop, RUE weakness, dysarthria, and global aphasia. NIHSS 12. CTA showing c/f L MCA occlusion.     7/23 Adm NSCU s/p angiogram showing R M2/MCA occlusion with distal retrograde reconstitution, aspiration thrombectomy attempted without recanalization.   7/24: no acute overnight events  7/25: Aphasic when SBP dropped to 110 overnight, daughter reports slowed speech this morning   7/26: Pressure dependent exam, exam worsened when SBP <160  7/27: Mild confusion over night. Negative balance. Will put her on maintenance fluids to decrease the pressors requirements  7/28: No acute overnight events. Failed a challenge with a lower BP parameter: 120-160. Increase midodrine to 15 q8  7/29: no acute overnight events. STA-MCA Bypass on Monday?  7/30 BP dropped < 160 mm her neuro exam got worse, phenylephrine   7/31: Patient still symptomatic on Low BP, responds extremely well to midodrine and BP rises significantly   8/14: On jose still, possible angio for STA-MCA bypass on 8/2.    Allergies    No Known Allergies    REVIEW OF SYSTEMS: [ x] Unable to Assess due to neurologic exam   [ ] All ROS addressed below are non-contributory, except:  Neuro: [ ] Headache [ ] Back pain [ ] Numbness [ ] Weakness [ ] Ataxia [ ] Dizziness [ ] Aphasia [ ] Dysarthria [ ] Visual disturbance  Resp: [ ] Shortness of breath/dyspnea, [ ] Orthopnea [ ] Cough  CV: [ ] Chest pain [ ] Palpitation [ ] Lightheadedness [ ] Syncope  Renal: [ ] Thirst [ ] Edema  GI: [ ] Nausea [ ] Emesis [ ] Abdominal pain [ ] Constipation [ ] Diarrhea  Hem: [ ] Hematemesis [ ] bright red blood per rectum  ID: [ ] Fever [ ] Chills [ ] Dysuria  ENT: [ ] Rhinorrhea      DEVICES:   [ ] Restraints [ ] ET tube [ ] central line [ ] arterial line [ ] gonzalez [ ] NGT/OGT [ ] EVD [ ] LD [ ] FRANTZ/HMV [ ] Trach [ ] PEG [ ] Chest Tube     T(C): 36.9 (07-30-23 @ 03:00), Max: 36.9 (07-29-23 @ 19:00)  HR: 73 (07-30-23 @ 07:00) (73 - 89)  BP: 174/98 (07-30-23 @ 05:00) (139/101 - 195/81)  RR: 16 (07-30-23 @ 07:00) (7 - 23)  SpO2: 98% (07-30-23 @ 07:00) (92% - 100%)  07-29-23 @ 07:01  -  07-30-23 @ 07:00  --------------------------------------------------------  IN: 3427.5 mL / OUT: 2900 mL / NET: 527.5 mL    acetaminophen     Tablet .. 650 milliGRAM(s) Oral every 6 hours PRN  aspirin  chewable 81 milliGRAM(s) Oral daily  atorvastatin 80 milliGRAM(s) Oral at bedtime  chlorhexidine 4% Liquid 1 Application(s) Topical daily  enoxaparin Injectable 40 milliGRAM(s) SubCutaneous <User Schedule>  ferrous    sulfate 325 milliGRAM(s) Oral <User Schedule>  insulin glargine Injectable (LANTUS) 5 Unit(s) SubCutaneous at bedtime  insulin lispro (ADMELOG) corrective regimen sliding scale   SubCutaneous three times a day before meals  levETIRAcetam 500 milliGRAM(s) Oral two times a day  midodrine 10 milliGRAM(s) Oral <User Schedule>  multiple electrolytes Injection Type 1 1000 milliLiter(s) IV Continuous <Continuous>  phenylephrine    Infusion 0.1 MICROgram(s)/kG/Min IV Continuous <Continuous>  polyethylene glycol 3350 17 Gram(s) Oral two times a day  senna 2 Tablet(s) Oral at bedtime      CTP 7/23  IMPRESSION:    CT angiography neck: No hemodynamically significant stenosis byNASCET   criteria. No vascular dissection.    CT angiography brain: No major vessel occlusion or proximal stenosis. 2   mm left M1 bifurcation aneurysm    CT perfusion: Approximately 7 mL of ischemic penumbra is seen in the left   frontoparietal cortex.      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, L EOMI, FC, speech improved, still has some word finding difficulties  Motor exam:           Upper extremity                         Delt     Bicep     Tricep    HG                                                 R         5/5       5/5        5/5        5/5                                               L          5/5       5/5        5/5        5/5             Lower extremity                        HF         KF        KE       DF         PF                                                  R        5/5       5/5        5/5        5/5      5/5                                               L         5/5       5/5        5/5        5/5      5/5                                                  Sensation: [x] intact to light touch  [ ] decreased:     Reflexes: Deep Tendon Reflexes Intact     Pulmonary: Clear to Auscultation, No rales, No rhonchi, No wheezes     Cardiovascular: S1, S2, Regular rate and rhythm     Gastrointestinal: Soft, Non-tender, Non-distended     Extremities: No calf tenderness       LABS:       LABS:  Na: 139 (07-29 @ 00:44), 139 (07-28 @ 04:03)  K: 4.1 (07-29 @ 00:44), 4.2 (07-28 @ 04:03)  Cl: 103 (07-29 @ 00:44), 105 (07-28 @ 04:03)  CO2: 24 (07-29 @ 00:44), 24 (07-28 @ 04:03)  BUN: 20 (07-29 @ 00:44), 22 (07-28 @ 04:03)  Cr: 0.75 (07-29 @ 00:44), 0.73 (07-28 @ 04:03)  Glu: 195(07-29 @ 00:44), 142(07-28 @ 04:03)    Hgb: 9.3 (07-29 @ 00:43), 9.1 (07-28 @ 04:03)  Hct: 29.4 (07-29 @ 00:43), 29.2 (07-28 @ 04:03)  WBC: 8.35 (07-29 @ 00:43), 8.25 (07-28 @ 04:03)  Plt: 265 (07-29 @ 00:43), 252 (07-28 @ 04:03)    INR:   PTT:                                  Subjective and Objective:   HOSPITAL COURSE: 66yo woman PMH seizure on keppra, CVA on plavix with unknown residual deficit, HLD, HTN p/w worsening and more prolonged episodes of aphasia/blank staring/unresponsiveness over two weeks. The episodes became more frequent and today, per daughter, it lasted for hours. Brought to ED, where she was found to have R facial droop, RUE weakness, dysarthria, and global aphasia. NIHSS 12. CTA showing c/f L MCA occlusion.     7/23 Adm NSCU s/p angiogram showing R M2/MCA occlusion with distal retrograde reconstitution, aspiration thrombectomy attempted without recanalization.   7/24: no acute overnight events  7/25: Aphasic when SBP dropped to 110 overnight, daughter reports slowed speech this morning   7/26: Pressure dependent exam, exam worsened when SBP <160  7/27: Mild confusion over night. Negative balance. Will put her on maintenance fluids to decrease the pressors requirements  7/28: No acute overnight events. Failed a challenge with a lower BP parameter: 120-160. Increase midodrine to 15 q8  7/29: no acute overnight events. STA-MCA Bypass on Monday?  7/30 BP dropped < 160 mm her neuro exam got worse, phenylephrine   7/31: Patient still symptomatic on Low BP, responds extremely well to midodrine and BP rises significantly   8/1: On jose still, possible angio for STA-MCA bypass on 8/2.    Allergies    No Known Allergies    REVIEW OF SYSTEMS: [ x] Unable to Assess due to neurologic exam   [ ] All ROS addressed below are non-contributory, except:  Neuro: [ ] Headache [ ] Back pain [ ] Numbness [ ] Weakness [ ] Ataxia [ ] Dizziness [ ] Aphasia [ ] Dysarthria [ ] Visual disturbance  Resp: [ ] Shortness of breath/dyspnea, [ ] Orthopnea [ ] Cough  CV: [ ] Chest pain [ ] Palpitation [ ] Lightheadedness [ ] Syncope  Renal: [ ] Thirst [ ] Edema  GI: [ ] Nausea [ ] Emesis [ ] Abdominal pain [ ] Constipation [ ] Diarrhea  Hem: [ ] Hematemesis [ ] bright red blood per rectum  ID: [ ] Fever [ ] Chills [ ] Dysuria  ENT: [ ] Rhinorrhea      DEVICES:   [ ] Restraints [ ] ET tube [ ] central line [ ] arterial line [ ] gonzalez [ ] NGT/OGT [ ] EVD [ ] LD [ ] FRANTZ/HMV [ ] Trach [ ] PEG [ ] Chest Tube     T(C): 36.9 (07-30-23 @ 03:00), Max: 36.9 (07-29-23 @ 19:00)  HR: 73 (07-30-23 @ 07:00) (73 - 89)  BP: 174/98 (07-30-23 @ 05:00) (139/101 - 195/81)  RR: 16 (07-30-23 @ 07:00) (7 - 23)  SpO2: 98% (07-30-23 @ 07:00) (92% - 100%)  07-29-23 @ 07:01  -  07-30-23 @ 07:00  --------------------------------------------------------  IN: 3427.5 mL / OUT: 2900 mL / NET: 527.5 mL    acetaminophen     Tablet .. 650 milliGRAM(s) Oral every 6 hours PRN  aspirin  chewable 81 milliGRAM(s) Oral daily  atorvastatin 80 milliGRAM(s) Oral at bedtime  chlorhexidine 4% Liquid 1 Application(s) Topical daily  enoxaparin Injectable 40 milliGRAM(s) SubCutaneous <User Schedule>  ferrous    sulfate 325 milliGRAM(s) Oral <User Schedule>  insulin glargine Injectable (LANTUS) 5 Unit(s) SubCutaneous at bedtime  insulin lispro (ADMELOG) corrective regimen sliding scale   SubCutaneous three times a day before meals  levETIRAcetam 500 milliGRAM(s) Oral two times a day  midodrine 10 milliGRAM(s) Oral <User Schedule>  multiple electrolytes Injection Type 1 1000 milliLiter(s) IV Continuous <Continuous>  phenylephrine    Infusion 0.1 MICROgram(s)/kG/Min IV Continuous <Continuous>  polyethylene glycol 3350 17 Gram(s) Oral two times a day  senna 2 Tablet(s) Oral at bedtime      CTP 7/23  IMPRESSION:    CT angiography neck: No hemodynamically significant stenosis byNASCET   criteria. No vascular dissection.    CT angiography brain: No major vessel occlusion or proximal stenosis. 2   mm left M1 bifurcation aneurysm    CT perfusion: Approximately 7 mL of ischemic penumbra is seen in the left   frontoparietal cortex.      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, EOMI, FC, speech improved, some dysarthria and minor left facial asymmetry    Motor exam:           Upper extremity                         Delt     Bicep     Tricep    HG                                                 R         5/5       5/5        5/5        5/5                                               L          5/5       5/5        5/5        5/5             Lower extremity                        HF         KF        KE       DF         PF                                                  R        5/5       5/5        5/5        5/5      5/5                                               L         5/5       5/5        5/5        5/5      5/5                                                  Sensation: [x] intact to light touch  [ ] decreased:     Reflexes: Deep Tendon Reflexes Intact     Pulmonary: Clear to Auscultation, No rales, No rhonchi, No wheezes     Cardiovascular: S1, S2, Regular rate and rhythm     Gastrointestinal: Soft, Non-tender, Non-distended     Extremities: No calf tenderness       LABS:       LABS:  Na: 139 (07-29 @ 00:44), 139 (07-28 @ 04:03)  K: 4.1 (07-29 @ 00:44), 4.2 (07-28 @ 04:03)  Cl: 103 (07-29 @ 00:44), 105 (07-28 @ 04:03)  CO2: 24 (07-29 @ 00:44), 24 (07-28 @ 04:03)  BUN: 20 (07-29 @ 00:44), 22 (07-28 @ 04:03)  Cr: 0.75 (07-29 @ 00:44), 0.73 (07-28 @ 04:03)  Glu: 195(07-29 @ 00:44), 142(07-28 @ 04:03)    Hgb: 9.3 (07-29 @ 00:43), 9.1 (07-28 @ 04:03)  Hct: 29.4 (07-29 @ 00:43), 29.2 (07-28 @ 04:03)  WBC: 8.35 (07-29 @ 00:43), 8.25 (07-28 @ 04:03)  Plt: 265 (07-29 @ 00:43), 252 (07-28 @ 04:03)    INR:   PTT:

## 2023-08-01 NOTE — PROGRESS NOTE ADULT - ASSESSMENT
67F hx seizures on keppra, CVA on plavix with unknown residual deficit, HLD, HTN p/w worsening and more prolonged episodes of aphasia/blank staring/unresponsiveness over two weeks. Brought to ED, where she was found to have R facial droop, RUE weakness, dysarthria, and global aphasia. NIHSS 12. CTA showing c/f L MCA occlusion.    -most likely will not need angio in AM. Will confirm in AM  --200  -Preop for bypass Wednesday 8/2  -Wean pressors as tolerated (pressure dependent)

## 2023-08-01 NOTE — PROGRESS NOTE ADULT - SUBJECTIVE AND OBJECTIVE BOX
NSCU ATTENDING -- ADDITIONAL PROGRESS NOTE    Nighttime rounds were performed -- please refer to earlier Progress Note for HPI details.    ICU Vital Signs Last 24 Hrs  T(C): 36.5 (01 Aug 2023 19:00), Max: 36.8 (31 Jul 2023 23:00)  T(F): 97.7 (01 Aug 2023 19:00), Max: 98.2 (31 Jul 2023 23:00)  HR: 83 (01 Aug 2023 21:45) (70 - 97)  BP: 178/85 (01 Aug 2023 18:15) (136/74 - 213/90)  BP(mean): 113 (01 Aug 2023 18:15) (93 - 126)  ABP: 161/68 (01 Aug 2023 21:45) (135/75 - 243/236)  ABP(mean): 104 (01 Aug 2023 21:45) (89 - 239)  RR: 15 (01 Aug 2023 21:45) (11 - 24)  SpO2: 99% (01 Aug 2023 21:45) (88% - 100%)    O2 Parameters below as of 01 Aug 2023 19:00  Patient On (Oxygen Delivery Method): room air    Relevant labwork and imaging reviewed.    CHIEF COMPLAINT: L MCA occlusion     [A/P] Adm for L MCA occlusion, now has been on blood pressure augmentation with slightly fluctuating aphasia, but mostly improved  oriented x3 with Cantonese    cont -200  on midodrine 10mg Q6 now  on and off phenylephrine as needed   per neurosurgery, planning for STA/MCA bypass Wed; ?pre op for possible angio tomorrow   hyperglycemia increased lantus to 14U and cont premeals 6U tid   Q2 neuro/vitals; Q4 overnight     ATTENDING STATEMENT:    Patient is critically ill, requiring critical care services.     Attending: I have personally and independently provided 30 minutes of critical care services.  This excludes any time spent on separate procedures or teaching.

## 2023-08-02 ENCOUNTER — TRANSCRIPTION ENCOUNTER (OUTPATIENT)
Age: 68
End: 2023-08-02

## 2023-08-02 ENCOUNTER — APPOINTMENT (OUTPATIENT)
Dept: NEUROSURGERY | Facility: HOSPITAL | Age: 68
End: 2023-08-02

## 2023-08-02 PROBLEM — Z00.00 ENCOUNTER FOR PREVENTIVE HEALTH EXAMINATION: Status: ACTIVE | Noted: 2023-08-02

## 2023-08-02 LAB
ANION GAP SERPL CALC-SCNC: 14 MMOL/L — SIGNIFICANT CHANGE UP (ref 5–17)
BUN SERPL-MCNC: 17 MG/DL — SIGNIFICANT CHANGE UP (ref 7–23)
CALCIUM SERPL-MCNC: 8.5 MG/DL — SIGNIFICANT CHANGE UP (ref 8.4–10.5)
CHLORIDE SERPL-SCNC: 108 MMOL/L — SIGNIFICANT CHANGE UP (ref 96–108)
CO2 SERPL-SCNC: 20 MMOL/L — LOW (ref 22–31)
CREAT SERPL-MCNC: 0.58 MG/DL — SIGNIFICANT CHANGE UP (ref 0.5–1.3)
EGFR: 99 ML/MIN/1.73M2 — SIGNIFICANT CHANGE UP
GAS PNL BLDA: SIGNIFICANT CHANGE UP
GAS PNL BLDA: SIGNIFICANT CHANGE UP
GLUCOSE BLDC GLUCOMTR-MCNC: 119 MG/DL — HIGH (ref 70–99)
GLUCOSE BLDC GLUCOMTR-MCNC: 195 MG/DL — HIGH (ref 70–99)
GLUCOSE BLDC GLUCOMTR-MCNC: 209 MG/DL — HIGH (ref 70–99)
GLUCOSE BLDC GLUCOMTR-MCNC: 214 MG/DL — HIGH (ref 70–99)
GLUCOSE SERPL-MCNC: 219 MG/DL — HIGH (ref 70–99)
HCT VFR BLD CALC: 25.5 % — LOW (ref 34.5–45)
HGB BLD-MCNC: 8 G/DL — LOW (ref 11.5–15.5)
MAGNESIUM SERPL-MCNC: 2.1 MG/DL — SIGNIFICANT CHANGE UP (ref 1.6–2.6)
MCHC RBC-ENTMCNC: 21.2 PG — LOW (ref 27–34)
MCHC RBC-ENTMCNC: 31.4 GM/DL — LOW (ref 32–36)
MCV RBC AUTO: 67.6 FL — LOW (ref 80–100)
NRBC # BLD: 0 /100 WBCS — SIGNIFICANT CHANGE UP (ref 0–0)
PHOSPHATE SERPL-MCNC: 4 MG/DL — SIGNIFICANT CHANGE UP (ref 2.5–4.5)
PLATELET # BLD AUTO: 301 K/UL — SIGNIFICANT CHANGE UP (ref 150–400)
POTASSIUM SERPL-MCNC: 4 MMOL/L — SIGNIFICANT CHANGE UP (ref 3.5–5.3)
POTASSIUM SERPL-SCNC: 4 MMOL/L — SIGNIFICANT CHANGE UP (ref 3.5–5.3)
RBC # BLD: 3.77 M/UL — LOW (ref 3.8–5.2)
RBC # FLD: 15.9 % — HIGH (ref 10.3–14.5)
SODIUM SERPL-SCNC: 142 MMOL/L — SIGNIFICANT CHANGE UP (ref 135–145)
WBC # BLD: 16.02 K/UL — HIGH (ref 3.8–10.5)
WBC # FLD AUTO: 16.02 K/UL — HIGH (ref 3.8–10.5)

## 2023-08-02 PROCEDURE — 92240 ICG ANGIOGRAPHY I&R UNI/BI: CPT | Mod: 26

## 2023-08-02 PROCEDURE — 69990 MICROSURGERY ADD-ON: CPT | Mod: 59

## 2023-08-02 PROCEDURE — 61781 SCAN PROC CRANIAL INTRA: CPT

## 2023-08-02 PROCEDURE — 99291 CRITICAL CARE FIRST HOUR: CPT

## 2023-08-02 PROCEDURE — 0042T: CPT

## 2023-08-02 PROCEDURE — 70496 CT ANGIOGRAPHY HEAD: CPT | Mod: 26

## 2023-08-02 PROCEDURE — 93888 INTRACRANIAL LIMITED STUDY: CPT | Mod: 26

## 2023-08-02 PROCEDURE — 70450 CT HEAD/BRAIN W/O DYE: CPT | Mod: 26,59

## 2023-08-02 PROCEDURE — 64999 UNLISTED PX NERVOUS SYSTEM: CPT

## 2023-08-02 PROCEDURE — 93010 ELECTROCARDIOGRAM REPORT: CPT

## 2023-08-02 PROCEDURE — 61711 FUSION OF SKULL ARTERIES: CPT

## 2023-08-02 DEVICE — PLATE UN3 2 HOLE RIGID: Type: IMPLANTABLE DEVICE | Site: LEFT | Status: FUNCTIONAL

## 2023-08-02 DEVICE — IMP CRANIAL CLEARFIT COVER 2.4CM: Type: IMPLANTABLE DEVICE | Site: LEFT | Status: FUNCTIONAL

## 2023-08-02 DEVICE — SURGICEL 4 X 8": Type: IMPLANTABLE DEVICE | Site: LEFT | Status: FUNCTIONAL

## 2023-08-02 DEVICE — SURGIFOAM PAD 8CM X 12.5CM X 10MM (100): Type: IMPLANTABLE DEVICE | Site: LEFT | Status: FUNCTIONAL

## 2023-08-02 DEVICE — SCREW UN3 AXS SELF DRILL 1.5X4MM: Type: IMPLANTABLE DEVICE | Site: LEFT | Status: FUNCTIONAL

## 2023-08-02 DEVICE — PLATE COVER BURRHOLE UN3 W/TAB 14MM: Type: IMPLANTABLE DEVICE | Site: LEFT | Status: FUNCTIONAL

## 2023-08-02 DEVICE — PLATE UN3 2 HOLE: Type: IMPLANTABLE DEVICE | Site: LEFT | Status: FUNCTIONAL

## 2023-08-02 DEVICE — MATRIX DURAGEN PLUS DURAL REGENERATION 3X3: Type: IMPLANTABLE DEVICE | Site: LEFT | Status: FUNCTIONAL

## 2023-08-02 DEVICE — SURGIFLO MATRIX WITH THROMBIN KIT: Type: IMPLANTABLE DEVICE | Site: LEFT | Status: FUNCTIONAL

## 2023-08-02 RX ORDER — LEVETIRACETAM 250 MG/1
500 TABLET, FILM COATED ORAL EVERY 12 HOURS
Refills: 0 | Status: DISCONTINUED | OUTPATIENT
Start: 2023-08-02 | End: 2023-08-03

## 2023-08-02 RX ORDER — INSULIN LISPRO 100/ML
VIAL (ML) SUBCUTANEOUS
Refills: 0 | Status: DISCONTINUED | OUTPATIENT
Start: 2023-08-02 | End: 2023-08-10

## 2023-08-02 RX ORDER — ACETAMINOPHEN 500 MG
650 TABLET ORAL EVERY 6 HOURS
Refills: 0 | Status: DISCONTINUED | OUTPATIENT
Start: 2023-08-02 | End: 2023-08-10

## 2023-08-02 RX ORDER — ACETAMINOPHEN 500 MG
650 TABLET ORAL EVERY 6 HOURS
Refills: 0 | Status: DISCONTINUED | OUTPATIENT
Start: 2023-08-02 | End: 2023-08-03

## 2023-08-02 RX ORDER — PHENYLEPHRINE HYDROCHLORIDE 10 MG/ML
0.1 INJECTION INTRAVENOUS
Qty: 40 | Refills: 0 | Status: DISCONTINUED | OUTPATIENT
Start: 2023-08-02 | End: 2023-08-04

## 2023-08-02 RX ORDER — POLYETHYLENE GLYCOL 3350 17 G/17G
17 POWDER, FOR SOLUTION ORAL
Refills: 0 | Status: DISCONTINUED | OUTPATIENT
Start: 2023-08-02 | End: 2023-08-10

## 2023-08-02 RX ORDER — PHENYLEPHRINE HYDROCHLORIDE 10 MG/ML
0.1 INJECTION INTRAVENOUS
Qty: 40 | Refills: 0 | Status: DISCONTINUED | OUTPATIENT
Start: 2023-08-02 | End: 2023-08-02

## 2023-08-02 RX ORDER — INSULIN LISPRO 100/ML
6 VIAL (ML) SUBCUTANEOUS
Refills: 0 | Status: DISCONTINUED | OUTPATIENT
Start: 2023-08-02 | End: 2023-08-03

## 2023-08-02 RX ORDER — LEVETIRACETAM 250 MG/1
500 TABLET, FILM COATED ORAL
Refills: 0 | Status: DISCONTINUED | OUTPATIENT
Start: 2023-08-02 | End: 2023-08-02

## 2023-08-02 RX ORDER — SODIUM CHLORIDE 9 MG/ML
1000 INJECTION INTRAMUSCULAR; INTRAVENOUS; SUBCUTANEOUS
Refills: 0 | Status: DISCONTINUED | OUTPATIENT
Start: 2023-08-02 | End: 2023-08-04

## 2023-08-02 RX ORDER — MIDODRINE HYDROCHLORIDE 2.5 MG/1
10 TABLET ORAL
Refills: 0 | Status: DISCONTINUED | OUTPATIENT
Start: 2023-08-02 | End: 2023-08-03

## 2023-08-02 RX ORDER — SENNA PLUS 8.6 MG/1
2 TABLET ORAL AT BEDTIME
Refills: 0 | Status: DISCONTINUED | OUTPATIENT
Start: 2023-08-02 | End: 2023-08-10

## 2023-08-02 RX ORDER — FERROUS SULFATE 325(65) MG
325 TABLET ORAL
Refills: 0 | Status: DISCONTINUED | OUTPATIENT
Start: 2023-08-02 | End: 2023-08-10

## 2023-08-02 RX ORDER — ASPIRIN/CALCIUM CARB/MAGNESIUM 324 MG
81 TABLET ORAL DAILY
Refills: 0 | Status: DISCONTINUED | OUTPATIENT
Start: 2023-08-02 | End: 2023-08-10

## 2023-08-02 RX ORDER — POTASSIUM CHLORIDE 20 MEQ
20 PACKET (EA) ORAL DAILY
Refills: 0 | Status: DISCONTINUED | OUTPATIENT
Start: 2023-08-02 | End: 2023-08-03

## 2023-08-02 RX ORDER — INSULIN GLARGINE 100 [IU]/ML
15 INJECTION, SOLUTION SUBCUTANEOUS AT BEDTIME
Refills: 0 | Status: DISCONTINUED | OUTPATIENT
Start: 2023-08-02 | End: 2023-08-03

## 2023-08-02 RX ORDER — CEFAZOLIN SODIUM 1 G
2000 VIAL (EA) INJECTION EVERY 8 HOURS
Refills: 0 | Status: COMPLETED | OUTPATIENT
Start: 2023-08-02 | End: 2023-08-03

## 2023-08-02 RX ORDER — FLUDROCORTISONE ACETATE 0.1 MG/1
0.1 TABLET ORAL EVERY 12 HOURS
Refills: 0 | Status: DISCONTINUED | OUTPATIENT
Start: 2023-08-02 | End: 2023-08-03

## 2023-08-02 RX ORDER — ATORVASTATIN CALCIUM 80 MG/1
80 TABLET, FILM COATED ORAL AT BEDTIME
Refills: 0 | Status: DISCONTINUED | OUTPATIENT
Start: 2023-08-02 | End: 2023-08-10

## 2023-08-02 RX ORDER — OXYCODONE HYDROCHLORIDE 5 MG/1
5 TABLET ORAL EVERY 4 HOURS
Refills: 0 | Status: DISCONTINUED | OUTPATIENT
Start: 2023-08-02 | End: 2023-08-02

## 2023-08-02 RX ADMIN — Medication 81 MILLIGRAM(S): at 07:38

## 2023-08-02 RX ADMIN — MIDODRINE HYDROCHLORIDE 10 MILLIGRAM(S): 2.5 TABLET ORAL at 18:10

## 2023-08-02 RX ADMIN — Medication 2: at 18:16

## 2023-08-02 RX ADMIN — PHENYLEPHRINE HYDROCHLORIDE 2.13 MICROGRAM(S)/KG/MIN: 10 INJECTION INTRAVENOUS at 07:38

## 2023-08-02 RX ADMIN — LEVETIRACETAM 400 MILLIGRAM(S): 250 TABLET, FILM COATED ORAL at 18:10

## 2023-08-02 RX ADMIN — Medication 100 MILLIGRAM(S): at 21:13

## 2023-08-02 RX ADMIN — INSULIN GLARGINE 15 UNIT(S): 100 INJECTION, SOLUTION SUBCUTANEOUS at 21:10

## 2023-08-02 RX ADMIN — PHENYLEPHRINE HYDROCHLORIDE 2.16 MICROGRAM(S)/KG/MIN: 10 INJECTION INTRAVENOUS at 18:10

## 2023-08-02 RX ADMIN — FLUDROCORTISONE ACETATE 0.1 MILLIGRAM(S): 0.1 TABLET ORAL at 05:17

## 2023-08-02 RX ADMIN — Medication 325 MILLIGRAM(S): at 05:16

## 2023-08-02 RX ADMIN — Medication 4: at 21:13

## 2023-08-02 RX ADMIN — MIDODRINE HYDROCHLORIDE 10 MILLIGRAM(S): 2.5 TABLET ORAL at 05:17

## 2023-08-02 RX ADMIN — ATORVASTATIN CALCIUM 80 MILLIGRAM(S): 80 TABLET, FILM COATED ORAL at 21:11

## 2023-08-02 RX ADMIN — PHENYLEPHRINE HYDROCHLORIDE 2.16 MICROGRAM(S)/KG/MIN: 10 INJECTION INTRAVENOUS at 21:12

## 2023-08-02 RX ADMIN — SENNA PLUS 2 TABLET(S): 8.6 TABLET ORAL at 21:11

## 2023-08-02 RX ADMIN — LEVETIRACETAM 500 MILLIGRAM(S): 250 TABLET, FILM COATED ORAL at 05:17

## 2023-08-02 RX ADMIN — MIDODRINE HYDROCHLORIDE 10 MILLIGRAM(S): 2.5 TABLET ORAL at 23:30

## 2023-08-02 RX ADMIN — FLUDROCORTISONE ACETATE 0.1 MILLIGRAM(S): 0.1 TABLET ORAL at 18:10

## 2023-08-02 RX ADMIN — Medication 1 DROP(S): at 23:50

## 2023-08-02 NOTE — PROGRESS NOTE ADULT - SUBJECTIVE AND OBJECTIVE BOX
Subjective and Objective:   HOSPITAL COURSE: 68yo woman PMH seizure on keppra, CVA on plavix with unknown residual deficit, HLD, HTN p/w worsening and more prolonged episodes of aphasia/blank staring/unresponsiveness over two weeks. The episodes became more frequent and today, per daughter, it lasted for hours. Brought to ED, where she was found to have R facial droop, RUE weakness, dysarthria, and global aphasia. NIHSS 12. CTA showing c/f L MCA occlusion.     7/23 Adm NSCU s/p angiogram showing R M2/MCA occlusion with distal retrograde reconstitution, aspiration thrombectomy attempted without recanalization.   7/24: no acute overnight events  7/25: Aphasic when SBP dropped to 110 overnight, daughter reports slowed speech this morning   7/26: Pressure dependent exam, exam worsened when SBP <160  7/27: Mild confusion over night. Negative balance. Will put her on maintenance fluids to decrease the pressors requirements  7/28: No acute overnight events. Failed a challenge with a lower BP parameter: 120-160. Increase midodrine to 15 q8  7/29: no acute overnight events. STA-MCA Bypass on Monday?  7/30 BP dropped < 160 mm her neuro exam got worse, phenylephrine   7/31: Patient still symptomatic on Low BP, responds extremely well to midodrine and BP rises significantly   8/1: On jose still, possible angio for STA-MCA bypass on 8/2.  8/2- No events.     Allergies    No Known Allergies    REVIEW OF SYSTEMS: [ x] Unable to Assess due to neurologic exam   [ ] All ROS addressed below are non-contributory, except:  Neuro: [ ] Headache [ ] Back pain [ ] Numbness [ ] Weakness [ ] Ataxia [ ] Dizziness [ ] Aphasia [ ] Dysarthria [ ] Visual disturbance  Resp: [ ] Shortness of breath/dyspnea, [ ] Orthopnea [ ] Cough  CV: [ ] Chest pain [ ] Palpitation [ ] Lightheadedness [ ] Syncope  Renal: [ ] Thirst [ ] Edema  GI: [ ] Nausea [ ] Emesis [ ] Abdominal pain [ ] Constipation [ ] Diarrhea  Hem: [ ] Hematemesis [ ] bright red blood per rectum  ID: [ ] Fever [ ] Chills [ ] Dysuria  ENT: [ ] Rhinorrhea      DEVICES:   [ ] Restraints [ ] ET tube [ ] central line [ ] arterial line [ ] gonzalez [ ] NGT/OGT [ ] EVD [ ] LD [ ] FRANTZ/HMV [ ] Trach [ ] PEG [ ] Chest Tube     T(C): 36.9 (07-30-23 @ 03:00), Max: 36.9 (07-29-23 @ 19:00)  HR: 73 (07-30-23 @ 07:00) (73 - 89)  BP: 174/98 (07-30-23 @ 05:00) (139/101 - 195/81)  RR: 16 (07-30-23 @ 07:00) (7 - 23)  SpO2: 98% (07-30-23 @ 07:00) (92% - 100%)  07-29-23 @ 07:01  -  07-30-23 @ 07:00  --------------------------------------------------------  IN: 3427.5 mL / OUT: 2900 mL / NET: 527.5 mL    acetaminophen     Tablet .. 650 milliGRAM(s) Oral every 6 hours PRN  aspirin  chewable 81 milliGRAM(s) Oral daily  atorvastatin 80 milliGRAM(s) Oral at bedtime  chlorhexidine 4% Liquid 1 Application(s) Topical daily  enoxaparin Injectable 40 milliGRAM(s) SubCutaneous <User Schedule>  ferrous    sulfate 325 milliGRAM(s) Oral <User Schedule>  insulin glargine Injectable (LANTUS) 5 Unit(s) SubCutaneous at bedtime  insulin lispro (ADMELOG) corrective regimen sliding scale   SubCutaneous three times a day before meals  levETIRAcetam 500 milliGRAM(s) Oral two times a day  midodrine 10 milliGRAM(s) Oral <User Schedule>  multiple electrolytes Injection Type 1 1000 milliLiter(s) IV Continuous <Continuous>  phenylephrine    Infusion 0.1 MICROgram(s)/kG/Min IV Continuous <Continuous>  polyethylene glycol 3350 17 Gram(s) Oral two times a day  senna 2 Tablet(s) Oral at bedtime      CTP 7/23  IMPRESSION:    CT angiography neck: No hemodynamically significant stenosis byNASCET   criteria. No vascular dissection.    CT angiography brain: No major vessel occlusion or proximal stenosis. 2   mm left M1 bifurcation aneurysm    CT perfusion: Approximately 7 mL of ischemic penumbra is seen in the left   frontoparietal cortex.      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, EOMI, FC, speech improved, some dysarthria and minor left facial asymmetry    Motor exam:           Upper extremity                         Delt     Bicep     Tricep    HG                                                 R         5/5       5/5        5/5        5/5                                               L          5/5       5/5        5/5        5/5             Lower extremity                        HF         KF        KE       DF         PF                                                  R        5/5       5/5        5/5        5/5      5/5                                               L         5/5       5/5        5/5        5/5      5/5                                                  Sensation: [x] intact to light touch  [ ] decreased:     Reflexes: Deep Tendon Reflexes Intact     Pulmonary: Clear to Auscultation, No rales, No rhonchi, No wheezes     Cardiovascular: S1, S2, Regular rate and rhythm     Gastrointestinal: Soft, Non-tender, Non-distended     Extremities: No calf tenderness       LABS:       LABS:  Na: 139 (07-29 @ 00:44), 139 (07-28 @ 04:03)  K: 4.1 (07-29 @ 00:44), 4.2 (07-28 @ 04:03)  Cl: 103 (07-29 @ 00:44), 105 (07-28 @ 04:03)  CO2: 24 (07-29 @ 00:44), 24 (07-28 @ 04:03)  BUN: 20 (07-29 @ 00:44), 22 (07-28 @ 04:03)  Cr: 0.75 (07-29 @ 00:44), 0.73 (07-28 @ 04:03)  Glu: 195(07-29 @ 00:44), 142(07-28 @ 04:03)    Hgb: 9.3 (07-29 @ 00:43), 9.1 (07-28 @ 04:03)  Hct: 29.4 (07-29 @ 00:43), 29.2 (07-28 @ 04:03)  WBC: 8.35 (07-29 @ 00:43), 8.25 (07-28 @ 04:03)  Plt: 265 (07-29 @ 00:43), 252 (07-28 @ 04:03)    INR:   PTT:                                  Subjective and Objective:   HOSPITAL COURSE: 66yo woman PMH seizure on keppra, CVA on plavix with unknown residual deficit, HLD, HTN p/w worsening and more prolonged episodes of aphasia/blank staring/unresponsiveness over two weeks. The episodes became more frequent and today, per daughter, it lasted for hours. Brought to ED, where she was found to have R facial droop, RUE weakness, dysarthria, and global aphasia. NIHSS 12. CTA showing c/f L MCA occlusion.     7/23 Adm NSCU s/p angiogram showing R M2/MCA occlusion with distal retrograde reconstitution, aspiration thrombectomy attempted without recanalization.   7/24: no acute overnight events  7/25: Aphasic when SBP dropped to 110 overnight, daughter reports slowed speech this morning   7/26: Pressure dependent exam, exam worsened when SBP <160  7/27: Mild confusion over night. Negative balance. Will put her on maintenance fluids to decrease the pressors requirements  7/28: No acute overnight events. Failed a challenge with a lower BP parameter: 120-160. Increase midodrine to 15 q8  7/29: no acute overnight events. STA-MCA Bypass on Monday?  7/30 BP dropped < 160 mm her neuro exam got worse, phenylephrine   7/31: Patient still symptomatic on Low BP, responds extremely well to midodrine and BP rises significantly   8/1: On jose still, possible angio for STA-MCA bypass on 8/2.  8/2- No events.     Allergies    No Known Allergies    REVIEW OF SYSTEMS: [ x] Unable to Assess due to neurologic exam   [ ] All ROS addressed below are non-contributory, except:  Neuro: [ ] Headache [ ] Back pain [ ] Numbness [ ] Weakness [ ] Ataxia [ ] Dizziness [ ] Aphasia [ ] Dysarthria [ ] Visual disturbance  Resp: [ ] Shortness of breath/dyspnea, [ ] Orthopnea [ ] Cough  CV: [ ] Chest pain [ ] Palpitation [ ] Lightheadedness [ ] Syncope  Renal: [ ] Thirst [ ] Edema  GI: [ ] Nausea [ ] Emesis [ ] Abdominal pain [ ] Constipation [ ] Diarrhea  Hem: [ ] Hematemesis [ ] bright red blood per rectum  ID: [ ] Fever [ ] Chills [ ] Dysuria  ENT: [ ] Rhinorrhea      DEVICES:   [ ] Restraints [ ] ET tube [ ] central line [ ] arterial line [ ] gonzalez [ ] NGT/OGT [ ] EVD [ ] LD [ ] FRANTZ/HMV [ ] Trach [ ] PEG [ ] Chest Tube       ICU Vital Signs Last 24 Hrs  T(C): 36.5 (02 Aug 2023 19:00), Max: 36.8 (02 Aug 2023 16:00)  T(F): 97.7 (02 Aug 2023 19:00), Max: 98.2 (02 Aug 2023 16:00)  HR: 110 (02 Aug 2023 22:00) (73 - 110)  BP: 182/72 (02 Aug 2023 00:38) (182/72 - 182/72)  BP(mean): --  ABP: 148/64 (02 Aug 2023 22:00) (124/59 - 218/86)  ABP(mean): 101 (02 Aug 2023 22:00) (86 - 139)  RR: 19 (02 Aug 2023 22:00) (10 - 23)  SpO2: 99% (02 Aug 2023 19:30) (86% - 100%)    O2 Parameters below as of 02 Aug 2023 19:00  Patient On (Oxygen Delivery Method): room air        I&O's Detail    01 Aug 2023 07:01  -  02 Aug 2023 07:00  --------------------------------------------------------  IN:    multiple electrolytes Injection Type 1.: 50 mL    Oral Fluid: 980 mL    Phenylephrine: 176.7 mL    sodium chloride 0.9%: 600 mL    Sodium Chloride 0.9% Bolus: 1000 mL  Total IN: 2806.7 mL    OUT:    Incontinent per Collection Bag (mL): 2150 mL  Total OUT: 2150 mL    Total NET: 656.7 mL      02 Aug 2023 07:01  -  02 Aug 2023 22:48  --------------------------------------------------------  IN:    IV PiggyBack: 50 mL    Oral Fluid: 240 mL    Phenylephrine: 15.1 mL    Phenylephrine: 6.5 mL    sodium chloride 0.9%: 75 mL    sodium chloride 0.9%: 600 mL  Total IN: 986.6 mL    OUT:    Indwelling Catheter - Urethral (mL): 925 mL  Total OUT: 925 mL    Total NET: 61.6 mL        MEDICATIONS  (STANDING):  aspirin  chewable 81 milliGRAM(s) Oral daily  atorvastatin 80 milliGRAM(s) Oral at bedtime  ceFAZolin   IVPB 2000 milliGRAM(s) IV Intermittent every 8 hours  ferrous    sulfate 325 milliGRAM(s) Oral <User Schedule>  fludroCORTISONE 0.1 milliGRAM(s) Oral every 12 hours  insulin glargine Injectable (LANTUS) 15 Unit(s) SubCutaneous at bedtime  insulin lispro (ADMELOG) corrective regimen sliding scale   SubCutaneous Before meals and at bedtime  insulin lispro Injectable (ADMELOG) 6 Unit(s) SubCutaneous three times a day before meals  levETIRAcetam  IVPB 500 milliGRAM(s) IV Intermittent every 12 hours  midodrine 10 milliGRAM(s) Oral <User Schedule>  phenylephrine    Infusion 0.1 MICROgram(s)/kG/Min (2.16 mL/Hr) IV Continuous <Continuous>  polyethylene glycol 3350 17 Gram(s) Oral two times a day  potassium chloride    Tablet ER 20 milliEquivalent(s) Oral daily  senna 2 Tablet(s) Oral at bedtime  sodium chloride 0.9%. 1000 milliLiter(s) (100 mL/Hr) IV Continuous <Continuous>    MEDICATIONS  (PRN):  acetaminophen     Tablet .. 650 milliGRAM(s) Oral every 6 hours PRN Mild Pain (1 - 3)  acetaminophen     Tablet .. 650 milliGRAM(s) Oral every 6 hours PRN Mild Pain (1 - 3)  oxyCODONE    IR 5 milliGRAM(s) Oral every 4 hours PRN Moderate Pain (4 - 6)      CTP 7/23  IMPRESSION:    CT angiography neck: No hemodynamically significant stenosis byNASCET   criteria. No vascular dissection.    CT angiography brain: No major vessel occlusion or proximal stenosis. 2   mm left M1 bifurcation aneurysm    CT perfusion: Approximately 7 mL of ischemic penumbra is seen in the left   frontoparietal cortex.      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, EOMI, FC, speech improved, some dysarthria and minor left facial asymmetry    Motor exam:           Upper extremity                         Delt     Bicep     Tricep    HG                                                 R         5/5       5/5        5/5        5/5                                               L          5/5       5/5        5/5        5/5             Lower extremity                        HF         KF        KE       DF         PF                                                  R        5/5       5/5        5/5        5/5      5/5                                               L         5/5       5/5        5/5        5/5      5/5                                                  Sensation: [x] intact to light touch  [ ] decreased:     Reflexes: Deep Tendon Reflexes Intact     Pulmonary: Clear to Auscultation, No rales, No rhonchi, No wheezes     Cardiovascular: S1, S2, Regular rate and rhythm     Gastrointestinal: Soft, Non-tender, Non-distended     Extremities: No calf tenderness       LABS:       LABS:  Na: 139 (07-29 @ 00:44), 139 (07-28 @ 04:03)  K: 4.1 (07-29 @ 00:44), 4.2 (07-28 @ 04:03)  Cl: 103 (07-29 @ 00:44), 105 (07-28 @ 04:03)  CO2: 24 (07-29 @ 00:44), 24 (07-28 @ 04:03)  BUN: 20 (07-29 @ 00:44), 22 (07-28 @ 04:03)  Cr: 0.75 (07-29 @ 00:44), 0.73 (07-28 @ 04:03)  Glu: 195(07-29 @ 00:44), 142(07-28 @ 04:03)    Hgb: 9.3 (07-29 @ 00:43), 9.1 (07-28 @ 04:03)  Hct: 29.4 (07-29 @ 00:43), 29.2 (07-28 @ 04:03)  WBC: 8.35 (07-29 @ 00:43), 8.25 (07-28 @ 04:03)  Plt: 265 (07-29 @ 00:43), 252 (07-28 @ 04:03)    INR:   PTT:

## 2023-08-02 NOTE — PROGRESS NOTE ADULT - ASSESSMENT
Assessment and Plan: 	  ASSESSMENT/PLAN: chronic M2 occlusion BP dependent     NEURO:  neuro checks q 2hr   cont keppra home medication   ASA, hold plavix for bypass  continue phenylephrine -200   Possible plan for bypass TODAY  Activity: [x] mobilize as tolerated [] Bedrest [x] PT [x] OT [] PMNR    PULM:  RA     CV:  SBP goal 160-200 mmhg  Right radial not functioning, will replace it with axillary  Midodrine to 10 mg Q6H, Will add florinef- watch K  continue  Phenylephrine 0.6  TTE - 63% ejection fraction  cont atorvastatin    RENAL:  Fluids: IVL     GI:  Diet: CCD  GI prophylaxis [] not indicated [x] PPI home med [] other:  Bowel regimen [] colace [x] senna [x] other: miralax   LBM 8/1    ENDO:   A1C 8.5  Goal euglycemia (-180)  DM lantus 12 units, ISS  Will continue premeal 6 units TID  TSH WNL  EDISON    HEME/ONC:  H/H stable   VTE prophylaxis: [x] SCDs [x] chemoprophylaxis [] hold chemoprophylaxis due to: [] high risk of DVT/PE on admission due to:  continue iron for microcytic anemia, previously on iron o/p for AJAY    ID: afebrile, no leukocytosis     30 critical care at risk for stroke, MI

## 2023-08-02 NOTE — PROVIDER CONTACT NOTE (CHANGE IN STATUS NOTIFICATION) - ASSESSMENT
Pt afebrile, HR 93, spo2 96 on room air and SBP 120s and sustaining (parameters 140-180). Pt aphasic and dysarthric at this time and only responds correctly with choices. Pt denies pain. New facial noticed at this time.
Pt reports "not seeing things clear" in left eye. Pt SBP currently 130-138. Pt denies any pain or pressure in or around surgical site. No pupillary/sclera changes noted on assessment.  [place]  utilized via  [id number]

## 2023-08-02 NOTE — PROGRESS NOTE ADULT - ASSESSMENT
Assessment and Plan: 	  ASSESSMENT/PLAN: chronic M2 occlusion BP dependent     NEURO:  neuro checks q 2hr   cont keppra home medication   ASA, hold plavix for bypass  continue phenylephrine -200   Possible plan for bypass 8/2  Activity: [x] mobilize as tolerated [] Bedrest [x] PT [x] OT [] PMNR    PULM:  RA     CV:  SBP goal 160-200 mmhg  Right radial not functioning, will replace it with axillary  Midodrine to 10 mg Q6H, Will add florinef- watch K  continue  Phenylephrine 0.6  TTE - 63% ejection fraction  cont atorvastatin    RENAL:  Fluids: IVL     GI:  Diet: CCD  GI prophylaxis [] not indicated [x] PPI home med [] other:  Bowel regimen [] colace [x] senna [x] other: miralax   LBM 8/1    ENDO:   A1C 8.5  Goal euglycemia (-180)  DM lantus 12 units, ISS  Will continue premeal 6 units TID  TSH WNL  EDISON    HEME/ONC:  H/H stable   VTE prophylaxis: [x] SCDs [x] chemoprophylaxis [] hold chemoprophylaxis due to: [] high risk of DVT/PE on admission due to:  continue iron for microcytic anemia, previously on iron o/p for AJAY    ID: afebrile, no leukocytosis     30 critical care at risk for stroke, MI       Assessment and Plan: 	  ASSESSMENT/PLAN: chronic M2 occlusion BP dependent     NEURO:  neuro checks q 2hr   cont keppra home medication   ASA, hold plavix for bypass  continue phenylephrine -200   Possible plan for bypass TODAY  Activity: [x] mobilize as tolerated [] Bedrest [x] PT [x] OT [] PMNR    PULM:  RA     CV:  SBP goal 160-200 mmhg  Right radial not functioning, will replace it with axillary  Midodrine to 10 mg Q6H, Will add florinef- watch K  continue  Phenylephrine 0.6  TTE - 63% ejection fraction  cont atorvastatin    RENAL:  Fluids: IVL     GI:  Diet: CCD  GI prophylaxis [] not indicated [x] PPI home med [] other:  Bowel regimen [] colace [x] senna [x] other: miralax   LBM 8/1    ENDO:   A1C 8.5  Goal euglycemia (-180)  DM lantus 12 units, ISS  Will continue premeal 6 units TID  TSH WNL  EDISON    HEME/ONC:  H/H stable   VTE prophylaxis: [x] SCDs [x] chemoprophylaxis [] hold chemoprophylaxis due to: [] high risk of DVT/PE on admission due to:  continue iron for microcytic anemia, previously on iron o/p for AJAY    ID: afebrile, no leukocytosis     30 critical care at risk for stroke, MI

## 2023-08-02 NOTE — PRE-OP CHECKLIST - SITE MARKED BY SURGEON
Last visit virtual 12/14/2020 Taryn for diabetes  Supervisit scheduled 4/14/2021  Last labs 01/21/2020  No outstanding lab orders  Will refill Losartan and HCTZ for 90 days, others forward to Dr Gennaro Cho
n/a
n/a

## 2023-08-02 NOTE — PROVIDER CONTACT NOTE (CHANGE IN STATUS NOTIFICATION) - ACTION/TREATMENT ORDERED:
see above
NP Francesca Altamirano, MD Nathan and MD Gilbert at the bedside. As per ICU team pt has a pressure dependent exam and change BP parameters. As per MD Gilbert start jose at this time for SBP >180 and continue to monitor.

## 2023-08-02 NOTE — PROGRESS NOTE ADULT - SUBJECTIVE AND OBJECTIVE BOX
Subjective and Objective:   HOSPITAL COURSE: 66yo woman PMH seizure on keppra, CVA on plavix with unknown residual deficit, HLD, HTN p/w worsening and more prolonged episodes of aphasia/blank staring/unresponsiveness over two weeks. The episodes became more frequent and today, per daughter, it lasted for hours. Brought to ED, where she was found to have R facial droop, RUE weakness, dysarthria, and global aphasia. NIHSS 12. CTA showing c/f L MCA occlusion.     7/23 Adm NSCU s/p angiogram showing R M2/MCA occlusion with distal retrograde reconstitution, aspiration thrombectomy attempted without recanalization.   7/24: no acute overnight events  7/25: Aphasic when SBP dropped to 110 overnight, daughter reports slowed speech this morning   7/26: Pressure dependent exam, exam worsened when SBP <160  7/27: Mild confusion over night. Negative balance. Will put her on maintenance fluids to decrease the pressors requirements  7/28: No acute overnight events. Failed a challenge with a lower BP parameter: 120-160. Increase midodrine to 15 q8  7/29: no acute overnight events. STA-MCA Bypass on Monday?  7/30 BP dropped < 160 mm her neuro exam got worse, phenylephrine   7/31: Patient still symptomatic on Low BP, responds extremely well to midodrine and BP rises significantly   8/1: On jose still, possible angio for STA-MCA bypass on 8/2.  8/2- No events.     Allergies    No Known Allergies    REVIEW OF SYSTEMS: [ x] Unable to Assess due to neurologic exam   [ ] All ROS addressed below are non-contributory, except:  Neuro: [ ] Headache [ ] Back pain [ ] Numbness [ ] Weakness [ ] Ataxia [ ] Dizziness [ ] Aphasia [ ] Dysarthria [ ] Visual disturbance  Resp: [ ] Shortness of breath/dyspnea, [ ] Orthopnea [ ] Cough  CV: [ ] Chest pain [ ] Palpitation [ ] Lightheadedness [ ] Syncope  Renal: [ ] Thirst [ ] Edema  GI: [ ] Nausea [ ] Emesis [ ] Abdominal pain [ ] Constipation [ ] Diarrhea  Hem: [ ] Hematemesis [ ] bright red blood per rectum  ID: [ ] Fever [ ] Chills [ ] Dysuria  ENT: [ ] Rhinorrhea      DEVICES:   [ ] Restraints [ ] ET tube [ ] central line [ ] arterial line [ ] gonzalez [ ] NGT/OGT [ ] EVD [ ] LD [ ] FRANTZ/HMV [ ] Trach [ ] PEG [ ] Chest Tube     T(C): 36.9 (07-30-23 @ 03:00), Max: 36.9 (07-29-23 @ 19:00)  HR: 73 (07-30-23 @ 07:00) (73 - 89)  BP: 174/98 (07-30-23 @ 05:00) (139/101 - 195/81)  RR: 16 (07-30-23 @ 07:00) (7 - 23)  SpO2: 98% (07-30-23 @ 07:00) (92% - 100%)  07-29-23 @ 07:01  -  07-30-23 @ 07:00  --------------------------------------------------------  IN: 3427.5 mL / OUT: 2900 mL / NET: 527.5 mL    acetaminophen     Tablet .. 650 milliGRAM(s) Oral every 6 hours PRN  aspirin  chewable 81 milliGRAM(s) Oral daily  atorvastatin 80 milliGRAM(s) Oral at bedtime  chlorhexidine 4% Liquid 1 Application(s) Topical daily  enoxaparin Injectable 40 milliGRAM(s) SubCutaneous <User Schedule>  ferrous    sulfate 325 milliGRAM(s) Oral <User Schedule>  insulin glargine Injectable (LANTUS) 5 Unit(s) SubCutaneous at bedtime  insulin lispro (ADMELOG) corrective regimen sliding scale   SubCutaneous three times a day before meals  levETIRAcetam 500 milliGRAM(s) Oral two times a day  midodrine 10 milliGRAM(s) Oral <User Schedule>  multiple electrolytes Injection Type 1 1000 milliLiter(s) IV Continuous <Continuous>  phenylephrine    Infusion 0.1 MICROgram(s)/kG/Min IV Continuous <Continuous>  polyethylene glycol 3350 17 Gram(s) Oral two times a day  senna 2 Tablet(s) Oral at bedtime      CTP 7/23  IMPRESSION:    CT angiography neck: No hemodynamically significant stenosis byNASCET   criteria. No vascular dissection.    CT angiography brain: No major vessel occlusion or proximal stenosis. 2   mm left M1 bifurcation aneurysm    CT perfusion: Approximately 7 mL of ischemic penumbra is seen in the left   frontoparietal cortex.      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, EOMI, FC, speech improved, some dysarthria and minor left facial asymmetry    Motor exam:           Upper extremity                         Delt     Bicep     Tricep    HG                                                 R         5/5       5/5        5/5        5/5                                               L          5/5       5/5        5/5        5/5             Lower extremity                        HF         KF        KE       DF         PF                                                  R        5/5       5/5        5/5        5/5      5/5                                               L         5/5       5/5        5/5        5/5      5/5                                                  Sensation: [x] intact to light touch  [ ] decreased:     Reflexes: Deep Tendon Reflexes Intact     Pulmonary: Clear to Auscultation, No rales, No rhonchi, No wheezes     Cardiovascular: S1, S2, Regular rate and rhythm     Gastrointestinal: Soft, Non-tender, Non-distended     Extremities: No calf tenderness       LABS:       LABS:  Na: 139 (07-29 @ 00:44), 139 (07-28 @ 04:03)  K: 4.1 (07-29 @ 00:44), 4.2 (07-28 @ 04:03)  Cl: 103 (07-29 @ 00:44), 105 (07-28 @ 04:03)  CO2: 24 (07-29 @ 00:44), 24 (07-28 @ 04:03)  BUN: 20 (07-29 @ 00:44), 22 (07-28 @ 04:03)  Cr: 0.75 (07-29 @ 00:44), 0.73 (07-28 @ 04:03)  Glu: 195(07-29 @ 00:44), 142(07-28 @ 04:03)    Hgb: 9.3 (07-29 @ 00:43), 9.1 (07-28 @ 04:03)  Hct: 29.4 (07-29 @ 00:43), 29.2 (07-28 @ 04:03)  WBC: 8.35 (07-29 @ 00:43), 8.25 (07-28 @ 04:03)  Plt: 265 (07-29 @ 00:43), 252 (07-28 @ 04:03)    INR:   PTT:

## 2023-08-02 NOTE — PROVIDER CONTACT NOTE (CHANGE IN STATUS NOTIFICATION) - SITUATION
Change in neuro exam and outside of SBP parameters.
68 YO F  L M1 Occlusion, MT unsuccessful for recannulization  s/p L STA MCA BYPASS 8/2  goal sbp 130-160

## 2023-08-03 LAB
ANION GAP SERPL CALC-SCNC: 11 MMOL/L — SIGNIFICANT CHANGE UP (ref 5–17)
BUN SERPL-MCNC: 20 MG/DL — SIGNIFICANT CHANGE UP (ref 7–23)
CALCIUM SERPL-MCNC: 8.5 MG/DL — SIGNIFICANT CHANGE UP (ref 8.4–10.5)
CHLORIDE SERPL-SCNC: 108 MMOL/L — SIGNIFICANT CHANGE UP (ref 96–108)
CO2 SERPL-SCNC: 23 MMOL/L — SIGNIFICANT CHANGE UP (ref 22–31)
CREAT SERPL-MCNC: 0.97 MG/DL — SIGNIFICANT CHANGE UP (ref 0.5–1.3)
EGFR: 64 ML/MIN/1.73M2 — SIGNIFICANT CHANGE UP
GLUCOSE BLDC GLUCOMTR-MCNC: 148 MG/DL — HIGH (ref 70–99)
GLUCOSE BLDC GLUCOMTR-MCNC: 183 MG/DL — HIGH (ref 70–99)
GLUCOSE BLDC GLUCOMTR-MCNC: 218 MG/DL — HIGH (ref 70–99)
GLUCOSE BLDC GLUCOMTR-MCNC: 245 MG/DL — HIGH (ref 70–99)
GLUCOSE BLDC GLUCOMTR-MCNC: 272 MG/DL — HIGH (ref 70–99)
GLUCOSE SERPL-MCNC: 137 MG/DL — HIGH (ref 70–99)
HCT VFR BLD CALC: 22.3 % — LOW (ref 34.5–45)
HCT VFR BLD CALC: 22.8 % — LOW (ref 34.5–45)
HCT VFR BLD CALC: 26.7 % — LOW (ref 34.5–45)
HGB BLD-MCNC: 6.9 G/DL — CRITICAL LOW (ref 11.5–15.5)
HGB BLD-MCNC: 7.2 G/DL — LOW (ref 11.5–15.5)
HGB BLD-MCNC: 8.6 G/DL — LOW (ref 11.5–15.5)
MAGNESIUM SERPL-MCNC: 2 MG/DL — SIGNIFICANT CHANGE UP (ref 1.6–2.6)
MCHC RBC-ENTMCNC: 21.1 PG — LOW (ref 27–34)
MCHC RBC-ENTMCNC: 21.4 PG — LOW (ref 27–34)
MCHC RBC-ENTMCNC: 22.7 PG — LOW (ref 27–34)
MCHC RBC-ENTMCNC: 30.9 GM/DL — LOW (ref 32–36)
MCHC RBC-ENTMCNC: 31.6 GM/DL — LOW (ref 32–36)
MCHC RBC-ENTMCNC: 32.2 GM/DL — SIGNIFICANT CHANGE UP (ref 32–36)
MCV RBC AUTO: 67.9 FL — LOW (ref 80–100)
MCV RBC AUTO: 68.2 FL — LOW (ref 80–100)
MCV RBC AUTO: 70.4 FL — LOW (ref 80–100)
NRBC # BLD: 0 /100 WBCS — SIGNIFICANT CHANGE UP (ref 0–0)
PHOSPHATE SERPL-MCNC: 2.6 MG/DL — SIGNIFICANT CHANGE UP (ref 2.5–4.5)
PLATELET # BLD AUTO: 264 K/UL — SIGNIFICANT CHANGE UP (ref 150–400)
PLATELET # BLD AUTO: 283 K/UL — SIGNIFICANT CHANGE UP (ref 150–400)
PLATELET # BLD AUTO: 291 K/UL — SIGNIFICANT CHANGE UP (ref 150–400)
POTASSIUM SERPL-MCNC: 3.6 MMOL/L — SIGNIFICANT CHANGE UP (ref 3.5–5.3)
POTASSIUM SERPL-SCNC: 3.6 MMOL/L — SIGNIFICANT CHANGE UP (ref 3.5–5.3)
RBC # BLD: 3.27 M/UL — LOW (ref 3.8–5.2)
RBC # BLD: 3.36 M/UL — LOW (ref 3.8–5.2)
RBC # BLD: 3.79 M/UL — LOW (ref 3.8–5.2)
RBC # FLD: 15.9 % — HIGH (ref 10.3–14.5)
RBC # FLD: 15.9 % — HIGH (ref 10.3–14.5)
RBC # FLD: 19.6 % — HIGH (ref 10.3–14.5)
SODIUM SERPL-SCNC: 142 MMOL/L — SIGNIFICANT CHANGE UP (ref 135–145)
WBC # BLD: 16.2 K/UL — HIGH (ref 3.8–10.5)
WBC # BLD: 16.48 K/UL — HIGH (ref 3.8–10.5)
WBC # BLD: 18.36 K/UL — HIGH (ref 3.8–10.5)
WBC # FLD AUTO: 16.2 K/UL — HIGH (ref 3.8–10.5)
WBC # FLD AUTO: 16.48 K/UL — HIGH (ref 3.8–10.5)
WBC # FLD AUTO: 18.36 K/UL — HIGH (ref 3.8–10.5)

## 2023-08-03 PROCEDURE — 99291 CRITICAL CARE FIRST HOUR: CPT

## 2023-08-03 PROCEDURE — 70450 CT HEAD/BRAIN W/O DYE: CPT | Mod: 26

## 2023-08-03 RX ORDER — MIDODRINE HYDROCHLORIDE 2.5 MG/1
10 TABLET ORAL EVERY 8 HOURS
Refills: 0 | Status: DISCONTINUED | OUTPATIENT
Start: 2023-08-03 | End: 2023-08-03

## 2023-08-03 RX ORDER — LEVETIRACETAM 250 MG/1
500 TABLET, FILM COATED ORAL
Refills: 0 | Status: DISCONTINUED | OUTPATIENT
Start: 2023-08-03 | End: 2023-08-10

## 2023-08-03 RX ORDER — FLUDROCORTISONE ACETATE 0.1 MG/1
0.1 TABLET ORAL DAILY
Refills: 0 | Status: DISCONTINUED | OUTPATIENT
Start: 2023-08-03 | End: 2023-08-03

## 2023-08-03 RX ORDER — CHLORHEXIDINE GLUCONATE 213 G/1000ML
1 SOLUTION TOPICAL DAILY
Refills: 0 | Status: DISCONTINUED | OUTPATIENT
Start: 2023-08-03 | End: 2023-08-08

## 2023-08-03 RX ORDER — INSULIN GLARGINE 100 [IU]/ML
20 INJECTION, SOLUTION SUBCUTANEOUS AT BEDTIME
Refills: 0 | Status: DISCONTINUED | OUTPATIENT
Start: 2023-08-03 | End: 2023-08-05

## 2023-08-03 RX ORDER — NICARDIPINE HYDROCHLORIDE 30 MG/1
5 CAPSULE, EXTENDED RELEASE ORAL
Qty: 40 | Refills: 0 | Status: DISCONTINUED | OUTPATIENT
Start: 2023-08-03 | End: 2023-08-03

## 2023-08-03 RX ORDER — MIDODRINE HYDROCHLORIDE 2.5 MG/1
5 TABLET ORAL EVERY 8 HOURS
Refills: 0 | Status: DISCONTINUED | OUTPATIENT
Start: 2023-08-03 | End: 2023-08-04

## 2023-08-03 RX ORDER — INSULIN LISPRO 100/ML
10 VIAL (ML) SUBCUTANEOUS
Refills: 0 | Status: DISCONTINUED | OUTPATIENT
Start: 2023-08-03 | End: 2023-08-07

## 2023-08-03 RX ADMIN — LEVETIRACETAM 500 MILLIGRAM(S): 250 TABLET, FILM COATED ORAL at 17:36

## 2023-08-03 RX ADMIN — SODIUM CHLORIDE 100 MILLILITER(S): 9 INJECTION INTRAMUSCULAR; INTRAVENOUS; SUBCUTANEOUS at 19:29

## 2023-08-03 RX ADMIN — CHLORHEXIDINE GLUCONATE 1 APPLICATION(S): 213 SOLUTION TOPICAL at 11:56

## 2023-08-03 RX ADMIN — INSULIN GLARGINE 20 UNIT(S): 100 INJECTION, SOLUTION SUBCUTANEOUS at 22:29

## 2023-08-03 RX ADMIN — MIDODRINE HYDROCHLORIDE 5 MILLIGRAM(S): 2.5 TABLET ORAL at 22:16

## 2023-08-03 RX ADMIN — POLYETHYLENE GLYCOL 3350 17 GRAM(S): 17 POWDER, FOR SOLUTION ORAL at 17:33

## 2023-08-03 RX ADMIN — ATORVASTATIN CALCIUM 80 MILLIGRAM(S): 80 TABLET, FILM COATED ORAL at 22:16

## 2023-08-03 RX ADMIN — LEVETIRACETAM 400 MILLIGRAM(S): 250 TABLET, FILM COATED ORAL at 06:02

## 2023-08-03 RX ADMIN — Medication 2: at 17:30

## 2023-08-03 RX ADMIN — Medication 10 UNIT(S): at 17:33

## 2023-08-03 RX ADMIN — SENNA PLUS 2 TABLET(S): 8.6 TABLET ORAL at 22:16

## 2023-08-03 RX ADMIN — POLYETHYLENE GLYCOL 3350 17 GRAM(S): 17 POWDER, FOR SOLUTION ORAL at 06:02

## 2023-08-03 RX ADMIN — FLUDROCORTISONE ACETATE 0.1 MILLIGRAM(S): 0.1 TABLET ORAL at 06:02

## 2023-08-03 RX ADMIN — MIDODRINE HYDROCHLORIDE 10 MILLIGRAM(S): 2.5 TABLET ORAL at 13:10

## 2023-08-03 RX ADMIN — Medication 4: at 11:55

## 2023-08-03 RX ADMIN — Medication 10 UNIT(S): at 11:56

## 2023-08-03 RX ADMIN — Medication 100 MILLIGRAM(S): at 06:01

## 2023-08-03 RX ADMIN — Medication 20 MILLIEQUIVALENT(S): at 11:55

## 2023-08-03 RX ADMIN — Medication 6 UNIT(S): at 08:25

## 2023-08-03 RX ADMIN — MIDODRINE HYDROCHLORIDE 10 MILLIGRAM(S): 2.5 TABLET ORAL at 06:02

## 2023-08-03 RX ADMIN — Medication 81 MILLIGRAM(S): at 11:57

## 2023-08-03 RX ADMIN — Medication 4: at 08:25

## 2023-08-03 NOTE — PROGRESS NOTE ADULT - SUBJECTIVE AND OBJECTIVE BOX
Subjective and Objective:   HOSPITAL COURSE: 66yo woman PMH seizure on keppra, CVA on plavix with unknown residual deficit, HLD, HTN p/w worsening and more prolonged episodes of aphasia/blank staring/unresponsiveness over two weeks. The episodes became more frequent and today, per daughter, it lasted for hours. Brought to ED, where she was found to have R facial droop, RUE weakness, dysarthria, and global aphasia. NIHSS 12. CTA showing c/f L MCA occlusion.     7/23 Adm NSCU s/p angiogram showing R M2/MCA occlusion with distal retrograde reconstitution, aspiration thrombectomy attempted without recanalization.   7/24: no acute overnight events  7/25: Aphasic when SBP dropped to 110 overnight, daughter reports slowed speech this morning   7/26: Pressure dependent exam, exam worsened when SBP <160  7/27: Mild confusion over night. Negative balance. Will put her on maintenance fluids to decrease the pressors requirements  7/28: No acute overnight events. Failed a challenge with a lower BP parameter: 120-160. Increase midodrine to 15 q8  7/29: no acute overnight events. STA-MCA Bypass on Monday?  7/30 BP dropped < 160 mm her neuro exam got worse, phenylephrine   7/31: Patient still symptomatic on Low BP, responds extremely well to midodrine and BP rises significantly   8/1: On jose still, possible angio for STA-MCA bypass on 8/2.  8/2- No events.   8/3- Patient still pressure dependent. Exam worsens when Bp drops <160 mmHg. No other changes.      Allergies    No Known Allergies    REVIEW OF SYSTEMS: [ x] Unable to Assess due to neurologic exam   [ ] All ROS addressed below are non-contributory, except:  Neuro: [ ] Headache [ ] Back pain [ ] Numbness [ ] Weakness [ ] Ataxia [ ] Dizziness [ ] Aphasia [ ] Dysarthria [ ] Visual disturbance  Resp: [ ] Shortness of breath/dyspnea, [ ] Orthopnea [ ] Cough  CV: [ ] Chest pain [ ] Palpitation [ ] Lightheadedness [ ] Syncope  Renal: [ ] Thirst [ ] Edema  GI: [ ] Nausea [ ] Emesis [ ] Abdominal pain [ ] Constipation [ ] Diarrhea  Hem: [ ] Hematemesis [ ] bright red blood per rectum  ID: [ ] Fever [ ] Chills [ ] Dysuria  ENT: [ ] Rhinorrhea      DEVICES:   [ ] Restraints [ ] ET tube [ ] central line [ ] arterial line [ ] gonzalez [ ] NGT/OGT [ ] EVD [ ] LD [ ] FRANZT/HMV [ ] Trach [ ] PEG [ ] Chest Tube       ICU Vital Signs Last 24 Hrs  T(C): 36.5 (02 Aug 2023 19:00), Max: 36.8 (02 Aug 2023 16:00)  T(F): 97.7 (02 Aug 2023 19:00), Max: 98.2 (02 Aug 2023 16:00)  HR: 110 (02 Aug 2023 22:00) (73 - 110)  BP: 182/72 (02 Aug 2023 00:38) (182/72 - 182/72)  BP(mean): --  ABP: 148/64 (02 Aug 2023 22:00) (124/59 - 218/86)  ABP(mean): 101 (02 Aug 2023 22:00) (86 - 139)  RR: 19 (02 Aug 2023 22:00) (10 - 23)  SpO2: 99% (02 Aug 2023 19:30) (86% - 100%)    O2 Parameters below as of 02 Aug 2023 19:00  Patient On (Oxygen Delivery Method): room air        I&O's Detail    01 Aug 2023 07:01  -  02 Aug 2023 07:00  --------------------------------------------------------  IN:    multiple electrolytes Injection Type 1.: 50 mL    Oral Fluid: 980 mL    Phenylephrine: 176.7 mL    sodium chloride 0.9%: 600 mL    Sodium Chloride 0.9% Bolus: 1000 mL  Total IN: 2806.7 mL    OUT:    Incontinent per Collection Bag (mL): 2150 mL  Total OUT: 2150 mL    Total NET: 656.7 mL      02 Aug 2023 07:01  -  02 Aug 2023 22:48  --------------------------------------------------------  IN:    IV PiggyBack: 50 mL    Oral Fluid: 240 mL    Phenylephrine: 15.1 mL    Phenylephrine: 6.5 mL    sodium chloride 0.9%: 75 mL    sodium chloride 0.9%: 600 mL  Total IN: 986.6 mL    OUT:    Indwelling Catheter - Urethral (mL): 925 mL  Total OUT: 925 mL    Total NET: 61.6 mL        MEDICATIONS  (STANDING):  aspirin  chewable 81 milliGRAM(s) Oral daily  atorvastatin 80 milliGRAM(s) Oral at bedtime  ceFAZolin   IVPB 2000 milliGRAM(s) IV Intermittent every 8 hours  ferrous    sulfate 325 milliGRAM(s) Oral <User Schedule>  fludroCORTISONE 0.1 milliGRAM(s) Oral every 12 hours  insulin glargine Injectable (LANTUS) 15 Unit(s) SubCutaneous at bedtime  insulin lispro (ADMELOG) corrective regimen sliding scale   SubCutaneous Before meals and at bedtime  insulin lispro Injectable (ADMELOG) 6 Unit(s) SubCutaneous three times a day before meals  levETIRAcetam  IVPB 500 milliGRAM(s) IV Intermittent every 12 hours  midodrine 10 milliGRAM(s) Oral <User Schedule>  phenylephrine    Infusion 0.1 MICROgram(s)/kG/Min (2.16 mL/Hr) IV Continuous <Continuous>  polyethylene glycol 3350 17 Gram(s) Oral two times a day  potassium chloride    Tablet ER 20 milliEquivalent(s) Oral daily  senna 2 Tablet(s) Oral at bedtime  sodium chloride 0.9%. 1000 milliLiter(s) (100 mL/Hr) IV Continuous <Continuous>    MEDICATIONS  (PRN):  acetaminophen     Tablet .. 650 milliGRAM(s) Oral every 6 hours PRN Mild Pain (1 - 3)  acetaminophen     Tablet .. 650 milliGRAM(s) Oral every 6 hours PRN Mild Pain (1 - 3)  oxyCODONE    IR 5 milliGRAM(s) Oral every 4 hours PRN Moderate Pain (4 - 6)      CTP 7/23  IMPRESSION:    CT angiography neck: No hemodynamically significant stenosis byNASCET   criteria. No vascular dissection.    CT angiography brain: No major vessel occlusion or proximal stenosis. 2   mm left M1 bifurcation aneurysm    CT perfusion: Approximately 7 mL of ischemic penumbra is seen in the left   frontoparietal cortex.      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, EOMI, FC, speech improved, some dysarthria and minor left facial asymmetry    Motor exam:           Upper extremity                         Delt     Bicep     Tricep    HG                                                 R         5/5       5/5        5/5        5/5                                               L          5/5       5/5        5/5        5/5             Lower extremity                        HF         KF        KE       DF         PF                                                  R        5/5       5/5        5/5        5/5      5/5                                               L         5/5       5/5        5/5        5/5      5/5                                                  Sensation: [x] intact to light touch  [ ] decreased:     Reflexes: Deep Tendon Reflexes Intact     Pulmonary: Clear to Auscultation, No rales, No rhonchi, No wheezes     Cardiovascular: S1, S2, Regular rate and rhythm     Gastrointestinal: Soft, Non-tender, Non-distended     Extremities: No calf tenderness       LABS:       LABS:  Na: 139 (07-29 @ 00:44), 139 (07-28 @ 04:03)  K: 4.1 (07-29 @ 00:44), 4.2 (07-28 @ 04:03)  Cl: 103 (07-29 @ 00:44), 105 (07-28 @ 04:03)  CO2: 24 (07-29 @ 00:44), 24 (07-28 @ 04:03)  BUN: 20 (07-29 @ 00:44), 22 (07-28 @ 04:03)  Cr: 0.75 (07-29 @ 00:44), 0.73 (07-28 @ 04:03)  Glu: 195(07-29 @ 00:44), 142(07-28 @ 04:03)    Hgb: 9.3 (07-29 @ 00:43), 9.1 (07-28 @ 04:03)  Hct: 29.4 (07-29 @ 00:43), 29.2 (07-28 @ 04:03)  WBC: 8.35 (07-29 @ 00:43), 8.25 (07-28 @ 04:03)  Plt: 265 (07-29 @ 00:43), 252 (07-28 @ 04:03)    INR:   PTT:

## 2023-08-03 NOTE — PROGRESS NOTE ADULT - ASSESSMENT
Assessment and Plan: 	  ASSESSMENT/PLAN: chronic M2 occlusion BP dependent     NEURO:  neuro checks q 2hr   cont keppra home medication   ASA, hold plavix for bypass  Off phenylephrine for now -200, will continue to monitor    s/p L STA-MCA bypass. Attempted aspiration thrombectomy of L M2/MCA occlusion.   Exam still pressure dependent  Activity: [x] mobilize as tolerated [] Bedrest [x] PT [x] OT [] PMNR    PULM:  RA     CV:  SBP goal 120-160 mmhg  Right radial not functioning, will replace it with axillary  Midodrine decreased to 10 mg Q8H, Will continue florinef but will reduce the dose to 0.1 mg QD  Off  Phenylephrine  TTE - 63% ejection fraction  cont atorvastatin    RENAL:  Fluids: IVL     GI:  Diet: CCD  GI prophylaxis [] not indicated [x] PPI home med [] other:  Bowel regimen [] colace [x] senna [x] other: miralax   LBM 8/1    ENDO:   A1C 8.5  Goal euglycemia (-180)  DM lantus 18 units, ISS  Will continue premeal 10 units TID  TSH WNL  Medium sliding scale    HEME/ONC:  H/H trending down, will transfuse if <7. Likely blood loss anemia, will repeat H/H now  VTE prophylaxis: [x] SCDs [x] chemoprophylaxis [] hold chemoprophylaxis due to: [] high risk of DVT/PE on admission due to:  continue iron for microcytic anemia, previously on iron o/p for AJAY    ID: afebrile, no leukocytosis     30 critical care at risk for stroke, MI       Assessment and Plan: 	  ASSESSMENT/PLAN: chronic M2 occlusion BP dependent     NEURO:  neuro checks q 2hr   cont keppra home medication   ASA, hold plavix for bypass  Off phenylephrine for now -160, will continue to monitor    s/p L STA-MCA bypass. Attempted aspiration thrombectomy of L M2/MCA occlusion.   Exam still pressure dependent  Activity: [x] mobilize as tolerated [] Bedrest [x] PT [x] OT [] PMNR    PULM:  RA     CV:  SBP goal 120-160 mmhg  Right radial not functioning, will replace it with axillary  Midodrine decreased to 10 mg Q8H, Will continue florinef but will reduce the dose to 0.1 mg QD  Off  Phenylephrine  TTE - 63% ejection fraction  cont atorvastatin    RENAL:  Fluids: IVL     GI:  Diet: CCD  GI prophylaxis [] not indicated [x] PPI home med [] other:  Bowel regimen [] colace [x] senna [x] other: miralax   LBM 8/1    ENDO:   A1C 8.5  Goal euglycemia (-180)  DM lantus 18 units, ISS  Will continue premeal 10 units TID  TSH WNL  Medium sliding scale    HEME/ONC:  H/H trending down, will transfuse if <7. Likely blood loss anemia, will repeat H/H now  VTE prophylaxis: [x] SCDs [x] chemoprophylaxis [] hold chemoprophylaxis due to: [] high risk of DVT/PE on admission due to:  continue iron for microcytic anemia, previously on iron o/p for AJYA    ID: afebrile, no leukocytosis     30 critical care at risk for stroke, MI

## 2023-08-03 NOTE — CHART NOTE - NSCHARTNOTEFT_GEN_A_CORE
Nutrition Follow Up Note  Patient seen for: malnutrition follow up    67F hx seizures on keppra, CVA on plavix with unknown residual deficit, HLD, HTN p/w worsening and more prolonged episodes of aphasia/blank staring/unresponsiveness over two weeks. Brought to ED, where she was found to have R facial droop, RUE weakness, dysarthria, and global aphasia. NIHSS 12. CTA showing c/f L MCA occlusion. Now s/p L STA-MCA bypass.    Source: [] Patient       [x] Medical Record        [x] RN        [] Family at bedside       [] Other:    -If unable to interview patient: [] Trach/Vent/BiPAP  [x] Dysarthria     Diet Order:   Diet, Consistent Carbohydrate Renal/No Snacks (23)    - Is current order appropriate/adequate? see below for recommendations    - PO intake :   [x] >75%  Adequate    [] 50-75%  Fair       [] <50%  Poor  Per RN, eating well. Per writer observation this morning, consuming breakfast without issues.     Nutrition Related Concerns:  -on insulin regimen for glycemic control, POCT glucose being monitored, see below.   -IV Fluids: NaCl 0.9%  -Phenylephine ordered    GI:  Last BM    Bowel Regimen? [x] Yes   [] No    Weights:   Dosing weight 57.6kg  Daily Weight in k.6 ()    Nutritionally Pertinent MEDICATIONS  (STANDING):  atorvastatin  ferrous    sulfate  fludroCORTISONE  insulin glargine Injectable (LANTUS)  insulin lispro (ADMELOG) corrective regimen sliding scale  insulin lispro Injectable (ADMELOG)  midodrine  phenylephrine    Infusion  polyethylene glycol 3350  potassium chloride    Tablet ER  senna  sodium chloride 0.9%.    Pertinent Labs:  @ 20:08: Na 142, BUN 17, Cr 0.58, <H>, K+ 4.0, Phos 4.0, Mg 2.1, Alk Phos --, ALT/SGPT --, AST/SGOT --, HbA1c --    A1C with Estimated Average Glucose Result: 8.5 % (23 @ 21:37)    Finger Sticks:  POCT Blood Glucose.: 245 mg/dL ( @ 08:19)  POCT Blood Glucose.: 272 mg/dL ( @ 08:18)  POCT Blood Glucose.: 214 mg/dL ( @ 20:25)  POCT Blood Glucose.: 195 mg/dL ( @ 18:03)  POCT Blood Glucose.: 209 mg/dL ( @ 17:56)    Skin per nursing documentation: right groin incision s/p angio, left head incision s/p bypass  Edema per nursing documentation: none     Estimated Needs using dosing weight 57.6kg  27-32kcal/kg 1555-1843kcal/day  1.2-1.4g/kg 69-81g/day  defer fluid needs to team    Previous Nutrition Diagnosis: acute moderate protein calorie malnutrition  Nutrition Diagnosis is: [x] ongoing  [] resolved [] not applicable     Nutrition Care Plan:  [x] In Progress  [] Achieved  [] Not applicable    New Nutrition Diagnosis: increased nutrient needs related to increased demand for nutrients s/p neurosurgical intervention s/p L STA-MCA bypass as evidenced by incisions as above.     Nutrition Interventions:  Education: pt currently eating breakfast and acute, no appropriate at this time    Recommendations:      -Continue current diet  -RD to add No Sugar Added Mighty Shake supplement daily to aid in meeting nutrient needs  -Continue ferrous sulfate per team  -Consider addition of Multivitamin if no contraindications to aid in incision healing and preventing micronutrient deficiencies.     Monitoring and Evaluation:   Continue to monitor nutritional intake, tolerance to diet prescription, weights, labs, skin integrity    RD remains available upon request and will follow up per protocol  Rebecca Galindo, MS, RD, CDN teams Nutrition Follow Up Note  Patient seen for: malnutrition follow up    67F hx seizures on keppra, CVA on plavix with unknown residual deficit, HLD, HTN p/w worsening and more prolonged episodes of aphasia/blank staring/unresponsiveness over two weeks. Brought to ED, where she was found to have R facial droop, RUE weakness, dysarthria, and global aphasia. NIHSS 12. CTA showing c/f L MCA occlusion. Now s/p L STA-MCA bypass.    Source: [x] Patient       [x] Medical Record        [x] RN speaks cantonese   [] Family at bedside       [] Other:    -If unable to interview patient: [] Trach/Vent/BiPAP     Diet Order:   Diet, Consistent Carbohydrate Renal/No Snacks (23)    - Is current order appropriate/adequate? see below for recommendations    - PO intake :   [x] >75%  Adequate    [] 50-75%  Fair       [] <50%  Poor  Per RN, eating well. Per writer observation this morning, consuming breakfast without issues.     Nutrition Related Concerns:  -on insulin regimen for glycemic control, POCT glucose being monitored, see below.   -IV Fluids: NaCl 0.9%  -Phenylephine ordered    GI:  Last BM    Bowel Regimen? [x] Yes   [] No    Weights:   Dosing weight 57.6kg  Daily Weight in k.6 ()    Nutritionally Pertinent MEDICATIONS  (STANDING):  atorvastatin  ferrous    sulfate  fludroCORTISONE  insulin glargine Injectable (LANTUS)  insulin lispro (ADMELOG) corrective regimen sliding scale  insulin lispro Injectable (ADMELOG)  midodrine  phenylephrine    Infusion  polyethylene glycol 3350  potassium chloride    Tablet ER  senna  sodium chloride 0.9%.    Pertinent Labs:  @ 20:08: Na 142, BUN 17, Cr 0.58, <H>, K+ 4.0, Phos 4.0, Mg 2.1, Alk Phos --, ALT/SGPT --, AST/SGOT --, HbA1c --    A1C with Estimated Average Glucose Result: 8.5 % (23 @ 21:37)    Finger Sticks:  POCT Blood Glucose.: 245 mg/dL ( @ 08:19)  POCT Blood Glucose.: 272 mg/dL ( @ 08:18)  POCT Blood Glucose.: 214 mg/dL ( @ 20:25)  POCT Blood Glucose.: 195 mg/dL ( @ 18:03)  POCT Blood Glucose.: 209 mg/dL ( @ 17:56)    Skin per nursing documentation: right groin incision s/p angio, left head incision s/p bypass  Edema per nursing documentation: none     Estimated Needs using dosing weight 57.6kg  27-32kcal/kg 1555-1843kcal/day  1.2-1.4g/kg 69-81g/day  defer fluid needs to team    Previous Nutrition Diagnosis: acute moderate protein calorie malnutrition  Nutrition Diagnosis is: [x] ongoing  [] resolved [] not applicable     Nutrition Care Plan:  [x] In Progress  [] Achieved  [] Not applicable    New Nutrition Diagnosis: increased nutrient needs related to increased demand for nutrients s/p neurosurgical intervention s/p L STA-MCA bypass as evidenced by incisions as above.     Nutrition Interventions:  Education: pt currently eating breakfast and acute, no appropriate at this time    Recommendations:      -Continue current diet  -RD to add No Sugar Added Mighty Shake supplement daily to aid in meeting nutrient needs  -Continue ferrous sulfate per team  -Consider addition of Multivitamin if no contraindications to aid in incision healing and preventing micronutrient deficiencies.     Monitoring and Evaluation:   Continue to monitor nutritional intake, tolerance to diet prescription, weights, labs, skin integrity    RD remains available upon request and will follow up per protocol  Rebecca Galindo, MS, RD, CDN teams Nutrition Follow Up Note  Patient seen for: malnutrition follow up    67F hx seizures on keppra, CVA on plavix with unknown residual deficit, HLD, HTN p/w worsening and more prolonged episodes of aphasia/blank staring/unresponsiveness over two weeks. Brought to ED, where she was found to have R facial droop, RUE weakness, dysarthria, and global aphasia. NIHSS 12. CTA showing c/f L MCA occlusion. Now s/p L STA-MCA bypass.    Source: [x] Patient       [x] Medical Record        [x] RN speaks cantonese   [] Family at bedside       [] Other:    -If unable to interview patient: [] Trach/Vent/BiPAP     Diet Order:   Diet, Consistent Carbohydrate Renal/No Snacks (23)    - Is current order appropriate/adequate? see below for recommendations    - PO intake :   [x] >75%  Adequate    [] 50-75%  Fair       [] <50%  Poor  Per RN, eating well. Per writer observation this morning, consuming breakfast without issues.     Nutrition Related Concerns:  -on insulin regimen for glycemic control, POCT glucose being monitored, see below.   -IV Fluids: NaCl 0.9%  -Phenylephine ordered    GI:  Last BM    Bowel Regimen? [x] Yes   [] No    Weights:   Dosing weight 57.6kg  Daily Weight in k.6 ()    Nutritionally Pertinent MEDICATIONS  (STANDING):  atorvastatin  ferrous    sulfate  fludroCORTISONE  insulin glargine Injectable (LANTUS)  insulin lispro (ADMELOG) corrective regimen sliding scale  insulin lispro Injectable (ADMELOG)  midodrine  phenylephrine    Infusion  polyethylene glycol 3350  potassium chloride    Tablet ER  senna  sodium chloride 0.9%.    Pertinent Labs:  @ 20:08: Na 142, BUN 17, Cr 0.58, <H>, K+ 4.0, Phos 4.0, Mg 2.1, Alk Phos --, ALT/SGPT --, AST/SGOT --, HbA1c --    A1C with Estimated Average Glucose Result: 8.5 % (23 @ 21:37)    Finger Sticks:  POCT Blood Glucose.: 245 mg/dL ( @ 08:19)  POCT Blood Glucose.: 272 mg/dL ( @ 08:18)  POCT Blood Glucose.: 214 mg/dL ( @ 20:25)  POCT Blood Glucose.: 195 mg/dL ( @ 18:03)  POCT Blood Glucose.: 209 mg/dL ( @ 17:56)    Skin per nursing documentation: right groin incision s/p angio, left head incision s/p bypass  Edema per nursing documentation: none     Estimated Needs using dosing weight 57.6kg  27-32kcal/kg 1555-1843kcal/day  1.2-1.4g/kg 69-81g/day  defer fluid needs to team    Previous Nutrition Diagnosis: acute moderate protein calorie malnutrition  Nutrition Diagnosis is: [x] ongoing  [] resolved [] not applicable     Nutrition Care Plan:  [x] In Progress  [] Achieved  [] Not applicable    New Nutrition Diagnosis: increased nutrient needs related to increased demand for nutrients s/p neurosurgical intervention s/p L STA-MCA bypass as evidenced by incisions as above.     Nutrition Interventions:  Education: pt currently eating breakfast and acute, not appropriate at this time    Recommendations:      -Continue current diet  -RD to add No Sugar Added Mighty Shake supplement daily to aid in meeting nutrient needs  -Continue ferrous sulfate per team  -Consider addition of Multivitamin if no contraindications to aid in incision healing and preventing micronutrient deficiencies.     Monitoring and Evaluation:   Continue to monitor nutritional intake, tolerance to diet prescription, weights, labs, skin integrity    RD remains available upon request and will follow up per protocol  Rebecca Galindo, MS, RD, CDN teams

## 2023-08-03 NOTE — PROGRESS NOTE ADULT - ASSESSMENT
67F hx seizures on keppra, CVA on plavix with unknown residual deficit, HLD, HTN p/w worsening and more prolonged episodes of aphasia/blank staring/unresponsiveness over two weeks. Brought to ED, where she was found to have R facial droop, RUE weakness, dysarthria, and global aphasia. NIHSS 12. CTA showing c/f L MCA occlusion. Now s/p L STA-MCA bypass.    -exam stable overnight  --160  -NS at 100/hr

## 2023-08-03 NOTE — PROGRESS NOTE ADULT - SUBJECTIVE AND OBJECTIVE BOX
Patient seen and examined at bedside.    --Anticoagulation--  aspirin  chewable 81 milliGRAM(s) Oral daily    T(C): 37 (08-02-23 @ 23:00), Max: 37 (08-02-23 @ 23:00)  HR: 106 (08-03-23 @ 01:00) (73 - 110)  BP: --  RR: 13 (08-03-23 @ 01:00) (10 - 23)  SpO2: 100% (08-02-23 @ 23:30) (86% - 100%)  Wt(kg): --     Exam: AOx3, FC, Rt eye blind, FRANCIS 5/5, incision c/d/i

## 2023-08-03 NOTE — PROGRESS NOTE ADULT - ASSESSMENT
Assessment and Plan: 	  ASSESSMENT/PLAN: chronic M2 occlusion BP dependent     NEURO:  neuro checks q 2hr   cont keppra home medication   ASA, hold plavix for bypass  Off phenylephrine for now -200, will continue to monitor    s/p L STA-MCA bypass. Attempted aspiration thrombectomy of L M2/MCA occlusion.   Exam still pressure dependent  Activity: [x] mobilize as tolerated [] Bedrest [x] PT [x] OT [] PMNR    PULM:  RA     CV:  SBP goal 120-160 mmhg  Right radial not functioning, will replace it with axillary  Midodrine decreased to 10 mg Q8H, Will continue florinef but will reduce the dose to 0.1 mg QD  Off  Phenylephrine  TTE - 63% ejection fraction  cont atorvastatin    RENAL:  Fluids: IVL     GI:  Diet: CCD  GI prophylaxis [] not indicated [x] PPI home med [] other:  Bowel regimen [] colace [x] senna [x] other: miralax   LBM 8/1    ENDO:   A1C 8.5  Goal euglycemia (-180)  DM lantus 18 units, ISS  Will continue premeal 10 units TID  TSH WNL  Medium sliding scale    HEME/ONC:  H/H trending down, will transfuse if <7. Likely blood loss anemia, will repeat H/H now  VTE prophylaxis: [x] SCDs [x] chemoprophylaxis [] hold chemoprophylaxis due to: [] high risk of DVT/PE on admission due to:  continue iron for microcytic anemia, previously on iron o/p for AJAY    ID: afebrile, no leukocytosis     30 critical care at risk for stroke, MI

## 2023-08-03 NOTE — PROGRESS NOTE ADULT - SUBJECTIVE AND OBJECTIVE BOX
Subjective and Objective:   HOSPITAL COURSE: 66yo woman PMH seizure on keppra, CVA on plavix with unknown residual deficit, HLD, HTN p/w worsening and more prolonged episodes of aphasia/blank staring/unresponsiveness over two weeks. The episodes became more frequent and today, per daughter, it lasted for hours. Brought to ED, where she was found to have R facial droop, RUE weakness, dysarthria, and global aphasia. NIHSS 12. CTA showing c/f L MCA occlusion.     7/23 Adm NSCU s/p angiogram showing R M2/MCA occlusion with distal retrograde reconstitution, aspiration thrombectomy attempted without recanalization.   7/24: no acute overnight events  7/25: Aphasic when SBP dropped to 110 overnight, daughter reports slowed speech this morning   7/26: Pressure dependent exam, exam worsened when SBP <160  7/27: Mild confusion over night. Negative balance. Will put her on maintenance fluids to decrease the pressors requirements  7/28: No acute overnight events. Failed a challenge with a lower BP parameter: 120-160. Increase midodrine to 15 q8  7/29: no acute overnight events. STA-MCA Bypass on Monday?  7/30 BP dropped < 160 mm her neuro exam got worse, phenylephrine   7/31: Patient still symptomatic on Low BP, responds extremely well to midodrine and BP rises significantly   8/1: On jose still, possible angio for STA-MCA bypass on 8/2.  8/2- No events.   8/3- Patient still pressure dependent. Exam worsens when Bp drops <160 mmHg. No other changes.      Allergies    No Known Allergies    REVIEW OF SYSTEMS: [ x] Unable to Assess due to neurologic exam   [ ] All ROS addressed below are non-contributory, except:  Neuro: [ ] Headache [ ] Back pain [ ] Numbness [ ] Weakness [ ] Ataxia [ ] Dizziness [ ] Aphasia [ ] Dysarthria [ ] Visual disturbance  Resp: [ ] Shortness of breath/dyspnea, [ ] Orthopnea [ ] Cough  CV: [ ] Chest pain [ ] Palpitation [ ] Lightheadedness [ ] Syncope  Renal: [ ] Thirst [ ] Edema  GI: [ ] Nausea [ ] Emesis [ ] Abdominal pain [ ] Constipation [ ] Diarrhea  Hem: [ ] Hematemesis [ ] bright red blood per rectum  ID: [ ] Fever [ ] Chills [ ] Dysuria  ENT: [ ] Rhinorrhea      DEVICES:   [ ] Restraints [ ] ET tube [ ] central line [ ] arterial line [ ] gonzalez [ ] NGT/OGT [ ] EVD [ ] LD [ ] FRANTZ/HMV [ ] Trach [ ] PEG [ ] Chest Tube       ICU Vital Signs Last 24 Hrs  T(C): 36.5 (02 Aug 2023 19:00), Max: 36.8 (02 Aug 2023 16:00)  T(F): 97.7 (02 Aug 2023 19:00), Max: 98.2 (02 Aug 2023 16:00)  HR: 110 (02 Aug 2023 22:00) (73 - 110)  BP: 182/72 (02 Aug 2023 00:38) (182/72 - 182/72)  BP(mean): --  ABP: 148/64 (02 Aug 2023 22:00) (124/59 - 218/86)  ABP(mean): 101 (02 Aug 2023 22:00) (86 - 139)  RR: 19 (02 Aug 2023 22:00) (10 - 23)  SpO2: 99% (02 Aug 2023 19:30) (86% - 100%)    O2 Parameters below as of 02 Aug 2023 19:00  Patient On (Oxygen Delivery Method): room air        I&O's Detail    01 Aug 2023 07:01  -  02 Aug 2023 07:00  --------------------------------------------------------  IN:    multiple electrolytes Injection Type 1.: 50 mL    Oral Fluid: 980 mL    Phenylephrine: 176.7 mL    sodium chloride 0.9%: 600 mL    Sodium Chloride 0.9% Bolus: 1000 mL  Total IN: 2806.7 mL    OUT:    Incontinent per Collection Bag (mL): 2150 mL  Total OUT: 2150 mL    Total NET: 656.7 mL      02 Aug 2023 07:01  -  02 Aug 2023 22:48  --------------------------------------------------------  IN:    IV PiggyBack: 50 mL    Oral Fluid: 240 mL    Phenylephrine: 15.1 mL    Phenylephrine: 6.5 mL    sodium chloride 0.9%: 75 mL    sodium chloride 0.9%: 600 mL  Total IN: 986.6 mL    OUT:    Indwelling Catheter - Urethral (mL): 925 mL  Total OUT: 925 mL    Total NET: 61.6 mL        MEDICATIONS  (STANDING):  aspirin  chewable 81 milliGRAM(s) Oral daily  atorvastatin 80 milliGRAM(s) Oral at bedtime  ceFAZolin   IVPB 2000 milliGRAM(s) IV Intermittent every 8 hours  ferrous    sulfate 325 milliGRAM(s) Oral <User Schedule>  fludroCORTISONE 0.1 milliGRAM(s) Oral every 12 hours  insulin glargine Injectable (LANTUS) 15 Unit(s) SubCutaneous at bedtime  insulin lispro (ADMELOG) corrective regimen sliding scale   SubCutaneous Before meals and at bedtime  insulin lispro Injectable (ADMELOG) 6 Unit(s) SubCutaneous three times a day before meals  levETIRAcetam  IVPB 500 milliGRAM(s) IV Intermittent every 12 hours  midodrine 10 milliGRAM(s) Oral <User Schedule>  phenylephrine    Infusion 0.1 MICROgram(s)/kG/Min (2.16 mL/Hr) IV Continuous <Continuous>  polyethylene glycol 3350 17 Gram(s) Oral two times a day  potassium chloride    Tablet ER 20 milliEquivalent(s) Oral daily  senna 2 Tablet(s) Oral at bedtime  sodium chloride 0.9%. 1000 milliLiter(s) (100 mL/Hr) IV Continuous <Continuous>    MEDICATIONS  (PRN):  acetaminophen     Tablet .. 650 milliGRAM(s) Oral every 6 hours PRN Mild Pain (1 - 3)  acetaminophen     Tablet .. 650 milliGRAM(s) Oral every 6 hours PRN Mild Pain (1 - 3)  oxyCODONE    IR 5 milliGRAM(s) Oral every 4 hours PRN Moderate Pain (4 - 6)      CTP 7/23  IMPRESSION:    CT angiography neck: No hemodynamically significant stenosis byNASCET   criteria. No vascular dissection.    CT angiography brain: No major vessel occlusion or proximal stenosis. 2   mm left M1 bifurcation aneurysm    CT perfusion: Approximately 7 mL of ischemic penumbra is seen in the left   frontoparietal cortex.      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, EOMI, FC, speech improved, some dysarthria and minor left facial asymmetry    Motor exam:           Upper extremity                         Delt     Bicep     Tricep    HG                                                 R         5/5       5/5        5/5        5/5                                               L          5/5       5/5        5/5        5/5             Lower extremity                        HF         KF        KE       DF         PF                                                  R        5/5       5/5        5/5        5/5      5/5                                               L         5/5       5/5        5/5        5/5      5/5                                                  Sensation: [x] intact to light touch  [ ] decreased:     Reflexes: Deep Tendon Reflexes Intact     Pulmonary: Clear to Auscultation, No rales, No rhonchi, No wheezes     Cardiovascular: S1, S2, Regular rate and rhythm     Gastrointestinal: Soft, Non-tender, Non-distended     Extremities: No calf tenderness       LABS:       LABS:  Na: 139 (07-29 @ 00:44), 139 (07-28 @ 04:03)  K: 4.1 (07-29 @ 00:44), 4.2 (07-28 @ 04:03)  Cl: 103 (07-29 @ 00:44), 105 (07-28 @ 04:03)  CO2: 24 (07-29 @ 00:44), 24 (07-28 @ 04:03)  BUN: 20 (07-29 @ 00:44), 22 (07-28 @ 04:03)  Cr: 0.75 (07-29 @ 00:44), 0.73 (07-28 @ 04:03)  Glu: 195(07-29 @ 00:44), 142(07-28 @ 04:03)    Hgb: 9.3 (07-29 @ 00:43), 9.1 (07-28 @ 04:03)  Hct: 29.4 (07-29 @ 00:43), 29.2 (07-28 @ 04:03)  WBC: 8.35 (07-29 @ 00:43), 8.25 (07-28 @ 04:03)  Plt: 265 (07-29 @ 00:43), 252 (07-28 @ 04:03)    INR:   PTT:                                  Subjective and Objective:   HOSPITAL COURSE: 66yo woman PMH seizure on keppra, CVA on plavix with unknown residual deficit, HLD, HTN p/w worsening and more prolonged episodes of aphasia/blank staring/unresponsiveness over two weeks. The episodes became more frequent and today, per daughter, it lasted for hours. Brought to ED, where she was found to have R facial droop, RUE weakness, dysarthria, and global aphasia. NIHSS 12. CTA showing c/f L MCA occlusion.     7/23 Adm NSCU s/p angiogram showing R M2/MCA occlusion with distal retrograde reconstitution, aspiration thrombectomy attempted without recanalization.   7/24: no acute overnight events  7/25: Aphasic when SBP dropped to 110 overnight, daughter reports slowed speech this morning   7/26: Pressure dependent exam, exam worsened when SBP <160  7/27: Mild confusion over night. Negative balance. Will put her on maintenance fluids to decrease the pressors requirements  7/28: No acute overnight events. Failed a challenge with a lower BP parameter: 120-160. Increase midodrine to 15 q8  7/29: no acute overnight events. STA-MCA Bypass on Monday?  7/30 BP dropped < 160 mm her neuro exam got worse, phenylephrine   7/31: Patient still symptomatic on Low BP, responds extremely well to midodrine and BP rises significantly   8/1: On jose still, possible angio for STA-MCA bypass on 8/2.  8/2- No events.   8/3- Patient still pressure dependent. Exam worsens when Bp drops <160 mmHg. No other changes.      Allergies    No Known Allergies    REVIEW OF SYSTEMS: [ x] Unable to Assess due to neurologic exam   [ ] All ROS addressed below are non-contributory, except:  Neuro: [ ] Headache [ ] Back pain [ ] Numbness [ ] Weakness [ ] Ataxia [ ] Dizziness [ ] Aphasia [ ] Dysarthria [ ] Visual disturbance  Resp: [ ] Shortness of breath/dyspnea, [ ] Orthopnea [ ] Cough  CV: [ ] Chest pain [ ] Palpitation [ ] Lightheadedness [ ] Syncope  Renal: [ ] Thirst [ ] Edema  GI: [ ] Nausea [ ] Emesis [ ] Abdominal pain [ ] Constipation [ ] Diarrhea  Hem: [ ] Hematemesis [ ] bright red blood per rectum  ID: [ ] Fever [ ] Chills [ ] Dysuria  ENT: [ ] Rhinorrhea      DEVICES:   [ ] Restraints [ ] ET tube [ ] central line [ ] arterial line [ ] gonzalez [ ] NGT/OGT [ ] EVD [ ] LD [ ] FRANTZ/HMV [ ] Trach [ ] PEG [ ] Chest Tube       ICU Vital Signs Last 24 Hrs  T(C): 36.4 (03 Aug 2023 19:00), Max: 37 (02 Aug 2023 23:00)  T(F): 97.6 (03 Aug 2023 19:00), Max: 98.6 (02 Aug 2023 23:00)  HR: 102 (03 Aug 2023 20:30) (92 - 110)  BP: --  BP(mean): --  ABP: 148/59 (03 Aug 2023 20:30) (118/58 - 192/83)  ABP(mean): 97 (03 Aug 2023 20:30) (83 - 131)  RR: 17 (03 Aug 2023 20:30) (11 - 26)  SpO2: 96% (03 Aug 2023 20:30) (94% - 100%)    O2 Parameters below as of 03 Aug 2023 07:00  Patient On (Oxygen Delivery Method): room air          I&O's Detail    02 Aug 2023 07:01  -  03 Aug 2023 07:00  --------------------------------------------------------  IN:    IV PiggyBack: 50 mL    Oral Fluid: 240 mL    Phenylephrine: 15.1 mL    Phenylephrine: 27.5 mL    sodium chloride 0.9%: 75 mL    sodium chloride 0.9%: 1500 mL  Total IN: 1907.6 mL    OUT:    Incontinent per Collection Bag (mL): 450 mL    Indwelling Catheter - Urethral (mL): 925 mL  Total OUT: 1375 mL    Total NET: 532.6 mL      03 Aug 2023 07:01  -  03 Aug 2023 21:38  --------------------------------------------------------  IN:    NiCARdipine: 50 mL    Oral Fluid: 440 mL    Phenylephrine: 8.6 mL    PRBCs (Packed Red Blood Cells): 300 mL    sodium chloride 0.9%: 1100 mL  Total IN: 1898.6 mL    OUT:    Incontinent per Collection Bag (mL): 900 mL  Total OUT: 900 mL    Total NET: 998.6 mL            MEDICATIONS  (STANDING):  aspirin  chewable 81 milliGRAM(s) Oral daily  atorvastatin 80 milliGRAM(s) Oral at bedtime  chlorhexidine 4% Liquid 1 Application(s) Topical daily  ferrous    sulfate 325 milliGRAM(s) Oral <User Schedule>  insulin glargine Injectable (LANTUS) 20 Unit(s) SubCutaneous at bedtime  insulin lispro (ADMELOG) corrective regimen sliding scale   SubCutaneous Before meals and at bedtime  insulin lispro Injectable (ADMELOG) 10 Unit(s) SubCutaneous three times a day before meals  levETIRAcetam 500 milliGRAM(s) Oral two times a day  midodrine 5 milliGRAM(s) Oral every 8 hours  phenylephrine    Infusion 0.1 MICROgram(s)/kG/Min (2.16 mL/Hr) IV Continuous <Continuous>  polyethylene glycol 3350 17 Gram(s) Oral two times a day  senna 2 Tablet(s) Oral at bedtime  sodium chloride 0.9%. 1000 milliLiter(s) (100 mL/Hr) IV Continuous <Continuous>    MEDICATIONS  (PRN):  acetaminophen     Tablet .. 650 milliGRAM(s) Oral every 6 hours PRN Mild Pain (1 - 3)  oxyCODONE    IR 5 milliGRAM(s) Oral every 4 hours PRN Moderate Pain (4 - 6)        CTP 7/23  IMPRESSION:    CT angiography neck: No hemodynamically significant stenosis byNASCET   criteria. No vascular dissection.    CT angiography brain: No major vessel occlusion or proximal stenosis. 2   mm left M1 bifurcation aneurysm    CT perfusion: Approximately 7 mL of ischemic penumbra is seen in the left   frontoparietal cortex.      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, EOMI, FC, speech improved, some dysarthria and minor left facial asymmetry    Motor exam:           Upper extremity                         Delt     Bicep     Tricep    HG                                                 R         5/5       5/5        5/5        5/5                                               L          5/5       5/5        5/5        5/5             Lower extremity                        HF         KF        KE       DF         PF                                                  R        5/5       5/5        5/5 5/5      5/5                                               L         5/5 5/5        5/5 5/5 5/5                                                  Sensation: [x] intact to light touch  [ ] decreased:     Reflexes: Deep Tendon Reflexes Intact     Pulmonary: Clear to Auscultation, No rales, No rhonchi, No wheezes     Cardiovascular: S1, S2, Regular rate and rhythm     Gastrointestinal: Soft, Non-tender, Non-distended     Extremities: No calf tenderness       LABS:       LABS:  Na: 139 (07-29 @ 00:44), 139 (07-28 @ 04:03)  K: 4.1 (07-29 @ 00:44), 4.2 (07-28 @ 04:03)  Cl: 103 (07-29 @ 00:44), 105 (07-28 @ 04:03)  CO2: 24 (07-29 @ 00:44), 24 (07-28 @ 04:03)  BUN: 20 (07-29 @ 00:44), 22 (07-28 @ 04:03)  Cr: 0.75 (07-29 @ 00:44), 0.73 (07-28 @ 04:03)  Glu: 195(07-29 @ 00:44), 142(07-28 @ 04:03)    Hgb: 9.3 (07-29 @ 00:43), 9.1 (07-28 @ 04:03)  Hct: 29.4 (07-29 @ 00:43), 29.2 (07-28 @ 04:03)  WBC: 8.35 (07-29 @ 00:43), 8.25 (07-28 @ 04:03)  Plt: 265 (07-29 @ 00:43), 252 (07-28 @ 04:03)    INR:   PTT:

## 2023-08-04 LAB
GLUCOSE BLDC GLUCOMTR-MCNC: 107 MG/DL — HIGH (ref 70–99)
GLUCOSE BLDC GLUCOMTR-MCNC: 159 MG/DL — HIGH (ref 70–99)
GLUCOSE BLDC GLUCOMTR-MCNC: 191 MG/DL — HIGH (ref 70–99)
GLUCOSE BLDC GLUCOMTR-MCNC: 80 MG/DL — SIGNIFICANT CHANGE UP (ref 70–99)
HCT VFR BLD CALC: 27 % — LOW (ref 34.5–45)
HGB BLD-MCNC: 8.7 G/DL — LOW (ref 11.5–15.5)
MCHC RBC-ENTMCNC: 22.6 PG — LOW (ref 27–34)
MCHC RBC-ENTMCNC: 32.2 GM/DL — SIGNIFICANT CHANGE UP (ref 32–36)
MCV RBC AUTO: 70.1 FL — LOW (ref 80–100)
NRBC # BLD: 0 /100 WBCS — SIGNIFICANT CHANGE UP (ref 0–0)
PLATELET # BLD AUTO: 264 K/UL — SIGNIFICANT CHANGE UP (ref 150–400)
RBC # BLD: 3.85 M/UL — SIGNIFICANT CHANGE UP (ref 3.8–5.2)
RBC # FLD: 18.5 % — HIGH (ref 10.3–14.5)
WBC # BLD: 12.63 K/UL — HIGH (ref 3.8–10.5)
WBC # FLD AUTO: 12.63 K/UL — HIGH (ref 3.8–10.5)

## 2023-08-04 PROCEDURE — 99233 SBSQ HOSP IP/OBS HIGH 50: CPT

## 2023-08-04 RX ORDER — POTASSIUM PHOSPHATE, MONOBASIC POTASSIUM PHOSPHATE, DIBASIC 236; 224 MG/ML; MG/ML
30 INJECTION, SOLUTION INTRAVENOUS ONCE
Refills: 0 | Status: COMPLETED | OUTPATIENT
Start: 2023-08-04 | End: 2023-08-04

## 2023-08-04 RX ORDER — LABETALOL HCL 100 MG
10 TABLET ORAL ONCE
Refills: 0 | Status: COMPLETED | OUTPATIENT
Start: 2023-08-04 | End: 2023-08-04

## 2023-08-04 RX ORDER — POTASSIUM CHLORIDE 20 MEQ
40 PACKET (EA) ORAL ONCE
Refills: 0 | Status: COMPLETED | OUTPATIENT
Start: 2023-08-04 | End: 2023-08-04

## 2023-08-04 RX ORDER — SODIUM CHLORIDE 9 MG/ML
1000 INJECTION INTRAMUSCULAR; INTRAVENOUS; SUBCUTANEOUS
Refills: 0 | Status: DISCONTINUED | OUTPATIENT
Start: 2023-08-04 | End: 2023-08-05

## 2023-08-04 RX ORDER — ENOXAPARIN SODIUM 100 MG/ML
40 INJECTION SUBCUTANEOUS
Refills: 0 | Status: DISCONTINUED | OUTPATIENT
Start: 2023-08-04 | End: 2023-08-10

## 2023-08-04 RX ADMIN — Medication 10 UNIT(S): at 16:45

## 2023-08-04 RX ADMIN — MIDODRINE HYDROCHLORIDE 5 MILLIGRAM(S): 2.5 TABLET ORAL at 05:13

## 2023-08-04 RX ADMIN — INSULIN GLARGINE 20 UNIT(S): 100 INJECTION, SOLUTION SUBCUTANEOUS at 21:08

## 2023-08-04 RX ADMIN — CHLORHEXIDINE GLUCONATE 1 APPLICATION(S): 213 SOLUTION TOPICAL at 11:41

## 2023-08-04 RX ADMIN — Medication 81 MILLIGRAM(S): at 11:41

## 2023-08-04 RX ADMIN — ATORVASTATIN CALCIUM 80 MILLIGRAM(S): 80 TABLET, FILM COATED ORAL at 21:09

## 2023-08-04 RX ADMIN — Medication 10 MILLIGRAM(S): at 16:00

## 2023-08-04 RX ADMIN — SODIUM CHLORIDE 100 MILLILITER(S): 9 INJECTION INTRAMUSCULAR; INTRAVENOUS; SUBCUTANEOUS at 21:08

## 2023-08-04 RX ADMIN — POLYETHYLENE GLYCOL 3350 17 GRAM(S): 17 POWDER, FOR SOLUTION ORAL at 05:13

## 2023-08-04 RX ADMIN — SODIUM CHLORIDE 100 MILLILITER(S): 9 INJECTION INTRAMUSCULAR; INTRAVENOUS; SUBCUTANEOUS at 15:18

## 2023-08-04 RX ADMIN — Medication 10 MILLIGRAM(S): at 12:00

## 2023-08-04 RX ADMIN — Medication 325 MILLIGRAM(S): at 08:19

## 2023-08-04 RX ADMIN — Medication 2: at 16:44

## 2023-08-04 RX ADMIN — Medication 40 MILLIEQUIVALENT(S): at 03:18

## 2023-08-04 RX ADMIN — POTASSIUM PHOSPHATE, MONOBASIC POTASSIUM PHOSPHATE, DIBASIC 83.33 MILLIMOLE(S): 236; 224 INJECTION, SOLUTION INTRAVENOUS at 03:20

## 2023-08-04 RX ADMIN — LEVETIRACETAM 500 MILLIGRAM(S): 250 TABLET, FILM COATED ORAL at 05:13

## 2023-08-04 RX ADMIN — LEVETIRACETAM 500 MILLIGRAM(S): 250 TABLET, FILM COATED ORAL at 17:00

## 2023-08-04 RX ADMIN — Medication 10 UNIT(S): at 08:19

## 2023-08-04 RX ADMIN — ENOXAPARIN SODIUM 40 MILLIGRAM(S): 100 INJECTION SUBCUTANEOUS at 17:00

## 2023-08-04 RX ADMIN — Medication 2: at 21:08

## 2023-08-04 NOTE — PROGRESS NOTE ADULT - ASSESSMENT
Assessment and Plan: 	  ASSESSMENT/PLAN: chronic M2 occlusion, s/p L STA-MCA bypass    NEURO:  neuro checks q 2hr   cont keppra home medication   ASA, hold plavix for bypass  Continue fluids @ 100 until DC from hospital   s/p L STA-MCA bypass. Attempted aspiration thrombectomy of L M2/MCA occlusion.   Activity: [x] mobilize as tolerated [] Bedrest [x] PT [x] OT [] PMNR    PULM:  RA     CV:  SBP goal  mmhg  Off  Phenylephrine  TTE - 63% ejection fraction  Cont atorvastatin    RENAL:  NS @100L     GI:  Diet: CCD  GI prophylaxis [x] not indicated [] PPI home med [] other:  Bowel regimen [] colace [x] senna [x] other: miralax   LBM 8/4    ENDO:   A1C 8.5  Goal euglycemia (-180)  DM lantus 20 units, ISS  Will continue premeal 10 units TID  TSH WNL  Medium sliding scale    HEME/ONC:  H/H trending down, will transfuse if <7. Likely blood loss anemia, will repeat H/H now  VTE prophylaxis: [x] SCDs [x] chemoprophylaxis SQL [] hold chemoprophylaxis due to: [] high risk of DVT/PE on admission due to:  continue iron for microcytic anemia, previously on iron o/p for AJAY    ID: afebrile, no leukocytosis     30 critical care at risk for stroke, MI

## 2023-08-04 NOTE — PROGRESS NOTE ADULT - ASSESSMENT
Assessment and Plan: 	  ASSESSMENT/PLAN: chronic M2 occlusion BP dependent     NEURO:  neuro checks q 2hr   cont keppra home medication   ASA, hold plavix for bypass  Off phenylephrine for now -200, will continue to monitor    s/p L STA-MCA bypass. Attempted aspiration thrombectomy of L M2/MCA occlusion.   Exam still pressure dependent  Activity: [x] mobilize as tolerated [] Bedrest [x] PT [x] OT [] PMNR    PULM:  RA     CV:  SBP goal 120-160 mmhg  Right radial not functioning, will replace it with axillary  Midodrine decreased to 10 mg Q8H, Will continue florinef but will reduce the dose to 0.1 mg QD  Off  Phenylephrine  TTE - 63% ejection fraction  cont atorvastatin    RENAL:  Fluids: IVL     GI:  Diet: CCD  GI prophylaxis [] not indicated [x] PPI home med [] other:  Bowel regimen [] colace [x] senna [x] other: miralax   LBM 8/1    ENDO:   A1C 8.5  Goal euglycemia (-180)  DM lantus 18 units, ISS  Will continue premeal 10 units TID  TSH WNL  Medium sliding scale    HEME/ONC:  H/H trending down, will transfuse if <7. Likely blood loss anemia, will repeat H/H now  VTE prophylaxis: [x] SCDs [x] chemoprophylaxis [] hold chemoprophylaxis due to: [] high risk of DVT/PE on admission due to:  continue iron for microcytic anemia, previously on iron o/p for AJAY    ID: afebrile, no leukocytosis     30 critical care at risk for stroke, MI       Assessment and Plan: 	  ASSESSMENT/PLAN: chronic M2 occlusion, s/p L STA-MCA bypass    NEURO:  neuro checks q 2hr   cont keppra home medication   ASA, hold plavix for bypass  s/p L STA-MCA bypass. Attempted aspiration thrombectomy of L M2/MCA occlusion.   Activity: [x] mobilize as tolerated [] Bedrest [x] PT [x] OT [] PMNR    PULM:  RA     CV:  SBP goal  mmhg  8/4 Will discontinue Midodrine, florinef is already off  Off  Phenylephrine  TTE - 63% ejection fraction  Cont atorvastatin    RENAL:  Fluids: IVL     GI:  Diet: CCD  GI prophylaxis [x] not indicated [] PPI home med [] other:  Bowel regimen [] colace [x] senna [x] other: miralax   LBM 8/4    ENDO:   A1C 8.5  Goal euglycemia (-180)  DM lantus 20 units, ISS  Will continue premeal 10 units TID  TSH WNL  Medium sliding scale    HEME/ONC:  H/H trending down, will transfuse if <7. Likely blood loss anemia, will repeat H/H now  VTE prophylaxis: [x] SCDs [x] chemoprophylaxis SQL [] hold chemoprophylaxis due to: [] high risk of DVT/PE on admission due to:  continue iron for microcytic anemia, previously on iron o/p for AJAY    ID: afebrile, no leukocytosis     30 critical care at risk for stroke, MI

## 2023-08-04 NOTE — PROGRESS NOTE ADULT - SUBJECTIVE AND OBJECTIVE BOX
Subjective and Objective:   HOSPITAL COURSE: 68yo woman PMH seizure on keppra, CVA on plavix with unknown residual deficit, HLD, HTN p/w worsening and more prolonged episodes of aphasia/blank staring/unresponsiveness over two weeks. The episodes became more frequent and today, per daughter, it lasted for hours. Brought to ED, where she was found to have R facial droop, RUE weakness, dysarthria, and global aphasia. NIHSS 12. CTA showing c/f L MCA occlusion.     7/23 Adm NSCU s/p angiogram showing R M2/MCA occlusion with distal retrograde reconstitution, aspiration thrombectomy attempted without recanalization.   7/24: no acute overnight events  7/25: Aphasic when SBP dropped to 110 overnight, daughter reports slowed speech this morning   7/26: Pressure dependent exam, exam worsened when SBP <160  7/27: Mild confusion over night. Negative balance. Will put her on maintenance fluids to decrease the pressors requirements  7/28: No acute overnight events. Failed a challenge with a lower BP parameter: 120-160. Increase midodrine to 15 q8  7/29: no acute overnight events. STA-MCA Bypass on Monday?  7/30 BP dropped < 160 mm her neuro exam got worse, phenylephrine   7/31: Patient still symptomatic on Low BP, responds extremely well to midodrine and BP rises significantly   8/1: On jose still, possible angio for STA-MCA bypass on 8/2.  8/2- No events.   8/3- Patient still pressure dependent. Exam worsens when Bp drops <160 mmHg. No other changes.  8/4- Patient off pressors. No changes in exam at /80.     Allergies    No Known Allergies    REVIEW OF SYSTEMS: [ x] Unable to Assess due to neurologic exam   [ ] All ROS addressed below are non-contributory, except:  Neuro: [ ] Headache [ ] Back pain [ ] Numbness [ ] Weakness [ ] Ataxia [ ] Dizziness [ ] Aphasia [ ] Dysarthria [ ] Visual disturbance  Resp: [ ] Shortness of breath/dyspnea, [ ] Orthopnea [ ] Cough  CV: [ ] Chest pain [ ] Palpitation [ ] Lightheadedness [ ] Syncope  Renal: [ ] Thirst [ ] Edema  GI: [ ] Nausea [ ] Emesis [ ] Abdominal pain [ ] Constipation [ ] Diarrhea  Hem: [ ] Hematemesis [ ] bright red blood per rectum  ID: [ ] Fever [ ] Chills [ ] Dysuria  ENT: [ ] Rhinorrhea      DEVICES:   [ ] Restraints [ ] ET tube [ ] central line [ ] arterial line [ ] gonzalez [ ] NGT/OGT [ ] EVD [ ] LD [ ] FRANTZ/HMV [ ] Trach [ ] PEG [ ] Chest Tube       ICU Vital Signs Last 24 Hrs  T(C): 36.5 (02 Aug 2023 19:00), Max: 36.8 (02 Aug 2023 16:00)  T(F): 97.7 (02 Aug 2023 19:00), Max: 98.2 (02 Aug 2023 16:00)  HR: 110 (02 Aug 2023 22:00) (73 - 110)  BP: 182/72 (02 Aug 2023 00:38) (182/72 - 182/72)  BP(mean): --  ABP: 148/64 (02 Aug 2023 22:00) (124/59 - 218/86)  ABP(mean): 101 (02 Aug 2023 22:00) (86 - 139)  RR: 19 (02 Aug 2023 22:00) (10 - 23)  SpO2: 99% (02 Aug 2023 19:30) (86% - 100%)    O2 Parameters below as of 02 Aug 2023 19:00  Patient On (Oxygen Delivery Method): room air        I&O's Detail    01 Aug 2023 07:01  -  02 Aug 2023 07:00  --------------------------------------------------------  IN:    multiple electrolytes Injection Type 1.: 50 mL    Oral Fluid: 980 mL    Phenylephrine: 176.7 mL    sodium chloride 0.9%: 600 mL    Sodium Chloride 0.9% Bolus: 1000 mL  Total IN: 2806.7 mL    OUT:    Incontinent per Collection Bag (mL): 2150 mL  Total OUT: 2150 mL    Total NET: 656.7 mL      02 Aug 2023 07:01  -  02 Aug 2023 22:48  --------------------------------------------------------  IN:    IV PiggyBack: 50 mL    Oral Fluid: 240 mL    Phenylephrine: 15.1 mL    Phenylephrine: 6.5 mL    sodium chloride 0.9%: 75 mL    sodium chloride 0.9%: 600 mL  Total IN: 986.6 mL    OUT:    Indwelling Catheter - Urethral (mL): 925 mL  Total OUT: 925 mL    Total NET: 61.6 mL        MEDICATIONS  (STANDING):  aspirin  chewable 81 milliGRAM(s) Oral daily  atorvastatin 80 milliGRAM(s) Oral at bedtime  ceFAZolin   IVPB 2000 milliGRAM(s) IV Intermittent every 8 hours  ferrous    sulfate 325 milliGRAM(s) Oral <User Schedule>  fludroCORTISONE 0.1 milliGRAM(s) Oral every 12 hours  insulin glargine Injectable (LANTUS) 15 Unit(s) SubCutaneous at bedtime  insulin lispro (ADMELOG) corrective regimen sliding scale   SubCutaneous Before meals and at bedtime  insulin lispro Injectable (ADMELOG) 6 Unit(s) SubCutaneous three times a day before meals  levETIRAcetam  IVPB 500 milliGRAM(s) IV Intermittent every 12 hours  midodrine 10 milliGRAM(s) Oral <User Schedule>  phenylephrine    Infusion 0.1 MICROgram(s)/kG/Min (2.16 mL/Hr) IV Continuous <Continuous>  polyethylene glycol 3350 17 Gram(s) Oral two times a day  potassium chloride    Tablet ER 20 milliEquivalent(s) Oral daily  senna 2 Tablet(s) Oral at bedtime  sodium chloride 0.9%. 1000 milliLiter(s) (100 mL/Hr) IV Continuous <Continuous>    MEDICATIONS  (PRN):  acetaminophen     Tablet .. 650 milliGRAM(s) Oral every 6 hours PRN Mild Pain (1 - 3)  acetaminophen     Tablet .. 650 milliGRAM(s) Oral every 6 hours PRN Mild Pain (1 - 3)  oxyCODONE    IR 5 milliGRAM(s) Oral every 4 hours PRN Moderate Pain (4 - 6)      CTP 7/23  IMPRESSION:    CT angiography neck: No hemodynamically significant stenosis byNASCET   criteria. No vascular dissection.    CT angiography brain: No major vessel occlusion or proximal stenosis. 2   mm left M1 bifurcation aneurysm    CT perfusion: Approximately 7 mL of ischemic penumbra is seen in the left   frontoparietal cortex.      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, EOMI, FC, speech improved, some dysarthria and minor left facial asymmetry    Motor exam:           Upper extremity                         Delt     Bicep     Tricep    HG                                                 R         5/5       5/5        5/5        5/5                                               L          5/5       5/5        5/5        5/5             Lower extremity                        HF         KF        KE       DF         PF                                                  R        5/5       5/5        5/5        5/5      5/5                                               L         5/5       5/5        5/5        5/5      5/5                                                  Sensation: [x] intact to light touch  [ ] decreased:     Reflexes: Deep Tendon Reflexes Intact     Pulmonary: Clear to Auscultation, No rales, No rhonchi, No wheezes     Cardiovascular: S1, S2, Regular rate and rhythm     Gastrointestinal: Soft, Non-tender, Non-distended     Extremities: No calf tenderness       LABS:       LABS:  Na: 139 (07-29 @ 00:44), 139 (07-28 @ 04:03)  K: 4.1 (07-29 @ 00:44), 4.2 (07-28 @ 04:03)  Cl: 103 (07-29 @ 00:44), 105 (07-28 @ 04:03)  CO2: 24 (07-29 @ 00:44), 24 (07-28 @ 04:03)  BUN: 20 (07-29 @ 00:44), 22 (07-28 @ 04:03)  Cr: 0.75 (07-29 @ 00:44), 0.73 (07-28 @ 04:03)  Glu: 195(07-29 @ 00:44), 142(07-28 @ 04:03)    Hgb: 9.3 (07-29 @ 00:43), 9.1 (07-28 @ 04:03)  Hct: 29.4 (07-29 @ 00:43), 29.2 (07-28 @ 04:03)  WBC: 8.35 (07-29 @ 00:43), 8.25 (07-28 @ 04:03)  Plt: 265 (07-29 @ 00:43), 252 (07-28 @ 04:03)    INR:   PTT:

## 2023-08-04 NOTE — PROGRESS NOTE ADULT - SUBJECTIVE AND OBJECTIVE BOX
Patient seen and examined at bedside.    --Anticoagulation--  aspirin  chewable 81 milliGRAM(s) Oral daily    T(C): 36.8 (08-03-23 @ 23:00), Max: 36.8 (08-03-23 @ 03:00)  HR: 97 (08-03-23 @ 23:45) (92 - 108)  BP: --  RR: 18 (08-03-23 @ 23:45) (11 - 26)  SpO2: 95% (08-03-23 @ 23:45) (94% - 100%)  Wt(kg): --    Exam: AOx3, FC, Rt eye blind, FRANCIS 5/5

## 2023-08-04 NOTE — PROGRESS NOTE ADULT - ASSESSMENT
67F hx seizures on keppra, CVA on plavix with unknown residual deficit, HLD, HTN p/w worsening and more prolonged episodes of aphasia/blank staring/unresponsiveness over two weeks. Brought to ED, where she was found to have R facial droop, RUE weakness, dysarthria, and global aphasia. NIHSS 12. CTA showing c/f L MCA occlusion. Now s/p L STA-MCA bypass.    -exam stable overnight  --160, on midodrine  -NS at 100/hr  -responded to 1 unit of PRBC

## 2023-08-04 NOTE — PROGRESS NOTE ADULT - SUBJECTIVE AND OBJECTIVE BOX
Subjective and Objective:   HOSPITAL COURSE: 68yo woman PMH seizure on keppra, CVA on plavix with unknown residual deficit, HLD, HTN p/w worsening and more prolonged episodes of aphasia/blank staring/unresponsiveness over two weeks. The episodes became more frequent and today, per daughter, it lasted for hours. Brought to ED, where she was found to have R facial droop, RUE weakness, dysarthria, and global aphasia. NIHSS 12. CTA showing c/f L MCA occlusion.     7/23 Adm NSCU s/p angiogram showing R M2/MCA occlusion with distal retrograde reconstitution, aspiration thrombectomy attempted without recanalization.   7/24: no acute overnight events  7/25: Aphasic when SBP dropped to 110 overnight, daughter reports slowed speech this morning   7/26: Pressure dependent exam, exam worsened when SBP <160  7/27: Mild confusion over night. Negative balance. Will put her on maintenance fluids to decrease the pressors requirements  7/28: No acute overnight events. Failed a challenge with a lower BP parameter: 120-160. Increase midodrine to 15 q8  7/29: no acute overnight events. STA-MCA Bypass on Monday?  7/30 BP dropped < 160 mm her neuro exam got worse, phenylephrine   7/31: Patient still symptomatic on Low BP, responds extremely well to midodrine and BP rises significantly   8/1: On jose still, possible angio for STA-MCA bypass on 8/2.  8/2- No events.   8/3- Patient still pressure dependent. Exam worsens when Bp drops <160 mmHg. No other changes.  8/4- Patient off pressors. No changes in exam.    Allergies    No Known Allergies    REVIEW OF SYSTEMS: [ x] Unable to Assess due to neurologic exam   [ ] All ROS addressed below are non-contributory, except:  Neuro: [ ] Headache [ ] Back pain [ ] Numbness [ ] Weakness [ ] Ataxia [ ] Dizziness [ ] Aphasia [ ] Dysarthria [ ] Visual disturbance  Resp: [ ] Shortness of breath/dyspnea, [ ] Orthopnea [ ] Cough  CV: [ ] Chest pain [ ] Palpitation [ ] Lightheadedness [ ] Syncope  Renal: [ ] Thirst [ ] Edema  GI: [ ] Nausea [ ] Emesis [ ] Abdominal pain [ ] Constipation [ ] Diarrhea  Hem: [ ] Hematemesis [ ] bright red blood per rectum  ID: [ ] Fever [ ] Chills [ ] Dysuria  ENT: [ ] Rhinorrhea      DEVICES:   [ ] Restraints [ ] ET tube [ ] central line [ ] arterial line [ ] gonzalez [ ] NGT/OGT [ ] EVD [ ] LD [ ] FRANTZ/HMV [ ] Trach [ ] PEG [ ] Chest Tube       ICU Vital Signs Last 24 Hrs  T(C): 36.5 (02 Aug 2023 19:00), Max: 36.8 (02 Aug 2023 16:00)  T(F): 97.7 (02 Aug 2023 19:00), Max: 98.2 (02 Aug 2023 16:00)  HR: 110 (02 Aug 2023 22:00) (73 - 110)  BP: 182/72 (02 Aug 2023 00:38) (182/72 - 182/72)  BP(mean): --  ABP: 148/64 (02 Aug 2023 22:00) (124/59 - 218/86)  ABP(mean): 101 (02 Aug 2023 22:00) (86 - 139)  RR: 19 (02 Aug 2023 22:00) (10 - 23)  SpO2: 99% (02 Aug 2023 19:30) (86% - 100%)    O2 Parameters below as of 02 Aug 2023 19:00  Patient On (Oxygen Delivery Method): room air        I&O's Detail    01 Aug 2023 07:01  -  02 Aug 2023 07:00  --------------------------------------------------------  IN:    multiple electrolytes Injection Type 1.: 50 mL    Oral Fluid: 980 mL    Phenylephrine: 176.7 mL    sodium chloride 0.9%: 600 mL    Sodium Chloride 0.9% Bolus: 1000 mL  Total IN: 2806.7 mL    OUT:    Incontinent per Collection Bag (mL): 2150 mL  Total OUT: 2150 mL    Total NET: 656.7 mL      02 Aug 2023 07:01  -  02 Aug 2023 22:48  --------------------------------------------------------  IN:    IV PiggyBack: 50 mL    Oral Fluid: 240 mL    Phenylephrine: 15.1 mL    Phenylephrine: 6.5 mL    sodium chloride 0.9%: 75 mL    sodium chloride 0.9%: 600 mL  Total IN: 986.6 mL    OUT:    Indwelling Catheter - Urethral (mL): 925 mL  Total OUT: 925 mL    Total NET: 61.6 mL        MEDICATIONS  (STANDING):  aspirin  chewable 81 milliGRAM(s) Oral daily  atorvastatin 80 milliGRAM(s) Oral at bedtime  ceFAZolin   IVPB 2000 milliGRAM(s) IV Intermittent every 8 hours  ferrous    sulfate 325 milliGRAM(s) Oral <User Schedule>  fludroCORTISONE 0.1 milliGRAM(s) Oral every 12 hours  insulin glargine Injectable (LANTUS) 15 Unit(s) SubCutaneous at bedtime  insulin lispro (ADMELOG) corrective regimen sliding scale   SubCutaneous Before meals and at bedtime  insulin lispro Injectable (ADMELOG) 6 Unit(s) SubCutaneous three times a day before meals  levETIRAcetam  IVPB 500 milliGRAM(s) IV Intermittent every 12 hours  midodrine 10 milliGRAM(s) Oral <User Schedule>  phenylephrine    Infusion 0.1 MICROgram(s)/kG/Min (2.16 mL/Hr) IV Continuous <Continuous>  polyethylene glycol 3350 17 Gram(s) Oral two times a day  potassium chloride    Tablet ER 20 milliEquivalent(s) Oral daily  senna 2 Tablet(s) Oral at bedtime  sodium chloride 0.9%. 1000 milliLiter(s) (100 mL/Hr) IV Continuous <Continuous>    MEDICATIONS  (PRN):  acetaminophen     Tablet .. 650 milliGRAM(s) Oral every 6 hours PRN Mild Pain (1 - 3)  acetaminophen     Tablet .. 650 milliGRAM(s) Oral every 6 hours PRN Mild Pain (1 - 3)  oxyCODONE    IR 5 milliGRAM(s) Oral every 4 hours PRN Moderate Pain (4 - 6)      CTP 7/23  IMPRESSION:    CT angiography neck: No hemodynamically significant stenosis byNASCET   criteria. No vascular dissection.    CT angiography brain: No major vessel occlusion or proximal stenosis. 2   mm left M1 bifurcation aneurysm    CT perfusion: Approximately 7 mL of ischemic penumbra is seen in the left   frontoparietal cortex.      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, EOMI, FC, speech improved, some dysarthria and minor left facial asymmetry    Motor exam:           Upper extremity                         Delt     Bicep     Tricep    HG                                                 R         5/5       5/5        5/5        5/5                                               L          5/5       5/5        5/5        5/5             Lower extremity                        HF         KF        KE       DF         PF                                                  R        5/5       5/5        5/5        5/5      5/5                                               L         5/5       5/5        5/5        5/5      5/5                                                  Sensation: [x] intact to light touch  [ ] decreased:     Reflexes: Deep Tendon Reflexes Intact     Pulmonary: Clear to Auscultation, No rales, No rhonchi, No wheezes     Cardiovascular: S1, S2, Regular rate and rhythm     Gastrointestinal: Soft, Non-tender, Non-distended     Extremities: No calf tenderness       LABS:       LABS:  Na: 139 (07-29 @ 00:44), 139 (07-28 @ 04:03)  K: 4.1 (07-29 @ 00:44), 4.2 (07-28 @ 04:03)  Cl: 103 (07-29 @ 00:44), 105 (07-28 @ 04:03)  CO2: 24 (07-29 @ 00:44), 24 (07-28 @ 04:03)  BUN: 20 (07-29 @ 00:44), 22 (07-28 @ 04:03)  Cr: 0.75 (07-29 @ 00:44), 0.73 (07-28 @ 04:03)  Glu: 195(07-29 @ 00:44), 142(07-28 @ 04:03)    Hgb: 9.3 (07-29 @ 00:43), 9.1 (07-28 @ 04:03)  Hct: 29.4 (07-29 @ 00:43), 29.2 (07-28 @ 04:03)  WBC: 8.35 (07-29 @ 00:43), 8.25 (07-28 @ 04:03)  Plt: 265 (07-29 @ 00:43), 252 (07-28 @ 04:03)    INR:   PTT:                                  Subjective and Objective:   HOSPITAL COURSE: 66yo woman PMH seizure on keppra, CVA on plavix with unknown residual deficit, HLD, HTN p/w worsening and more prolonged episodes of aphasia/blank staring/unresponsiveness over two weeks. The episodes became more frequent and today, per daughter, it lasted for hours. Brought to ED, where she was found to have R facial droop, RUE weakness, dysarthria, and global aphasia. NIHSS 12. CTA showing c/f L MCA occlusion.     7/23 Adm NSCU s/p angiogram showing R M2/MCA occlusion with distal retrograde reconstitution, aspiration thrombectomy attempted without recanalization.   7/24: no acute overnight events  7/25: Aphasic when SBP dropped to 110 overnight, daughter reports slowed speech this morning   7/26: Pressure dependent exam, exam worsened when SBP <160  7/27: Mild confusion over night. Negative balance. Will put her on maintenance fluids to decrease the pressors requirements  7/28: No acute overnight events. Failed a challenge with a lower BP parameter: 120-160. Increase midodrine to 15 q8  7/29: no acute overnight events. STA-MCA Bypass on Monday?  7/30 BP dropped < 160 mm her neuro exam got worse, phenylephrine   7/31: Patient still symptomatic on Low BP, responds extremely well to midodrine and BP rises significantly   8/1: On jose still, possible angio for STA-MCA bypass on 8/2.  8/2- No events.   8/3- Patient still pressure dependent. Exam worsens when Bp drops <160 mmHg. No other changes.  8/4- Patient off pressors. No changes in exam at /80.     Allergies    No Known Allergies    REVIEW OF SYSTEMS: [ x] Unable to Assess due to neurologic exam   [ ] All ROS addressed below are non-contributory, except:  Neuro: [ ] Headache [ ] Back pain [ ] Numbness [ ] Weakness [ ] Ataxia [ ] Dizziness [ ] Aphasia [ ] Dysarthria [ ] Visual disturbance  Resp: [ ] Shortness of breath/dyspnea, [ ] Orthopnea [ ] Cough  CV: [ ] Chest pain [ ] Palpitation [ ] Lightheadedness [ ] Syncope  Renal: [ ] Thirst [ ] Edema  GI: [ ] Nausea [ ] Emesis [ ] Abdominal pain [ ] Constipation [ ] Diarrhea  Hem: [ ] Hematemesis [ ] bright red blood per rectum  ID: [ ] Fever [ ] Chills [ ] Dysuria  ENT: [ ] Rhinorrhea      DEVICES:   [ ] Restraints [ ] ET tube [ ] central line [ ] arterial line [ ] gonzalez [ ] NGT/OGT [ ] EVD [ ] LD [ ] FRANTZ/HMV [ ] Trach [ ] PEG [ ] Chest Tube       ICU Vital Signs Last 24 Hrs  T(C): 36.5 (02 Aug 2023 19:00), Max: 36.8 (02 Aug 2023 16:00)  T(F): 97.7 (02 Aug 2023 19:00), Max: 98.2 (02 Aug 2023 16:00)  HR: 110 (02 Aug 2023 22:00) (73 - 110)  BP: 182/72 (02 Aug 2023 00:38) (182/72 - 182/72)  BP(mean): --  ABP: 148/64 (02 Aug 2023 22:00) (124/59 - 218/86)  ABP(mean): 101 (02 Aug 2023 22:00) (86 - 139)  RR: 19 (02 Aug 2023 22:00) (10 - 23)  SpO2: 99% (02 Aug 2023 19:30) (86% - 100%)    O2 Parameters below as of 02 Aug 2023 19:00  Patient On (Oxygen Delivery Method): room air        I&O's Detail    01 Aug 2023 07:01  -  02 Aug 2023 07:00  --------------------------------------------------------  IN:    multiple electrolytes Injection Type 1.: 50 mL    Oral Fluid: 980 mL    Phenylephrine: 176.7 mL    sodium chloride 0.9%: 600 mL    Sodium Chloride 0.9% Bolus: 1000 mL  Total IN: 2806.7 mL    OUT:    Incontinent per Collection Bag (mL): 2150 mL  Total OUT: 2150 mL    Total NET: 656.7 mL      02 Aug 2023 07:01  -  02 Aug 2023 22:48  --------------------------------------------------------  IN:    IV PiggyBack: 50 mL    Oral Fluid: 240 mL    Phenylephrine: 15.1 mL    Phenylephrine: 6.5 mL    sodium chloride 0.9%: 75 mL    sodium chloride 0.9%: 600 mL  Total IN: 986.6 mL    OUT:    Indwelling Catheter - Urethral (mL): 925 mL  Total OUT: 925 mL    Total NET: 61.6 mL        MEDICATIONS  (STANDING):  aspirin  chewable 81 milliGRAM(s) Oral daily  atorvastatin 80 milliGRAM(s) Oral at bedtime  ceFAZolin   IVPB 2000 milliGRAM(s) IV Intermittent every 8 hours  ferrous    sulfate 325 milliGRAM(s) Oral <User Schedule>  fludroCORTISONE 0.1 milliGRAM(s) Oral every 12 hours  insulin glargine Injectable (LANTUS) 15 Unit(s) SubCutaneous at bedtime  insulin lispro (ADMELOG) corrective regimen sliding scale   SubCutaneous Before meals and at bedtime  insulin lispro Injectable (ADMELOG) 6 Unit(s) SubCutaneous three times a day before meals  levETIRAcetam  IVPB 500 milliGRAM(s) IV Intermittent every 12 hours  midodrine 10 milliGRAM(s) Oral <User Schedule>  phenylephrine    Infusion 0.1 MICROgram(s)/kG/Min (2.16 mL/Hr) IV Continuous <Continuous>  polyethylene glycol 3350 17 Gram(s) Oral two times a day  potassium chloride    Tablet ER 20 milliEquivalent(s) Oral daily  senna 2 Tablet(s) Oral at bedtime  sodium chloride 0.9%. 1000 milliLiter(s) (100 mL/Hr) IV Continuous <Continuous>    MEDICATIONS  (PRN):  acetaminophen     Tablet .. 650 milliGRAM(s) Oral every 6 hours PRN Mild Pain (1 - 3)  acetaminophen     Tablet .. 650 milliGRAM(s) Oral every 6 hours PRN Mild Pain (1 - 3)  oxyCODONE    IR 5 milliGRAM(s) Oral every 4 hours PRN Moderate Pain (4 - 6)      CTP 7/23  IMPRESSION:    CT angiography neck: No hemodynamically significant stenosis byNASCET   criteria. No vascular dissection.    CT angiography brain: No major vessel occlusion or proximal stenosis. 2   mm left M1 bifurcation aneurysm    CT perfusion: Approximately 7 mL of ischemic penumbra is seen in the left   frontoparietal cortex.      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, EOMI, FC, speech improved, some dysarthria and minor left facial asymmetry    Motor exam:           Upper extremity                         Delt     Bicep     Tricep    HG                                                 R         5/5       5/5        5/5        5/5                                               L          5/5       5/5        5/5        5/5             Lower extremity                        HF         KF        KE       DF         PF                                                  R        5/5       5/5        5/5        5/5      5/5                                               L         5/5       5/5        5/5        5/5      5/5                                                  Sensation: [x] intact to light touch  [ ] decreased:     Reflexes: Deep Tendon Reflexes Intact     Pulmonary: Clear to Auscultation, No rales, No rhonchi, No wheezes     Cardiovascular: S1, S2, Regular rate and rhythm     Gastrointestinal: Soft, Non-tender, Non-distended     Extremities: No calf tenderness       LABS:       LABS:  Na: 139 (07-29 @ 00:44), 139 (07-28 @ 04:03)  K: 4.1 (07-29 @ 00:44), 4.2 (07-28 @ 04:03)  Cl: 103 (07-29 @ 00:44), 105 (07-28 @ 04:03)  CO2: 24 (07-29 @ 00:44), 24 (07-28 @ 04:03)  BUN: 20 (07-29 @ 00:44), 22 (07-28 @ 04:03)  Cr: 0.75 (07-29 @ 00:44), 0.73 (07-28 @ 04:03)  Glu: 195(07-29 @ 00:44), 142(07-28 @ 04:03)    Hgb: 9.3 (07-29 @ 00:43), 9.1 (07-28 @ 04:03)  Hct: 29.4 (07-29 @ 00:43), 29.2 (07-28 @ 04:03)  WBC: 8.35 (07-29 @ 00:43), 8.25 (07-28 @ 04:03)  Plt: 265 (07-29 @ 00:43), 252 (07-28 @ 04:03)    INR:   PTT:

## 2023-08-05 LAB
GLUCOSE BLDC GLUCOMTR-MCNC: 135 MG/DL — HIGH (ref 70–99)
GLUCOSE BLDC GLUCOMTR-MCNC: 156 MG/DL — HIGH (ref 70–99)
GLUCOSE BLDC GLUCOMTR-MCNC: 84 MG/DL — SIGNIFICANT CHANGE UP (ref 70–99)
GLUCOSE BLDC GLUCOMTR-MCNC: 85 MG/DL — SIGNIFICANT CHANGE UP (ref 70–99)

## 2023-08-05 PROCEDURE — 99233 SBSQ HOSP IP/OBS HIGH 50: CPT

## 2023-08-05 RX ORDER — LABETALOL HCL 100 MG
10 TABLET ORAL ONCE
Refills: 0 | Status: COMPLETED | OUTPATIENT
Start: 2023-08-05 | End: 2023-08-05

## 2023-08-05 RX ORDER — SODIUM CHLORIDE 9 MG/ML
1000 INJECTION INTRAMUSCULAR; INTRAVENOUS; SUBCUTANEOUS
Refills: 0 | Status: DISCONTINUED | OUTPATIENT
Start: 2023-08-05 | End: 2023-08-05

## 2023-08-05 RX ORDER — INSULIN GLARGINE 100 [IU]/ML
15 INJECTION, SOLUTION SUBCUTANEOUS AT BEDTIME
Refills: 0 | Status: DISCONTINUED | OUTPATIENT
Start: 2023-08-05 | End: 2023-08-08

## 2023-08-05 RX ORDER — INSULIN LISPRO 100/ML
5 VIAL (ML) SUBCUTANEOUS ONCE
Refills: 0 | Status: COMPLETED | OUTPATIENT
Start: 2023-08-05 | End: 2023-08-05

## 2023-08-05 RX ORDER — SODIUM CHLORIDE 9 MG/ML
1000 INJECTION INTRAMUSCULAR; INTRAVENOUS; SUBCUTANEOUS
Refills: 0 | Status: DISCONTINUED | OUTPATIENT
Start: 2023-08-05 | End: 2023-08-10

## 2023-08-05 RX ORDER — METOPROLOL TARTRATE 50 MG
25 TABLET ORAL
Refills: 0 | Status: DISCONTINUED | OUTPATIENT
Start: 2023-08-05 | End: 2023-08-05

## 2023-08-05 RX ORDER — SODIUM CHLORIDE 9 MG/ML
500 INJECTION INTRAMUSCULAR; INTRAVENOUS; SUBCUTANEOUS ONCE
Refills: 0 | Status: COMPLETED | OUTPATIENT
Start: 2023-08-05 | End: 2023-08-05

## 2023-08-05 RX ORDER — SODIUM CHLORIDE 9 MG/ML
250 INJECTION INTRAMUSCULAR; INTRAVENOUS; SUBCUTANEOUS ONCE
Refills: 0 | Status: COMPLETED | OUTPATIENT
Start: 2023-08-05 | End: 2023-08-05

## 2023-08-05 RX ORDER — HYDRALAZINE HCL 50 MG
5 TABLET ORAL ONCE
Refills: 0 | Status: COMPLETED | OUTPATIENT
Start: 2023-08-05 | End: 2023-08-05

## 2023-08-05 RX ADMIN — ATORVASTATIN CALCIUM 80 MILLIGRAM(S): 80 TABLET, FILM COATED ORAL at 21:39

## 2023-08-05 RX ADMIN — Medication 5 MILLIGRAM(S): at 13:03

## 2023-08-05 RX ADMIN — Medication 2: at 16:53

## 2023-08-05 RX ADMIN — Medication 10 MILLIGRAM(S): at 12:40

## 2023-08-05 RX ADMIN — CHLORHEXIDINE GLUCONATE 1 APPLICATION(S): 213 SOLUTION TOPICAL at 11:15

## 2023-08-05 RX ADMIN — Medication 5 UNIT(S): at 08:25

## 2023-08-05 RX ADMIN — LEVETIRACETAM 500 MILLIGRAM(S): 250 TABLET, FILM COATED ORAL at 17:01

## 2023-08-05 RX ADMIN — SODIUM CHLORIDE 75 MILLILITER(S): 9 INJECTION INTRAMUSCULAR; INTRAVENOUS; SUBCUTANEOUS at 05:26

## 2023-08-05 RX ADMIN — INSULIN GLARGINE 15 UNIT(S): 100 INJECTION, SOLUTION SUBCUTANEOUS at 21:39

## 2023-08-05 RX ADMIN — Medication 10 UNIT(S): at 16:53

## 2023-08-05 RX ADMIN — LEVETIRACETAM 500 MILLIGRAM(S): 250 TABLET, FILM COATED ORAL at 05:26

## 2023-08-05 RX ADMIN — SODIUM CHLORIDE 100 MILLILITER(S): 9 INJECTION INTRAMUSCULAR; INTRAVENOUS; SUBCUTANEOUS at 10:22

## 2023-08-05 RX ADMIN — Medication 25 MILLIGRAM(S): at 08:25

## 2023-08-05 RX ADMIN — Medication 81 MILLIGRAM(S): at 11:15

## 2023-08-05 RX ADMIN — SODIUM CHLORIDE 250 MILLILITER(S): 9 INJECTION INTRAMUSCULAR; INTRAVENOUS; SUBCUTANEOUS at 14:22

## 2023-08-05 RX ADMIN — SODIUM CHLORIDE 1000 MILLILITER(S): 9 INJECTION INTRAMUSCULAR; INTRAVENOUS; SUBCUTANEOUS at 10:28

## 2023-08-05 RX ADMIN — SODIUM CHLORIDE 100 MILLILITER(S): 9 INJECTION INTRAMUSCULAR; INTRAVENOUS; SUBCUTANEOUS at 19:33

## 2023-08-05 RX ADMIN — ENOXAPARIN SODIUM 40 MILLIGRAM(S): 100 INJECTION SUBCUTANEOUS at 17:01

## 2023-08-05 NOTE — PROVIDER CONTACT NOTE (OTHER) - REASON
Arterial line is not correlating with NIBP cuff
Arterial Line not reading accurately
Patient with altered mental status

## 2023-08-05 NOTE — PROVIDER CONTACT NOTE (OTHER) - ACTION/TREATMENT ORDERED:
As Per CHELO So please use NIBP cuff to titrate HARJEET until new a line can be placed
Arterial line to be rethread by provider. No additional change in treatment at this time will continue to assess
CHELO Ansari assessed patient at bedside, 500cc NS bolus to be administered. Blood pressure parameters adjusted to 120-160. Continue to monitor.

## 2023-08-05 NOTE — PROVIDER CONTACT NOTE (OTHER) - ASSESSMENT
Arterial line has damped wave form and no blood return
Mima has dampened wave form no blood return from Mima
Patient experiencing altered mental status. Blurry vision, word finding difficulties.

## 2023-08-05 NOTE — PROGRESS NOTE ADULT - ASSESSMENT
67F hx seizures on keppra, CVA on plavix with unknown residual deficit, HLD, HTN p/w worsening and more prolonged episodes of aphasia/blank staring/unresponsiveness over two weeks. Brought to ED, where she was found to have R facial droop, RUE weakness, dysarthria, and global aphasia. NIHSS 12. CTA showing c/f L MCA occlusion. Now s/p L STA-MCA bypass.    -exam stable overnight  -SBP  ; off pressors  -NS at 75/hr

## 2023-08-05 NOTE — PROGRESS NOTE ADULT - ASSESSMENT
Assessment and Plan: 	  ASSESSMENT/PLAN: chronic M2 occlusion, s/p L STA-MCA bypass.     NEURO:  neuro checks q 2hr   cont keppra home medication   ASA, hold plavix  Continue fluids @ 100 until DC from hospital   s/p L STA-MCA bypass. Attempted aspiration thrombectomy of L M2/MCA occlusion.   Activity: [x] mobilize as tolerated [] Bedrest [x] PT [x] OT [] PMNR    PULM:  RA     CV:  SBP goal 120-160 mmhg  TTE - 63% ejection fraction  Cont atorvastatin  Will discontinue lopressor and will hold off on standing medications  Cardiology consulted for recommendations on labile BP     RENAL:  NS @100L     GI:  Diet: CCD  GI prophylaxis [x] not indicated [] PPI home med [] other:  Bowel regimen [] colace [x] senna [x] other: miralax   LBM 8/4    ENDO:   A1C 8.5  Goal euglycemia (-180)  DM lantus 15 units, ISS  Will continue premeal 10 units TID  TSH WNL  Medium sliding scale    HEME/ONC:  H/H trending down, will transfuse if <7. Likely blood loss anemia, will repeat H/H now  VTE prophylaxis: [x] SCDs [x] chemoprophylaxis SQL [] hold chemoprophylaxis due to: [] high risk of DVT/PE on admission due to:  continue iron for microcytic anemia, previously on iron o/p for AJAY    ID: afebrile, leukocytosis trending down     30 critical care at risk for stroke, MI

## 2023-08-05 NOTE — PROGRESS NOTE ADULT - SUBJECTIVE AND OBJECTIVE BOX
Patient seen and examined at bedside.    --Anticoagulation--  aspirin  chewable 81 milliGRAM(s) Oral daily  enoxaparin Injectable 40 milliGRAM(s) SubCutaneous <User Schedule>    T(C): 36.9 (08-04-23 @ 23:00), Max: 36.9 (08-04-23 @ 15:00)  HR: 93 (08-05-23 @ 01:00) (87 - 104)  BP: --  RR: 18 (08-05-23 @ 01:00) (14 - 26)  SpO2: 95% (08-05-23 @ 01:00) (87% - 97%)  Wt(kg): --    Exam: intact

## 2023-08-05 NOTE — PROGRESS NOTE ADULT - ASSESSMENT
Assessment and Plan: 	  ASSESSMENT/PLAN: chronic M2 occlusion, s/p L STA-MCA bypass.     NEURO:  neuro checks q 2hr   cont keppra home medication   ASA, hold plavix  Continue fluids @ 100 until DC from hospital   s/p L STA-MCA bypass. Attempted aspiration thrombectomy of L M2/MCA occlusion.   Activity: [x] mobilize as tolerated [] Bedrest [x] PT [x] OT [] PMNR    PULM:  RA     CV:  SBP goal 120-160 mmhg  TTE - 63% ejection fraction  Cont atorvastatin  Will discontinue lopressor and will hold off on standing medications, try IVP    RENAL:  NS @100L     GI:  Diet: CCD  GI prophylaxis [x] not indicated [] PPI home med [] other:  Bowel regimen [] colace [x] senna [x] other: miralax   LBM 8/4    ENDO:   A1C 8.5  Goal euglycemia (-180)  DM lantus 15 units, ISS  Will continue premeal 10 units TID  TSH WNL  Medium sliding scale    HEME/ONC:  H/H trending down, will transfuse if <7. Likely blood loss anemia, will repeat H/H now  VTE prophylaxis: [x] SCDs [x] chemoprophylaxis SQL [] hold chemoprophylaxis due to: [] high risk of DVT/PE on admission due to:  continue iron for microcytic anemia, previously on iron o/p for AJAY    ID: afebrile, leukocytosis trending down      Assessment and Plan: 	  ASSESSMENT/PLAN: chronic M2 occlusion, s/p L STA-MCA bypass.     NEURO:  neuro checks q 2hr   cont keppra home medication   ASA, hold plavix  Continue fluids @ 100 until DC from hospital   s/p L STA-MCA bypass. Attempted aspiration thrombectomy of L M2/MCA occlusion.   Activity: [x] mobilize as tolerated [] Bedrest [x] PT [x] OT [] PMNR    PULM:  RA     CV:  SBP goal 120-160 mmhg  TTE - 63% ejection fraction  Cont atorvastatin  Start Amlodepine 2.5mg as per Dr Ferrari  Will discontinue lopressor and will hold off on standing medications, try IVP    RENAL:  NS @100L     GI:  Diet: CCD  GI prophylaxis [x] not indicated [] PPI home med [] other:  Bowel regimen [] colace [x] senna [x] other: miralax   LBM 8/4    ENDO:   A1C 8.5  Goal euglycemia (-180)  DM lantus 15 units, ISS  Will continue premeal 10 units TID  TSH WNL  Medium sliding scale    HEME/ONC:  H/H trending down, will transfuse if <7. Likely blood loss anemia, will repeat H/H now  VTE prophylaxis: [x] SCDs [x] chemoprophylaxis SQL [] hold chemoprophylaxis due to: [] high risk of DVT/PE on admission due to:  continue iron for microcytic anemia, previously on iron o/p for AJAY    ID: afebrile, leukocytosis trending down

## 2023-08-05 NOTE — PROGRESS NOTE ADULT - SUBJECTIVE AND OBJECTIVE BOX
Subjective and Objective:   HOSPITAL COURSE: 66yo woman PMH seizure on keppra, CVA on plavix with unknown residual deficit, HLD, HTN p/w worsening and more prolonged episodes of aphasia/blank staring/unresponsiveness over two weeks. The episodes became more frequent and today, per daughter, it lasted for hours. Brought to ED, where she was found to have R facial droop, RUE weakness, dysarthria, and global aphasia. NIHSS 12. CTA showing c/f L MCA occlusion.     7/23 Adm [facility] s/p angiogram showing R M2/MCA occlusion with distal retrograde reconstitution, aspiration thrombectomy attempted without recanalization.   7/24: no acute overnight events  7/25: Aphasic when SBP dropped to 110 overnight, daughter reports slowed speech this morning   7/26: Pressure dependent exam, exam worsened when SBP <160  7/27: Mild confusion over night. Negative balance. Will put her on maintenance fluids to decrease the pressors requirements  7/28: No acute overnight events. Failed a challenge with a lower BP parameter: 120-160. Increase midodrine to 15 q8  7/29: no acute overnight events. STA-MCA Bypass on Monday?  7/30 BP dropped < 160 mm her neuro exam got worse, phenylephrine   7/31: Patient still symptomatic on Low BP, responds extremely well to midodrine and BP rises significantly   8/1: On jose still, possible angio for STA-MCA bypass on 8/2.  8/2- No events.   8/3- Patient still pressure dependent. Exam worsens when Bp drops <160 mmHg. No other changes.  8/4- Patient off pressors. No changes in exam at /80.   8/5- No fluctuations in examination in the last 24 hours. BP liberalized and reaching higher than 160 at times, examined at 170 BP, with intact exam. ---OF NOTE: on  patient with word finding difficulty and R sided blurry vision     Allergies    No Known Allergies    REVIEW OF SYSTEMS: [ x] Unable to Assess due to neurologic exam   [ ] All ROS addressed below are non-contributory, except:  Neuro: [ ] Headache [ ] Back pain [ ] Numbness [ ] Weakness [ ] Ataxia [ ] Dizziness [ ] Aphasia [ ] Dysarthria [ ] Visual disturbance  Resp: [ ] Shortness of breath/dyspnea, [ ] Orthopnea [ ] Cough  CV: [ ] Chest pain [ ] Palpitation [ ] Lightheadedness [ ] Syncope  Renal: [ ] Thirst [ ] Edema  GI: [ ] Nausea [ ] Emesis [ ] Abdominal pain [ ] Constipation [ ] Diarrhea  Hem: [ ] Hematemesis [ ] bright red blood per rectum  ID: [ ] Fever [ ] Chills [ ] Dysuria  ENT: [ ] Rhinorrhea      DEVICES:   [ ] Restraints [ ] ET tube [ ] central line [ ] arterial line [ ] gonzalez [ ] NGT/OGT [ ] EVD [ ] LD [ ] FRANTZ/HMV [ ] Trach [ ] PEG [ ] Chest Tube       ICU Vital Signs Last 24 Hrs  ICU Vital Signs Last 24 Hrs  T(C): 36.5 (05 Aug 2023 19:00), Max: 38.6 (05 Aug 2023 15:00)  T(F): 97.7 (05 Aug 2023 19:00), Max: 101.5 (05 Aug 2023 15:00)  HR: 98 (05 Aug 2023 21:00) (79 - 103)  BP: --  BP(mean): --  ABP: 152/64 (05 Aug 2023 21:00) (104/48 - 191/79)  ABP(mean): 101 (05 Aug 2023 21:00) (71 - 128)  RR: 16 (05 Aug 2023 21:00) (13 - 19)  SpO2: 93% (05 Aug 2023 21:00) (93% - 98%)    O2 Parameters below as of 05 Aug 2023 19:00  Patient On (Oxygen Delivery Method): room air    I&O's Summary    04 Aug 2023 07:01  -  05 Aug 2023 07:00  --------------------------------------------------------  IN: 2900 mL / OUT: 2500 mL / NET: 400 mL    05 Aug 2023 07:01  -  05 Aug 2023 22:28  --------------------------------------------------------  IN: 2270 mL / OUT: 2100 mL / NET: 170 mL      MEDICATIONS  (STANDING):  MEDICATIONS  (STANDING):  aspirin  chewable 81 milliGRAM(s) Oral daily  atorvastatin 80 milliGRAM(s) Oral at bedtime  chlorhexidine 4% Liquid 1 Application(s) Topical daily  enoxaparin Injectable 40 milliGRAM(s) SubCutaneous <User Schedule>  ferrous    sulfate 325 milliGRAM(s) Oral <User Schedule>  insulin glargine Injectable (LANTUS) 15 Unit(s) SubCutaneous at bedtime  insulin lispro (ADMELOG) corrective regimen sliding scale   SubCutaneous Before meals and at bedtime  insulin lispro Injectable (ADMELOG) 10 Unit(s) SubCutaneous three times a day before meals  levETIRAcetam 500 milliGRAM(s) Oral two times a day  polyethylene glycol 3350 17 Gram(s) Oral two times a day  senna 2 Tablet(s) Oral at bedtime  sodium chloride 0.9%. 1000 milliLiter(s) (100 mL/Hr) IV Continuous <Continuous>    MEDICATIONS  (PRN):  acetaminophen     Tablet .. 650 milliGRAM(s) Oral every 6 hours PRN Mild Pain (1 - 3)  oxyCODONE    IR 5 milliGRAM(s) Oral every 4 hours PRN Moderate Pain (4 - 6)    CTP 7/23  IMPRESSION:    CT angiography neck: No hemodynamically significant stenosis byNASCET   criteria. No vascular dissection.    CT angiography brain: No major vessel occlusion or proximal stenosis. 2   mm left M1 bifurcation aneurysm    CT perfusion: Approximately 7 mL of ischemic penumbra is seen in the left   frontoparietal cortex.      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, EOMI, FC, speech improved    Motor exam:           Upper extremity                         Delt     Bicep     Tricep    HG                                                 R         5/5       5/5        5/5        5/5                                               L          5/5       5/5        5/5        5/5             Lower extremity                        HF         KF        KE       DF         PF                                                  R        5/5       5/5        5/5        5/5      5/5                                               L         5/5       5/5        5/5        5/5      5/5                                                Sensation: [x] intact to light touch  [ ] decreased:   Reflexes: Deep Tendon Reflexes Intact   Pulmonary: Clear to Auscultation, No rales, No rhonchi, No wheezes   Cardiovascular: S1, S2, Regular rate and rhythm   Gastrointestinal: Soft, Non-tender, Non-distended   Extremities: No calf tenderness                           8.7    12.63 )-----------( 264      ( 04 Aug 2023 11:54 )             27.0                                    Subjective and Objective:   HOSPITAL COURSE: 66yo woman PMH seizure on keppra, CVA on plavix with unknown residual deficit, HLD, HTN p/w worsening and more prolonged episodes of aphasia/blank staring/unresponsiveness over two weeks. The episodes became more frequent and today, per daughter, it lasted for hours. Brought to ED, where she was found to have R facial droop, RUE weakness, dysarthria, and global aphasia. NIHSS 12. CTA showing c/f L MCA occlusion.     7/23 Adm [facility] s/p angiogram showing R M2/MCA occlusion with distal retrograde reconstitution, aspiration thrombectomy attempted without recanalization.   7/24: no acute overnight events  7/25: Aphasic when SBP dropped to 110 overnight, daughter reports slowed speech this morning   7/26: Pressure dependent exam, exam worsened when SBP <160  7/27: Mild confusion over night. Negative balance. Will put her on maintenance fluids to decrease the pressors requirements  7/28: No acute overnight events. Failed a challenge with a lower BP parameter: 120-160. Increase midodrine to 15 q8  7/29: no acute overnight events. STA-MCA Bypass on Monday?  7/30 BP dropped < 160 mm her neuro exam got worse, phenylephrine   7/31: Patient still symptomatic on Low BP, responds extremely well to midodrine and BP rises significantly   8/1: On jose still, possible angio for STA-MCA bypass on 8/2.  8/2- No events.   8/3- Patient still pressure dependent. Exam worsens when Bp drops <160 mmHg. No other changes.  8/4- Patient off pressors. No changes in exam at /80.   8/5- No fluctuations in examination in the last 24 hours. BP liberalized and reaching higher than 160 at times, examined at 170 BP, with intact exam. ---OF NOTE: on  patient with word finding difficulty and R sided blurry vision   8/6 start on Amlodepine 2.5mg for BP optimization    Allergies    No Known Allergies    REVIEW OF SYSTEMS: [ x] Unable to Assess due to neurologic exam   [ ] All ROS addressed below are non-contributory, except:  Neuro: [ ] Headache [ ] Back pain [ ] Numbness [ ] Weakness [ ] Ataxia [ ] Dizziness [ ] Aphasia [ ] Dysarthria [ ] Visual disturbance  Resp: [ ] Shortness of breath/dyspnea, [ ] Orthopnea [ ] Cough  CV: [ ] Chest pain [ ] Palpitation [ ] Lightheadedness [ ] Syncope  Renal: [ ] Thirst [ ] Edema  GI: [ ] Nausea [ ] Emesis [ ] Abdominal pain [ ] Constipation [ ] Diarrhea  Hem: [ ] Hematemesis [ ] bright red blood per rectum  ID: [ ] Fever [ ] Chills [ ] Dysuria  ENT: [ ] Rhinorrhea      DEVICES:   [ ] Restraints [ ] ET tube [ ] central line [ ] arterial line [ ] gonzalez [ ] NGT/OGT [ ] EVD [ ] LD [ ] FRANTZ/HMV [ ] Trach [ ] PEG [ ] Chest Tube       ICU Vital Signs Last 24 Hrs  ICU Vital Signs Last 24 Hrs  T(C): 36.5 (05 Aug 2023 19:00), Max: 38.6 (05 Aug 2023 15:00)  T(F): 97.7 (05 Aug 2023 19:00), Max: 101.5 (05 Aug 2023 15:00)  HR: 98 (05 Aug 2023 21:00) (79 - 103)  BP: --  BP(mean): --  ABP: 152/64 (05 Aug 2023 21:00) (104/48 - 191/79)  ABP(mean): 101 (05 Aug 2023 21:00) (71 - 128)  RR: 16 (05 Aug 2023 21:00) (13 - 19)  SpO2: 93% (05 Aug 2023 21:00) (93% - 98%)    O2 Parameters below as of 05 Aug 2023 19:00  Patient On (Oxygen Delivery Method): room air    I&O's Summary    04 Aug 2023 07:01  -  05 Aug 2023 07:00  --------------------------------------------------------  IN: 2900 mL / OUT: 2500 mL / NET: 400 mL    05 Aug 2023 07:01  -  05 Aug 2023 22:28  --------------------------------------------------------  IN: 2270 mL / OUT: 2100 mL / NET: 170 mL      MEDICATIONS  (STANDING):  MEDICATIONS  (STANDING):  aspirin  chewable 81 milliGRAM(s) Oral daily  atorvastatin 80 milliGRAM(s) Oral at bedtime  chlorhexidine 4% Liquid 1 Application(s) Topical daily  enoxaparin Injectable 40 milliGRAM(s) SubCutaneous <User Schedule>  ferrous    sulfate 325 milliGRAM(s) Oral <User Schedule>  insulin glargine Injectable (LANTUS) 15 Unit(s) SubCutaneous at bedtime  insulin lispro (ADMELOG) corrective regimen sliding scale   SubCutaneous Before meals and at bedtime  insulin lispro Injectable (ADMELOG) 10 Unit(s) SubCutaneous three times a day before meals  levETIRAcetam 500 milliGRAM(s) Oral two times a day  polyethylene glycol 3350 17 Gram(s) Oral two times a day  senna 2 Tablet(s) Oral at bedtime  sodium chloride 0.9%. 1000 milliLiter(s) (100 mL/Hr) IV Continuous <Continuous>    MEDICATIONS  (PRN):  acetaminophen     Tablet .. 650 milliGRAM(s) Oral every 6 hours PRN Mild Pain (1 - 3)  oxyCODONE    IR 5 milliGRAM(s) Oral every 4 hours PRN Moderate Pain (4 - 6)    CTP 7/23  IMPRESSION:    CT angiography neck: No hemodynamically significant stenosis byNASCET   criteria. No vascular dissection.    CT angiography brain: No major vessel occlusion or proximal stenosis. 2   mm left M1 bifurcation aneurysm    CT perfusion: Approximately 7 mL of ischemic penumbra is seen in the left   frontoparietal cortex.      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, EOMI, FC, speech improved    Motor exam:           Upper extremity                         Delt     Bicep     Tricep    HG                                                 R         5/5       5/5        5/5        5/5                                               L          5/5       5/5        5/5        5/5             Lower extremity                        HF         KF        KE       DF         PF                                                  R        5/5       5/5        5/5        5/5      5/5                                               L         5/5       5/5        5/5        5/5      5/5                                                Sensation: [x] intact to light touch  [ ] decreased:   Reflexes: Deep Tendon Reflexes Intact   Pulmonary: Clear to Auscultation, No rales, No rhonchi, No wheezes   Cardiovascular: S1, S2, Regular rate and rhythm   Gastrointestinal: Soft, Non-tender, Non-distended   Extremities: No calf tenderness                           8.7    12.63 )-----------( 264      ( 04 Aug 2023 11:54 )             27.0

## 2023-08-05 NOTE — PROGRESS NOTE ADULT - SUBJECTIVE AND OBJECTIVE BOX
Subjective and Objective:   HOSPITAL COURSE: 66yo woman PMH seizure on keppra, CVA on plavix with unknown residual deficit, HLD, HTN p/w worsening and more prolonged episodes of aphasia/blank staring/unresponsiveness over two weeks. The episodes became more frequent and today, per daughter, it lasted for hours. Brought to ED, where she was found to have R facial droop, RUE weakness, dysarthria, and global aphasia. NIHSS 12. CTA showing c/f L MCA occlusion.     7/23 Adm NSCU s/p angiogram showing R M2/MCA occlusion with distal retrograde reconstitution, aspiration thrombectomy attempted without recanalization.   7/24: no acute overnight events  7/25: Aphasic when SBP dropped to 110 overnight, daughter reports slowed speech this morning   7/26: Pressure dependent exam, exam worsened when SBP <160  7/27: Mild confusion over night. Negative balance. Will put her on maintenance fluids to decrease the pressors requirements  7/28: No acute overnight events. Failed a challenge with a lower BP parameter: 120-160. Increase midodrine to 15 q8  7/29: no acute overnight events. STA-MCA Bypass on Monday?  7/30 BP dropped < 160 mm her neuro exam got worse, phenylephrine   7/31: Patient still symptomatic on Low BP, responds extremely well to midodrine and BP rises significantly   8/1: On jose still, possible angio for STA-MCA bypass on 8/2.  8/2- No events.   8/3- Patient still pressure dependent. Exam worsens when Bp drops <160 mmHg. No other changes.  8/4- Patient off pressors. No changes in exam at /80.   8/5- No fluctuations in examination in the last 24 hours. BP liberalized and reaching higher than 160 at times.     Allergies    No Known Allergies    REVIEW OF SYSTEMS: [ x] Unable to Assess due to neurologic exam   [ ] All ROS addressed below are non-contributory, except:  Neuro: [ ] Headache [ ] Back pain [ ] Numbness [ ] Weakness [ ] Ataxia [ ] Dizziness [ ] Aphasia [ ] Dysarthria [ ] Visual disturbance  Resp: [ ] Shortness of breath/dyspnea, [ ] Orthopnea [ ] Cough  CV: [ ] Chest pain [ ] Palpitation [ ] Lightheadedness [ ] Syncope  Renal: [ ] Thirst [ ] Edema  GI: [ ] Nausea [ ] Emesis [ ] Abdominal pain [ ] Constipation [ ] Diarrhea  Hem: [ ] Hematemesis [ ] bright red blood per rectum  ID: [ ] Fever [ ] Chills [ ] Dysuria  ENT: [ ] Rhinorrhea      DEVICES:   [ ] Restraints [ ] ET tube [ ] central line [ ] arterial line [ ] gonzalez [ ] NGT/OGT [ ] EVD [ ] LD [ ] FRANTZ/HMV [ ] Trach [ ] PEG [ ] Chest Tube       ICU Vital Signs Last 24 Hrs  T(C): 36.5 (02 Aug 2023 19:00), Max: 36.8 (02 Aug 2023 16:00)  T(F): 97.7 (02 Aug 2023 19:00), Max: 98.2 (02 Aug 2023 16:00)  HR: 110 (02 Aug 2023 22:00) (73 - 110)  BP: 182/72 (02 Aug 2023 00:38) (182/72 - 182/72)  BP(mean): --  ABP: 148/64 (02 Aug 2023 22:00) (124/59 - 218/86)  ABP(mean): 101 (02 Aug 2023 22:00) (86 - 139)  RR: 19 (02 Aug 2023 22:00) (10 - 23)  SpO2: 99% (02 Aug 2023 19:30) (86% - 100%)    O2 Parameters below as of 02 Aug 2023 19:00  Patient On (Oxygen Delivery Method): room air        I&O's Detail    01 Aug 2023 07:01  -  02 Aug 2023 07:00  --------------------------------------------------------  IN:    multiple electrolytes Injection Type 1.: 50 mL    Oral Fluid: 980 mL    Phenylephrine: 176.7 mL    sodium chloride 0.9%: 600 mL    Sodium Chloride 0.9% Bolus: 1000 mL  Total IN: 2806.7 mL    OUT:    Incontinent per Collection Bag (mL): 2150 mL  Total OUT: 2150 mL    Total NET: 656.7 mL      02 Aug 2023 07:01  -  02 Aug 2023 22:48  --------------------------------------------------------  IN:    IV PiggyBack: 50 mL    Oral Fluid: 240 mL    Phenylephrine: 15.1 mL    Phenylephrine: 6.5 mL    sodium chloride 0.9%: 75 mL    sodium chloride 0.9%: 600 mL  Total IN: 986.6 mL    OUT:    Indwelling Catheter - Urethral (mL): 925 mL  Total OUT: 925 mL    Total NET: 61.6 mL        MEDICATIONS  (STANDING):  aspirin  chewable 81 milliGRAM(s) Oral daily  atorvastatin 80 milliGRAM(s) Oral at bedtime  ceFAZolin   IVPB 2000 milliGRAM(s) IV Intermittent every 8 hours  ferrous    sulfate 325 milliGRAM(s) Oral <User Schedule>  fludroCORTISONE 0.1 milliGRAM(s) Oral every 12 hours  insulin glargine Injectable (LANTUS) 15 Unit(s) SubCutaneous at bedtime  insulin lispro (ADMELOG) corrective regimen sliding scale   SubCutaneous Before meals and at bedtime  insulin lispro Injectable (ADMELOG) 6 Unit(s) SubCutaneous three times a day before meals  levETIRAcetam  IVPB 500 milliGRAM(s) IV Intermittent every 12 hours  midodrine 10 milliGRAM(s) Oral <User Schedule>  phenylephrine    Infusion 0.1 MICROgram(s)/kG/Min (2.16 mL/Hr) IV Continuous <Continuous>  polyethylene glycol 3350 17 Gram(s) Oral two times a day  potassium chloride    Tablet ER 20 milliEquivalent(s) Oral daily  senna 2 Tablet(s) Oral at bedtime  sodium chloride 0.9%. 1000 milliLiter(s) (100 mL/Hr) IV Continuous <Continuous>    MEDICATIONS  (PRN):  acetaminophen     Tablet .. 650 milliGRAM(s) Oral every 6 hours PRN Mild Pain (1 - 3)  acetaminophen     Tablet .. 650 milliGRAM(s) Oral every 6 hours PRN Mild Pain (1 - 3)  oxyCODONE    IR 5 milliGRAM(s) Oral every 4 hours PRN Moderate Pain (4 - 6)      CTP 7/23  IMPRESSION:    CT angiography neck: No hemodynamically significant stenosis byNASCET   criteria. No vascular dissection.    CT angiography brain: No major vessel occlusion or proximal stenosis. 2   mm left M1 bifurcation aneurysm    CT perfusion: Approximately 7 mL of ischemic penumbra is seen in the left   frontoparietal cortex.      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, EOMI, FC, speech improved, some dysarthria and minor left facial asymmetry    Motor exam:           Upper extremity                         Delt     Bicep     Tricep    HG                                                 R         5/5       5/5        5/5        5/5                                               L          5/5       5/5        5/5        5/5             Lower extremity                        HF         KF        KE       DF         PF                                                  R        5/5       5/5        5/5        5/5      5/5                                               L         5/5       5/5        5/5        5/5      5/5                                                  Sensation: [x] intact to light touch  [ ] decreased:     Reflexes: Deep Tendon Reflexes Intact     Pulmonary: Clear to Auscultation, No rales, No rhonchi, No wheezes     Cardiovascular: S1, S2, Regular rate and rhythm     Gastrointestinal: Soft, Non-tender, Non-distended     Extremities: No calf tenderness       LABS:       LABS:  Na: 139 (07-29 @ 00:44), 139 (07-28 @ 04:03)  K: 4.1 (07-29 @ 00:44), 4.2 (07-28 @ 04:03)  Cl: 103 (07-29 @ 00:44), 105 (07-28 @ 04:03)  CO2: 24 (07-29 @ 00:44), 24 (07-28 @ 04:03)  BUN: 20 (07-29 @ 00:44), 22 (07-28 @ 04:03)  Cr: 0.75 (07-29 @ 00:44), 0.73 (07-28 @ 04:03)  Glu: 195(07-29 @ 00:44), 142(07-28 @ 04:03)    Hgb: 9.3 (07-29 @ 00:43), 9.1 (07-28 @ 04:03)  Hct: 29.4 (07-29 @ 00:43), 29.2 (07-28 @ 04:03)  WBC: 8.35 (07-29 @ 00:43), 8.25 (07-28 @ 04:03)  Plt: 265 (07-29 @ 00:43), 252 (07-28 @ 04:03)    INR:   PTT:                                  Subjective and Objective:   HOSPITAL COURSE: 66yo woman PMH seizure on keppra, CVA on plavix with unknown residual deficit, HLD, HTN p/w worsening and more prolonged episodes of aphasia/blank staring/unresponsiveness over two weeks. The episodes became more frequent and today, per daughter, it lasted for hours. Brought to ED, where she was found to have R facial droop, RUE weakness, dysarthria, and global aphasia. NIHSS 12. CTA showing c/f L MCA occlusion.     7/23 Adm NSCU s/p angiogram showing R M2/MCA occlusion with distal retrograde reconstitution, aspiration thrombectomy attempted without recanalization.   7/24: no acute overnight events  7/25: Aphasic when SBP dropped to 110 overnight, daughter reports slowed speech this morning   7/26: Pressure dependent exam, exam worsened when SBP <160  7/27: Mild confusion over night. Negative balance. Will put her on maintenance fluids to decrease the pressors requirements  7/28: No acute overnight events. Failed a challenge with a lower BP parameter: 120-160. Increase midodrine to 15 q8  7/29: no acute overnight events. STA-MCA Bypass on Monday?  7/30 BP dropped < 160 mm her neuro exam got worse, phenylephrine   7/31: Patient still symptomatic on Low BP, responds extremely well to midodrine and BP rises significantly   8/1: On jose still, possible angio for STA-MCA bypass on 8/2.  8/2- No events.   8/3- Patient still pressure dependent. Exam worsens when Bp drops <160 mmHg. No other changes.  8/4- Patient off pressors. No changes in exam at /80.   8/5- No fluctuations in examination in the last 24 hours. BP liberalized and reaching higher than 160 at times, examined at 170 BP, with intact exam. ---OF NOTE: on  patient with word finding difficulty and R sided blurry vision     Allergies    No Known Allergies    REVIEW OF SYSTEMS: [ x] Unable to Assess due to neurologic exam   [ ] All ROS addressed below are non-contributory, except:  Neuro: [ ] Headache [ ] Back pain [ ] Numbness [ ] Weakness [ ] Ataxia [ ] Dizziness [ ] Aphasia [ ] Dysarthria [ ] Visual disturbance  Resp: [ ] Shortness of breath/dyspnea, [ ] Orthopnea [ ] Cough  CV: [ ] Chest pain [ ] Palpitation [ ] Lightheadedness [ ] Syncope  Renal: [ ] Thirst [ ] Edema  GI: [ ] Nausea [ ] Emesis [ ] Abdominal pain [ ] Constipation [ ] Diarrhea  Hem: [ ] Hematemesis [ ] bright red blood per rectum  ID: [ ] Fever [ ] Chills [ ] Dysuria  ENT: [ ] Rhinorrhea      DEVICES:   [ ] Restraints [ ] ET tube [ ] central line [ ] arterial line [ ] gonzalez [ ] NGT/OGT [ ] EVD [ ] LD [ ] FRANTZ/HMV [ ] Trach [ ] PEG [ ] Chest Tube       ICU Vital Signs Last 24 Hrs  T(C): 36.5 (02 Aug 2023 19:00), Max: 36.8 (02 Aug 2023 16:00)  T(F): 97.7 (02 Aug 2023 19:00), Max: 98.2 (02 Aug 2023 16:00)  HR: 110 (02 Aug 2023 22:00) (73 - 110)  BP: 182/72 (02 Aug 2023 00:38) (182/72 - 182/72)  BP(mean): --  ABP: 148/64 (02 Aug 2023 22:00) (124/59 - 218/86)  ABP(mean): 101 (02 Aug 2023 22:00) (86 - 139)  RR: 19 (02 Aug 2023 22:00) (10 - 23)  SpO2: 99% (02 Aug 2023 19:30) (86% - 100%)    O2 Parameters below as of 02 Aug 2023 19:00  Patient On (Oxygen Delivery Method): room air        I&O's Detail    01 Aug 2023 07:01  -  02 Aug 2023 07:00  --------------------------------------------------------  IN:    multiple electrolytes Injection Type 1.: 50 mL    Oral Fluid: 980 mL    Phenylephrine: 176.7 mL    sodium chloride 0.9%: 600 mL    Sodium Chloride 0.9% Bolus: 1000 mL  Total IN: 2806.7 mL    OUT:    Incontinent per Collection Bag (mL): 2150 mL  Total OUT: 2150 mL    Total NET: 656.7 mL      02 Aug 2023 07:01  -  02 Aug 2023 22:48  --------------------------------------------------------  IN:    IV PiggyBack: 50 mL    Oral Fluid: 240 mL    Phenylephrine: 15.1 mL    Phenylephrine: 6.5 mL    sodium chloride 0.9%: 75 mL    sodium chloride 0.9%: 600 mL  Total IN: 986.6 mL    OUT:    Indwelling Catheter - Urethral (mL): 925 mL  Total OUT: 925 mL    Total NET: 61.6 mL        MEDICATIONS  (STANDING):  aspirin  chewable 81 milliGRAM(s) Oral daily  atorvastatin 80 milliGRAM(s) Oral at bedtime  ceFAZolin   IVPB 2000 milliGRAM(s) IV Intermittent every 8 hours  ferrous    sulfate 325 milliGRAM(s) Oral <User Schedule>  fludroCORTISONE 0.1 milliGRAM(s) Oral every 12 hours  insulin glargine Injectable (LANTUS) 15 Unit(s) SubCutaneous at bedtime  insulin lispro (ADMELOG) corrective regimen sliding scale   SubCutaneous Before meals and at bedtime  insulin lispro Injectable (ADMELOG) 6 Unit(s) SubCutaneous three times a day before meals  levETIRAcetam  IVPB 500 milliGRAM(s) IV Intermittent every 12 hours  midodrine 10 milliGRAM(s) Oral <User Schedule>  phenylephrine    Infusion 0.1 MICROgram(s)/kG/Min (2.16 mL/Hr) IV Continuous <Continuous>  polyethylene glycol 3350 17 Gram(s) Oral two times a day  potassium chloride    Tablet ER 20 milliEquivalent(s) Oral daily  senna 2 Tablet(s) Oral at bedtime  sodium chloride 0.9%. 1000 milliLiter(s) (100 mL/Hr) IV Continuous <Continuous>    MEDICATIONS  (PRN):  acetaminophen     Tablet .. 650 milliGRAM(s) Oral every 6 hours PRN Mild Pain (1 - 3)  acetaminophen     Tablet .. 650 milliGRAM(s) Oral every 6 hours PRN Mild Pain (1 - 3)  oxyCODONE    IR 5 milliGRAM(s) Oral every 4 hours PRN Moderate Pain (4 - 6)      CTP 7/23  IMPRESSION:    CT angiography neck: No hemodynamically significant stenosis byNASCET   criteria. No vascular dissection.    CT angiography brain: No major vessel occlusion or proximal stenosis. 2   mm left M1 bifurcation aneurysm    CT perfusion: Approximately 7 mL of ischemic penumbra is seen in the left   frontoparietal cortex.      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, EOMI, FC, speech improved, minor left facial asymmetry    Motor exam:           Upper extremity                         Delt     Bicep     Tricep    HG                                                 R         5/5       5/5        5/5        5/5                                               L          5/5       5/5        5/5        5/5             Lower extremity                        HF         KF        KE       DF         PF                                                  R        5/5       5/5        5/5        5/5      5/5                                               L         5/5       5/5        5/5        5/5      5/5     Word finding difficulty and right visual field cut on .                                                 Sensation: [x] intact to light touch  [ ] decreased:     Reflexes: Deep Tendon Reflexes Intact     Pulmonary: Clear to Auscultation, No rales, No rhonchi, No wheezes     Cardiovascular: S1, S2, Regular rate and rhythm     Gastrointestinal: Soft, Non-tender, Non-distended     Extremities: No calf tenderness       LABS:       LABS:  Na: 139 (07-29 @ 00:44), 139 (07-28 @ 04:03)  K: 4.1 (07-29 @ 00:44), 4.2 (07-28 @ 04:03)  Cl: 103 (07-29 @ 00:44), 105 (07-28 @ 04:03)  CO2: 24 (07-29 @ 00:44), 24 (07-28 @ 04:03)  BUN: 20 (07-29 @ 00:44), 22 (07-28 @ 04:03)  Cr: 0.75 (07-29 @ 00:44), 0.73 (07-28 @ 04:03)  Glu: 195(07-29 @ 00:44), 142(07-28 @ 04:03)    Hgb: 9.3 (07-29 @ 00:43), 9.1 (07-28 @ 04:03)  Hct: 29.4 (07-29 @ 00:43), 29.2 (07-28 @ 04:03)  WBC: 8.35 (07-29 @ 00:43), 8.25 (07-28 @ 04:03)  Plt: 265 (07-29 @ 00:43), 252 (07-28 @ 04:03)    INR:   PTT:

## 2023-08-05 NOTE — PROGRESS NOTE ADULT - SUBJECTIVE AND OBJECTIVE BOX
Subjective and Objective:   HOSPITAL COURSE: 68yo woman PMH seizure on keppra, CVA on plavix with unknown residual deficit, HLD, HTN p/w worsening and more prolonged episodes of aphasia/blank staring/unresponsiveness over two weeks. The episodes became more frequent and today, per daughter, it lasted for hours. Brought to ED, where she was found to have R facial droop, RUE weakness, dysarthria, and global aphasia. NIHSS 12. CTA showing c/f L MCA occlusion.     7/23 Adm NSCU s/p angiogram showing R M2/MCA occlusion with distal retrograde reconstitution, aspiration thrombectomy attempted without recanalization.   7/24: no acute overnight events  7/25: Aphasic when SBP dropped to 110 overnight, daughter reports slowed speech this morning   7/26: Pressure dependent exam, exam worsened when SBP <160  7/27: Mild confusion over night. Negative balance. Will put her on maintenance fluids to decrease the pressors requirements  7/28: No acute overnight events. Failed a challenge with a lower BP parameter: 120-160. Increase midodrine to 15 q8  7/29: no acute overnight events. STA-MCA Bypass on Monday?  7/30 BP dropped < 160 mm her neuro exam got worse, phenylephrine   7/31: Patient still symptomatic on Low BP, responds extremely well to midodrine and BP rises significantly   8/1: On jose still, possible angio for STA-MCA bypass on 8/2.  8/2- No events.   8/3- Patient still pressure dependent. Exam worsens when Bp drops <160 mmHg. No other changes.  8/4- Patient off pressors. No changes in exam at /80.   8/5- No fluctuations in examination in the last 24 hours. BP liberalized and reaching higher than 160 at times, examined at 170 BP, with intact exam. ---OF NOTE: on  patient with word finding difficulty and R sided blurry vision     Allergies    No Known Allergies    REVIEW OF SYSTEMS: [ x] Unable to Assess due to neurologic exam   [ ] All ROS addressed below are non-contributory, except:  Neuro: [ ] Headache [ ] Back pain [ ] Numbness [ ] Weakness [ ] Ataxia [ ] Dizziness [ ] Aphasia [ ] Dysarthria [ ] Visual disturbance  Resp: [ ] Shortness of breath/dyspnea, [ ] Orthopnea [ ] Cough  CV: [ ] Chest pain [ ] Palpitation [ ] Lightheadedness [ ] Syncope  Renal: [ ] Thirst [ ] Edema  GI: [ ] Nausea [ ] Emesis [ ] Abdominal pain [ ] Constipation [ ] Diarrhea  Hem: [ ] Hematemesis [ ] bright red blood per rectum  ID: [ ] Fever [ ] Chills [ ] Dysuria  ENT: [ ] Rhinorrhea      DEVICES:   [ ] Restraints [ ] ET tube [ ] central line [ ] arterial line [ ] gonzalez [ ] NGT/OGT [ ] EVD [ ] LD [ ] FRANTZ/HMV [ ] Trach [ ] PEG [ ] Chest Tube       ICU Vital Signs Last 24 Hrs  T(C): 36.5 (02 Aug 2023 19:00), Max: 36.8 (02 Aug 2023 16:00)  T(F): 97.7 (02 Aug 2023 19:00), Max: 98.2 (02 Aug 2023 16:00)  HR: 110 (02 Aug 2023 22:00) (73 - 110)  BP: 182/72 (02 Aug 2023 00:38) (182/72 - 182/72)  BP(mean): --  ABP: 148/64 (02 Aug 2023 22:00) (124/59 - 218/86)  ABP(mean): 101 (02 Aug 2023 22:00) (86 - 139)  RR: 19 (02 Aug 2023 22:00) (10 - 23)  SpO2: 99% (02 Aug 2023 19:30) (86% - 100%)    O2 Parameters below as of 02 Aug 2023 19:00  Patient On (Oxygen Delivery Method): room air        I&O's Detail    01 Aug 2023 07:01  -  02 Aug 2023 07:00  --------------------------------------------------------  IN:    multiple electrolytes Injection Type 1.: 50 mL    Oral Fluid: 980 mL    Phenylephrine: 176.7 mL    sodium chloride 0.9%: 600 mL    Sodium Chloride 0.9% Bolus: 1000 mL  Total IN: 2806.7 mL    OUT:    Incontinent per Collection Bag (mL): 2150 mL  Total OUT: 2150 mL    Total NET: 656.7 mL      02 Aug 2023 07:01  -  02 Aug 2023 22:48  --------------------------------------------------------  IN:    IV PiggyBack: 50 mL    Oral Fluid: 240 mL    Phenylephrine: 15.1 mL    Phenylephrine: 6.5 mL    sodium chloride 0.9%: 75 mL    sodium chloride 0.9%: 600 mL  Total IN: 986.6 mL    OUT:    Indwelling Catheter - Urethral (mL): 925 mL  Total OUT: 925 mL    Total NET: 61.6 mL        MEDICATIONS  (STANDING):  aspirin  chewable 81 milliGRAM(s) Oral daily  atorvastatin 80 milliGRAM(s) Oral at bedtime  ceFAZolin   IVPB 2000 milliGRAM(s) IV Intermittent every 8 hours  ferrous    sulfate 325 milliGRAM(s) Oral <User Schedule>  fludroCORTISONE 0.1 milliGRAM(s) Oral every 12 hours  insulin glargine Injectable (LANTUS) 15 Unit(s) SubCutaneous at bedtime  insulin lispro (ADMELOG) corrective regimen sliding scale   SubCutaneous Before meals and at bedtime  insulin lispro Injectable (ADMELOG) 6 Unit(s) SubCutaneous three times a day before meals  levETIRAcetam  IVPB 500 milliGRAM(s) IV Intermittent every 12 hours  midodrine 10 milliGRAM(s) Oral <User Schedule>  phenylephrine    Infusion 0.1 MICROgram(s)/kG/Min (2.16 mL/Hr) IV Continuous <Continuous>  polyethylene glycol 3350 17 Gram(s) Oral two times a day  potassium chloride    Tablet ER 20 milliEquivalent(s) Oral daily  senna 2 Tablet(s) Oral at bedtime  sodium chloride 0.9%. 1000 milliLiter(s) (100 mL/Hr) IV Continuous <Continuous>    MEDICATIONS  (PRN):  acetaminophen     Tablet .. 650 milliGRAM(s) Oral every 6 hours PRN Mild Pain (1 - 3)  acetaminophen     Tablet .. 650 milliGRAM(s) Oral every 6 hours PRN Mild Pain (1 - 3)  oxyCODONE    IR 5 milliGRAM(s) Oral every 4 hours PRN Moderate Pain (4 - 6)      CTP 7/23  IMPRESSION:    CT angiography neck: No hemodynamically significant stenosis byNASCET   criteria. No vascular dissection.    CT angiography brain: No major vessel occlusion or proximal stenosis. 2   mm left M1 bifurcation aneurysm    CT perfusion: Approximately 7 mL of ischemic penumbra is seen in the left   frontoparietal cortex.      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, EOMI, FC, speech improved, minor left facial asymmetry    Motor exam:           Upper extremity                         Delt     Bicep     Tricep    HG                                                 R         5/5       5/5        5/5        5/5                                               L          5/5       5/5        5/5        5/5             Lower extremity                        HF         KF        KE       DF         PF                                                  R        5/5       5/5        5/5        5/5      5/5                                               L         5/5       5/5        5/5        5/5      5/5     Word finding difficulty and right visual field cut on .                                                 Sensation: [x] intact to light touch  [ ] decreased:     Reflexes: Deep Tendon Reflexes Intact     Pulmonary: Clear to Auscultation, No rales, No rhonchi, No wheezes     Cardiovascular: S1, S2, Regular rate and rhythm     Gastrointestinal: Soft, Non-tender, Non-distended     Extremities: No calf tenderness       LABS:       LABS:  Na: 139 (07-29 @ 00:44), 139 (07-28 @ 04:03)  K: 4.1 (07-29 @ 00:44), 4.2 (07-28 @ 04:03)  Cl: 103 (07-29 @ 00:44), 105 (07-28 @ 04:03)  CO2: 24 (07-29 @ 00:44), 24 (07-28 @ 04:03)  BUN: 20 (07-29 @ 00:44), 22 (07-28 @ 04:03)  Cr: 0.75 (07-29 @ 00:44), 0.73 (07-28 @ 04:03)  Glu: 195(07-29 @ 00:44), 142(07-28 @ 04:03)    Hgb: 9.3 (07-29 @ 00:43), 9.1 (07-28 @ 04:03)  Hct: 29.4 (07-29 @ 00:43), 29.2 (07-28 @ 04:03)  WBC: 8.35 (07-29 @ 00:43), 8.25 (07-28 @ 04:03)  Plt: 265 (07-29 @ 00:43), 252 (07-28 @ 04:03)    INR:   PTT:

## 2023-08-05 NOTE — PROGRESS NOTE ADULT - ASSESSMENT
Assessment and Plan: 	  ASSESSMENT/PLAN: chronic M2 occlusion, s/p L STA-MCA bypass    NEURO:  neuro checks q 2hr   cont keppra home medication   ASA, hold plavix  Continue fluids @ 100 until DC from hospital   s/p L STA-MCA bypass. Attempted aspiration thrombectomy of L M2/MCA occlusion.   Activity: [x] mobilize as tolerated [] Bedrest [x] PT [x] OT [] PMNR    PULM:  RA     CV:  SBP goal  mmhg  Off  Phenylephrine  TTE - 63% ejection fraction  Cont atorvastatin  Will add Lopressor 25 mg BID in setting of high BP, patient at home was on 50 mg BID, increase as tolerated    RENAL:  NS @100L     GI:  Diet: CCD  GI prophylaxis [x] not indicated [] PPI home med [] other:  Bowel regimen [] colace [x] senna [x] other: miralax   LBM 8/4    ENDO:   A1C 8.5  Goal euglycemia (-180)  DM lantus 20 units, ISS  Will continue premeal 10 units TID  TSH WNL  Medium sliding scale    HEME/ONC:  H/H trending down, will transfuse if <7. Likely blood loss anemia, will repeat H/H now  VTE prophylaxis: [x] SCDs [x] chemoprophylaxis SQL [] hold chemoprophylaxis due to: [] high risk of DVT/PE on admission due to:  continue iron for microcytic anemia, previously on iron o/p for AJAY    ID: afebrile, no leukocytosis     30 critical care at risk for stroke, MI       Assessment and Plan: 	  ASSESSMENT/PLAN: chronic M2 occlusion, s/p L STA-MCA bypass.     NEURO:  neuro checks q 2hr   cont keppra home medication   ASA, hold plavix  Continue fluids @ 100 until DC from hospital   s/p L STA-MCA bypass. Attempted aspiration thrombectomy of L M2/MCA occlusion.   Activity: [x] mobilize as tolerated [] Bedrest [x] PT [x] OT [] PMNR    PULM:  RA     CV:  SBP goal 120-160 mmhg  TTE - 63% ejection fraction  Cont atorvastatin  Will discontinue lopressor and will hold off on standing medications, try IVP    RENAL:  NS @100L     GI:  Diet: CCD  GI prophylaxis [x] not indicated [] PPI home med [] other:  Bowel regimen [] colace [x] senna [x] other: miralax   LBM 8/4    ENDO:   A1C 8.5  Goal euglycemia (-180)  DM lantus 15 units, ISS  Will continue premeal 10 units TID  TSH WNL  Medium sliding scale    HEME/ONC:  H/H trending down, will transfuse if <7. Likely blood loss anemia, will repeat H/H now  VTE prophylaxis: [x] SCDs [x] chemoprophylaxis SQL [] hold chemoprophylaxis due to: [] high risk of DVT/PE on admission due to:  continue iron for microcytic anemia, previously on iron o/p for AJAY    ID: afebrile, leukocytosis trending down     30 critical care at risk for stroke, MI

## 2023-08-05 NOTE — PROVIDER CONTACT NOTE (OTHER) - BACKGROUND
PT is currently on HARJEET with a SBP goal of 160-200
Bypass
PT afshin was rethreaded by provider. Work momentarily and now is still reading higher values. PT SBP goal is 160-200 with HARJEET running

## 2023-08-06 DIAGNOSIS — E11.9 TYPE 2 DIABETES MELLITUS WITHOUT COMPLICATIONS: ICD-10-CM

## 2023-08-06 DIAGNOSIS — I10 ESSENTIAL (PRIMARY) HYPERTENSION: ICD-10-CM

## 2023-08-06 DIAGNOSIS — I63.9 CEREBRAL INFARCTION, UNSPECIFIED: ICD-10-CM

## 2023-08-06 LAB
GLUCOSE BLDC GLUCOMTR-MCNC: 109 MG/DL — HIGH (ref 70–99)
GLUCOSE BLDC GLUCOMTR-MCNC: 135 MG/DL — HIGH (ref 70–99)
GLUCOSE BLDC GLUCOMTR-MCNC: 157 MG/DL — HIGH (ref 70–99)
GLUCOSE BLDC GLUCOMTR-MCNC: 80 MG/DL — SIGNIFICANT CHANGE UP (ref 70–99)
GLUCOSE BLDC GLUCOMTR-MCNC: 81 MG/DL — SIGNIFICANT CHANGE UP (ref 70–99)
IRON SATN MFR SERPL: 14 % — SIGNIFICANT CHANGE UP (ref 14–50)
IRON SATN MFR SERPL: 30 UG/DL — SIGNIFICANT CHANGE UP (ref 30–160)
MRSA PCR RESULT.: SIGNIFICANT CHANGE UP
S AUREUS DNA NOSE QL NAA+PROBE: SIGNIFICANT CHANGE UP
TIBC SERPL-MCNC: 219 UG/DL — LOW (ref 220–430)
TRANSFERRIN SERPL-MCNC: 160 MG/DL — LOW (ref 200–360)
UIBC SERPL-MCNC: 190 UG/DL — SIGNIFICANT CHANGE UP (ref 110–370)

## 2023-08-06 PROCEDURE — 99233 SBSQ HOSP IP/OBS HIGH 50: CPT

## 2023-08-06 RX ORDER — ACETAMINOPHEN 500 MG
500 TABLET ORAL ONCE
Refills: 0 | Status: COMPLETED | OUTPATIENT
Start: 2023-08-06 | End: 2023-08-06

## 2023-08-06 RX ORDER — HYDRALAZINE HCL 50 MG
5 TABLET ORAL ONCE
Refills: 0 | Status: COMPLETED | OUTPATIENT
Start: 2023-08-06 | End: 2023-08-06

## 2023-08-06 RX ORDER — AMLODIPINE BESYLATE 2.5 MG/1
2.5 TABLET ORAL DAILY
Refills: 0 | Status: DISCONTINUED | OUTPATIENT
Start: 2023-08-06 | End: 2023-08-10

## 2023-08-06 RX ORDER — AMLODIPINE BESYLATE 2.5 MG/1
2.5 TABLET ORAL ONCE
Refills: 0 | Status: COMPLETED | OUTPATIENT
Start: 2023-08-06 | End: 2023-08-06

## 2023-08-06 RX ADMIN — ATORVASTATIN CALCIUM 80 MILLIGRAM(S): 80 TABLET, FILM COATED ORAL at 21:53

## 2023-08-06 RX ADMIN — LEVETIRACETAM 500 MILLIGRAM(S): 250 TABLET, FILM COATED ORAL at 17:15

## 2023-08-06 RX ADMIN — ENOXAPARIN SODIUM 40 MILLIGRAM(S): 100 INJECTION SUBCUTANEOUS at 17:24

## 2023-08-06 RX ADMIN — Medication 200 MILLIGRAM(S): at 12:24

## 2023-08-06 RX ADMIN — Medication 200 MILLIGRAM(S): at 21:30

## 2023-08-06 RX ADMIN — SODIUM CHLORIDE 100 MILLILITER(S): 9 INJECTION INTRAMUSCULAR; INTRAVENOUS; SUBCUTANEOUS at 07:51

## 2023-08-06 RX ADMIN — AMLODIPINE BESYLATE 2.5 MILLIGRAM(S): 2.5 TABLET ORAL at 10:57

## 2023-08-06 RX ADMIN — AMLODIPINE BESYLATE 2.5 MILLIGRAM(S): 2.5 TABLET ORAL at 08:30

## 2023-08-06 RX ADMIN — Medication 81 MILLIGRAM(S): at 12:15

## 2023-08-06 RX ADMIN — INSULIN GLARGINE 15 UNIT(S): 100 INJECTION, SOLUTION SUBCUTANEOUS at 22:07

## 2023-08-06 RX ADMIN — Medication 500 MILLIGRAM(S): at 22:00

## 2023-08-06 RX ADMIN — Medication 5 MILLIGRAM(S): at 00:30

## 2023-08-06 RX ADMIN — Medication 10 UNIT(S): at 07:52

## 2023-08-06 RX ADMIN — Medication 10 UNIT(S): at 12:14

## 2023-08-06 RX ADMIN — SODIUM CHLORIDE 100 MILLILITER(S): 9 INJECTION INTRAMUSCULAR; INTRAVENOUS; SUBCUTANEOUS at 19:18

## 2023-08-06 RX ADMIN — Medication 5 MILLIGRAM(S): at 00:05

## 2023-08-06 RX ADMIN — LEVETIRACETAM 500 MILLIGRAM(S): 250 TABLET, FILM COATED ORAL at 05:29

## 2023-08-06 RX ADMIN — POLYETHYLENE GLYCOL 3350 17 GRAM(S): 17 POWDER, FOR SOLUTION ORAL at 17:16

## 2023-08-06 RX ADMIN — Medication 2: at 21:56

## 2023-08-06 RX ADMIN — CHLORHEXIDINE GLUCONATE 1 APPLICATION(S): 213 SOLUTION TOPICAL at 21:52

## 2023-08-06 NOTE — PROGRESS NOTE ADULT - SUBJECTIVE AND OBJECTIVE BOX
PM EVENTS: no acute overnight events as of documentation time of this note.    ROS: negative except as mentioned above.    T(C): 36.9 (08-07-23 @ 03:00), Max: 37.7 (08-06-23 @ 12:00)  HR: 92 (08-07-23 @ 04:00) (83 - 106)  BP: --  RR: 16 (08-07-23 @ 04:00) (12 - 20)  SpO2: 96% (08-07-23 @ 04:00) (93% - 98%)        I&O's Summary    05 Aug 2023 07:01  -  06 Aug 2023 07:00  --------------------------------------------------------  IN: 3330 mL / OUT: 3000 mL / NET: 330 mL    06 Aug 2023 07:01  -  07 Aug 2023 04:49  --------------------------------------------------------  IN: 2980 mL / OUT: 4700 mL / NET: -1720 mL        EXAM:   Neuro: a/ox3, mild dysnomia, R. eye blindness, EOMI, gaze conjugate, face symmetric, no dysarthria, power 5/5 throughout  Chest: nonlabored respirations, no adventitious lung sounds bilaterally, heart regular rate/rhythm, present S1/S2, no murmurs or rubs  Abdomen: nondistended, soft and nontender without peritoneal signs, normoactive bowel sounds  Extremities: no clubbing, well-perfused, no edema                              8.5    8.62  )-----------( 315      ( 07 Aug 2023 04:23 )             26.6             MEDICATIONS  (STANDING):  amLODIPine   Tablet 2.5 milliGRAM(s) Oral daily  aspirin  chewable 81 milliGRAM(s) Oral daily  atorvastatin 80 milliGRAM(s) Oral at bedtime  chlorhexidine 4% Liquid 1 Application(s) Topical daily  enoxaparin Injectable 40 milliGRAM(s) SubCutaneous <User Schedule>  ferrous    sulfate 325 milliGRAM(s) Oral <User Schedule>  insulin glargine Injectable (LANTUS) 15 Unit(s) SubCutaneous at bedtime  insulin lispro (ADMELOG) corrective regimen sliding scale   SubCutaneous Before meals and at bedtime  insulin lispro Injectable (ADMELOG) 10 Unit(s) SubCutaneous three times a day before meals  levETIRAcetam 500 milliGRAM(s) Oral two times a day  polyethylene glycol 3350 17 Gram(s) Oral two times a day  senna 2 Tablet(s) Oral at bedtime  sodium chloride 0.9%. 1000 milliLiter(s) (100 mL/Hr) IV Continuous <Continuous>    MEDICATIONS  (PRN):  acetaminophen     Tablet .. 650 milliGRAM(s) Oral every 6 hours PRN Mild Pain (1 - 3)  oxyCODONE    IR 5 milliGRAM(s) Oral every 4 hours PRN Moderate Pain (4 - 6)      Please see the day's note documented by Attending/fellow for detailed ongoing assessment and plan.      Admit NSICU neuro checks q2h, vitals q1h, ASA, LEV  On room air  SBP goal 120 - 160, norvasc, lipitor, holding lopressor  CCD, stool count, no stress ulcer ppx indicated  Euglycemia/euvolemia/eunatremia, lantus, RASSI, premeal  SCDs/SQL  Remains in NSICU for BP control

## 2023-08-06 NOTE — PROGRESS NOTE ADULT - SUBJECTIVE AND OBJECTIVE BOX
Patient seen and examined at bedside.    --Anticoagulation--  aspirin  chewable 81 milliGRAM(s) Oral daily  enoxaparin Injectable 40 milliGRAM(s) SubCutaneous <User Schedule>    T(C): 36.8 (08-05-23 @ 23:00), Max: 38.6 (08-05-23 @ 15:00)  HR: 95 (08-06-23 @ 00:30) (79 - 103)  BP: --  RR: 17 (08-06-23 @ 00:30) (13 - 19)  SpO2: 96% (08-06-23 @ 00:30) (93% - 98%)  Wt(kg): --    Exam:  AOx3, FC, PERRL, EOMI, no facial, 5/5 throughout, no drift

## 2023-08-06 NOTE — CONSULT NOTE ADULT - ASSESSMENT
66yo woman PMH seizure on keppra, CVA on plavix with unknown residual deficit, HLD, HTN p/w worsening and more prolonged episodes of aphasia/blank staring/unresponsiveness over two weeks. The episodes became more frequent and today, per daughter, it lasted for hours. Brought to ED, where she was found to have R facial droop, RUE weakness, dysarthria, and global aphasia. NIHSS 12. CTA showing c/f L MCA occlusion.     7/23 Adm NSCU s/p angiogram showing R M2/MCA occlusion with distal retrograde reconstitution, aspiration thrombectomy attempted without recanalization.   7/24: no acute overnight events  7/25: Aphasic when SBP dropped to 110 overnight, daughter reports slowed speech this morning   7/26: Pressure dependent exam, exam worsened when SBP <160  7/27: Mild confusion over night. Negative balance. Will put her on maintenance fluids to decrease the pressors requirements  7/28: No acute overnight events. Failed a challenge with a lower BP parameter: 120-160. Increase midodrine to 15 q8  7/29: no acute overnight events. STA-MCA Bypass on Monday?  7/30 BP dropped < 160 mm her neuro exam got worse, phenylephrine   7/31: Patient still symptomatic on Low BP, responds extremely well to midodrine and BP rises significantly   8/1: On jose still, possible angio for STA-MCA bypass on 8/2.  8/2- No events.   8/3- Patient still pressure dependent. Exam worsens when Bp drops <160 mmHg. No other changes.

## 2023-08-06 NOTE — CONSULT NOTE ADULT - SUBJECTIVE AND OBJECTIVE BOX
CHIEF COMPLAINT:    HISTORY OF PRESENT ILLNESS:  66yo woman PMH seizure on keppra, CVA on plavix with unknown residual deficit, HLD, HTN p/w worsening and more prolonged episodes of aphasia/blank staring/unresponsiveness over two weeks. The episodes became more frequent and today, per daughter, it lasted for hours. Brought to ED, where she was found to have R facial droop, RUE weakness, dysarthria, and global aphasia. NIHSS 12. CTA showing c/f L MCA occlusion.     7/23 Adm NSCU s/p angiogram showing R M2/MCA occlusion with distal retrograde reconstitution, aspiration thrombectomy attempted without recanalization.   7/24: no acute overnight events  7/25: Aphasic when SBP dropped to 110 overnight, daughter reports slowed speech this morning   7/26: Pressure dependent exam, exam worsened when SBP <160  7/27: Mild confusion over night. Negative balance. Will put her on maintenance fluids to decrease the pressors requirements  7/28: No acute overnight events. Failed a challenge with a lower BP parameter: 120-160. Increase midodrine to 15 q8  7/29: no acute overnight events. STA-MCA Bypass on Monday?  7/30 BP dropped < 160 mm her neuro exam got worse, phenylephrine   7/31: Patient still symptomatic on Low BP, responds extremely well to midodrine and BP rises significantly   8/1: On jose still, possible angio for STA-MCA bypass on 8/2.  8/2- No events.   8/3- Patient still pressure dependent. Exam worsens when Bp drops <160 mmHg. No other changes.  8/4- Patient off pressors. No changes in exam at /80.   8/5- No fluctuations in examination in the last 24 hours. BP liberalized and reaching higher than 160 at times, examined at 170 BP, with intact exam. ---OF NOTE: on  patient with word finding difficulty and R sided blurry vision     PAST MEDICAL & SURGICAL HISTORY:  DM (diabetes mellitus)  HTN (hypertension)    MEDICATIONS:  aspirin  chewable 81 milliGRAM(s) Oral daily  enoxaparin Injectable 40 milliGRAM(s) SubCutaneous <User Schedule>  acetaminophen     Tablet .. 650 milliGRAM(s) Oral every 6 hours PRN  levETIRAcetam 500 milliGRAM(s) Oral two times a day  oxyCODONE    IR 5 milliGRAM(s) Oral every 4 hours PRN  polyethylene glycol 3350 17 Gram(s) Oral two times a day  senna 2 Tablet(s) Oral at bedtime  atorvastatin 80 milliGRAM(s) Oral at bedtime  insulin glargine Injectable (LANTUS) 15 Unit(s) SubCutaneous at bedtime  insulin lispro (ADMELOG) corrective regimen sliding scale   SubCutaneous Before meals and at bedtime  insulin lispro Injectable (ADMELOG) 10 Unit(s) SubCutaneous three times a day before meals  chlorhexidine 4% Liquid 1 Application(s) Topical daily  ferrous    sulfate 325 milliGRAM(s) Oral <User Schedule>  sodium chloride 0.9%. 1000 milliLiter(s) IV Continuous <Continuous>    FAMILY HISTORY:      SOCIAL HISTORY:    [ ] Non-smoker  [ ] Smoker  [ ] Alcohol    Allergies    No Known Allergies    Intolerances    	    REVIEW OF SYSTEMS:  CONSTITUTIONAL: No fever, weight loss, + fatigue  EYES: No eye pain, visual disturbances, or discharge  ENMT:  No difficulty hearing, tinnitus, vertigo; No sinus or throat pain  NECK: No pain or stiffness  RESPIRATORY: No cough, wheezing, chills or hemoptysis; No Shortness of Breath  CARDIOVASCULAR: No chest pain, palpitations, passing out, dizziness, or leg swelling  GASTROINTESTINAL: No abdominal or epigastric pain. No nausea, vomiting, or hematemesis; No diarrhea or constipation. No melena or hematochezia.  GENITOURINARY: No dysuria, frequency, hematuria, or incontinence  NEUROLOGICAL: No headaches, memory loss, loss of strength, numbness, or tremors  SKIN: No itching, burning, rashes, or lesions   LYMPH Nodes: No enlarged glands  ENDOCRINE: No heat or cold intolerance; No hair loss  MUSCULOSKELETAL: No joint pain or swelling; No muscle, back, or extremity pain  PSYCHIATRIC: No depression, anxiety, mood swings, or difficulty sleeping  HEME/LYMPH: No easy bruising, or bleeding gums  ALLERY AND IMMUNOLOGIC: No hives or eczema	    [ ] All others negative	  [ ] Unable to obtain    PHYSICAL EXAM:  T(C): 36.9 (08-06-23 @ 03:00), Max: 38.6 (08-05-23 @ 15:00)  HR: 94 (08-06-23 @ 06:00) (79 - 103)  BP: --  RR: 18 (08-06-23 @ 06:00) (12 - 19)  SpO2: 94% (08-06-23 @ 06:00) (93% - 98%)  Wt(kg): --  I&O's Summary    05 Aug 2023 07:01  -  06 Aug 2023 07:00  --------------------------------------------------------  IN: 3270 mL / OUT: 3000 mL / NET: 270 mL        Appearance: Normal	  HEENT:   Normal oral mucosa, PERRL, EOMI	  Lymphatic: No lymphadenopathy  Cardiovascular: Normal S1 S2, No JVD, No murmurs, No edema  Respiratory: Lungs clear to auscultation	  Psychiatry: A & O x 3, Mood & affect appropriate  Gastrointestinal:  Soft, Non-tender, + BS	  Skin: No rashes, No ecchymoses, No cyanosis	  Neurologic: AOx3, FC, PERRL, EOMI, no facial, 5/5 throughout, no drift  Extremities: Normal range of motion, No clubbing, cyanosis or edema  Vascular: Peripheral pulses palpable 2+ bilaterally    TELEMETRY: 	SR    ECG:  	SR non specific stt changes   RADIOLOGY: < from: CT Head No Cont (08.03.23 @ 11:12) >    ACC: [id number] EXAM:  CT BRAIN   ORDERED BY: ALETHEA HANSON     PROCEDURE DATE:  08/03/2023          INTERPRETATION:  Noncontrast CT of the brain.    CLINICAL INDICATION:  Postoperative scan    TECHNIQUE : Axial CT scanning of the brain was obtainedfrom the skull   base to the vertex without the administration of intravenous contrast.   Sagittal and coronal reformats were provided.    COMPARISON: CT brain 8/2/2023    FINDINGS:    Redemonstration of left parietotemporal craniotomy an left temporal   craniectomy.    Similar left frontal pneumocephalus.    No hydrocephalus, midline shift, or effacement of the basal cisterns.    No acute parenchymal hemorrhage or brain edema.    IMPRESSION:    No significant interval change from 8/2/2023    ---End of Report ---        < end of copied text >  < from: CT Head No Cont (08.02.23 @ 17:28) >    ACC: [id number] EXAM:  CT BRAIN   ORDERED BY: KELLY MILTON     PROCEDURE DATE:  08/02/2023          INTERPRETATION:  EXAM: CT HEAD WITHOUT INTRAVENOUS CONTRAST    HISTORY: Status post left STA to MCA bypass, follow-up imaging    TECHNIQUE: Multiple axial images were obtained at 5 mm intervals from the   skull base to the vertex. Sagittal and coronal reformatted images were   obtained from the axial data set. The images were reviewed in brain and   bone windows.    COMPARISON: CT of the brain July 24 223    FINDINGS:    There are postoperative changes related to a recent left parietotemporal   craniectomy. Pneumocephalus is seen over the left frontal convexity and   left middle cranial fossa. A small amount of acute blood is seen in the   extra-axial space overlying the operative field. These findings result in   a small midline shift to the right of approximately 0.3 cm.    There are small areas of decreased attenuation in the deep and   periventricular white matter, likely compatiblewith chronic small vessel   disease. There is no hydrocephalus.    The cranial cervical junction is within normal limits. The sella is not   expanded. There is no depressed calvarial fracture. The paranasal sinuses   are well aerated. The mastoid aircells are well aerated. A silicone   injection is noted in the right globe. The left globe is status post   cataract surgery.    IMPRESSION:    1.  Postoperative changes related to a recent left parietotemporal   craniectomy with a small midline shift to the right.  2.  Small areas of decreased attenuation in the deep and periventricular   white matter, likely compatible with chronic small vessel disease.    --- End of Report ---        < end of copied text >  < from: MR Head No Cont (07.25.23 @ 08:33) >    ACC: [id number] EXAM:  MR BRAIN   ORDERED BY: VANDANA HANNAH     PROCEDURE DATE:  07/25/2023          INTERPRETATION:  CLINICAL INDICATION: Evaluate for infarct    TECHNIQUE: Multi-planar multi-sequential MR imaging of the brain was   performed before without the intravenous administration of Gadavist IV   contrast.    COMPARISON: CT head 7/25/2023, CT head stroke protocol 7/23/2023    FINDINGS:    There is an acute infarct involving the left subinsular cortical ribbon   with scattered acute infarcts within the left frontal parietal white   matter all in the distribution of the left middle cerebral artery.    A chronic left parietal occipital infarct is noted. Chronic pontine   infarct.    Age-appropriate involutional and mild microvascular ischemic changes are   present.    Bilateral lens replacement is noted.    Note again is made of silicon injection within the right globe.    Major flow voids at the skull base are within normal limits.    IMPRESSION:    Acute left subinsular and scattered left frontal and parietal white   matter infarcts in the distribution of the left middle cerebral artery.  Chronic left parietal/occipital infarct.    The results of this examination were discussed with CHELO Matamoros at 10:26   AM on 7/25/2023    --- End of Report ---    < end of copied text >  < from: TTE Limited W or WO Ultrasound Enhancing Agent (07.24.23 @ 08:04) >  TRANSTHORACIC ECHOCARDIOGRAM REPORT  ________________________________________________________________________________                                      _______       Pt. Name:       GABY HARDY Study Date:    7/24/2023  MRN:            [id number]      YOB: 1955  Accession #:    [id number]       Age:           67 years  Account#:       [id number]    Gender:        F  Heart Rate:     75 bpm          Height:        60.00 in (152.40 cm)  Rhythm:         sinus rhythm    Weight:        125.00 lb (56.70 kg)  Blood Pressure: 149/61 mmHg     BSA/BMI:       1.53 m² / 24.41 kg/m²  ________________________________________________________________________________________  Referring Physician:    [id number] Tim Razo  Interpreting Physician: Gila Davis  Primary Sonographer:    Mehran Khoury [facility]    CPT:                ECHO TTE WITH CON COMP W DOPP - .m;DEFINITY ECHO                      CONTRAST PER ML - .m;DEFINITY ECHO CONTRAST PER ML                      WASTED - .m  Indication(s):      Other cerebrovascular disease - I67.89  Procedure:          Transthoracic echocardiogram with 2-D, M-mode and complete                      spectral and color flow Doppler.  [facility] [place]  Admission Status:   Inpatient  Contrast Injection: Verbal consent was obtained for injection of Ultrasonic                      Enhancing Agent following a discussion of risks and                      benefits.                      Endocardial visualization enhancedwith 2 ml of Definity                      Ultrasound enhancing agent (Lot#:[id number] Exp.Date:08/01/2024                      Discarded Dose:8ml).  UEA Reaction:       Patient had no adverse reaction after injection of                      Ultrasound Enhancing Agent.  Study Information:  Image quality for this study is fair.    _______________________________________________________________________________________  CONCLUSIONS:      1. Normal left ventricular cavity size. The left ventricular wall thickness is normal. The left ventricular systolic function is normal with an ejection fraction of 63 % by Ferreira's method of disks. There are no regional wall motion abnormalities seen.   2. There is normal left ventricular diastolic function.   3. Normal atria.   4. Normal right ventricular cavity size, normal right ventricular wall thickness and normal right ventricular systolic function. The tricuspid annular plane systolic excursion (TAPSE) is 1.8 cm (normal >=1.7 cm).   5. No significant valvular disease.   6. No pericardial effusion seen.   7. There is trace tricuspid regurgitation. There is insufficient tricuspid regurgitation detected to calculate pulmonary artery systolic pressure.   8. No prior echocardiogram is available for comparison.      < end of copied text >    OTHER: 	  	  LABS:	 	    CARDIAC MARKERS:                                  8.7    12.63 )-----------( 264      ( 04 Aug 2023 11:54 )             27.0           proBNP:   Lipid Profile:   HgA1c:   TSH:

## 2023-08-07 LAB
ANION GAP SERPL CALC-SCNC: 11 MMOL/L — SIGNIFICANT CHANGE UP (ref 5–17)
BUN SERPL-MCNC: 16 MG/DL — SIGNIFICANT CHANGE UP (ref 7–23)
CALCIUM SERPL-MCNC: 8.5 MG/DL — SIGNIFICANT CHANGE UP (ref 8.4–10.5)
CHLORIDE SERPL-SCNC: 106 MMOL/L — SIGNIFICANT CHANGE UP (ref 96–108)
CO2 SERPL-SCNC: 22 MMOL/L — SIGNIFICANT CHANGE UP (ref 22–31)
CREAT SERPL-MCNC: 0.65 MG/DL — SIGNIFICANT CHANGE UP (ref 0.5–1.3)
EGFR: 96 ML/MIN/1.73M2 — SIGNIFICANT CHANGE UP
FERRITIN SERPL-MCNC: 959 NG/ML — HIGH (ref 13–330)
GLUCOSE BLDC GLUCOMTR-MCNC: 108 MG/DL — HIGH (ref 70–99)
GLUCOSE BLDC GLUCOMTR-MCNC: 110 MG/DL — HIGH (ref 70–99)
GLUCOSE BLDC GLUCOMTR-MCNC: 130 MG/DL — HIGH (ref 70–99)
GLUCOSE BLDC GLUCOMTR-MCNC: 143 MG/DL — HIGH (ref 70–99)
GLUCOSE SERPL-MCNC: 114 MG/DL — HIGH (ref 70–99)
HCT VFR BLD CALC: 26.6 % — LOW (ref 34.5–45)
HGB BLD-MCNC: 8.5 G/DL — LOW (ref 11.5–15.5)
MAGNESIUM SERPL-MCNC: 1.9 MG/DL — SIGNIFICANT CHANGE UP (ref 1.6–2.6)
MCHC RBC-ENTMCNC: 22.8 PG — LOW (ref 27–34)
MCHC RBC-ENTMCNC: 32 GM/DL — SIGNIFICANT CHANGE UP (ref 32–36)
MCV RBC AUTO: 71.3 FL — LOW (ref 80–100)
NRBC # BLD: 0 /100 WBCS — SIGNIFICANT CHANGE UP (ref 0–0)
PHOSPHATE SERPL-MCNC: 3.4 MG/DL — SIGNIFICANT CHANGE UP (ref 2.5–4.5)
PLATELET # BLD AUTO: 315 K/UL — SIGNIFICANT CHANGE UP (ref 150–400)
POTASSIUM SERPL-MCNC: 3.6 MMOL/L — SIGNIFICANT CHANGE UP (ref 3.5–5.3)
POTASSIUM SERPL-SCNC: 3.6 MMOL/L — SIGNIFICANT CHANGE UP (ref 3.5–5.3)
RBC # BLD: 3.73 M/UL — LOW (ref 3.8–5.2)
RBC # FLD: 18.6 % — HIGH (ref 10.3–14.5)
SODIUM SERPL-SCNC: 139 MMOL/L — SIGNIFICANT CHANGE UP (ref 135–145)
WBC # BLD: 8.62 K/UL — SIGNIFICANT CHANGE UP (ref 3.8–10.5)
WBC # FLD AUTO: 8.62 K/UL — SIGNIFICANT CHANGE UP (ref 3.8–10.5)

## 2023-08-07 PROCEDURE — 99232 SBSQ HOSP IP/OBS MODERATE 35: CPT

## 2023-08-07 RX ORDER — SODIUM CHLORIDE 9 MG/ML
1000 INJECTION, SOLUTION INTRAVENOUS
Refills: 0 | Status: DISCONTINUED | OUTPATIENT
Start: 2023-08-07 | End: 2023-08-10

## 2023-08-07 RX ORDER — INSULIN LISPRO 100/ML
5 VIAL (ML) SUBCUTANEOUS
Refills: 0 | Status: DISCONTINUED | OUTPATIENT
Start: 2023-08-07 | End: 2023-08-10

## 2023-08-07 RX ORDER — POTASSIUM CHLORIDE 20 MEQ
40 PACKET (EA) ORAL ONCE
Refills: 0 | Status: COMPLETED | OUTPATIENT
Start: 2023-08-07 | End: 2023-08-07

## 2023-08-07 RX ORDER — MAGNESIUM SULFATE 500 MG/ML
1 VIAL (ML) INJECTION ONCE
Refills: 0 | Status: COMPLETED | OUTPATIENT
Start: 2023-08-07 | End: 2023-08-07

## 2023-08-07 RX ORDER — INSULIN LISPRO 100/ML
5 VIAL (ML) SUBCUTANEOUS ONCE
Refills: 0 | Status: COMPLETED | OUTPATIENT
Start: 2023-08-07 | End: 2023-08-07

## 2023-08-07 RX ORDER — HUMAN INSULIN 100 [IU]/ML
3 INJECTION, SUSPENSION SUBCUTANEOUS ONCE
Refills: 0 | Status: DISCONTINUED | OUTPATIENT
Start: 2023-08-07 | End: 2023-08-07

## 2023-08-07 RX ORDER — GLUCAGON INJECTION, SOLUTION 0.5 MG/.1ML
1 INJECTION, SOLUTION SUBCUTANEOUS ONCE
Refills: 0 | Status: DISCONTINUED | OUTPATIENT
Start: 2023-08-07 | End: 2023-08-10

## 2023-08-07 RX ADMIN — LEVETIRACETAM 500 MILLIGRAM(S): 250 TABLET, FILM COATED ORAL at 17:02

## 2023-08-07 RX ADMIN — SODIUM CHLORIDE 100 MILLILITER(S): 9 INJECTION INTRAMUSCULAR; INTRAVENOUS; SUBCUTANEOUS at 19:15

## 2023-08-07 RX ADMIN — Medication 5 UNIT(S): at 16:54

## 2023-08-07 RX ADMIN — Medication 40 MILLIEQUIVALENT(S): at 05:55

## 2023-08-07 RX ADMIN — Medication 81 MILLIGRAM(S): at 11:15

## 2023-08-07 RX ADMIN — ATORVASTATIN CALCIUM 80 MILLIGRAM(S): 80 TABLET, FILM COATED ORAL at 21:29

## 2023-08-07 RX ADMIN — CHLORHEXIDINE GLUCONATE 1 APPLICATION(S): 213 SOLUTION TOPICAL at 11:16

## 2023-08-07 RX ADMIN — LEVETIRACETAM 500 MILLIGRAM(S): 250 TABLET, FILM COATED ORAL at 05:02

## 2023-08-07 RX ADMIN — Medication 5 UNIT(S): at 12:44

## 2023-08-07 RX ADMIN — ENOXAPARIN SODIUM 40 MILLIGRAM(S): 100 INJECTION SUBCUTANEOUS at 17:02

## 2023-08-07 RX ADMIN — SENNA PLUS 2 TABLET(S): 8.6 TABLET ORAL at 21:30

## 2023-08-07 RX ADMIN — Medication 102 GRAM(S): at 05:57

## 2023-08-07 RX ADMIN — Medication 325 MILLIGRAM(S): at 08:16

## 2023-08-07 RX ADMIN — Medication 5 UNIT(S): at 08:32

## 2023-08-07 RX ADMIN — SODIUM CHLORIDE 100 MILLILITER(S): 9 INJECTION INTRAMUSCULAR; INTRAVENOUS; SUBCUTANEOUS at 07:16

## 2023-08-07 RX ADMIN — AMLODIPINE BESYLATE 2.5 MILLIGRAM(S): 2.5 TABLET ORAL at 05:01

## 2023-08-07 RX ADMIN — POLYETHYLENE GLYCOL 3350 17 GRAM(S): 17 POWDER, FOR SOLUTION ORAL at 17:02

## 2023-08-07 RX ADMIN — INSULIN GLARGINE 15 UNIT(S): 100 INJECTION, SOLUTION SUBCUTANEOUS at 21:32

## 2023-08-07 NOTE — PROGRESS NOTE ADULT - SUBJECTIVE AND OBJECTIVE BOX
PM EVENTS: no acute overnight events as of documentation time of this note.    ROS: negative except as mentioned above.    T(C): 36.9 (08-07-23 @ 03:00), Max: 37.7 (08-06-23 @ 12:00)  HR: 92 (08-07-23 @ 04:00) (83 - 106)  BP: --  RR: 16 (08-07-23 @ 04:00) (12 - 20)  SpO2: 96% (08-07-23 @ 04:00) (93% - 98%)        I&O's Summary    05 Aug 2023 07:01  -  06 Aug 2023 07:00  --------------------------------------------------------  IN: 3330 mL / OUT: 3000 mL / NET: 330 mL    06 Aug 2023 07:01  -  07 Aug 2023 04:49  --------------------------------------------------------  IN: 2980 mL / OUT: 4700 mL / NET: -1720 mL        EXAM:   Neuro: a/ox3, mild dysnomia, R. eye blindness, EOMI, gaze conjugate, face symmetric, no dysarthria, power 5/5 throughout  Chest: nonlabored respirations, no adventitious lung sounds bilaterally, heart regular rate/rhythm, present S1/S2, no murmurs or rubs  Abdomen: nondistended, soft and nontender without peritoneal signs, normoactive bowel sounds  Extremities: no clubbing, well-perfused, no edema                              8.5    8.62  )-----------( 315      ( 07 Aug 2023 04:23 )             26.6             MEDICATIONS  (STANDING):  amLODIPine   Tablet 2.5 milliGRAM(s) Oral daily  aspirin  chewable 81 milliGRAM(s) Oral daily  atorvastatin 80 milliGRAM(s) Oral at bedtime  chlorhexidine 4% Liquid 1 Application(s) Topical daily  enoxaparin Injectable 40 milliGRAM(s) SubCutaneous <User Schedule>  ferrous    sulfate 325 milliGRAM(s) Oral <User Schedule>  insulin glargine Injectable (LANTUS) 15 Unit(s) SubCutaneous at bedtime  insulin lispro (ADMELOG) corrective regimen sliding scale   SubCutaneous Before meals and at bedtime  insulin lispro Injectable (ADMELOG) 10 Unit(s) SubCutaneous three times a day before meals  levETIRAcetam 500 milliGRAM(s) Oral two times a day  polyethylene glycol 3350 17 Gram(s) Oral two times a day  senna 2 Tablet(s) Oral at bedtime  sodium chloride 0.9%. 1000 milliLiter(s) (100 mL/Hr) IV Continuous <Continuous>    MEDICATIONS  (PRN):  acetaminophen     Tablet .. 650 milliGRAM(s) Oral every 6 hours PRN Mild Pain (1 - 3)  oxyCODONE    IR 5 milliGRAM(s) Oral every 4 hours PRN Moderate Pain (4 - 6)      Please see the day's note documented by Attending/fellow for detailed ongoing assessment and plan.      Admit NSICU neuro checks q2h, vitals q1h, ASA, LEV  On room air  SBP goal 120 - 160, norvasc, lipitor, holding lopressor  CCD, stool count, no stress ulcer ppx indicated  Euglycemia/euvolemia/eunatremia, lantus, RASSI, premeal  SCDs/SQL  Remains in NSICU for BP control     PM EVENTS: no acute overnight events as of documentation time of this note.    ROS: negative except as mentioned above.      EXAM:   Neuro: a/ox3, speech fluent, no paraphasic errors, R. eye blindness, EOMI, gaze conjugate, face symmetric, no dysarthria, power 5/5 throughout  Chest: nonlabored respirations, no adventitious lung sounds bilaterally, heart regular rate/rhythm, present S1/S2, no murmurs or rubs  Abdomen: nondistended, soft and nontender without peritoneal signs, normoactive bowel sounds  Extremities: no clubbing, well-perfused, no edema        Vital Signs Last 24 Hrs  T(C): 37 (08 Aug 2023 03:00), Max: 37.3 (07 Aug 2023 07:00)  T(F): 98.6 (08 Aug 2023 03:00), Max: 99.1 (07 Aug 2023 07:00)  HR: 79 (08 Aug 2023 03:00) (79 - 92)  BP: --  BP(mean): --  RR: 15 (08 Aug 2023 03:00) (13 - 20)  SpO2: 96% (08 Aug 2023 03:00) (94% - 100%)    Parameters below as of 07 Aug 2023 19:00  Patient On (Oxygen Delivery Method): room air      I&O's Detail    06 Aug 2023 07:01  -  07 Aug 2023 07:00  --------------------------------------------------------  IN:    IV PiggyBack: 150 mL    Oral Fluid: 930 mL    sodium chloride 0.9%: 2300 mL  Total IN: 3380 mL    OUT:    Incontinent per Collection Bag (mL): 2050 mL    Voided (mL): 2650 mL  Total OUT: 4700 mL    Total NET: -1320 mL      07 Aug 2023 07:01  -  08 Aug 2023 05:14  --------------------------------------------------------  IN:    Oral Fluid: 2050 mL    sodium chloride 0.9%: 2100 mL  Total IN: 4150 mL    OUT:    Incontinent per Collection Bag (mL): 2300 mL    Voided (mL): 900 mL  Total OUT: 3200 mL    Total NET: 950 mL                                8.5    8.62  )-----------( 315      ( 07 Aug 2023 04:23 )             26.6     08-07    139  |  106  |  16  ----------------------------<  114<H>  3.6   |  22  |  0.65    Ca    8.5      07 Aug 2023 04:23  Phos  3.4     08-07  Mg     1.9     08-07        MEDICATIONS  (STANDING):  amLODIPine   Tablet 2.5 milliGRAM(s) Oral daily  aspirin  chewable 81 milliGRAM(s) Oral daily  atorvastatin 80 milliGRAM(s) Oral at bedtime  chlorhexidine 4% Liquid 1 Application(s) Topical daily  dextrose 5%. 1000 milliLiter(s) (100 mL/Hr) IV Continuous <Continuous>  enoxaparin Injectable 40 milliGRAM(s) SubCutaneous <User Schedule>  ferrous    sulfate 325 milliGRAM(s) Oral <User Schedule>  glucagon  Injectable 1 milliGRAM(s) IntraMuscular once  insulin glargine Injectable (LANTUS) 15 Unit(s) SubCutaneous at bedtime  insulin lispro (ADMELOG) corrective regimen sliding scale   SubCutaneous Before meals and at bedtime  insulin lispro Injectable (ADMELOG) 5 Unit(s) SubCutaneous three times a day before meals  levETIRAcetam 500 milliGRAM(s) Oral two times a day  polyethylene glycol 3350 17 Gram(s) Oral two times a day  senna 2 Tablet(s) Oral at bedtime  sodium chloride 0.9%. 1000 milliLiter(s) (100 mL/Hr) IV Continuous <Continuous>    MEDICATIONS  (PRN):  acetaminophen     Tablet .. 650 milliGRAM(s) Oral every 6 hours PRN Mild Pain (1 - 3)  oxyCODONE    IR 5 milliGRAM(s) Oral every 4 hours PRN Moderate Pain (4 - 6)      Please see the day's note documented by Attending/fellow for detailed ongoing assessment and plan.      Admit NSICU neuro checks q2h, vitals q1h, ASA, LEV  On room air  SBP goal 120 - 160, norvasc, lipitor, holding lopressor  CCD, stool count, no stress ulcer ppx indicated  Euglycemia/euvolemia/eunatremia, lantus, RASSI, premeal  SCDs/SQL  Remains in NSICU for BP control

## 2023-08-07 NOTE — PROGRESS NOTE ADULT - ASSESSMENT
66yo woman PMH seizure on keppra, CVA on plavix with unknown residual deficit, HLD, HTN p/w worsening and more prolonged episodes of aphasia/blank staring/unresponsiveness over two weeks. The episodes became more frequent and today, per daughter, it lasted for hours. Brought to ED, where she was found to have R facial droop, RUE weakness, dysarthria, and global aphasia. NIHSS 12. CTA showing c/f L MCA occlusion.

## 2023-08-07 NOTE — PROGRESS NOTE ADULT - ASSESSMENT
Assessment and Plan: 	  ASSESSMENT/PLAN: chronic M2 occlusion, s/p L STA-MCA bypass.     NEURO:  neuro checks q 2hr   cont keppra home medication   ASA, hold plavix  Continue fluids @ 100 until DC from hospital   s/p L STA-MCA bypass. Attempted aspiration thrombectomy of L M2/MCA occlusion.   Activity: [x] mobilize as tolerated [] Bedrest [x] PT [x] OT [] PMNR    PULM:  RA     CV:  SBP goal 120-160 mmhg  TTE - 63% ejection fraction  Cont atorvastatin  Will discontinue lopressor and will hold off on standing medications  Cardiology consulted for recommendations on labile BP     RENAL:  NS @100L     GI:  Diet: CCD  GI prophylaxis [x] not indicated [] PPI home med [] other:  Bowel regimen [] colace [x] senna [x] other: miralax   LBM 8/4    ENDO:   A1C 8.5  Goal euglycemia (-180)  DM lantus 15 units, ISS  Will continue premeal 10 units TID  TSH WNL  Medium sliding scale    HEME/ONC:  H/H trending down, will transfuse if <7. Likely blood loss anemia, will repeat H/H now  VTE prophylaxis: [x] SCDs [x] chemoprophylaxis SQL [] hold chemoprophylaxis due to: [] high risk of DVT/PE on admission due to:  continue iron for microcytic anemia, previously on iron o/p for AJAY    ID: afebrile, leukocytosis trending down     30 critical care at risk for stroke, MI       Assessment and Plan: 	  ASSESSMENT/PLAN: chronic M2 occlusion, s/p L STA-MCA bypass.     NEURO:  neuro checks q 2hr   cont keppra home medication   ASA, hold plavix  Continue fluids @ 100 until DC from hospital   s/p L STA-MCA bypass. Attempted aspiration thrombectomy of L M2/MCA occlusion.   Activity: [x] mobilize as tolerated [] Bedrest [x] PT [x] OT [] PMNR    PULM:  RA     CV:  SBP goal 120-160 mmhg  TTE - 63% ejection fraction  Cont atorvastatin  Will discontinue lopressor and will hold off on standing medications  Cardiology consulted for recommendations on labile BP     RENAL:  NS @100L     GI:  Diet: CCD  GI prophylaxis [x] not indicated [] PPI home med [] other:  Bowel regimen [] colace [x] senna [x] other: miralax   LBM 8/4    ENDO:   A1C 8.5  Goal euglycemia (-180)  DM lantus 15 units, ISS  Will continue premeal 10 units TID  TSH WNL  Medium sliding scale    HEME/ONC:  H/H trending down, will transfuse if <7. Likely blood loss anemia, will repeat H/H now  VTE prophylaxis: [x] SCDs [x] chemoprophylaxis SQL [] hold chemoprophylaxis due to: [] high risk of DVT/PE on admission due to:  continue iron for microcytic anemia, previously on iron o/p for AJAY    ID: afebrile, leukocytosis trending down     ICU  full code

## 2023-08-07 NOTE — PROGRESS NOTE ADULT - ASSESSMENT
67F hx seizures on keppra, CVA on plavix with unknown residual deficit, HLD, HTN p/w worsening and more prolonged episodes of aphasia/blank staring/unresponsiveness over two weeks. Brought to ED, where she was found to have R facial droop, RUE weakness, dysarthria, and global aphasia. NIHSS 12. CTA showing c/f L MCA occlusion. Now s/p L STA-MCA bypass.    -exam stable overnight  --160; off pressors  -NS at 100/hr  -Monitor H/H   -Continue to monitor exam  -Vonda to see (labile BP)  -F/u iron studies

## 2023-08-07 NOTE — PROGRESS NOTE ADULT - SUBJECTIVE AND OBJECTIVE BOX
Subjective and Objective:   HOSPITAL COURSE: 66yo woman PMH seizure on keppra, CVA on plavix with unknown residual deficit, HLD, HTN p/w worsening and more prolonged episodes of aphasia/blank staring/unresponsiveness over two weeks. The episodes became more frequent and today, per daughter, it lasted for hours. Brought to ED, where she was found to have R facial droop, RUE weakness, dysarthria, and global aphasia. NIHSS 12. CTA showing c/f L MCA occlusion.     7/23 Adm NSCU s/p angiogram showing R M2/MCA occlusion with distal retrograde reconstitution, aspiration thrombectomy attempted without recanalization.   7/24: no acute overnight events  7/25: Aphasic when SBP dropped to 110 overnight, daughter reports slowed speech this morning   7/26: Pressure dependent exam, exam worsened when SBP <160  7/27: Mild confusion over night. Negative balance. Will put her on maintenance fluids to decrease the pressors requirements  7/28: No acute overnight events. Failed a challenge with a lower BP parameter: 120-160. Increase midodrine to 15 q8  7/29: no acute overnight events. STA-MCA Bypass on Monday?  7/30 BP dropped < 160 mm her neuro exam got worse, phenylephrine   7/31: Patient still symptomatic on Low BP, responds extremely well to midodrine and BP rises significantly   8/1: On jose still, possible angio for STA-MCA bypass on 8/2.  8/2- No events.   8/3- Patient still pressure dependent. Exam worsens when Bp drops <160 mmHg. No other changes.  8/4- Patient off pressors. No changes in exam at /80.   8/5- No fluctuations in examination in the last 24 hours. BP liberalized and reaching higher than 160 at times, examined at 170 BP, with intact exam. ---OF NOTE: on  patient with word finding difficulty and R sided blurry vision   8/7- No overnight events.     Allergies    No Known Allergies    REVIEW OF SYSTEMS: [ x] Unable to Assess due to neurologic exam   [ ] All ROS addressed below are non-contributory, except:  Neuro: [ ] Headache [ ] Back pain [ ] Numbness [ ] Weakness [ ] Ataxia [ ] Dizziness [ ] Aphasia [ ] Dysarthria [ ] Visual disturbance  Resp: [ ] Shortness of breath/dyspnea, [ ] Orthopnea [ ] Cough  CV: [ ] Chest pain [ ] Palpitation [ ] Lightheadedness [ ] Syncope  Renal: [ ] Thirst [ ] Edema  GI: [ ] Nausea [ ] Emesis [ ] Abdominal pain [ ] Constipation [ ] Diarrhea  Hem: [ ] Hematemesis [ ] bright red blood per rectum  ID: [ ] Fever [ ] Chills [ ] Dysuria  ENT: [ ] Rhinorrhea      DEVICES:   [ ] Restraints [ ] ET tube [ ] central line [ ] arterial line [ ] gonzalez [ ] NGT/OGT [ ] EVD [ ] LD [ ] FRANTZ/HMV [ ] Trach [ ] PEG [ ] Chest Tube       ICU Vital Signs Last 24 Hrs  T(C): 36.5 (02 Aug 2023 19:00), Max: 36.8 (02 Aug 2023 16:00)  T(F): 97.7 (02 Aug 2023 19:00), Max: 98.2 (02 Aug 2023 16:00)  HR: 110 (02 Aug 2023 22:00) (73 - 110)  BP: 182/72 (02 Aug 2023 00:38) (182/72 - 182/72)  BP(mean): --  ABP: 148/64 (02 Aug 2023 22:00) (124/59 - 218/86)  ABP(mean): 101 (02 Aug 2023 22:00) (86 - 139)  RR: 19 (02 Aug 2023 22:00) (10 - 23)  SpO2: 99% (02 Aug 2023 19:30) (86% - 100%)    O2 Parameters below as of 02 Aug 2023 19:00  Patient On (Oxygen Delivery Method): room air        I&O's Detail    01 Aug 2023 07:01  -  02 Aug 2023 07:00  --------------------------------------------------------  IN:    multiple electrolytes Injection Type 1.: 50 mL    Oral Fluid: 980 mL    Phenylephrine: 176.7 mL    sodium chloride 0.9%: 600 mL    Sodium Chloride 0.9% Bolus: 1000 mL  Total IN: 2806.7 mL    OUT:    Incontinent per Collection Bag (mL): 2150 mL  Total OUT: 2150 mL    Total NET: 656.7 mL      02 Aug 2023 07:01  -  02 Aug 2023 22:48  --------------------------------------------------------  IN:    IV PiggyBack: 50 mL    Oral Fluid: 240 mL    Phenylephrine: 15.1 mL    Phenylephrine: 6.5 mL    sodium chloride 0.9%: 75 mL    sodium chloride 0.9%: 600 mL  Total IN: 986.6 mL    OUT:    Indwelling Catheter - Urethral (mL): 925 mL  Total OUT: 925 mL    Total NET: 61.6 mL        MEDICATIONS  (STANDING):  aspirin  chewable 81 milliGRAM(s) Oral daily  atorvastatin 80 milliGRAM(s) Oral at bedtime  ceFAZolin   IVPB 2000 milliGRAM(s) IV Intermittent every 8 hours  ferrous    sulfate 325 milliGRAM(s) Oral <User Schedule>  fludroCORTISONE 0.1 milliGRAM(s) Oral every 12 hours  insulin glargine Injectable (LANTUS) 15 Unit(s) SubCutaneous at bedtime  insulin lispro (ADMELOG) corrective regimen sliding scale   SubCutaneous Before meals and at bedtime  insulin lispro Injectable (ADMELOG) 6 Unit(s) SubCutaneous three times a day before meals  levETIRAcetam  IVPB 500 milliGRAM(s) IV Intermittent every 12 hours  midodrine 10 milliGRAM(s) Oral <User Schedule>  phenylephrine    Infusion 0.1 MICROgram(s)/kG/Min (2.16 mL/Hr) IV Continuous <Continuous>  polyethylene glycol 3350 17 Gram(s) Oral two times a day  potassium chloride    Tablet ER 20 milliEquivalent(s) Oral daily  senna 2 Tablet(s) Oral at bedtime  sodium chloride 0.9%. 1000 milliLiter(s) (100 mL/Hr) IV Continuous <Continuous>    MEDICATIONS  (PRN):  acetaminophen     Tablet .. 650 milliGRAM(s) Oral every 6 hours PRN Mild Pain (1 - 3)  acetaminophen     Tablet .. 650 milliGRAM(s) Oral every 6 hours PRN Mild Pain (1 - 3)  oxyCODONE    IR 5 milliGRAM(s) Oral every 4 hours PRN Moderate Pain (4 - 6)      CTP 7/23  IMPRESSION:    CT angiography neck: No hemodynamically significant stenosis byNASCET   criteria. No vascular dissection.    CT angiography brain: No major vessel occlusion or proximal stenosis. 2   mm left M1 bifurcation aneurysm    CT perfusion: Approximately 7 mL of ischemic penumbra is seen in the left   frontoparietal cortex.      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, EOMI, FC, speech improved, minor left facial asymmetry    Motor exam:           Upper extremity                         Delt     Bicep     Tricep    HG                                                 R         5/5       5/5        5/5        5/5                                               L          5/5       5/5        5/5        5/5             Lower extremity                        HF         KF        KE       DF         PF                                                  R        5/5       5/5        5/5        5/5      5/5                                               L         5/5       5/5        5/5        5/5      5/5     Word finding difficulty and right visual field cut on .                                                 Sensation: [x] intact to light touch  [ ] decreased:     Reflexes: Deep Tendon Reflexes Intact     Pulmonary: Clear to Auscultation, No rales, No rhonchi, No wheezes     Cardiovascular: S1, S2, Regular rate and rhythm     Gastrointestinal: Soft, Non-tender, Non-distended     Extremities: No calf tenderness       LABS:       LABS:  Na: 139 (07-29 @ 00:44), 139 (07-28 @ 04:03)  K: 4.1 (07-29 @ 00:44), 4.2 (07-28 @ 04:03)  Cl: 103 (07-29 @ 00:44), 105 (07-28 @ 04:03)  CO2: 24 (07-29 @ 00:44), 24 (07-28 @ 04:03)  BUN: 20 (07-29 @ 00:44), 22 (07-28 @ 04:03)  Cr: 0.75 (07-29 @ 00:44), 0.73 (07-28 @ 04:03)  Glu: 195(07-29 @ 00:44), 142(07-28 @ 04:03)    Hgb: 9.3 (07-29 @ 00:43), 9.1 (07-28 @ 04:03)  Hct: 29.4 (07-29 @ 00:43), 29.2 (07-28 @ 04:03)  WBC: 8.35 (07-29 @ 00:43), 8.25 (07-28 @ 04:03)  Plt: 265 (07-29 @ 00:43), 252 (07-28 @ 04:03)    INR:   PTT:                                  Subjective and Objective:   HOSPITAL COURSE: 68yo woman PMH seizure on keppra, CVA on plavix with unknown residual deficit, HLD, HTN p/w worsening and more prolonged episodes of aphasia/blank staring/unresponsiveness over two weeks. The episodes became more frequent and today, per daughter, it lasted for hours. Brought to ED, where she was found to have R facial droop, RUE weakness, dysarthria, and global aphasia. NIHSS 12. CTA showing c/f L MCA occlusion.     7/23 Adm NSCU s/p angiogram showing R M2/MCA occlusion with distal retrograde reconstitution, aspiration thrombectomy attempted without recanalization.   7/24: no acute overnight events  7/25: Aphasic when SBP dropped to 110 overnight, daughter reports slowed speech this morning   7/26: Pressure dependent exam, exam worsened when SBP <160  7/27: Mild confusion over night. Negative balance. Will put her on maintenance fluids to decrease the pressors requirements  7/28: No acute overnight events. Failed a challenge with a lower BP parameter: 120-160. Increase midodrine to 15 q8  7/29: no acute overnight events. STA-MCA Bypass on Monday?  7/30 BP dropped < 160 mm her neuro exam got worse, phenylephrine   7/31: Patient still symptomatic on Low BP, responds extremely well to midodrine and BP rises significantly   8/1: On jose still, possible angio for STA-MCA bypass on 8/2.  8/2- No events.   8/3- Patient still pressure dependent. Exam worsens when Bp drops <160 mmHg. No other changes.  8/4- Patient off pressors. No changes in exam at /80.   8/5- No fluctuations in examination in the last 24 hours. BP liberalized and reaching higher than 160 at times, examined at 170 BP, with intact exam. ---OF NOTE: on  patient with word finding difficulty and R sided blurry vision   8/7- No overnight events.     Allergies    No Known Allergies    REVIEW OF SYSTEMS: [ x] Unable to Assess due to neurologic exam   [ ] All ROS addressed below are non-contributory, except:  Neuro: [ ] Headache [ ] Back pain [ ] Numbness [ ] Weakness [ ] Ataxia [ ] Dizziness [ ] Aphasia [ ] Dysarthria [ ] Visual disturbance  Resp: [ ] Shortness of breath/dyspnea, [ ] Orthopnea [ ] Cough  CV: [ ] Chest pain [ ] Palpitation [ ] Lightheadedness [ ] Syncope  Renal: [ ] Thirst [ ] Edema  GI: [ ] Nausea [ ] Emesis [ ] Abdominal pain [ ] Constipation [ ] Diarrhea  Hem: [ ] Hematemesis [ ] bright red blood per rectum  ID: [ ] Fever [ ] Chills [ ] Dysuria  ENT: [ ] Rhinorrhea      DEVICES:   [ ] Restraints [ ] ET tube [ ] central line [ ] arterial line [ ] gonzalez [ ] NGT/OGT [ ] EVD [ ] LD [ ] FRANTZ/HMV [ ] Trach [ ] PEG [ ] Chest Tube       ICU Vital Signs Last 24 Hrs  T(C): 37.3 (07 Aug 2023 07:00), Max: 37.7 (06 Aug 2023 12:00)  T(F): 99.1 (07 Aug 2023 07:00), Max: 99.8 (06 Aug 2023 12:00)  HR: 87 (07 Aug 2023 10:00) (83 - 106)  BP: --  BP(mean): --  ABP: 140/67 (07 Aug 2023 10:00) (122/50 - 177/73)  ABP(mean): 95 (07 Aug 2023 10:00) (79 - 117)  RR: 15 (07 Aug 2023 10:00) (12 - 20)  SpO2: 100% (07 Aug 2023 10:00) (94% - 100%)    O2 Parameters below as of 07 Aug 2023 07:00  Patient On (Oxygen Delivery Method): room air        MEDICATIONS  (STANDING):  amLODIPine   Tablet 2.5 milliGRAM(s) Oral daily  aspirin  chewable 81 milliGRAM(s) Oral daily  atorvastatin 80 milliGRAM(s) Oral at bedtime  chlorhexidine 4% Liquid 1 Application(s) Topical daily  dextrose 5%. 1000 milliLiter(s) (100 mL/Hr) IV Continuous <Continuous>  enoxaparin Injectable 40 milliGRAM(s) SubCutaneous <User Schedule>  ferrous    sulfate 325 milliGRAM(s) Oral <User Schedule>  glucagon  Injectable 1 milliGRAM(s) IntraMuscular once  insulin glargine Injectable (LANTUS) 15 Unit(s) SubCutaneous at bedtime  insulin lispro (ADMELOG) corrective regimen sliding scale   SubCutaneous Before meals and at bedtime  insulin lispro Injectable (ADMELOG) 10 Unit(s) SubCutaneous three times a day before meals  levETIRAcetam 500 milliGRAM(s) Oral two times a day  polyethylene glycol 3350 17 Gram(s) Oral two times a day  senna 2 Tablet(s) Oral at bedtime  sodium chloride 0.9%. 1000 milliLiter(s) (100 mL/Hr) IV Continuous <Continuous>    MEDICATIONS  (PRN):  acetaminophen     Tablet .. 650 milliGRAM(s) Oral every 6 hours PRN Mild Pain (1 - 3)  oxyCODONE    IR 5 milliGRAM(s) Oral every 4 hours PRN Moderate Pain (4 - 6)      CTP 7/23  IMPRESSION:    CT angiography neck: No hemodynamically significant stenosis byNASCET   criteria. No vascular dissection.    CT angiography brain: No major vessel occlusion or proximal stenosis. 2   mm left M1 bifurcation aneurysm    CT perfusion: Approximately 7 mL of ischemic penumbra is seen in the left   frontoparietal cortex.      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 Speech clear, no word finding difficulty.  Motor exam:           Upper extremity                         Delt     Bicep     Tricep    HG                                                 R         5/5       5/5        5/5        5/5                                               L          5/5       5/5        5/5        5/5             Lower extremity                        HF         KF        KE       DF         PF                                                  R        5/5       5/5        5/5        5/5      5/5                                               L         5/5       5/5        5/5        5/5      5/5     ==                                                 Sensation: [x] intact to light touch  [ ] decreased:     Reflexes: Deep Tendon Reflexes Intact     Pulmonary: Clear to Auscultation, No rales, No rhonchi, No wheezes     Cardiovascular: S1, S2, Regular rate and rhythm     Gastrointestinal: Soft, Non-tender, Non-distended     Extremities: No calf tenderness                                    Subjective and Objective:   HOSPITAL COURSE: 66yo woman PMH seizure on keppra, CVA on plavix with unknown residual deficit, HLD, HTN p/w worsening and more prolonged episodes of aphasia/blank staring/unresponsiveness over two weeks. The episodes became more frequent and today, per daughter, it lasted for hours. Brought to ED, where she was found to have R facial droop, RUE weakness, dysarthria, and global aphasia. NIHSS 12. CTA showing c/f L MCA occlusion.     7/23 Adm NSCU s/p angiogram showing R M2/MCA occlusion with distal retrograde reconstitution, aspiration thrombectomy attempted without recanalization.   7/24: no acute overnight events  7/25: Aphasic when SBP dropped to 110 overnight, daughter reports slowed speech this morning   7/26: Pressure dependent exam, exam worsened when SBP <160  7/27: Mild confusion over night. Negative balance. Will put her on maintenance fluids to decrease the pressors requirements  7/28: No acute overnight events. Failed a challenge with a lower BP parameter: 120-160. Increase midodrine to 15 q8  7/29: no acute overnight events. STA-MCA Bypass on Monday?  7/30 BP dropped < 160 mm her neuro exam got worse, phenylephrine   7/31: Patient still symptomatic on Low BP, responds extremely well to midodrine and BP rises significantly   8/1: On jose still, possible angio for STA-MCA bypass on 8/2.  8/2- No events.   8/3- Patient still pressure dependent. Exam worsens when Bp drops <160 mmHg. No other changes.  8/4- Patient off pressors. No changes in exam at /80.   8/5- No fluctuations in examination in the last 24 hours. BP liberalized and reaching higher than 160 at times, examined at 170 BP, with intact exam. ---OF NOTE: on  patient with word finding difficulty and R sided blurry vision   8/7- No overnight events.     Allergies    No Known Allergies    REVIEW OF SYSTEMS: [ x] Unable to Assess due to neurologic exam   [ ] All ROS addressed below are non-contributory, except:  Neuro: [ ] Headache [ ] Back pain [ ] Numbness [ ] Weakness [ ] Ataxia [ ] Dizziness [ ] Aphasia [ ] Dysarthria [ ] Visual disturbance  Resp: [ ] Shortness of breath/dyspnea, [ ] Orthopnea [ ] Cough  CV: [ ] Chest pain [ ] Palpitation [ ] Lightheadedness [ ] Syncope  Renal: [ ] Thirst [ ] Edema  GI: [ ] Nausea [ ] Emesis [ ] Abdominal pain [ ] Constipation [ ] Diarrhea  Hem: [ ] Hematemesis [ ] bright red blood per rectum  ID: [ ] Fever [ ] Chills [ ] Dysuria  ENT: [ ] Rhinorrhea      DEVICES:   [ ] Restraints [ ] ET tube [ ] central line [ x] arterial line [ ] gonzalez [ ] NGT/OGT [ ] EVD [ ] LD [ ] FRANTZ/HMV [ ] Trach [ ] PEG [ ] Chest Tube       ICU Vital Signs Last 24 Hrs  T(C): 37.3 (07 Aug 2023 07:00), Max: 37.7 (06 Aug 2023 12:00)  T(F): 99.1 (07 Aug 2023 07:00), Max: 99.8 (06 Aug 2023 12:00)  HR: 87 (07 Aug 2023 10:00) (83 - 106)  BP: --  BP(mean): --  ABP: 140/67 (07 Aug 2023 10:00) (122/50 - 177/73)  ABP(mean): 95 (07 Aug 2023 10:00) (79 - 117)  RR: 15 (07 Aug 2023 10:00) (12 - 20)  SpO2: 100% (07 Aug 2023 10:00) (94% - 100%)    O2 Parameters below as of 07 Aug 2023 07:00  Patient On (Oxygen Delivery Method): room air        MEDICATIONS  (STANDING):  amLODIPine   Tablet 2.5 milliGRAM(s) Oral daily  aspirin  chewable 81 milliGRAM(s) Oral daily  atorvastatin 80 milliGRAM(s) Oral at bedtime  chlorhexidine 4% Liquid 1 Application(s) Topical daily  dextrose 5%. 1000 milliLiter(s) (100 mL/Hr) IV Continuous <Continuous>  enoxaparin Injectable 40 milliGRAM(s) SubCutaneous <User Schedule>  ferrous    sulfate 325 milliGRAM(s) Oral <User Schedule>  glucagon  Injectable 1 milliGRAM(s) IntraMuscular once  insulin glargine Injectable (LANTUS) 15 Unit(s) SubCutaneous at bedtime  insulin lispro (ADMELOG) corrective regimen sliding scale   SubCutaneous Before meals and at bedtime  insulin lispro Injectable (ADMELOG) 10 Unit(s) SubCutaneous three times a day before meals  levETIRAcetam 500 milliGRAM(s) Oral two times a day  polyethylene glycol 3350 17 Gram(s) Oral two times a day  senna 2 Tablet(s) Oral at bedtime  sodium chloride 0.9%. 1000 milliLiter(s) (100 mL/Hr) IV Continuous <Continuous>    MEDICATIONS  (PRN):  acetaminophen     Tablet .. 650 milliGRAM(s) Oral every 6 hours PRN Mild Pain (1 - 3)  oxyCODONE    IR 5 milliGRAM(s) Oral every 4 hours PRN Moderate Pain (4 - 6)      CTP 7/23  IMPRESSION:    CT angiography neck: No hemodynamically significant stenosis byNASCET   criteria. No vascular dissection.    CT angiography brain: No major vessel occlusion or proximal stenosis. 2   mm left M1 bifurcation aneurysm    CT perfusion: Approximately 7 mL of ischemic penumbra is seen in the left   frontoparietal cortex.      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 Speech clear, no word finding difficulty.  Motor exam:           Upper extremity                         Delt     Bicep     Tricep    HG                                                 R         5/5       5/5        5/5        5/5                                               L          5/5       5/5        5/5        5/5             Lower extremity                        HF         KF        KE       DF         PF                                                  R        5/5       5/5        5/5        5/5      5/5                                               L         5/5       5/5        5/5        5/5      5/5     ==                                                 Sensation: [x] intact to light touch  [ ] decreased:     Reflexes: Deep Tendon Reflexes Intact     Pulmonary: Clear to Auscultation, No rales, No rhonchi, No wheezes     Cardiovascular: S1, S2, Regular rate and rhythm     Gastrointestinal: Soft, Non-tender, Non-distended     Extremities: No calf tenderness

## 2023-08-07 NOTE — PROGRESS NOTE ADULT - SUBJECTIVE AND OBJECTIVE BOX
Patient seen and examined at bedside.    --Anticoagulation--  aspirin  chewable 81 milliGRAM(s) Oral daily  enoxaparin Injectable 40 milliGRAM(s) SubCutaneous <User Schedule>    T(C): 37.1 (08-06-23 @ 23:00), Max: 37.7 (08-06-23 @ 12:00)  HR: 84 (08-07-23 @ 01:00) (84 - 106)  BP: --  RR: 15 (08-07-23 @ 01:00) (12 - 20)  SpO2: 96% (08-07-23 @ 01:00) (93% - 98%)  Wt(kg): --    Exam: intact

## 2023-08-08 LAB
GLUCOSE BLDC GLUCOMTR-MCNC: 102 MG/DL — HIGH (ref 70–99)
GLUCOSE BLDC GLUCOMTR-MCNC: 109 MG/DL — HIGH (ref 70–99)
GLUCOSE BLDC GLUCOMTR-MCNC: 110 MG/DL — HIGH (ref 70–99)
GLUCOSE BLDC GLUCOMTR-MCNC: 115 MG/DL — HIGH (ref 70–99)

## 2023-08-08 RX ORDER — INSULIN GLARGINE 100 [IU]/ML
10 INJECTION, SOLUTION SUBCUTANEOUS AT BEDTIME
Refills: 0 | Status: DISCONTINUED | OUTPATIENT
Start: 2023-08-08 | End: 2023-08-10

## 2023-08-08 RX ADMIN — Medication 5 UNIT(S): at 17:01

## 2023-08-08 RX ADMIN — ATORVASTATIN CALCIUM 80 MILLIGRAM(S): 80 TABLET, FILM COATED ORAL at 21:48

## 2023-08-08 RX ADMIN — Medication 5 UNIT(S): at 13:33

## 2023-08-08 RX ADMIN — INSULIN GLARGINE 10 UNIT(S): 100 INJECTION, SOLUTION SUBCUTANEOUS at 21:49

## 2023-08-08 RX ADMIN — AMLODIPINE BESYLATE 2.5 MILLIGRAM(S): 2.5 TABLET ORAL at 05:21

## 2023-08-08 RX ADMIN — LEVETIRACETAM 500 MILLIGRAM(S): 250 TABLET, FILM COATED ORAL at 05:21

## 2023-08-08 RX ADMIN — Medication 5 UNIT(S): at 08:49

## 2023-08-08 RX ADMIN — ENOXAPARIN SODIUM 40 MILLIGRAM(S): 100 INJECTION SUBCUTANEOUS at 17:00

## 2023-08-08 RX ADMIN — LEVETIRACETAM 500 MILLIGRAM(S): 250 TABLET, FILM COATED ORAL at 16:59

## 2023-08-08 RX ADMIN — Medication 81 MILLIGRAM(S): at 11:18

## 2023-08-08 RX ADMIN — SODIUM CHLORIDE 100 MILLILITER(S): 9 INJECTION INTRAMUSCULAR; INTRAVENOUS; SUBCUTANEOUS at 07:30

## 2023-08-08 NOTE — PROGRESS NOTE ADULT - ASSESSMENT
Assessment and Plan: 	  ASSESSMENT/PLAN: chronic M2 occlusion, s/p L STA-MCA bypass.     NEURO:  neuro checks q 2hr   Cont keppra home medication   ASA, hold plavix  Continue fluids @ 100 until DC from hospital   s/p L STA-MCA bypass. Attempted aspiration thrombectomy of L M2/MCA occlusion.   Activity: [x] mobilize as tolerated [] Bedrest [x] PT [x] OT [] PMNR    PULM:  RA     CV:  SBP goal 120-160 mmhg  TTE - 63% ejection fraction  Cont atorvastatin  Started on Amlodipine 2.5 mg QD  Cardiology following    RENAL:  NS @100 cc/hour- will relax the BP    GI:  Diet: CCD  GI prophylaxis [x] not indicated [] PPI home med [] other:  Bowel regimen [] colace [x] senna [x] other: miralax   LBM 8/4    ENDO:   A1C 8.5  Goal euglycemia (-180)  DM lantus 15 units, ISS  Will continue premeal 5 units TID  TSH WNL  Medium sliding scale    HEME/ONC:  H/H low at baseline, will transfuse if <7.   VTE prophylaxis: [x] SCDs [x] chemoprophylaxis SQL [] hold chemoprophylaxis due to: [] high risk of DVT/PE on admission due to:  Continue iron for microcytic anemia, previously on iron o/p for AJAY    ID: Afebrile, no leukocytosis     ICU  full code      Assessment and Plan: 	  ASSESSMENT/PLAN: chronic M2 occlusion, s/p L STA-MCA bypass.     NEURO:  neuro checks q 2hr   Cont keppra home medication   ASA, hold plavix  Continue fluids @ 100 until DC from hospital   s/p L STA-MCA bypass. Attempted aspiration thrombectomy of L M2/MCA occlusion.   Activity: [x] mobilize as tolerated [] Bedrest [x] PT [x] OT [] PMNR    PULM:  RA     CV:  SBP goal 100-160 mmhg  TTE - 63% ejection fraction  Cont atorvastatin  Started on Amlodipine 2.5 mg QD  Cardiology following    RENAL:  NS @100 cc/hour- will relax the BP    GI:  Diet: CCD  GI prophylaxis [x] not indicated [] PPI home med [] other:  Bowel regimen [] colace [x] senna [x] other: miralax   LBM 8/4    ENDO:   A1C 8.5  Goal euglycemia (-180)  DM lantus 10 units, ISS  Will continue premeal 5 units TID  TSH WNL  Medium sliding scale    HEME/ONC:  H/H low at baseline, will transfuse if <7.   VTE prophylaxis: [x] SCDs [x] chemoprophylaxis SQL [] hold chemoprophylaxis due to: [] high risk of DVT/PE on admission due to:  Continue iron for microcytic anemia, previously on iron o/p for AJAY    ID: Afebrile, no leukocytosis     ICU  full code

## 2023-08-08 NOTE — PROGRESS NOTE ADULT - SUBJECTIVE AND OBJECTIVE BOX
Subjective: Patient seen and examined. No new events except as noted.   Seen in NSCU.  Daughter at bedside.    REVIEW OF SYSTEMS:    CONSTITUTIONAL: + weakness, fevers or chills  EYES/ENT: No visual changes;  No vertigo or throat pain   NECK: No pain or stiffness  RESPIRATORY: No cough, wheezing, hemoptysis; No shortness of breath  CARDIOVASCULAR: No chest pain or palpitations  GASTROINTESTINAL: No abdominal or epigastric pain. No nausea, vomiting, or hematemesis; No diarrhea or constipation. No melena or hematochezia.  GENITOURINARY: No dysuria, frequency or hematuria  NEUROLOGICAL: No numbness or weakness  SKIN: No itching, burning, rashes, or lesions   All other review of systems is negative unless indicated above.    MEDICATIONS:  MEDICATIONS  (STANDING):  amLODIPine   Tablet 2.5 milliGRAM(s) Oral daily  aspirin  chewable 81 milliGRAM(s) Oral daily  atorvastatin 80 milliGRAM(s) Oral at bedtime  chlorhexidine 4% Liquid 1 Application(s) Topical daily  dextrose 5%. 1000 milliLiter(s) (100 mL/Hr) IV Continuous <Continuous>  enoxaparin Injectable 40 milliGRAM(s) SubCutaneous <User Schedule>  ferrous    sulfate 325 milliGRAM(s) Oral <User Schedule>  glucagon  Injectable 1 milliGRAM(s) IntraMuscular once  insulin glargine Injectable (LANTUS) 15 Unit(s) SubCutaneous at bedtime  insulin lispro (ADMELOG) corrective regimen sliding scale   SubCutaneous Before meals and at bedtime  insulin lispro Injectable (ADMELOG) 5 Unit(s) SubCutaneous three times a day before meals  levETIRAcetam 500 milliGRAM(s) Oral two times a day  polyethylene glycol 3350 17 Gram(s) Oral two times a day  senna 2 Tablet(s) Oral at bedtime  sodium chloride 0.9%. 1000 milliLiter(s) (100 mL/Hr) IV Continuous <Continuous>    PHYSICAL EXAM:  Vital Signs Last 24 Hrs  T(C): 36.9 (08 Aug 2023 12:00), Max: 37 (08 Aug 2023 03:00)  T(F): 98.4 (08 Aug 2023 12:00), Max: 98.6 (08 Aug 2023 03:00)  HR: 89 (08 Aug 2023 12:00) (79 - 92)  BP: 159/85 (08 Aug 2023 12:00) (159/85 - 160/79)  BP(mean): 104 (08 Aug 2023 11:00) (104 - 104)  RR: 16 (08 Aug 2023 12:00) (12 - 17)  SpO2: 96% (08 Aug 2023 12:00) (95% - 100%)    Parameters below as of 08 Aug 2023 12:00  Patient On (Oxygen Delivery Method): room air    I&O's Summary    07 Aug 2023 07:01  -  08 Aug 2023 07:00  --------------------------------------------------------  IN: 4350 mL / OUT: 3200 mL / NET: 1150 mL    08 Aug 2023 07:01  -  08 Aug 2023 12:24  --------------------------------------------------------  IN: 760 mL / OUT: 1400 mL / NET: -640 mL    Appearance: Normal	  HEENT: Normal oral mucosa, PERRL, EOMI	  Lymphatic: No lymphadenopathy , no edema  Cardiovascular: Normal S1 S2, No JVD, No murmurs , Peripheral pulses palpable 2+ bilaterally  Respiratory: Lungs clear to auscultation, normal effort 	  Gastrointestinal:  Soft, Non-tender, + BS	  Skin: No rashes, No ecchymoses, No cyanosis, warm to touch  Neurological: axo3, EO to voice, 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 Speech clear, no word finding difficulty.  Motor exam:           Upper extremity                         Delt     Bicep     Tricep    HG                                                 R         5/5       5/5        5/5        5/5                                               L          5/5       5/5        5/5        5/5             Lower extremity                        HF         KF        KE       DF         PF                                                  R        5/5       5/5        5/5        5/5      5/5                                               L         5/5       5/5        5/5        5/5      5/5   Ext: No edema    LABS:    CARDIAC MARKERS:                        8.5    8.62  )-----------( 315      ( 07 Aug 2023 04:23 )             26.6     08-07    139  |  106  |  16  ----------------------------<  114<H>  3.6   |  22  |  0.65    Ca    8.5      07 Aug 2023 04:23  Phos  3.4     08-07  Mg     1.9     08-07    proBNP:   Lipid Profile:   HgA1c:   TSH:     TELEMETRY: SR    ECG:  	  RADIOLOGY:   DIAGNOSTIC TESTING:  [ ] Echocardiogram:  [ ]  Catheterization:  [ ] Stress Test:    OTHER:

## 2023-08-08 NOTE — PROGRESS NOTE ADULT - SUBJECTIVE AND OBJECTIVE BOX
Subjective and Objective:   HOSPITAL COURSE: 68yo woman PMH seizure on keppra, CVA on plavix with unknown residual deficit, HLD, HTN p/w worsening and more prolonged episodes of aphasia/blank staring/unresponsiveness over two weeks. The episodes became more frequent and today, per daughter, it lasted for hours. Brought to ED, where she was found to have R facial droop, RUE weakness, dysarthria, and global aphasia. NIHSS 12. CTA showing c/f L MCA occlusion.     7/23 Adm NSCU s/p angiogram showing R M2/MCA occlusion with distal retrograde reconstitution, aspiration thrombectomy attempted without recanalization.   7/24: no acute overnight events  7/25: Aphasic when SBP dropped to 110 overnight, daughter reports slowed speech this morning   7/26: Pressure dependent exam, exam worsened when SBP <160  7/27: Mild confusion over night. Negative balance. Will put her on maintenance fluids to decrease the pressors requirements  7/28: No acute overnight events. Failed a challenge with a lower BP parameter: 120-160. Increase midodrine to 15 q8  7/29: no acute overnight events. STA-MCA Bypass on Monday?  7/30 BP dropped < 160 mm her neuro exam got worse, phenylephrine   7/31: Patient still symptomatic on Low BP, responds extremely well to midodrine and BP rises significantly   8/1: On jose still, possible angio for STA-MCA bypass on 8/2.  8/2- No events.   8/3- Patient still pressure dependent. Exam worsens when Bp drops <160 mmHg. No other changes.  8/4- Patient off pressors. No changes in exam at /80.   8/5- No fluctuations in examination in the last 24 hours. BP liberalized and reaching higher than 160 at times, examined at 170 BP, with intact exam. ---OF NOTE: on  patient with word finding difficulty and R sided blurry vision   8/7- No overnight events.   8/8- No changes in examination, will lower the BP parameters today.     Allergies    No Known Allergies    REVIEW OF SYSTEMS: [] Unable to Assess due to neurologic exam   [x] All ROS addressed below are non-contributory, except:  Neuro: [ ] Headache [ ] Back pain [ ] Numbness [ ] Weakness [ ] Ataxia [ ] Dizziness [ ] Aphasia [ ] Dysarthria [ ] Visual disturbance  Resp: [ ] Shortness of breath/dyspnea, [ ] Orthopnea [ ] Cough  CV: [ ] Chest pain [ ] Palpitation [ ] Lightheadedness [ ] Syncope  Renal: [ ] Thirst [ ] Edema  GI: [ ] Nausea [ ] Emesis [ ] Abdominal pain [ ] Constipation [ ] Diarrhea  Hem: [ ] Hematemesis [ ] bright red blood per rectum  ID: [ ] Fever [ ] Chills [ ] Dysuria  ENT: [ ] Rhinorrhea      DEVICES:   [ ] Restraints [ ] ET tube [ ] central line [] arterial line [ ] gonzalez [ ] NGT/OGT [ ] EVD [ ] LD [ ] FRANTZ/HMV [ ] Trach [ ] PEG [ ] Chest Tube     ICU Vital Signs Last 24 Hrs  T(C): 37 (08 Aug 2023 07:00), Max: 37 (08 Aug 2023 03:00)  T(F): 98.6 (08 Aug 2023 07:00), Max: 98.6 (08 Aug 2023 03:00)  HR: 91 (08 Aug 2023 07:00) (79 - 92)  BP: --  BP(mean): --  ABP: 154/62 (08 Aug 2023 07:00) (120/56 - 173/73)  ABP(mean): 101 (08 Aug 2023 07:00) (81 - 120)  RR: 12 (08 Aug 2023 07:00) (12 - 20)  SpO2: 98% (08 Aug 2023 07:00) (95% - 100%)    O2 Parameters below as of 08 Aug 2023 07:00  Patient On (Oxygen Delivery Method): room air      MEDICATIONS  (STANDING):  amLODIPine   Tablet 2.5 milliGRAM(s) Oral daily  aspirin  chewable 81 milliGRAM(s) Oral daily  atorvastatin 80 milliGRAM(s) Oral at bedtime  chlorhexidine 4% Liquid 1 Application(s) Topical daily  dextrose 5%. 1000 milliLiter(s) (100 mL/Hr) IV Continuous <Continuous>  enoxaparin Injectable 40 milliGRAM(s) SubCutaneous <User Schedule>  ferrous    sulfate 325 milliGRAM(s) Oral <User Schedule>  glucagon  Injectable 1 milliGRAM(s) IntraMuscular once  insulin glargine Injectable (LANTUS) 15 Unit(s) SubCutaneous at bedtime  insulin lispro (ADMELOG) corrective regimen sliding scale   SubCutaneous Before meals and at bedtime  insulin lispro Injectable (ADMELOG) 5 Unit(s) SubCutaneous three times a day before meals  levETIRAcetam 500 milliGRAM(s) Oral two times a day  polyethylene glycol 3350 17 Gram(s) Oral two times a day  senna 2 Tablet(s) Oral at bedtime  sodium chloride 0.9%. 1000 milliLiter(s) (100 mL/Hr) IV Continuous <Continuous>    MEDICATIONS  (PRN):  acetaminophen     Tablet .. 650 milliGRAM(s) Oral every 6 hours PRN Mild Pain (1 - 3)  oxyCODONE    IR 5 milliGRAM(s) Oral every 4 hours PRN Moderate Pain (4 - 6)        CTP 7/23  IMPRESSION:    CT angiography neck: No hemodynamically significant stenosis byNASCET   criteria. No vascular dissection.    CT angiography brain: No major vessel occlusion or proximal stenosis. 2   mm left M1 bifurcation aneurysm    CT perfusion: Approximately 7 mL of ischemic penumbra is seen in the left   frontoparietal cortex.      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 Speech clear, no word finding difficulty.  Motor exam:           Upper extremity                         Delt     Bicep     Tricep    HG                                                 R         5/5       5/5        5/5        5/5                                               L          5/5       5/5        5/5        5/5             Lower extremity                        HF         KF        KE       DF         PF                                                  R        5/5       5/5        5/5        5/5      5/5                                               L         5/5       5/5        5/5        5/5      5/5                                                    Sensation: [x] intact to light touch  [ ] decreased:     Reflexes: Deep Tendon Reflexes Intact     Pulmonary: Clear to Auscultation, No rales, No rhonchi, No wheezes     Cardiovascular: S1, S2, Regular rate and rhythm     Gastrointestinal: Soft, Non-tender, Non-distended     Extremities: No calf tenderness

## 2023-08-08 NOTE — PROGRESS NOTE ADULT - SUBJECTIVE AND OBJECTIVE BOX
Patient seen and examined at bedside.    --Anticoagulation--  enoxaparin Injectable 40 milliGRAM(s) SubCutaneous <User Schedule>    T(C): 36.8 (08-07-23 @ 23:00), Max: 37.3 (08-07-23 @ 07:00)  HR: 84 (08-08-23 @ 01:00) (84 - 92)  BP: --  RR: 14 (08-08-23 @ 01:00) (13 - 20)  SpO2: 95% (08-08-23 @ 01:00) (94% - 100%)  Wt(kg): --    Exam: intact

## 2023-08-08 NOTE — CHART NOTE - NSCHARTNOTEFT_GEN_A_CORE
Nutrition Follow Up Note  Patient seen for: follow up    67F hx seizures on keppra, CVA on plavix with unknown residual deficit, HLD, HTN p/w worsening and more prolonged episodes of aphasia/blank staring/unresponsiveness over two weeks. Brought to ED, where she was found to have R facial droop, RUE weakness, dysarthria, and global aphasia. NIHSS 12. CTA showing c/f L MCA occlusion. Now s/p L STA-MCA bypass.    Source: [] Patient       [x] Medical Record        [x] RN    [x] Family at bedside   daughter    [x] Other: pt in and out of sleep    -If unable to interview patient: [] Trach/Vent/BiPAP     Diet, Consistent Carbohydrate Renal/No Snacks (23 @ 19:40) [Active]    - Is current order appropriate/adequate? see below for recommendations  per daughter, pt does not like Mighty shake. requesting glucerna or Ensure instead.    - PO intake :   [x] >75%  Adequate    [] 50-75%  Fair       [] <50%  Poor    Nutrition Related Concerns:  -on insulin regimen for glycemic control, POCT glucose being monitored, see below. Pt daughter confirms pt with a history of DM2.   -IV Fluids: NaCl 0.9%    GI:  Last BM    Bowel Regimen? [x] Yes   [] No    Weights:   Dosing weight 57.6kg  Daily Weight in k.6 ()  No further weights noted, RD will continue to monitor trends.     MEDICATIONS  (STANDING):  amLODIPine   Tablet  atorvastatin  dextrose 5%.  ferrous    sulfate  glucagon  Injectable  insulin glargine Injectable (LANTUS)  insulin lispro (ADMELOG) corrective regimen sliding scale  insulin lispro Injectable (ADMELOG)  polyethylene glycol 3350  senna  sodium chloride 0.9%.    Pertinent Labs:   A1C with Estimated Average Glucose Result: 8.5 % (23 @ 21:37)    Finger Sticks:  POCT Blood Glucose.: 110 mg/dL ( @ 13:27)  POCT Blood Glucose.: 102 mg/dL ( @ 08:11)  POCT Blood Glucose.: 108 mg/dL ( @ 21:28)  POCT Blood Glucose.: 143 mg/dL ( @ 16:51)    Triglycerides, Serum: 96 mg/dL (23 @ 21:37)    Skin per nursing documentation: right groin incision s/p angio, left head incision s/p bypass  Edema per nursing documentation: none     Estimated Needs using dosing weight 57.6kg  27-32kcal/kg 1555-1843kcal/day  1.2-1.4g/kg 69-81g/day  defer fluid needs to team    Previous Nutrition Diagnosis: acute moderate protein calorie malnutrition, increased nutrient needs   Nutrition Diagnosis is: [x] ongoing  [] resolved [] not applicable     Nutrition Care Plan:  [x] In Progress  [] Achieved  [] Not applicable    New Nutrition Diagnosis: N/A    Nutrition Interventions:  With pt daughter: Provided education on Carbohydrate Consistent diet including sources of carbohydrates, portion sizes, pairing protein with carbohydrates, limiting sugar sweetened beverages in diet and the importance of consistent eating pattern to help optimize glycemic control. Provided with DM MyPlate guideline.    Recommendations:      -Continue ferrous sulfate per team.  -Consider addition of Multivitamin if no contraindications to aid in incision healing and preventing micronutrient deficiencies.  -D/c renal restriction from diet, continue consistent carbohydrate restriction.    -Add Glucerna BID to aid in meeting nutrient needs. will d/c mighty Shake     Monitoring and Evaluation:   Continue to monitor nutritional intake, tolerance to diet prescription, weights, labs, skin integrity    RD remains available upon request and will follow up per protocol  Rebecca Galindo, MS, RD, CDN teams

## 2023-08-08 NOTE — PROGRESS NOTE ADULT - ASSESSMENT
68yo woman PMH seizure on keppra, CVA on plavix with unknown residual deficit, HLD, HTN p/w worsening and more prolonged episodes of aphasia/blank staring/unresponsiveness over two weeks. The episodes became more frequent and today, per daughter, it lasted for hours. Brought to ED, where she was found to have R facial droop, RUE weakness, dysarthria, and global aphasia. NIHSS 12. CTA showing c/f L MCA occlusion.

## 2023-08-09 ENCOUNTER — TRANSCRIPTION ENCOUNTER (OUTPATIENT)
Age: 68
End: 2023-08-09

## 2023-08-09 LAB
ANION GAP SERPL CALC-SCNC: 11 MMOL/L — SIGNIFICANT CHANGE UP (ref 5–17)
BUN SERPL-MCNC: 15 MG/DL — SIGNIFICANT CHANGE UP (ref 7–23)
CALCIUM SERPL-MCNC: 8.7 MG/DL — SIGNIFICANT CHANGE UP (ref 8.4–10.5)
CHLORIDE SERPL-SCNC: 105 MMOL/L — SIGNIFICANT CHANGE UP (ref 96–108)
CO2 SERPL-SCNC: 24 MMOL/L — SIGNIFICANT CHANGE UP (ref 22–31)
CREAT SERPL-MCNC: 0.61 MG/DL — SIGNIFICANT CHANGE UP (ref 0.5–1.3)
EGFR: 97 ML/MIN/1.73M2 — SIGNIFICANT CHANGE UP
GLUCOSE BLDC GLUCOMTR-MCNC: 117 MG/DL — HIGH (ref 70–99)
GLUCOSE BLDC GLUCOMTR-MCNC: 170 MG/DL — HIGH (ref 70–99)
GLUCOSE BLDC GLUCOMTR-MCNC: 186 MG/DL — HIGH (ref 70–99)
GLUCOSE BLDC GLUCOMTR-MCNC: 88 MG/DL — SIGNIFICANT CHANGE UP (ref 70–99)
GLUCOSE SERPL-MCNC: 185 MG/DL — HIGH (ref 70–99)
HCT VFR BLD CALC: 27.8 % — LOW (ref 34.5–45)
HGB BLD-MCNC: 8.6 G/DL — LOW (ref 11.5–15.5)
MAGNESIUM SERPL-MCNC: 2 MG/DL — SIGNIFICANT CHANGE UP (ref 1.6–2.6)
MCHC RBC-ENTMCNC: 22.2 PG — LOW (ref 27–34)
MCHC RBC-ENTMCNC: 30.9 GM/DL — LOW (ref 32–36)
MCV RBC AUTO: 71.6 FL — LOW (ref 80–100)
NRBC # BLD: 0 /100 WBCS — SIGNIFICANT CHANGE UP (ref 0–0)
PHOSPHATE SERPL-MCNC: 3.4 MG/DL — SIGNIFICANT CHANGE UP (ref 2.5–4.5)
PLATELET # BLD AUTO: 385 K/UL — SIGNIFICANT CHANGE UP (ref 150–400)
POTASSIUM SERPL-MCNC: 3.6 MMOL/L — SIGNIFICANT CHANGE UP (ref 3.5–5.3)
POTASSIUM SERPL-SCNC: 3.6 MMOL/L — SIGNIFICANT CHANGE UP (ref 3.5–5.3)
RBC # BLD: 3.88 M/UL — SIGNIFICANT CHANGE UP (ref 3.8–5.2)
RBC # FLD: 18.8 % — HIGH (ref 10.3–14.5)
SODIUM SERPL-SCNC: 140 MMOL/L — SIGNIFICANT CHANGE UP (ref 135–145)
WBC # BLD: 7.47 K/UL — SIGNIFICANT CHANGE UP (ref 3.8–10.5)
WBC # FLD AUTO: 7.47 K/UL — SIGNIFICANT CHANGE UP (ref 3.8–10.5)

## 2023-08-09 RX ORDER — POTASSIUM CHLORIDE 20 MEQ
40 PACKET (EA) ORAL ONCE
Refills: 0 | Status: COMPLETED | OUTPATIENT
Start: 2023-08-09 | End: 2023-08-09

## 2023-08-09 RX ADMIN — INSULIN GLARGINE 10 UNIT(S): 100 INJECTION, SOLUTION SUBCUTANEOUS at 21:22

## 2023-08-09 RX ADMIN — ATORVASTATIN CALCIUM 80 MILLIGRAM(S): 80 TABLET, FILM COATED ORAL at 21:22

## 2023-08-09 RX ADMIN — Medication 2: at 16:32

## 2023-08-09 RX ADMIN — Medication 81 MILLIGRAM(S): at 11:42

## 2023-08-09 RX ADMIN — Medication 40 MILLIEQUIVALENT(S): at 17:23

## 2023-08-09 RX ADMIN — ENOXAPARIN SODIUM 40 MILLIGRAM(S): 100 INJECTION SUBCUTANEOUS at 17:18

## 2023-08-09 RX ADMIN — LEVETIRACETAM 500 MILLIGRAM(S): 250 TABLET, FILM COATED ORAL at 17:18

## 2023-08-09 RX ADMIN — Medication 5 UNIT(S): at 16:32

## 2023-08-09 RX ADMIN — Medication 2: at 11:42

## 2023-08-09 RX ADMIN — Medication 325 MILLIGRAM(S): at 08:08

## 2023-08-09 RX ADMIN — Medication 5 UNIT(S): at 11:43

## 2023-08-09 RX ADMIN — LEVETIRACETAM 500 MILLIGRAM(S): 250 TABLET, FILM COATED ORAL at 05:03

## 2023-08-09 RX ADMIN — SODIUM CHLORIDE 100 MILLILITER(S): 9 INJECTION INTRAMUSCULAR; INTRAVENOUS; SUBCUTANEOUS at 11:43

## 2023-08-09 RX ADMIN — AMLODIPINE BESYLATE 2.5 MILLIGRAM(S): 2.5 TABLET ORAL at 05:03

## 2023-08-09 NOTE — PROGRESS NOTE ADULT - SUBJECTIVE AND OBJECTIVE BOX
Subjective: Patient seen and examined. No new events except as noted.   Sitting up in bed, eating breakfast.     REVIEW OF SYSTEMS:    CONSTITUTIONAL: + weakness, fevers or chills  EYES/ENT: No visual changes;  No vertigo or throat pain   NECK: No pain or stiffness  RESPIRATORY: No cough, wheezing, hemoptysis; No shortness of breath  CARDIOVASCULAR: No chest pain or palpitations  GASTROINTESTINAL: No abdominal or epigastric pain. No nausea, vomiting, or hematemesis; No diarrhea or constipation. No melena or hematochezia.  GENITOURINARY: No dysuria, frequency or hematuria  NEUROLOGICAL: No numbness or weakness  SKIN: No itching, burning, rashes, or lesions   All other review of systems is negative unless indicated above.    MEDICATIONS:  MEDICATIONS  (STANDING):  amLODIPine   Tablet 2.5 milliGRAM(s) Oral daily  aspirin  chewable 81 milliGRAM(s) Oral daily  atorvastatin 80 milliGRAM(s) Oral at bedtime  chlorhexidine 4% Liquid 1 Application(s) Topical daily  dextrose 5%. 1000 milliLiter(s) (100 mL/Hr) IV Continuous <Continuous>  enoxaparin Injectable 40 milliGRAM(s) SubCutaneous <User Schedule>  ferrous    sulfate 325 milliGRAM(s) Oral <User Schedule>  glucagon  Injectable 1 milliGRAM(s) IntraMuscular once  insulin glargine Injectable (LANTUS) 15 Unit(s) SubCutaneous at bedtime  insulin lispro (ADMELOG) corrective regimen sliding scale   SubCutaneous Before meals and at bedtime  insulin lispro Injectable (ADMELOG) 5 Unit(s) SubCutaneous three times a day before meals  levETIRAcetam 500 milliGRAM(s) Oral two times a day  polyethylene glycol 3350 17 Gram(s) Oral two times a day  senna 2 Tablet(s) Oral at bedtime  sodium chloride 0.9%. 1000 milliLiter(s) (100 mL/Hr) IV Continuous <Continuous>    PHYSICAL EXAM:  Vital Signs Last 24 Hrs  T(C): 36.4 (09 Aug 2023 09:13), Max: 36.9 (08 Aug 2023 11:00)  T(F): 97.5 (09 Aug 2023 09:13), Max: 98.4 (08 Aug 2023 11:00)  HR: 90 (09 Aug 2023 09:13) (78 - 90)  BP: 167/90 (09 Aug 2023 09:13) (144/76 - 167/90)  BP(mean): 104 (08 Aug 2023 11:00) (104 - 104)  RR: 18 (09 Aug 2023 09:13) (14 - 18)  SpO2: 95% (09 Aug 2023 09:13) (95% - 98%)    Parameters below as of 09 Aug 2023 09:13  Patient On (Oxygen Delivery Method): room air    I&O's Summary    08 Aug 2023 07:01  -  09 Aug 2023 07:00  --------------------------------------------------------  IN: 760 mL / OUT: 3400 mL / NET: -2640 mL    Appearance: Normal	  HEENT: Normal oral mucosa, PERRL, EOMI	  Lymphatic: No lymphadenopathy , no edema  Cardiovascular: Normal S1 S2, No JVD, No murmurs , Peripheral pulses palpable 2+ bilaterally  Respiratory: Lungs clear to auscultation, normal effort 	  Gastrointestinal:  Soft, Non-tender, + BS	  Skin: No rashes, No ecchymoses, No cyanosis, warm to touch  Neurological: axo3, EO to voice, 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 Speech clear, no word finding difficulty.  Motor exam:           Upper extremity                         Delt     Bicep     Tricep    HG                                                 R         5/5       5/5        5/5        5/5                                               L          5/5       5/5        5/5        5/5             Lower extremity                        HF         KF        KE       DF         PF                                                  R        5/5       5/5        5/5        5/5      5/5                                               L         5/5       5/5        5/5        5/5      5/5   Ext: No edema    LABS:    CARDIAC MARKERS:    proBNP:   Lipid Profile:   HgA1c:   TSH:     TELEMETRY:    ECG:  	  RADIOLOGY:   DIAGNOSTIC TESTING:  [ ] Echocardiogram:  [ ]  Catheterization:  [ ] Stress Test:    OTHER:

## 2023-08-09 NOTE — DISCHARGE NOTE PROVIDER - HOSPITAL COURSE
67y (1955) woman with a PMHx significant for CVA (on Plavix), seizure (on Keppra), HLD, HTN, who presents w/ CC of AMS. Patient unable to provide any history. Daughter at bedside assisting w/ history. Patient had stroke in past, but unclear when this happened and if any residual deficits. Per daughter, patient is usually independent, fully oriented, and speaks in normal conversation. However, over the past 2-3 weeks, she has been having 20-30 min episodes of sudden blank staring and unresponsiveness. She will be barely able to speak and the words will be nonsensical. She does not have generalized shaking, incontinence, or tongue biting. She comes back to her baseline afterwards. These episodes have generally occurred every other day. Daughter became concerned when she had episode starting at noon today that persisted for hours. Brought to ED for further evaluation. Code stroke called for AMS within 24h window. 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 Initial NIHSS 12. When asked orientation questions, she stutters an answer ("[age] and continues speaking nonsense. She follows simple commands with help of daughter speaking Cantonese. Home medications are: Plavix, amlodipine-ibresartan, metoprolol, rosuvastatin, Ceumid (aka Keppra), omeprezole, jarinu, folic acid, B complex, ferrous sulfate. After discussions with stroke/neuro-IR fellows and re-assessment, CTA showing concerning occlusion in L MCA distribution. Although NIHSS improving to 5 (mild dysarthria, aphasia, droop, RLE/RUE drift), patient taken for diagnostic cerebral angiogram. Family aware of diagnosis and provided written consent to plan. 67y (1955) woman with a PMHx significant for CVA (on Plavix), seizure (on Keppra), HLD, HTN, who presents w/ CC of AMS. Patient unable to provide any history. Daughter at bedside assisting w/ history. Patient had stroke in past, but unclear when this happened and if any residual deficits. Per daughter, patient is usually independent, fully oriented, and speaks in normal conversation. However, over the past 2-3 weeks, she has been having 20-30 min episodes of sudden blank staring and unresponsiveness. She will be barely able to speak and the words will be nonsensical. She does not have generalized shaking, incontinence, or tongue biting. She comes back to her baseline afterwards. These episodes have generally occurred every other day. Daughter became concerned when she had episode starting at noon today that persisted for hours. Brought to ED for further evaluation. Code stroke called for AMS within 24h window. 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 Initial NIHSS 12. When asked orientation questions, she stutters an answer ("[age] and continues speaking nonsense. She follows simple commands with help of daughter speaking Cantonese. Home medications are: Plavix, amlodipine-ibresartan, metoprolol, rosuvastatin, Ceumid (aka Keppra), omeprezole, jarinu, folic acid, B complex, ferrous sulfate. After discussions with stroke/neuro-IR fellows and re-assessment, CTA showing concerning occlusion in L MCA distribution. Although NIHSS improving to 5 (mild dysarthria, aphasia, droop, RLE/RUE drift), patient taken for diagnostic cerebral angiogram. Family aware of diagnosis and provided written consent to plan.    Hospital Course:   7/23 Adm NSCU s/p angiogram showing R M2/MCA occlusion with distal retrograde reconstitution, aspiration thrombectomy attempted without recanalization.   7/24: no acute overnight events  7/25: Aphasic when SBP dropped to 110 overnight, daughter reports slowed speech this morning   7/26: Pressure dependent exam, exam worsened when SBP <160  7/27: Mild confusion over night. Negative balance. Will put her on maintenance fluids to decrease the pressors requirements  7/28: No acute overnight events. Failed a challenge with a lower BP parameter: 120-160. Increase midodrine to 15 q8  7/29: no acute overnight events. STA-MCA Bypass on Monday?  7/30 BP dropped < 160 mm her neuro exam got worse, phenylephrine   7/31: Patient still symptomatic on Low BP, responds extremely well to midodrine and BP rises significantly   8/1: On jose still, possible angio for STA-MCA bypass on 8/2.  8/2- No events.   8/3- Patient still pressure dependent. Exam worsens when Bp drops <160 mmHg. No other changes.  8/4- Patient off pressors. No changes in exam at /80.   8/5- No fluctuations in examination in the last 24 hours. BP liberalized and reaching higher than 160 at times, examined at 170 BP, with intact exam. ---OF NOTE: on  patient with word finding difficulty and R sided blurry vision   8/7- No overnight events.   8/8- Transitioned from [facility] to [facility]  8/9- Exam stable, worked with PT/OT, for home PT/OT, needs stair clearance from PT

## 2023-08-09 NOTE — CHART NOTE - NSCHARTNOTESELECT_GEN_ALL_CORE
Commode/Event Note
Neurology/Event Note
Nutrition Services
Wheelchair/Event Note
EEG prelim
Interventional Neuro Radiology/Event Note
Interventional Neuro Radiology/Event Note
Neurology/Event Note
Nutrition Services
VTE risk/Event Note
yes

## 2023-08-09 NOTE — DISCHARGE NOTE PROVIDER - NSDCQMANTICOAGCONTRA_GEN_A_CORE
Status post recent surgery Patient does not have atrial fibrillation/flutter/Status post recent surgery

## 2023-08-09 NOTE — CHART NOTE - NSCHARTNOTEFT_GEN_A_CORE
Patient will require a standard wheelchair due to s/p left craniotomy STA-MCA bypass. The beneficiary has a mobility limitation that significantly impairs her ability to participate in one or more MRADLs such a toileting, feeding, dressing, grooming, and bathing in customary locations in the home. The patient's mobility limitation cannot be sufficiently resolved by the use of an appropriately fitted cane or walker. The patient is unable to ambulate with a walker. Use of a manual wheelchair will significantly improve the beneficiary's ability to participate in MRADLs and the beneficiary will use it on a regular basis in the home. The beneficiary is able and willing to use the wheelchair in the home. The beneficiary has sufficient upper extremity function and other physical and mental capabilities needed to safely self-propel the manual wheelchair that is provided in the home during a typical day.

## 2023-08-09 NOTE — DISCHARGE NOTE PROVIDER - NSDCQMSTROKERISK_NEU_ALL_CORE
Diabetes/High blood pressure/History of a stroke or TIA Diabetes/High blood pressure/High cholesterol/History of a stroke or TIA

## 2023-08-09 NOTE — DISCHARGE NOTE PROVIDER - NSDCFUADDINST_GEN_ALL_CORE_FT
Please make all necessary appointments and follow up. Please DO NOT take any NSAIDs (Advil, Aleve, Motrin, Ibuprofen) until cleared by your Neurosurgeon. Please DO NOT do any heavy lifting, bending, twisting and straining. You have surgical sutures, they are absorbable. You may shower, but NO SOAP / NO SHAMPOO. DO NOT do any scrubbing. Pat dry only. Please come to the emergency room for any of the following: altered mental status, seizures, pain uncontrolled by pain medications, fevers, leaking / bleeding from surgical site, chest pain and shortness of breath.

## 2023-08-09 NOTE — DISCHARGE NOTE PROVIDER - NSDCFUSCHEDAPPT_GEN_ALL_CORE_FT
Jennifer Pimentel  [facility]  NEUROSURG [address] [place] [address]  Scheduled Appointment: 08/25/2023

## 2023-08-09 NOTE — PROGRESS NOTE ADULT - ASSESSMENT
ASSESSMENT AND PLAN: 67y (1955) woman with a PMHx significant for CVA (on Plavix), seizure (on Keppra), HLD, HTN, who presents w/ CC of AMS. Patient unable to provide any history. Daughter at bedside assisting w/ history. Patient had stroke in past, but unclear when this happened and if any residual deficits. Per daughter, patient is usually independent, fully oriented, and speaks in normal conversation. However, over the past 2-3 weeks, she has been having 20-30 min episodes of sudden blank staring and unresponsiveness. She will be barely able to speak and the words will be nonsensical. She does not have generalized shaking, incontinence, or tongue biting. She comes back to her baseline afterwards. These episodes have generally occurred every other day. Daughter became concerned when she had episode starting at noon today that persisted for hours. Brought to ED for further evaluation. Code stroke called for AMS within 24h window. 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 Initial NIHSS 12. When asked orientation questions, she stutters an answer ("[age] and continues speaking nonsense. She follows simple commands with help of daughter speaking Cantonese. Home medications are: Plavix, amlodipine-ibresartan, metoprolol, rosuvastatin, Ceumid (aka Keppra), omeprezole, jarinu, folic acid, B complex, ferrous sulfate. After discussions with stroke/neuro-IR fellows and re-assessment, CTA showing concerning occlusion in L MCA distribution. Although NIHSS improving to 5 (mild dysarthria, aphasia, droop, RLE/RUE drift), patient taken for diagnostic cerebral angiogram. Family aware of diagnosis and provided written consent to plan.    HOSPITAL COURSE:  7/23 Adm NSCU s/p angiogram showing R M2/MCA occlusion with distal retrograde reconstitution, aspiration thrombectomy attempted without recanalization.   7/24: no acute overnight events  7/25: Aphasic when SBP dropped to 110 overnight, daughter reports slowed speech this morning   7/26: Pressure dependent exam, exam worsened when SBP <160  7/27: Mild confusion over night. Negative balance. Will put her on maintenance fluids to decrease the pressors requirements  7/28: No acute overnight events. Failed a challenge with a lower BP parameter: 120-160. Increase midodrine to 15 q8  7/29: no acute overnight events. STA-MCA Bypass on Monday?  7/30 BP dropped < 160 mm her neuro exam got worse, phenylephrine   7/31: Patient still symptomatic on Low BP, responds extremely well to midodrine and BP rises significantly   8/1: On jose still, possible angio for STA-MCA bypass on 8/2.  8/2- No events.   8/3- Patient still pressure dependent. Exam worsens when Bp drops <160 mmHg. No other changes.  8/4- Patient off pressors. No changes in exam at /80.   8/5- No fluctuations in examination in the last 24 hours. BP liberalized and reaching higher than 160 at times, examined at 170 BP, with intact exam. ---OF NOTE: on  patient with word finding difficulty and R sided blurry vision   8/7- No overnight events.   8/8- [facility]ed [facility] to [facility]    7/23/23 s/p angiogram showing R M2/MCA occlusion with distal retrograde reconstitution, aspiration thrombectomy attempted without recanalization  8/2/23 s/p left craniotomy for STA-MCA bypass    NEURO:   - Continue neuro checks q 4  - Continue Aspirin 81mg daily for STA-MCA bypass  - Continue Keppra for seizure prophylaxis  - Continue Tylenol prn and Oxycodone prn  - PT/OT - home PT/OT w/ rolling walker, wheelchair, 3:1 commode - prescriptions given to . Cleared from OT but still needs to work with PT for stairs clearance.    PULM:   - On room air, O2Sat>95%  - Incentive spirometry    CV:  - -160  - 7/24 TTE: EF 63%, normal left vent systolic fxn, normal left diastolic fxn, normal right vent systolic fxn, trace TR  - Continue Norvasc 2.5mg daily for HTN  - Continue Lipitor for HLD  - Appreciate Cardiology following    ENDO:   - A1c 8.5, continue Admelog, Lantus and ISS and CCD  - Fingersticks this morning was 88, Admelog was held at them time and f/u FS was 186  - Hospitalist consulted for assistance in glycemic control and discharge recommendations    HEME/ONC:             8/9 H/H stable, last PRBC transfusion for post-op anemia was on 8/3         DVT ppx: SQL, SCDs, 7/26 LE Dopp - negative    RENAL:   - NS@100  - 8/9 BMP - K 3.6, supplemented with KCl 40meq x 1    ID:   - Afebrile  - Received post-op abx    GI:    - Continue oral diet  - Continue senna and miralax for bowel regimen, last BM 8/8    Hospitalist team consulted for assistance in medical co-management.     DISCHARGE PLANNING:   PT/OT - home PT/OT w/ rolling walker, wheelchair, 3:1 commode - prescriptions given to . Cleared from OT but still needs to work with PT for stairs clearance.    Plan to be discussed w/ Dr. Pimentel  [id number]    ASSESSMENT AND PLAN: 67y (1955) woman with a PMHx significant for CVA (on Plavix), seizure (on Keppra), HLD, HTN, who presents w/ CC of AMS. Patient unable to provide any history. Daughter at bedside assisting w/ history. Patient had stroke in past, but unclear when this happened and if any residual deficits. Per daughter, patient is usually independent, fully oriented, and speaks in normal conversation. However, over the past 2-3 weeks, she has been having 20-30 min episodes of sudden blank staring and unresponsiveness. She will be barely able to speak and the words will be nonsensical. She does not have generalized shaking, incontinence, or tongue biting. She comes back to her baseline afterwards. These episodes have generally occurred every other day. Daughter became concerned when she had episode starting at noon today that persisted for hours. Brought to ED for further evaluation. Code stroke called for AMS within 24h window. 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 Initial NIHSS 12. When asked orientation questions, she stutters an answer ("[age] and continues speaking nonsense. She follows simple commands with help of daughter speaking Cantonese. Home medications are: Plavix, amlodipine-ibresartan, metoprolol, rosuvastatin, Ceumid (aka Keppra), omeprezole, jarinu, folic acid, B complex, ferrous sulfate. After discussions with stroke/neuro-IR fellows and re-assessment, CTA showing concerning occlusion in L MCA distribution. Although NIHSS improving to 5 (mild dysarthria, aphasia, droop, RLE/RUE drift), patient taken for diagnostic cerebral angiogram. Family aware of diagnosis and provided written consent to plan.    HOSPITAL COURSE:  7/23 Adm NSCU s/p angiogram showing R M2/MCA occlusion with distal retrograde reconstitution, aspiration thrombectomy attempted without recanalization.   7/24: no acute overnight events  7/25: Aphasic when SBP dropped to 110 overnight, daughter reports slowed speech this morning   7/26: Pressure dependent exam, exam worsened when SBP <160  7/27: Mild confusion over night. Negative balance. Will put her on maintenance fluids to decrease the pressors requirements  7/28: No acute overnight events. Failed a challenge with a lower BP parameter: 120-160. Increase midodrine to 15 q8  7/29: no acute overnight events. STA-MCA Bypass on Monday?  7/30 BP dropped < 160 mm her neuro exam got worse, phenylephrine   7/31: Patient still symptomatic on Low BP, responds extremely well to midodrine and BP rises significantly   8/1: On jose still, possible angio for STA-MCA bypass on 8/2.  8/2- No events.   8/3- Patient still pressure dependent. Exam worsens when Bp drops <160 mmHg. No other changes.  8/4- Patient off pressors. No changes in exam at /80.   8/5- No fluctuations in examination in the last 24 hours. BP liberalized and reaching higher than 160 at times, examined at 170 BP, with intact exam. ---OF NOTE: on  patient with word finding difficulty and R sided blurry vision   8/7- No overnight events.   8/8- [facility]    7/23/23 s/p angiogram showing R M2/MCA occlusion with distal retrograde reconstitution, aspiration thrombectomy attempted without recanalization  8/2/23 s/p left craniotomy for STA-MCA bypass    NEURO:   - Continue neuro checks q 4  - Continue Aspirin 81mg daily for STA-MCA bypass  - Continue Keppra for seizure prophylaxis  - Continue Tylenol prn and Oxycodone prn  - PT/OT - home PT/OT w/ rolling walker, wheelchair, 3:1 commode - prescriptions given to . Cleared from OT but still needs to work with PT for stairs clearance.    PULM:   - On room air, O2Sat>95%  - Incentive spirometry    CV:  - -160  - 7/24 TTE: EF 63%, normal left vent systolic fxn, normal left diastolic fxn, normal right vent systolic fxn, trace TR  - Continue Norvasc 2.5mg daily for HTN  - Continue Lipitor for HLD  - Appreciate Cardiology following    ENDO:   - A1c 8.5, continue Admelog, Lantus and ISS and CCD  - Fingersticks this morning was 88, Admelog was held at them time and f/u FS was 186  - Hospitalist consulted for assistance in glycemic control and discharge recommendations    HEME/ONC:             8/9 H/H stable, last PRBC transfusion for post-op anemia was on 8/3         DVT ppx: SQL, SCDs, 7/26 LE Dopp - negative    RENAL:   - NS@100  - 8/9 BMP - K 3.6, supplemented with KCl 40meq x 1    ID:   - Afebrile  - Received post-op abx    GI:    - Continue oral diet  - Continue senna and miralax for bowel regimen, last BM 8/8    Hospitalist team consulted for assistance in medical co-management.     Spoke with daughters Debi and Kalani at the bedside regarding her disposition, patient will be staying with Kalani who lives here in [place]. Kalani states she will be setting up an appointment for PMD for patient and Debi will be bringing patients medications tomorrow to go over what prescriptions she may need.     DISCHARGE PLANNING:   PT/OT - home PT/OT w/ rolling walker, wheelchair, 3:1 commode - prescriptions given to . Cleared from OT but still needs to work with PT for stairs clearance.    Plan to be discussed w/ Dr. Pimentel  [id number]

## 2023-08-09 NOTE — DISCHARGE NOTE PROVIDER - NSDCCPCAREPLAN_GEN_ALL_CORE_FT
PRINCIPAL DISCHARGE DIAGNOSIS  Diagnosis: Ischemic stroke  Assessment and Plan of Treatment: s/p angio: attempted aspiration thrombectomy of left M2/MCA origin occlusion 7/23/23, s/p left craniotomy for STA-MCA bypass 8/2/23  Please make an appointment with Dr. Pimentel in 1-2 weeks after discharge from the hospital.     PRINCIPAL DISCHARGE DIAGNOSIS  Diagnosis: Ischemic stroke  Assessment and Plan of Treatment: s/p angio: attempted aspiration thrombectomy of left M2/MCA origin occlusion 7/23/23, s/p left craniotomy for STA-MCA bypass 8/2/23  Please make an appointment with Dr. Pimentel in 1-2 weeks after discharge from the hospital. You may shower however DO NOT SCRUB at incision site, pat dry only. NO SOAP / NO SHAMPOO / NO LOTION / NO OINTMENT over incision site. Please keep surgical incision site clean and dry. Please continue your Aspirin 81mg as this medication is important for your bypass. Please continue your Keppra for seizure prophylaxis. Keppra helps control and prevent seizures.  The most common side effects include diarrhea or constipation, excessive sleepiness, irritability and mood changes.  Rare and sometimes serious side effects include rash. You may take Tylenol (Acetaminophen) as needed for pain control. PLEASE DO NOT take any NSAIDs (Advil, Aleve, Motrin, Ibuprofen) as these medications can increase your risk of bleeding after surgery. Please make an appointment and follow up with a primary care provider on discharge from hospital.     PRINCIPAL DISCHARGE DIAGNOSIS  Diagnosis: Ischemic stroke  Assessment and Plan of Treatment: s/p angio: attempted aspiration thrombectomy of left M2/MCA origin occlusion 7/23/23, s/p left craniotomy for STA-MCA bypass 8/2/23  Please follow up with Dr. Pimentel on Friday 8/25/23 at 12:00pm. You may shower however DO NOT SCRUB at incision site, pat dry only. NO SOAP / NO SHAMPOO / NO LOTION / NO OINTMENT over incision site. Please keep surgical incision site clean and dry. Please continue your Aspirin 81mg as this medication is important for your bypass. Please continue your Keppra for seizure prophylaxis. Keppra helps control and prevent seizures.  The most common side effects include diarrhea or constipation, excessive sleepiness, irritability and mood changes.  Rare and sometimes serious side effects include rash. You may take Tylenol (Acetaminophen) as needed for pain control. PLEASE DO NOT take any NSAIDs (Advil, Aleve, Motrin, Ibuprofen) as these medications can increase your risk of bleeding after surgery. Please make an appointment and follow up with a primary care provider on discharge from hospital.      SECONDARY DISCHARGE DIAGNOSES  Diagnosis: HTN (hypertension)  Assessment and Plan of Treatment: Please make an appointment and follow up with a primary care doctor in 1-2 weeks after discharge from the hospital. Please continue Metoprolol 25mg twice a day as prescribed.    Diagnosis: DM (diabetes mellitus)  Assessment and Plan of Treatment: Continue your home Tresiba and sliding scale (asparta). Continue Metformin 500mg daily. Your A1c was 8.5.    Diagnosis: Hyperlipidemia  Assessment and Plan of Treatment: Please continue to take Lipitor as prescribed. Please make an appointment and follow up with your primary care provider.

## 2023-08-09 NOTE — DISCHARGE NOTE PROVIDER - NSDCMRMEDTOKEN_GEN_ALL_CORE_FT
3:1 Commode: Please use as directed.  Clocard 75 mg: once daily  Esomep 40 mg tab: once daily  Ibertan Max 150/5 mg: once daily  Jarinu 10 mg: once daily  Keppra 500 mg oral tablet: 1 tab(s) orally 2 times a day Ceumid brand from [place]  Meprolol 50 mg: one tablet twice daily  Rolling Walker: Please use as prescribed.  Rusarte 40 mg: once daily  Wheelchair: Please use as prescribed.   3:1 Commode: Please use as directed.  Clocard 75 mg: once daily  Esomep 40 mg tab: once daily  Ibertan Max 150/5 mg: once daily  Jarinu 10 mg: once daily  Keppra 500 mg oral tablet: 1 tab(s) orally 2 times a day Ceumid brand from [place]  Meprolol 50 mg: one tablet twice daily  Plavix 75 mg oral tablet: 1 orally once a day  Rolling Walker: Please use as prescribed.  Rusarte 40 mg: once daily  Wheelchair: Please use as prescribed.   3:1 Commode: Please use as directed.  acetaminophen 325 mg oral tablet: 2 tab(s) orally every 6 hours As needed Mild Pain (1 - 3)  aspirin 81 mg oral tablet, chewable: 1 tab(s) orally once a day  atorvastatin 80 mg oral tablet: 1 tab(s) orally once a day (at bedtime)  ferrous sulfate 325 mg (65 mg elemental iron) oral tablet: 1 tab(s) orally Monday, Wednesday, and Friday  levETIRAcetam 500 mg oral tablet: 1 tab(s) orally 2 times a day  metFORMIN 500 mg oral tablet: 1 tab(s) orally once a day  metoprolol tartrate 25 mg oral tablet: 1 tab(s) orally every 12 hours  polyethylene glycol 3350 oral powder for reconstitution: 17 gram(s) orally 2 times a day  Rolling Walker: Please use as prescribed.  senna leaf extract oral tablet: 2 tab(s) orally once a day (at bedtime)  Wheelchair: Please use as prescribed.

## 2023-08-09 NOTE — DISCHARGE NOTE PROVIDER - REASON FOR ADMISSION
stroke - s/p angio: attempted aspiration thrombectomy of LM2/MCA origin occlusion 7/23/23, s/p left craniotomy for LT STA-MCA bypass 8/2/23 7/23/23 s/p angiogram showing R M2/MCA occlusion with distal retrograde reconstitution, aspiration thrombectomy attempted without recanalization  8/2/23 s/p left craniotomy for STA-MCA bypass

## 2023-08-09 NOTE — DISCHARGE NOTE PROVIDER - CARE PROVIDER_API CALL
Jennifer Pimentel  Neurosurgery  [address] [facility]  [place] [address]  Phone: [phone]  Fax: [phone]  Follow Up Time:

## 2023-08-09 NOTE — PROGRESS NOTE ADULT - SUBJECTIVE AND OBJECTIVE BOX
SUBJECTIVE: HPI:  Patient GABY HARDY is a 67y (1955) woman with a PMHx significant for CVA (on Plavix), seizure (on Keppra), HLD, HTN, who presents w/ CC of AMS. Patient unable to provide any history. Daughter at bedside assisting w/ history. Patient had stroke in past, but unclear when this happened and if any residual deficits. Per daughter, patient is usually independent, fully oriented, and speaks in normal conversation. However, over the past 2-3 weeks, she has been having 20-30 min episodes of sudden blank staring and unresponsiveness. She will be barely able to speak and the words will be nonsensical. She does not have generalized shaking, incontinence, or tongue biting. She comes back to her baseline afterwards. These episodes have generally occurred every other day. Daughter became concerned when she had episode starting at noon today that persisted for hours. Brought to ED for further evaluation. Code stroke called for AMS within 24h window. 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 Initial NIHSS 12. When asked orientation questions, she stutters an answer ("[age] and continues speaking nonsense. She follows simple commands with help of daughter speaking Cantonese. Home medications are: Plavix, amlodipine-ibresartan, metoprolol, rosuvastatin, Ceumid (aka Keppra), omeprezole, jarinu, folic acid, B complex, ferrous sulfate. After discussions with stroke/neuro-IR fellows and re-assessment, CTA showing concerning occlusion in L MCA distribution. Although NIHSS improving to 5 (mild dysarthria, aphasia, droop, RLE/RUE drift), patient taken for diagnostic cerebral angiogram. Family aware of diagnosis and provided written consent to plan. (23 Jul 2023 19:28)      OVERNIGHT EVENTS: [facility] Doing well, seen and evaluated with [facility]  [id number].     Vital Signs Last 24 Hrs  T(C): 36.6 (09 Aug 2023 13:07), Max: 36.8 (09 Aug 2023 01:00)  T(F): 97.8 (09 Aug 2023 13:07), Max: 98.2 (09 Aug 2023 01:00)  HR: 89 (09 Aug 2023 13:07) (78 - 90)  BP: 150/87 (09 Aug 2023 13:07) (144/76 - 167/90)  BP(mean): --  RR: 18 (09 Aug 2023 13:07) (18 - 18)  SpO2: 97% (09 Aug 2023 13:07) (95% - 98%)    Parameters below as of 09 Aug 2023 13:07  Patient On (Oxygen Delivery Method): room air        PHYSICAL EXAM:    General: No Acute Distress     Neurological: Awake, alert, oriented x 3 (name, place, date), right eye blind, left pupil 2mm reactive, speech fluent, following commands, no drift, uppers 5/5, lowers 5/5, SILT    Pulmonary: Clear to Auscultation, No Rales, No Rhonchi, No Wheezes     Cardiovascular: S1, S2, Regular Rate and Rhythm     Gastrointestinal: Soft, Nontender, Nondistended     Incision: left craniotomy incision - has absorbable sutures in place C/D/I    LABS:                        8.6    7.47  )-----------( 385      ( 09 Aug 2023 11:21 )             27.8    08-09    140  |  105  |  15  ----------------------------<  185<H>  3.6   |  24  |  0.61    Ca    8.7      09 Aug 2023 11:21  Phos  3.4     08-09  Mg     2.0     08-09 08-08 @ 07:01  -  08-09 @ 07:00  --------------------------------------------------------  IN: 760 mL / OUT: 3400 mL / NET: -2640 mL    08-09 @ 07:01  -  08-09 @ 13:21  --------------------------------------------------------  IN: 340 mL / OUT: 0 mL / NET: 340 mL      DRAINS: None    MEDICATIONS:  Antibiotics:    Neuro:  acetaminophen     Tablet .. 650 milliGRAM(s) Oral every 6 hours PRN Mild Pain (1 - 3)  levETIRAcetam 500 milliGRAM(s) Oral two times a day  oxyCODONE    IR 5 milliGRAM(s) Oral every 4 hours PRN Moderate Pain (4 - 6)    Cardiac:  amLODIPine   Tablet 2.5 milliGRAM(s) Oral daily    Pulm:    GI/:  polyethylene glycol 3350 17 Gram(s) Oral two times a day  senna 2 Tablet(s) Oral at bedtime    Other:   aspirin  chewable 81 milliGRAM(s) Oral daily  atorvastatin 80 milliGRAM(s) Oral at bedtime  dextrose 5%. 1000 milliLiter(s) IV Continuous <Continuous>  enoxaparin Injectable 40 milliGRAM(s) SubCutaneous <User Schedule>  ferrous    sulfate 325 milliGRAM(s) Oral <User Schedule>  glucagon  Injectable 1 milliGRAM(s) IntraMuscular once  insulin glargine Injectable (LANTUS) 10 Unit(s) SubCutaneous at bedtime  insulin lispro (ADMELOG) corrective regimen sliding scale   SubCutaneous Before meals and at bedtime  insulin lispro Injectable (ADMELOG) 5 Unit(s) SubCutaneous three times a day before meals  sodium chloride 0.9%. 1000 milliLiter(s) IV Continuous <Continuous>    DIET: [] Regular [x] CCD [] Renal [] Puree [] Dysphagia [] Tube Feeds:     IMAGING:   < from: CT Head No Cont (08.03.23 @ 11:12) >  IMPRESSION:    No significant interval change from 8/2/2023    ---End of Report ---      SONDRA MCGUIRE MD; Attending Radiologist  This document has been electronically signed. Aug  3 2023 10:26AM    < end of copied text >    < from: CT Angio Head w/ IV Cont (08.02.23 @ 17:28) >  IMPRESSION:    CTA BRAIN:  1.  Postoperative changes related to recent left parietotemporal   craniectomy with trace blood products in the overlying extra-axial space.  2.  Redemonstrated 2 mm aneurysm within the distal left M1/M2 segment.  3.  Faint narrowing of the presumed left temporal artery anastomosiswith   distal reconstitution of intraluminal contrast. The resulting bilateral   MCA territories arborization is fairly symmetric.    --- End of Report ---      SANDRA FISHER MD; Attending Radiologist  This document has been electronically signed. Aug  3 2023  8:57AM    < end of copied text >

## 2023-08-10 VITALS
OXYGEN SATURATION: 96 % | HEART RATE: 75 BPM | TEMPERATURE: 99 F | RESPIRATION RATE: 18 BRPM | SYSTOLIC BLOOD PRESSURE: 156 MMHG | DIASTOLIC BLOOD PRESSURE: 82 MMHG

## 2023-08-10 DIAGNOSIS — Z02.9 ENCOUNTER FOR ADMINISTRATIVE EXAMINATIONS, UNSPECIFIED: ICD-10-CM

## 2023-08-10 DIAGNOSIS — Z79.899 OTHER LONG TERM (CURRENT) DRUG THERAPY: ICD-10-CM

## 2023-08-10 LAB
GLUCOSE BLDC GLUCOMTR-MCNC: 106 MG/DL — HIGH (ref 70–99)
GLUCOSE BLDC GLUCOMTR-MCNC: 116 MG/DL — HIGH (ref 70–99)
GLUCOSE BLDC GLUCOMTR-MCNC: 151 MG/DL — HIGH (ref 70–99)

## 2023-08-10 PROCEDURE — 84466 ASSAY OF TRANSFERRIN: CPT

## 2023-08-10 PROCEDURE — 70496 CT ANGIOGRAPHY HEAD: CPT | Mod: MA

## 2023-08-10 PROCEDURE — 87640 STAPH A DNA AMP PROBE: CPT

## 2023-08-10 PROCEDURE — 86901 BLOOD TYPING SEROLOGIC RH(D): CPT

## 2023-08-10 PROCEDURE — 95700 EEG CONT REC W/VID EEG TECH: CPT

## 2023-08-10 PROCEDURE — 93005 ELECTROCARDIOGRAM TRACING: CPT

## 2023-08-10 PROCEDURE — 80061 LIPID PANEL: CPT

## 2023-08-10 PROCEDURE — 70551 MRI BRAIN STEM W/O DYE: CPT

## 2023-08-10 PROCEDURE — C1757: CPT

## 2023-08-10 PROCEDURE — 85018 HEMOGLOBIN: CPT

## 2023-08-10 PROCEDURE — 85014 HEMATOCRIT: CPT

## 2023-08-10 PROCEDURE — 84132 ASSAY OF SERUM POTASSIUM: CPT

## 2023-08-10 PROCEDURE — 99285 EMERGENCY DEPT VISIT HI MDM: CPT

## 2023-08-10 PROCEDURE — 80053 COMPREHEN METABOLIC PANEL: CPT

## 2023-08-10 PROCEDURE — C1889: CPT

## 2023-08-10 PROCEDURE — 97110 THERAPEUTIC EXERCISES: CPT

## 2023-08-10 PROCEDURE — 86850 RBC ANTIBODY SCREEN: CPT

## 2023-08-10 PROCEDURE — 83605 ASSAY OF LACTIC ACID: CPT

## 2023-08-10 PROCEDURE — P9016: CPT

## 2023-08-10 PROCEDURE — 97165 OT EVAL LOW COMPLEX 30 MIN: CPT

## 2023-08-10 PROCEDURE — C1769: CPT

## 2023-08-10 PROCEDURE — 83735 ASSAY OF MAGNESIUM: CPT

## 2023-08-10 PROCEDURE — 92507 TX SP LANG VOICE COMM INDIV: CPT

## 2023-08-10 PROCEDURE — 97535 SELF CARE MNGMENT TRAINING: CPT

## 2023-08-10 PROCEDURE — 86923 COMPATIBILITY TEST ELECTRIC: CPT

## 2023-08-10 PROCEDURE — 0042T: CPT | Mod: MA

## 2023-08-10 PROCEDURE — 82803 BLOOD GASES ANY COMBINATION: CPT

## 2023-08-10 PROCEDURE — 82728 ASSAY OF FERRITIN: CPT

## 2023-08-10 PROCEDURE — C1887: CPT

## 2023-08-10 PROCEDURE — C8929: CPT

## 2023-08-10 PROCEDURE — 36224 PLACE CATH CAROTD ART: CPT

## 2023-08-10 PROCEDURE — 84100 ASSAY OF PHOSPHORUS: CPT

## 2023-08-10 PROCEDURE — C1713: CPT

## 2023-08-10 PROCEDURE — 97116 GAIT TRAINING THERAPY: CPT

## 2023-08-10 PROCEDURE — 80048 BASIC METABOLIC PNL TOTAL CA: CPT

## 2023-08-10 PROCEDURE — C1760: CPT

## 2023-08-10 PROCEDURE — 85027 COMPLETE CBC AUTOMATED: CPT

## 2023-08-10 PROCEDURE — 82330 ASSAY OF CALCIUM: CPT

## 2023-08-10 PROCEDURE — 97530 THERAPEUTIC ACTIVITIES: CPT

## 2023-08-10 PROCEDURE — 99223 1ST HOSP IP/OBS HIGH 75: CPT

## 2023-08-10 PROCEDURE — 85610 PROTHROMBIN TIME: CPT

## 2023-08-10 PROCEDURE — 83036 HEMOGLOBIN GLYCOSYLATED A1C: CPT

## 2023-08-10 PROCEDURE — 82962 GLUCOSE BLOOD TEST: CPT

## 2023-08-10 PROCEDURE — 82947 ASSAY GLUCOSE BLOOD QUANT: CPT

## 2023-08-10 PROCEDURE — 97161 PT EVAL LOW COMPLEX 20 MIN: CPT

## 2023-08-10 PROCEDURE — 83540 ASSAY OF IRON: CPT

## 2023-08-10 PROCEDURE — 83550 IRON BINDING TEST: CPT

## 2023-08-10 PROCEDURE — 93970 EXTREMITY STUDY: CPT

## 2023-08-10 PROCEDURE — 85025 COMPLETE CBC W/AUTO DIFF WBC: CPT

## 2023-08-10 PROCEDURE — 85730 THROMBOPLASTIN TIME PARTIAL: CPT

## 2023-08-10 PROCEDURE — 87641 MR-STAPH DNA AMP PROBE: CPT

## 2023-08-10 PROCEDURE — 82565 ASSAY OF CREATININE: CPT

## 2023-08-10 PROCEDURE — C1894: CPT

## 2023-08-10 PROCEDURE — 95714 VEEG EA 12-26 HR UNMNTR: CPT

## 2023-08-10 PROCEDURE — 82435 ASSAY OF BLOOD CHLORIDE: CPT

## 2023-08-10 PROCEDURE — 92523 SPEECH SOUND LANG COMPREHEN: CPT

## 2023-08-10 PROCEDURE — 86900 BLOOD TYPING SEROLOGIC ABO: CPT

## 2023-08-10 PROCEDURE — 36415 COLL VENOUS BLD VENIPUNCTURE: CPT

## 2023-08-10 PROCEDURE — 84443 ASSAY THYROID STIM HORMONE: CPT

## 2023-08-10 PROCEDURE — 84484 ASSAY OF TROPONIN QUANT: CPT

## 2023-08-10 PROCEDURE — 70498 CT ANGIOGRAPHY NECK: CPT | Mod: MA

## 2023-08-10 PROCEDURE — C9399: CPT

## 2023-08-10 PROCEDURE — 70450 CT HEAD/BRAIN W/O DYE: CPT

## 2023-08-10 PROCEDURE — 36430 TRANSFUSION BLD/BLD COMPNT: CPT

## 2023-08-10 PROCEDURE — 84295 ASSAY OF SERUM SODIUM: CPT

## 2023-08-10 RX ORDER — ATORVASTATIN CALCIUM 80 MG/1
1 TABLET, FILM COATED ORAL
Qty: 30 | Refills: 0
Start: 2023-08-10 | End: 2023-09-08

## 2023-08-10 RX ORDER — LEVETIRACETAM 250 MG/1
1 TABLET, FILM COATED ORAL
Refills: 0 | DISCHARGE

## 2023-08-10 RX ORDER — FERROUS SULFATE 325(65) MG
1 TABLET ORAL
Qty: 0 | Refills: 0 | DISCHARGE
Start: 2023-08-10

## 2023-08-10 RX ORDER — METOPROLOL TARTRATE 50 MG
1 TABLET ORAL
Qty: 60 | Refills: 0
Start: 2023-08-10 | End: 2023-09-08

## 2023-08-10 RX ORDER — SENNA PLUS 8.6 MG/1
2 TABLET ORAL
Qty: 0 | Refills: 0 | DISCHARGE
Start: 2023-08-10

## 2023-08-10 RX ORDER — METFORMIN HYDROCHLORIDE 850 MG/1
1 TABLET ORAL
Qty: 30 | Refills: 0
Start: 2023-08-10 | End: 2023-09-08

## 2023-08-10 RX ORDER — POLYETHYLENE GLYCOL 3350 17 G/17G
17 POWDER, FOR SOLUTION ORAL
Qty: 0 | Refills: 0 | DISCHARGE
Start: 2023-08-10

## 2023-08-10 RX ORDER — LEVETIRACETAM 250 MG/1
1 TABLET, FILM COATED ORAL
Qty: 60 | Refills: 0
Start: 2023-08-10 | End: 2023-09-08

## 2023-08-10 RX ORDER — CLOPIDOGREL BISULFATE 75 MG/1
1 TABLET, FILM COATED ORAL
Refills: 0 | DISCHARGE

## 2023-08-10 RX ORDER — ACETAMINOPHEN 500 MG
2 TABLET ORAL
Qty: 0 | Refills: 0 | DISCHARGE
Start: 2023-08-10

## 2023-08-10 RX ORDER — METOPROLOL TARTRATE 50 MG
25 TABLET ORAL EVERY 12 HOURS
Refills: 0 | Status: DISCONTINUED | OUTPATIENT
Start: 2023-08-10 | End: 2023-08-10

## 2023-08-10 RX ORDER — ASPIRIN/CALCIUM CARB/MAGNESIUM 324 MG
1 TABLET ORAL
Qty: 0 | Refills: 0 | DISCHARGE
Start: 2023-08-10

## 2023-08-10 RX ADMIN — Medication 25 MILLIGRAM(S): at 13:16

## 2023-08-10 RX ADMIN — AMLODIPINE BESYLATE 2.5 MILLIGRAM(S): 2.5 TABLET ORAL at 05:32

## 2023-08-10 RX ADMIN — Medication 2: at 16:26

## 2023-08-10 RX ADMIN — Medication 81 MILLIGRAM(S): at 12:05

## 2023-08-10 RX ADMIN — Medication 5 UNIT(S): at 08:20

## 2023-08-10 RX ADMIN — LEVETIRACETAM 500 MILLIGRAM(S): 250 TABLET, FILM COATED ORAL at 17:17

## 2023-08-10 RX ADMIN — Medication 5 UNIT(S): at 16:27

## 2023-08-10 RX ADMIN — ENOXAPARIN SODIUM 40 MILLIGRAM(S): 100 INJECTION SUBCUTANEOUS at 17:18

## 2023-08-10 RX ADMIN — Medication 5 UNIT(S): at 12:05

## 2023-08-10 RX ADMIN — LEVETIRACETAM 500 MILLIGRAM(S): 250 TABLET, FILM COATED ORAL at 05:32

## 2023-08-10 NOTE — CONSULT NOTE ADULT - PROBLEM SELECTOR RECOMMENDATION 4
Home meds from DR mario w daughter-     Plavix 75mg/d, Olmesartan/Amlodipine 150/5mg per day, Rosuvastatin 40mg/d, Levetriacetam 500 mg bid  Jardiance 12.5 mg/d, Metoprolol 50mg bid, Tresiba 10u bid, Novolog premeal per sliding scale, Folic acid, Ferrous Sulfate 300mg/d, B complex

## 2023-08-10 NOTE — PROGRESS NOTE ADULT - NUTRITIONAL ASSESSMENT
This patient has been assessed with a concern for Malnutrition and has been determined to have a diagnosis/diagnoses of Moderate protein-calorie malnutrition.    This patient is being managed with:   Diet Consistent Carbohydrate Renal/No Snacks-  Entered: Aug  2 2023  7:40PM  
This patient has been assessed with a concern for Malnutrition and has been determined to have a diagnosis/diagnoses of Moderate protein-calorie malnutrition.    This patient is being managed with:   Diet Consistent Carbohydrate Renal/No Snacks-  Entered: Aug  2 2023  7:40PM  
This patient has been assessed with a concern for Malnutrition and has been determined to have a diagnosis/diagnoses of Moderate protein-calorie malnutrition.    This patient is being managed with:   Diet Consistent Carbohydrate/No Snacks-  Supplement Feeding Modality:  Oral  Glucerna Shake Cans or Servings Per Day:  1       Frequency:  Two Times a day  Entered: Aug  8 2023  3:56PM  
This patient has been assessed with a concern for Malnutrition and has been determined to have a diagnosis/diagnoses of Moderate protein-calorie malnutrition.    This patient is being managed with:   Diet Consistent Carbohydrate Renal/No Snacks-  Entered: Aug  2 2023  7:40PM  
This patient has been assessed with a concern for Malnutrition and has been determined to have a diagnosis/diagnoses of Moderate protein-calorie malnutrition.    This patient is being managed with:   Diet Consistent Carbohydrate Renal/No Snacks-  Entered: Aug  2 2023  7:40PM  
This patient has been assessed with a concern for Malnutrition and has been determined to have a diagnosis/diagnoses of Moderate protein-calorie malnutrition.    This patient is being managed with:   Diet Consistent Carbohydrate/No Snacks-  Entered: Jul 24 2023  9:22AM  
This patient has been assessed with a concern for Malnutrition and has been determined to have a diagnosis/diagnoses of Moderate protein-calorie malnutrition.    This patient is being managed with:   Diet NPO after Midnight-     NPO Start Date: 01-Aug-2023   NPO Start Time: 23:59  Except Medications  Entered: Aug  1 2023 10:45AM    Diet Consistent Carbohydrate/No Snacks-  Entered: Aug  1 2023 10:44AM  
This patient has been assessed with a concern for Malnutrition and has been determined to have a diagnosis/diagnoses of Moderate protein-calorie malnutrition.    This patient is being managed with:   Diet NPO after Midnight-     NPO Start Date: 01-Aug-2023   NPO Start Time: 23:59  Except Medications  Entered: Aug  1 2023 10:45AM    Diet Consistent Carbohydrate/No Snacks-  Entered: Aug  1 2023 10:44AM  
This patient has been assessed with a concern for Malnutrition and has been determined to have a diagnosis/diagnoses of Moderate protein-calorie malnutrition.    This patient is being managed with:   Diet Consistent Carbohydrate Renal/No Snacks-  Entered: Aug  2 2023  7:40PM  
This patient has been assessed with a concern for Malnutrition and has been determined to have a diagnosis/diagnoses of Moderate protein-calorie malnutrition.    This patient is being managed with:   Diet Consistent Carbohydrate Renal/No Snacks-  Entered: Aug  2 2023  7:40PM  
This patient has been assessed with a concern for Malnutrition and has been determined to have a diagnosis/diagnoses of Moderate protein-calorie malnutrition.    This patient is being managed with:   Diet Consistent Carbohydrate/No Snacks-  Entered: Jul 24 2023  9:22AM  
This patient has been assessed with a concern for Malnutrition and has been determined to have a diagnosis/diagnoses of Moderate protein-calorie malnutrition.    This patient is being managed with:   Diet Consistent Carbohydrate Renal/No Snacks-  Entered: Aug  2 2023  7:40PM  
This patient has been assessed with a concern for Malnutrition and has been determined to have a diagnosis/diagnoses of Moderate protein-calorie malnutrition.    This patient is being managed with:   Diet Consistent Carbohydrate/No Snacks-  Entered: Jul 24 2023  9:22AM  
This patient has been assessed with a concern for Malnutrition and has been determined to have a diagnosis/diagnoses of Moderate protein-calorie malnutrition.    This patient is being managed with:   Diet Consistent Carbohydrate/No Snacks-  Entered: Jul 24 2023  9:22AM  
This patient has been assessed with a concern for Malnutrition and has been determined to have a diagnosis/diagnoses of Moderate protein-calorie malnutrition.    This patient is being managed with:   Diet Consistent Carbohydrate Renal/No Snacks-  Entered: Aug  2 2023  7:40PM  
This patient has been assessed with a concern for Malnutrition and has been determined to have a diagnosis/diagnoses of Moderate protein-calorie malnutrition.    This patient is being managed with:   Diet Consistent Carbohydrate/No Snacks-  Supplement Feeding Modality:  Oral  Glucerna Shake Cans or Servings Per Day:  1       Frequency:  Two Times a day  Entered: Aug  8 2023  3:56PM  
This patient has been assessed with a concern for Malnutrition and has been determined to have a diagnosis/diagnoses of Moderate protein-calorie malnutrition.    This patient is being managed with:   Diet NPO after Midnight-     NPO Start Date: 31-Jul-2023   NPO Start Time: 23:59  Entered: Jul 31 2023  6:26PM    Diet Consistent Carbohydrate/No Snacks-  Entered: Jul 24 2023  9:22AM  
This patient has been assessed with a concern for Malnutrition and has been determined to have a diagnosis/diagnoses of Moderate protein-calorie malnutrition.    This patient is being managed with:   Diet NPO after Midnight-     NPO Start Date: 31-Jul-2023   NPO Start Time: 23:59  Entered: Jul 31 2023  6:26PM    Diet Consistent Carbohydrate/No Snacks-  Entered: Jul 24 2023  9:22AM  
This patient has been assessed with a concern for Malnutrition and has been determined to have a diagnosis/diagnoses of Moderate protein-calorie malnutrition.    This patient is being managed with:   Diet Consistent Carbohydrate Renal/No Snacks-  Entered: Aug  2 2023  7:40PM  
This patient has been assessed with a concern for Malnutrition and has been determined to have a diagnosis/diagnoses of Moderate protein-calorie malnutrition.    This patient is being managed with:   Diet Consistent Carbohydrate Renal/No Snacks-  Entered: Aug  2 2023  7:40PM  
This patient has been assessed with a concern for Malnutrition and has been determined to have a diagnosis/diagnoses of Moderate protein-calorie malnutrition.    This patient is being managed with:   Diet Consistent Carbohydrate/No Snacks-  Entered: Jul 24 2023  9:22AM  
This patient has been assessed with a concern for Malnutrition and has been determined to have a diagnosis/diagnoses of Moderate protein-calorie malnutrition.    This patient is being managed with:   Diet NPO after Midnight-     NPO Start Date: 31-Jul-2023   NPO Start Time: 23:59  Entered: Jul 31 2023  6:26PM    Diet Consistent Carbohydrate/No Snacks-  Entered: Jul 24 2023  9:22AM  
This patient has been assessed with a concern for Malnutrition and has been determined to have a diagnosis/diagnoses of Moderate protein-calorie malnutrition.    This patient is being managed with:   Diet Consistent Carbohydrate/No Snacks-  Entered: Jul 24 2023  9:22AM  
This patient has been assessed with a concern for Malnutrition and has been determined to have a diagnosis/diagnoses of Moderate protein-calorie malnutrition.    This patient is being managed with:   Diet Consistent Carbohydrate Renal/No Snacks-  Entered: Aug  2 2023  7:40PM

## 2023-08-10 NOTE — CONSULT NOTE ADULT - SUBJECTIVE AND OBJECTIVE BOX
NEUROSURGERY - HOSPITAL MEDICINE CO-MANAGEMENT INITIAL VISIT NOTE    CHIEF COMPLAINT: Patient is a 67y old  Female who presents with a chief complaint of stroke (10 Aug 2023 09:38)      HPI: Patient GABY HARDY is a 67y (1955) woman with a PMHx significant for CVA (on Plavix), seizure (on Keppra), HLD, HTN, who presents w/ CC of AMS. Patient unable to provide any history. Daughter at bedside assisting w/ history. Patient had stroke in past, but unclear when this happened and if any residual deficits. Per daughter, patient is usually independent, fully oriented, and speaks in normal conversation. However, over the past 2-3 weeks, she has been having 20-30 min episodes of sudden blank staring and unresponsiveness. She will be barely able to speak and the words will be nonsensical. She does not have generalized shaking, incontinence, or tongue biting. She comes back to her baseline afterwards. These episodes have generally occurred every other day. Daughter became concerned when she had episode starting at noon today that persisted for hours. Brought to ED for further evaluation. Code stroke called for AMS within 24h window. 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 Initial NIHSS 12. When asked orientation questions, she stutters an answer ("[age] and continues speaking nonsense. She follows simple commands with help of daughter speaking Cantonese. Home medications are: Plavix, amlodipine-ibresartan, metoprolol, rosuvastatin, Ceumid (aka Keppra), omeprezole, jarinu, folic acid, B complex, ferrous sulfate. After discussions with stroke/neuro-IR fellows and re-assessment, CTA showing concerning occlusion in L MCA distribution. Although NIHSS improving to 5 (mild dysarthria, aphasia, droop, RLE/RUE drift), patient taken for diagnostic cerebral angiogram. Family aware of diagnosis and provided written consent to plan.    HOSPITAL COURSE:  7/23 Adm [facility]U s/p angiogram showing R M2/MCA occlusion with distal retrograde reconstitution, aspiration thrombectomy attempted without recanalization.   7/24: no acute overnight events  7/25: Aphasic when SBP dropped to 110 overnight, daughter reports slowed speech this morning   7/26: Pressure dependent exam, exam worsened when SBP <160  7/27: Mild confusion over night. Negative balance. Will put her on maintenance fluids to decrease the pressors requirements  7/28: No acute overnight events. Failed a challenge with a lower BP parameter: 120-160. Increase midodrine to 15 q8  7/29: no acute overnight events. STA-MCA Bypass on Monday?  7/30 BP dropped < 160 mm her neuro exam got worse, phenylephrine   7/31: Patient still symptomatic on Low BP, responds extremely well to midodrine and BP rises significantly   8/1: On jose still, possible angio for STA-MCA bypass on 8/2.  8/2- No events.   8/3- Patient still pressure dependent. Exam worsens when Bp drops <160 mmHg. No other changes.  8/4- Patient off pressors. No changes in exam at /80.   8/5- No fluctuations in examination in the last 24 hours. BP liberalized and reaching higher than 160 at times, examined at 170 BP, with intact exam. ---OF NOTE: on  patient with word finding difficulty and R sided blurry vision   8/7- No overnight events.   8/8- Transitioned from [facility] to [facility]  8/9- Exam stable, worked with PT/OT, for home PT/OT, needs stair clearance from PT    7/23/23 s/p angiogram showing R M2/MCA occlusion with distal retrograde reconstitution, aspiration thrombectomy attempted without recanalization  8/2/23 s/p left craniotomy for STA-MCA bypass    Pt currently up in bed, denies pain or dsypnea. Daughter by bedside.         Allergies    No Known Allergies      Home Medications:  Clocard 75 mg: once daily (04 Aug 2023 18:58)  Esomep 40 mg tab: once daily (04 Aug 2023 18:50)  Ibertan Max 150/5 mg: once daily (04 Aug 2023 18:56)  Jarinu 10 mg: once daily (04 Aug 2023 18:37)  Keppra 500 mg oral tablet: 1 tab(s) orally 2 times a day Ceumid brand from [place] (04 Aug 2023 18:58)  Meprolol 50 mg: one tablet twice daily (04 Aug 2023 18:58)  Plavix 75 mg oral tablet: 1 orally once a day (10 Aug 2023 11:33)  Rusarte 40 mg: once daily (04 Aug 2023 18:58)      MEDICATIONS  (STANDING):  aspirin  chewable 81 milliGRAM(s) Oral daily  atorvastatin 80 milliGRAM(s) Oral at bedtime  dextrose 5%. 1000 milliLiter(s) (100 mL/Hr) IV Continuous <Continuous>  enoxaparin Injectable 40 milliGRAM(s) SubCutaneous <User Schedule>  ferrous    sulfate 325 milliGRAM(s) Oral <User Schedule>  glucagon  Injectable 1 milliGRAM(s) IntraMuscular once  insulin glargine Injectable (LANTUS) 10 Unit(s) SubCutaneous at bedtime  insulin lispro (ADMELOG) corrective regimen sliding scale   SubCutaneous Before meals and at bedtime  insulin lispro Injectable (ADMELOG) 5 Unit(s) SubCutaneous three times a day before meals  levETIRAcetam 500 milliGRAM(s) Oral two times a day  metoprolol tartrate 25 milliGRAM(s) Oral every 12 hours  polyethylene glycol 3350 17 Gram(s) Oral two times a day  senna 2 Tablet(s) Oral at bedtime  sodium chloride 0.9%. 1000 milliLiter(s) (100 mL/Hr) IV Continuous <Continuous>    MEDICATIONS  (PRN):  acetaminophen     Tablet .. 650 milliGRAM(s) Oral every 6 hours PRN Mild Pain (1 - 3)      PAST MEDICAL & SURGICAL HISTORY:  DM (diabetes mellitus)      HTN (hypertension)      [x ] Reviewed     Functional Assessment: [ ] Independent  [x ] Assistance  [ ] Total care  [ ] Non-ambulatory    SOCIAL HISTORY:  Residence: [ ] [facility]  [ ] SNF  [ x] Community  [ x] Substance abuse: None    FAMILY HISTORY:  [ ] No pertinent family history in first degree relatives     REVIEW OF SYSTEMS:    CONSTITUTIONAL: No fever, weight loss, or fatigue  EYES: No eye pain, visual disturbances, or discharge  ENMT:  No difficulty hearing, tinnitus, vertigo; No sinus or throat pain  NECK: No pain or stiffness  BREASTS: No pain, masses, or nipple discharge  RESPIRATORY: No cough, wheezing, chills or hemoptysis; No shortness of breath  CARDIOVASCULAR: No chest pain, palpitations, dizziness, or leg swelling  GASTROINTESTINAL: No abdominal or epigastric pain. No nausea, vomiting, or hematemesis; No diarrhea or constipation. No melena or hematochezia.  GENITOURINARY: No dysuria, frequency, hematuria, or incontinence  NEUROLOGICAL: No headaches, numbness, or tremors  SKIN: No itching, burning, rashes, or lesions   LYMPH NODES: No enlarged glands  ENDOCRINE: No heat or cold intolerance; No hair loss  MUSCULOSKELETAL: No muscle or back pain  PSYCHIATRIC: No depression, anxiety, mood swings, or difficulty sleeping  HEME/LYMPH: No easy bruising, or bleeding gums  ALLERGY AND IMMUNOLOGIC: No hives or eczema    [x  ] All other ROS negative  [  ] Unable to obtain due to poor mental status    PHYSICAL EXAM:    Vital Signs Last 24 Hrs  T(C): 36.6 (10 Aug 2023 09:00), Max: 37 (09 Aug 2023 16:40)  T(F): 97.8 (10 Aug 2023 09:00), Max: 98.6 (09 Aug 2023 16:40)  HR: 88 (10 Aug 2023 09:00) (83 - 93)  BP: 159/88 (10 Aug 2023 09:00) (128/72 - 161/79)  BP(mean): --  RR: 18 (10 Aug 2023 09:00) (18 - 18)  SpO2: 96% (10 Aug 2023 09:00) (95% - 97%)    Parameters below as of 10 Aug 2023 09:00  Patient On (Oxygen Delivery Method): room air        CONSTITUTIONAL: Well-groomed, in no apparent distress  EYES: No conjunctival or scleral injection, non-icteric; PERRLA and symmetric  ENMT: No external nasal lesions; nasal mucosa not inflamed; normal dentition; no pharyngeal injection or exudates, oral mucosa with moist membranes  NECK: Trachea midline without palpable neck mass; thyroid not enlarged and non-tender  RESPIRATORY: Breathing comfortably; no dullness to percussion; lungs CTA without wheeze/rhonchi/rales  CARDIOVASCULAR: +S1S2, RRR, no M/G/R; no carotid bruits; pedal pulses full and symmetric; no lower extremity edema  CHEST/BREAST: Breasts are symmetric in appearance; no palpable masses or lumps  GASTROINTESTINAL: No palpable masses or tenderness, +BS throughout, no rebound/guarding; no hepatosplenomegaly; no hernia palpated  LYMPHATIC: No cervical LAD or tenderness; no axillary LAD or tenderness; no inguinal LAD or tenderness  MUSCULOSKELETAL: Normal gait and station; no digital clubbing or cyanosis; no paraspinal tenderness; examination of the  (head/neck, spine/ribs/pelvis, RUE, LUE, RLE, LLE) without misalignment, normal strength and tone of extremities  SKIN: No rashes or ulcers noted; no subcutaneous nodules or induration palpable  NEUROLOGIC: CN II-XII intact; normal reflexes in upper and lower extremities; sensation intact in LEs b/l to light touch  PSYCHIATRIC: A+O x 3; mood and affect appropriate; appropriate insight and judgment    LABS:                        8.6    7.47  )-----------( 385      ( 09 Aug 2023 11:21 )             27.8     Hemoglobin: 8.6 g/dL (08-09 @ 11:21)  Hemoglobin: 8.5 g/dL (08-07 @ 04:23)    08-09    140  |  105  |  15  ----------------------------<  185<H>  3.6   |  24  |  0.61    Ca    8.7      09 Aug 2023 11:21  Phos  3.4     08-09  Mg     2.0     08-09        Urinalysis Basic - ( 09 Aug 2023 11:21 )    Color: x / Appearance: x / SG: x / pH: x  Gluc: 185 mg/dL / Ketone: x  / Bili: x / Urobili: x   Blood: x / Protein: x / Nitrite: x   Leuk Esterase: x / RBC: x / WBC x   Sq Epi: x / Non Sq Epi: x / Bacteria: x      CAPILLARY BLOOD GLUCOSE      POCT Blood Glucose.: 106 mg/dL (10 Aug 2023 11:16)        RADIOLOGY & ADDITIONAL STUDIES:    EKG:   Personally Reviewed:  [ ] YES     Imaging:   Personally Reviewed:  [ ] YES               [ ] Consultant(s) Notes Reviewed  [x] Care Discussed with Consultants/Other Providers: Neurosurgery Team    [x ] Fall risks identified:    [ ] High risk medications identified:    [ ] Probable osteoporosis    [ ] Possible osteomalacia    [ x] Increased delirium risk    [ x] Delirium and other risks can be reduced by:          -early ambulation          -minimizing "tethers" - IV, oxygen, catheters, etc          -avoiding hypnotics and sedatives          -maintaining hydration/nutrition          -avoid anticholinergics - diphenhydramine, etc          -pain control          -supportive environment    Advanced Directives: [ ] DNR  [ ] No feeding tube  [ ] MOLST in chart  [ ] MOLST completed today  [x ] Unknown

## 2023-08-10 NOTE — PROGRESS NOTE ADULT - SUBJECTIVE AND OBJECTIVE BOX
SUBJECTIVE: HPI:  Patient GABY HARDY is a 67y (1955) woman with a PMHx significant for CVA (on Plavix), seizure (on Keppra), HLD, HTN, who presents w/ CC of AMS. Patient unable to provide any history. Daughter at bedside assisting w/ history. Patient had stroke in past, but unclear when this happened and if any residual deficits. Per daughter, patient is usually independent, fully oriented, and speaks in normal conversation. However, over the past 2-3 weeks, she has been having 20-30 min episodes of sudden blank staring and unresponsiveness. She will be barely able to speak and the words will be nonsensical. She does not have generalized shaking, incontinence, or tongue biting. She comes back to her baseline afterwards. These episodes have generally occurred every other day. Daughter became concerned when she had episode starting at noon today that persisted for hours. Brought to ED for further evaluation. Code stroke called for AMS within 24h window. 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 Initial NIHSS 12. When asked orientation questions, she stutters an answer ("[age] and continues speaking nonsense. She follows simple commands with help of daughter speaking Cantonese. Home medications are: Plavix, amlodipine-ibresartan, metoprolol, rosuvastatin, Ceumid (aka Keppra), omeprezole, jarinu, folic acid, B complex, ferrous sulfate. After discussions with stroke/neuro-IR fellows and re-assessment, CTA showing concerning occlusion in L MCA distribution. Although NIHSS improving to 5 (mild dysarthria, aphasia, droop, RLE/RUE drift), patient taken for diagnostic cerebral angiogram. Family aware of diagnosis and provided written consent to plan. (23 Jul 2023 19:28)      OVERNIGHT EVENTS: No acute events, patient seen and examined with Cantonese  [id number], she is smiling and doing well, looking forward to discharge.     Vital Signs Last 24 Hrs  T(C): 36.6 (10 Aug 2023 09:00), Max: 37 (09 Aug 2023 16:40)  T(F): 97.8 (10 Aug 2023 09:00), Max: 98.6 (09 Aug 2023 16:40)  HR: 88 (10 Aug 2023 09:00) (83 - 93)  BP: 159/88 (10 Aug 2023 09:00) (128/72 - 161/79)  BP(mean): --  RR: 18 (10 Aug 2023 09:00) (18 - 18)  SpO2: 96% (10 Aug 2023 09:00) (95% - 97%)    Parameters below as of 10 Aug 2023 09:00  Patient On (Oxygen Delivery Method): room air        PHYSICAL EXAM:    General: No Acute Distress     Neurological: Awake, alert, oriented x 3 (name, place, date), right eye blind, left pupil 2mm reactive, speech fluent, following commands, no drift, uppers 5/5, lowers 5/5, SILT    Pulmonary: Clear to Auscultation, No Rales, No Rhonchi, No Wheezes     Cardiovascular: S1, S2, Regular Rate and Rhythm     Gastrointestinal: Soft, Nontender, Nondistended     Incision: left craniotomy sutures in place C/D/I    LABS:                        8.6    7.47  )-----------( 385      ( 09 Aug 2023 11:21 )             27.8    08-09    140  |  105  |  15  ----------------------------<  185<H>  3.6   |  24  |  0.61    Ca    8.7      09 Aug 2023 11:21  Phos  3.4     08-09  Mg     2.0     08-09 08-09 @ 07:01  -  08-10 @ 07:00  --------------------------------------------------------  IN: 1605 mL / OUT: 0 mL / NET: 1605 mL      DRAINS: None    MEDICATIONS:  Antibiotics:    Neuro:  acetaminophen     Tablet .. 650 milliGRAM(s) Oral every 6 hours PRN Mild Pain (1 - 3)  levETIRAcetam 500 milliGRAM(s) Oral two times a day    Cardiac:  amLODIPine   Tablet 2.5 milliGRAM(s) Oral daily    Pulm:    GI/:  polyethylene glycol 3350 17 Gram(s) Oral two times a day  senna 2 Tablet(s) Oral at bedtime    Other:   aspirin  chewable 81 milliGRAM(s) Oral daily  atorvastatin 80 milliGRAM(s) Oral at bedtime  dextrose 5%. 1000 milliLiter(s) IV Continuous <Continuous>  enoxaparin Injectable 40 milliGRAM(s) SubCutaneous <User Schedule>  ferrous    sulfate 325 milliGRAM(s) Oral <User Schedule>  glucagon  Injectable 1 milliGRAM(s) IntraMuscular once  insulin glargine Injectable (LANTUS) 10 Unit(s) SubCutaneous at bedtime  insulin lispro (ADMELOG) corrective regimen sliding scale   SubCutaneous Before meals and at bedtime  insulin lispro Injectable (ADMELOG) 5 Unit(s) SubCutaneous three times a day before meals  sodium chloride 0.9%. 1000 milliLiter(s) IV Continuous <Continuous>    DIET: [] Regular [x] CCD [] Renal [] Puree [] Dysphagia [] Tube Feeds:     IMAGING:   < from: CT Head No Cont (08.03.23 @ 11:12) >  FINDINGS:    Redemonstration of left parietotemporal craniotomy an left temporal   craniectomy.    Similar left frontal pneumocephalus.    No hydrocephalus, midline shift, or effacement of the basal cisterns.    No acute parenchymal hemorrhage or brain edema.    IMPRESSION:    No significant interval change from 8/2/2023    ---End of Report ---    SONDRA MCGUIRE MD; Attending Radiologist  This document has been electronically signed. Aug  3 2023 10:26AM    < end of copied text >

## 2023-08-10 NOTE — PROGRESS NOTE ADULT - PROVIDER SPECIALTY LIST ADULT
NSICU
NSICU
Neurosurgery
NSICU
Neurosurgery
NSICU
Neurosurgery
Neurosurgery
NSICU
Neurology
Neurosurgery
NSICU
Neurosurgery
NSICU
Neurosurgery
Neurosurgery
Cardiology
NSICU
Cardiology

## 2023-08-10 NOTE — PROGRESS NOTE ADULT - PROBLEM SELECTOR PLAN 2
SBP goal 120-160  c/w amlodipine    - can increase to 5mg   continue to monitor
SBP goal 100-160  c/w amlodipine  continue to monitor
SBP goal 100-160  c/w amlodipine    - can increase to 5mg   continue to monitor
SBP goal 120-160  c/w amlodipine    - can increase to 5mg   continue to monitor

## 2023-08-10 NOTE — CONSULT NOTE ADULT - PROBLEM SELECTOR RECOMMENDATION 5
[place] from . Has established a PMD here per daughter but is yet to see them. Daughter to make appointment. [place] . Has established a PMD here per daughter but is yet to see them. Daughter to make appointment.    May be d/valery w follow up.

## 2023-08-10 NOTE — PROGRESS NOTE ADULT - TIME BILLING
not critically ill inpatient care as above
Advanced care planning was discussed with patient and family.  Advanced care planning forms were reviewed and discussed as appropriate.  Differential diagnosis and plan of care discussed with patient after the evaluation.   Pain assessed and judicious use of narcotics when appropriate was discussed.  Importance of Fall prevention discussed.  Counseling on Smoking and Alcohol cessation was offered when appropriate.  Counseling on Diet, exercise, and medication compliance was done.
Advanced care planning was discussed with patient and family.  Advanced care planning forms were reviewed and discussed as appropriate.  Differential diagnosis and plan of care discussed with patient after the evaluation.   Pain assessed and judicious use of narcotics when appropriate was discussed.  Importance of Fall prevention discussed.  Counseling on Smoking and Alcohol cessation was offered when appropriate.  Counseling on Diet, exercise, and medication compliance was done.

## 2023-08-10 NOTE — CONSULT NOTE ADULT - PROBLEM SELECTOR RECOMMENDATION 2
Prior home meds as below.     Was on pressors before bypass to augment BP. Has been off all pressors since then. Amlodipine 2.5mg was resumed on 8/6.     BP has been 130s to 160s systolic. Would start Lopressor 25mg bid and hold Amlodipine, ARB for now. F/u with PMD.      7/24 TTE: EF 63%, normal left vent systolic fxn, normal left diastolic fxn, normal right vent systolic fxn, trace TR
Would start Norvasc 2.5 mg daily for now   would avoid faster acting meds like  Labetolol and hydralazine   Will monitor closely

## 2023-08-10 NOTE — PROGRESS NOTE ADULT - SUBJECTIVE AND OBJECTIVE BOX
Subjective: Patient seen and examined. No new events except as noted.   Sitting up in bed, appears comfortably.     REVIEW OF SYSTEMS:    CONSTITUTIONAL: + weakness, fevers or chills  EYES/ENT: No visual changes;  No vertigo or throat pain   NECK: No pain or stiffness  RESPIRATORY: No cough, wheezing, hemoptysis; No shortness of breath  CARDIOVASCULAR: No chest pain or palpitations  GASTROINTESTINAL: No abdominal or epigastric pain. No nausea, vomiting, or hematemesis; No diarrhea or constipation. No melena or hematochezia.  GENITOURINARY: No dysuria, frequency or hematuria  NEUROLOGICAL: No numbness or weakness  SKIN: No itching, burning, rashes, or lesions   All other review of systems is negative unless indicated above.    MEDICATIONS:  MEDICATIONS  (STANDING):  amLODIPine   Tablet 2.5 milliGRAM(s) Oral daily  aspirin  chewable 81 milliGRAM(s) Oral daily  atorvastatin 80 milliGRAM(s) Oral at bedtime  chlorhexidine 4% Liquid 1 Application(s) Topical daily  dextrose 5%. 1000 milliLiter(s) (100 mL/Hr) IV Continuous <Continuous>  enoxaparin Injectable 40 milliGRAM(s) SubCutaneous <User Schedule>  ferrous    sulfate 325 milliGRAM(s) Oral <User Schedule>  glucagon  Injectable 1 milliGRAM(s) IntraMuscular once  insulin glargine Injectable (LANTUS) 15 Unit(s) SubCutaneous at bedtime  insulin lispro (ADMELOG) corrective regimen sliding scale   SubCutaneous Before meals and at bedtime  insulin lispro Injectable (ADMELOG) 5 Unit(s) SubCutaneous three times a day before meals  levETIRAcetam 500 milliGRAM(s) Oral two times a day  polyethylene glycol 3350 17 Gram(s) Oral two times a day  senna 2 Tablet(s) Oral at bedtime  sodium chloride 0.9%. 1000 milliLiter(s) (100 mL/Hr) IV Continuous <Continuous>    PHYSICAL EXAM:  Vital Signs Last 24 Hrs  T(C): 36.9 (10 Aug 2023 04:57), Max: 37 (09 Aug 2023 16:40)  T(F): 98.4 (10 Aug 2023 04:57), Max: 98.6 (09 Aug 2023 16:40)  HR: 83 (10 Aug 2023 04:57) (83 - 93)  BP: 128/72 (10 Aug 2023 04:57) (128/72 - 167/90)  BP(mean): --  RR: 18 (10 Aug 2023 04:57) (18 - 18)  SpO2: 95% (10 Aug 2023 04:57) (95% - 97%)    Parameters below as of 10 Aug 2023 04:57  Patient On (Oxygen Delivery Method): room air    I&O's Summary    09 Aug 2023 07:01  -  10 Aug 2023 07:00  --------------------------------------------------------  IN: 1605 mL / OUT: 0 mL / NET: 1605 mL    Appearance: Normal	  HEENT: Normal oral mucosa, PERRL, EOMI	  Lymphatic: No lymphadenopathy , no edema  Cardiovascular: Normal S1 S2, No JVD, No murmurs , Peripheral pulses palpable 2+ bilaterally  Respiratory: Lungs clear to auscultation, normal effort 	  Gastrointestinal:  Soft, Non-tender, + BS	  Skin: No rashes, No ecchymoses, No cyanosis, warm to touch  Neurological: Awake, alert, oriented x 3 (name, place, date), right eye blind, left pupil 2mm reactive, speech fluent, following commands, no drift, uppers 5/5, lowers 5/5, SILT  Ext: No edema    LABS:    CARDIAC MARKERS:                        8.6    7.47  )-----------( 385      ( 09 Aug 2023 11:21 )             27.8     08-09    140  |  105  |  15  ----------------------------<  185<H>  3.6   |  24  |  0.61    Ca    8.7      09 Aug 2023 11:21  Phos  3.4     08-09  Mg     2.0     08-09    proBNP:   Lipid Profile:   HgA1c:   TSH:     TELEMETRY:    ECG:  	  RADIOLOGY:   DIAGNOSTIC TESTING:  [ ] Echocardiogram:  [ ]  Catheterization:  [ ] Stress Test:    OTHER:

## 2023-08-10 NOTE — CONSULT NOTE ADULT - ASSESSMENT
67y (1955) woman with a PMHx significant for CVA (on Plavix), seizure (on Keppra), HLD, HTN, who presents w/ CC of AMS. Prior h/o strokes. Had a hip fracture in Dec 2022, followed by a stroke, treated in the [place].   Over the past 2-3 weeks, she has been having 20-30 min episodes of sudden blank staring and unresponsiveness. Code stroke called for AMS within 24h window. 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 Initial NIHSS 12.     Home medications are: Plavix, amlodipine-ibresartan, metoprolol, rosuvastatin, Ceumid (aka Keppra), omeprezole, jarinu, folic acid, B complex, ferrous sulfate. CTA showing concerning occlusion in L MCA distribution. Although NIHSS improving to 5 (mild dysarthria, aphasia, droop, RLE/RUE drift), patient taken for diagnostic cerebral angiogram.     HOSPITAL COURSE:  7/23 Adm NSCU s/p angiogram showing R M2/MCA occlusion with distal retrograde reconstitution, aspiration thrombectomy attempted without recanalization.   7/24: no acute overnight events  7/25: Aphasic when SBP dropped to 110 overnight, daughter reports slowed speech this morning   7/26: Pressure dependent exam, exam worsened when SBP <160  7/27: Mild confusion over night. Negative balance. Will put her on maintenance fluids to decrease the pressors requirements  7/28: No acute overnight events. Failed a challenge with a lower BP parameter: 120-160. Increase midodrine to 15 q8  7/29: no acute overnight events. STA-MCA Bypass on Monday?  7/30 BP dropped < 160 mm her neuro exam got worse, phenylephrine   7/31: Patient still symptomatic on Low BP, responds extremely well to midodrine and BP rises significantly   8/1: On jose still, possible angio for STA-MCA bypass on 8/2.  8/2- No events.   8/3- Patient still pressure dependent. Exam worsens when Bp drops <160 mmHg. No other changes.  8/4- Patient off pressors. No changes in exam at /80.   8/5- No fluctuations in examination in the last 24 hours. BP liberalized and reaching higher than 160 at times, examined at 170 BP, with intact exam. ---OF NOTE: on  patient with word finding difficulty and R sided blurry vision   8/7- No overnight events.   8/8- [facility]  8/9- Exam stable, worked with PT/OT, for home PT/OT, needs stair clearance from PT    7/23/23 s/p angiogram showing R M2/MCA occlusion with distal retrograde reconstitution, aspiration thrombectomy attempted without recanalization  8/2/23 s/p left craniotomy for STA-MCA bypass    Currently up in chair, awake, alert, able to follow commands. Daughter at bedside provided translation.  67y (1955) woman with a PMHx significant for CVA (on Plavix), seizure (on Keppra), HLD, HTN, who presents w/ CC of AMS. Prior h/o strokes. Had a hip fracture in Dec 2022, followed by a stroke, [place]. Had been having 20-30 min episodes of sudden blank staring and unresponsiveness. Code stroke called for AMS within 24h window. 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 Initial NIHSS 12.     CTA showing concerning occlusion in L MCA distribution. Although NIHSS improving to 5 (mild dysarthria, aphasia, droop, RLE/RUE drift), patient taken for diagnostic cerebral angiogram.     HOSPITAL COURSE:  7/23 Adm NSCU s/p angiogram showing R M2/MCA occlusion with distal retrograde reconstitution, aspiration thrombectomy attempted without recanalization.   7/24: no acute overnight events  7/25: Aphasic when SBP dropped to 110 overnight, daughter reports slowed speech this morning   7/26: Pressure dependent exam, exam worsened when SBP <160  7/27: Mild confusion over night. Negative balance. Will put her on maintenance fluids to decrease the pressors requirements  7/28: No acute overnight events. Failed a challenge with a lower BP parameter: 120-160. Increase midodrine to 15 q8  7/29: no acute overnight events. STA-MCA Bypass on Monday?  7/30 BP dropped < 160 mm her neuro exam got worse, phenylephrine   7/31: Patient still symptomatic on Low BP, responds extremely well to midodrine and BP rises significantly   8/1: On jose still, possible angio for STA-MCA bypass on 8/2.  8/2- No events.   8/3- Patient still pressure dependent. Exam worsens when Bp drops <160 mmHg. No other changes.  8/4- Patient off pressors. No changes in exam at /80.   8/5- No fluctuations in examination in the last 24 hours. BP liberalized and reaching higher than 160 at times, examined at 170 BP, with intact exam. ---OF NOTE: on  patient with word finding difficulty and R sided blurry vision   8/7- No overnight events.   [facility]  8/9- Exam stable, worked with PT/OT, for home PT/OT, needs stair clearance from PT    7/23/23 s/p angiogram showing R M2/MCA occlusion with distal retrograde reconstitution, aspiration thrombectomy attempted without recanalization  8/2/23 s/p left craniotomy for STA-MCA bypass    Currently up in chair, awake, alert, able to follow commands. Daughter at bedside provided translation.

## 2023-08-10 NOTE — PROGRESS NOTE ADULT - REASON FOR ADMISSION
stroke

## 2023-08-10 NOTE — PROGRESS NOTE ADULT - ASSESSMENT
ASSESSMENT AND PLAN: 67y (1955) woman with a PMHx significant for CVA (on Plavix), seizure (on Keppra), HLD, HTN, who presents w/ CC of AMS. Patient unable to provide any history. Daughter at bedside assisting w/ history. Patient had stroke in past, but unclear when this happened and if any residual deficits. Per daughter, patient is usually independent, fully oriented, and speaks in normal conversation. However, over the past 2-3 weeks, she has been having 20-30 min episodes of sudden blank staring and unresponsiveness. She will be barely able to speak and the words will be nonsensical. She does not have generalized shaking, incontinence, or tongue biting. She comes back to her baseline afterwards. These episodes have generally occurred every other day. Daughter became concerned when she had episode starting at noon today that persisted for hours. Brought to ED for further evaluation. Code stroke called for AMS within 24h window. 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 Initial NIHSS 12. When asked orientation questions, she stutters an answer ("[age] and continues speaking nonsense. She follows simple commands with help of daughter speaking Cantonese. Home medications are: Plavix, amlodipine-ibresartan, metoprolol, rosuvastatin, Ceumid (aka Keppra), omeprezole, jarinu, folic acid, B complex, ferrous sulfate. After discussions with stroke/neuro-IR fellows and re-assessment, CTA showing concerning occlusion in L MCA distribution. Although NIHSS improving to 5 (mild dysarthria, aphasia, droop, RLE/RUE drift), patient taken for diagnostic cerebral angiogram. Family aware of diagnosis and provided written consent to plan.    HOSPITAL COURSE:  7/23 Adm NSCU s/p angiogram showing R M2/MCA occlusion with distal retrograde reconstitution, aspiration thrombectomy attempted without recanalization.   7/24: no acute overnight events  7/25: Aphasic when SBP dropped to 110 overnight, daughter reports slowed speech this morning   7/26: Pressure dependent exam, exam worsened when SBP <160  7/27: Mild confusion over night. Negative balance. Will put her on maintenance fluids to decrease the pressors requirements  7/28: No acute overnight events. Failed a challenge with a lower BP parameter: 120-160. Increase midodrine to 15 q8  7/29: no acute overnight events. STA-MCA Bypass on Monday?  7/30 BP dropped < 160 mm her neuro exam got worse, phenylephrine   7/31: Patient still symptomatic on Low BP, responds extremely well to midodrine and BP rises significantly   8/1: On jose still, possible angio for STA-MCA bypass on 8/2.  8/2- No events.   8/3- Patient still pressure dependent. Exam worsens when Bp drops <160 mmHg. No other changes.  8/4- Patient off pressors. No changes in exam at /80.   8/5- No fluctuations in examination in the last 24 hours. BP liberalized and reaching higher than 160 at times, examined at 170 BP, with intact exam. ---OF NOTE: on  patient with word finding difficulty and R sided blurry vision   8/7- No overnight events.   8/8- Transitioned from [facility] to [facility]  8/9- Exam stable, worked with PT/OT, for home PT/OT, needs stair clearance from PT    7/23/23 s/p angiogram showing R M2/MCA occlusion with distal retrograde reconstitution, aspiration thrombectomy attempted without recanalization  8/2/23 s/p left craniotomy for STA-MCA bypass    NEURO:   - Continue neuro checks q 4  - Continue Aspirin 81mg daily for STA-MCA bypass  - Continue Keppra for seizure prophylaxis  - Continue Tylenol prn and Oxycodone prn  - PT/OT - home PT/OT w/ rolling walker, wheelchair, 3:1 commode - prescriptions given to . Cleared from OT but still needs to work with PT for stairs clearance.    PULM:   - On room air, O2Sat>95%  - Incentive spirometry    CV:  - -160  - 7/24 TTE: EF 63%, normal left vent systolic fxn, normal left diastolic fxn, normal right vent systolic fxn, trace TR  - Continue Norvasc 2.5mg daily for HTN  - Continue Lipitor for HLD  - Appreciate Cardiology following    ENDO:   - A1c 8.5, continue Admelog, Lantus and ISS and CCD  - Fingersticks this morning was 88, Admelog was held at them time and f/u FS was 186  - Hospitalist consulted for assistance in glycemic control and discharge recommendations    HEME/ONC:             8/9 H/H stable, last PRBC transfusion for post-op anemia was on 8/3         DVT ppx: SQL, SCDs, 7/26 LE Dopp - negative    RENAL:   - NS@100  - 8/9 BMP - K 3.6, supplemented with KCl 40meq x 1    ID:   - Afebrile  - Received post-op abx    GI:    - Continue oral diet  - Continue senna and miralax for bowel regimen, last BM 8/8    Hospitalist team consulted for assistance in medical co-management.     DISCHARGE PLANNING:   PT/OT - home PT/OT w/ rolling walker, wheelchair, 3:1 commode - prescriptions given to . Cleared from OT but still needs to work with PT for stairs clearance.    Plan to be discussed w/ Dr. Pimentel  [id number]    ASSESSMENT AND PLAN: 67y (1955) woman with a PMHx significant for CVA (on Plavix), seizure (on Keppra), HLD, HTN, who presents w/ CC of AMS. Patient unable to provide any history. Daughter at bedside assisting w/ history. Patient had stroke in past, but unclear when this happened and if any residual deficits. Per daughter, patient is usually independent, fully oriented, and speaks in normal conversation. However, over the past 2-3 weeks, she has been having 20-30 min episodes of sudden blank staring and unresponsiveness. She will be barely able to speak and the words will be nonsensical. She does not have generalized shaking, incontinence, or tongue biting. She comes back to her baseline afterwards. These episodes have generally occurred every other day. Daughter became concerned when she had episode starting at noon today that persisted for hours. Brought to ED for further evaluation. Code stroke called for AMS within 24h window. 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 Initial NIHSS 12. When asked orientation questions, she stutters an answer ("[age] and continues speaking nonsense. She follows simple commands with help of daughter speaking Cantonese. Home medications are: Plavix, amlodipine-ibresartan, metoprolol, rosuvastatin, Ceumid (aka Keppra), omeprezole, jarinu, folic acid, B complex, ferrous sulfate. After discussions with stroke/neuro-IR fellows and re-assessment, CTA showing concerning occlusion in L MCA distribution. Although NIHSS improving to 5 (mild dysarthria, aphasia, droop, RLE/RUE drift), patient taken for diagnostic cerebral angiogram. Family aware of diagnosis and provided written consent to plan.    HOSPITAL COURSE:  7/23 Adm NSCU s/p angiogram showing R M2/MCA occlusion with distal retrograde reconstitution, aspiration thrombectomy attempted without recanalization.   7/24: no acute overnight events  7/25: Aphasic when SBP dropped to 110 overnight, daughter reports slowed speech this morning   7/26: Pressure dependent exam, exam worsened when SBP <160  7/27: Mild confusion over night. Negative balance. Will put her on maintenance fluids to decrease the pressors requirements  7/28: No acute overnight events. Failed a challenge with a lower BP parameter: 120-160. Increase midodrine to 15 q8  7/29: no acute overnight events. STA-MCA Bypass on Monday?  7/30 BP dropped < 160 mm her neuro exam got worse, phenylephrine   7/31: Patient still symptomatic on Low BP, responds extremely well to midodrine and BP rises significantly   8/1: On jose still, possible angio for STA-MCA bypass on 8/2.  8/2- No events.   8/3- Patient still pressure dependent. Exam worsens when Bp drops <160 mmHg. No other changes.  8/4- Patient off pressors. No changes in exam at /80.   8/5- No fluctuations in examination in the last 24 hours. BP liberalized and reaching higher than 160 at times, examined at 170 BP, with intact exam. ---OF NOTE: on  patient with word finding difficulty and R sided blurry vision   8/7- No overnight events.   8/8- Transitioned from [facility] to [facility]  8/9- Exam stable, worked with PT/OT, for home PT/OT, needs stair clearance from PT    7/23/23 s/p angiogram showing R M2/MCA occlusion with distal retrograde reconstitution, aspiration thrombectomy attempted without recanalization  8/2/23 s/p left craniotomy for STA-MCA bypass    NEURO:   - Continue neuro checks q 4  - Continue Aspirin 81mg daily for STA-MCA bypass  - Continue Keppra for seizure prophylaxis  - Continue Tylenol prn and Oxycodone prn  - PT/OT - home PT/OT w/ rolling walker, wheelchair, 3:1 commode - prescriptions given to . Cleared from OT but still needs to work with PT for stairs clearance.    PULM:   - On room air, O2Sat>95%  - Incentive spirometry    CV:  - -160  - 7/24 TTE: EF 63%, normal left vent systolic fxn, normal left diastolic fxn, normal right vent systolic fxn, trace TR  - Was on Norvasc 2.5mg daily for HTN until this morning, switched to Metoprolol 20mg BID per Hospitalist (she was on Metoprolol at home)  - Continue Lipitor for HLD  - Appreciate Cardiology following    ENDO:   - A1c 8.5, continue Admelog, Lantus and ISS and CCD  - Fingersticks this morning was 88, Admelog was held at them time and f/u FS was 186  - Hospitalist consulted for assistance in glycemic control and discharge recommendations    HEME/ONC:             8/9 H/H stable, last PRBC transfusion for post-op anemia was on 8/3         DVT ppx: SQL, SCDs, 7/26 LE Dopp - negative    RENAL:   - NS@100  - 8/9 BMP - K 3.6, supplemented with KCl 40meq x 1    ID:   - Afebrile  - Received post-op abx    GI:    - Continue oral diet  - Continue senna and miralax for bowel regimen, last BM 8/8    Hospitalist team consulted for assistance in medical co-management.     DISCHARGE PLANNING:   PT/OT - home PT/OT w/ rolling walker, wheelchair, 3:1 commode - prescriptions given to . Cleared from OT but still needs to work with PT for stairs clearance.    Plan to be discussed w/ Dr. Pimentel  [id number]    ASSESSMENT AND PLAN: 67y (1955) woman with a PMHx significant for CVA (on Plavix), seizure (on Keppra), HLD, HTN, who presents w/ CC of AMS. Patient unable to provide any history. Daughter at bedside assisting w/ history. Patient had stroke in past, but unclear when this happened and if any residual deficits. Per daughter, patient is usually independent, fully oriented, and speaks in normal conversation. However, over the past 2-3 weeks, she has been having 20-30 min episodes of sudden blank staring and unresponsiveness. She will be barely able to speak and the words will be nonsensical. She does not have generalized shaking, incontinence, or tongue biting. She comes back to her baseline afterwards. These episodes have generally occurred every other day. Daughter became concerned when she had episode starting at noon today that persisted for hours. Brought to ED for further evaluation. Code stroke called for AMS within 24h window. 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 Initial NIHSS 12. When asked orientation questions, she stutters an answer ("[age]") and continues speaking nonsense. She follows simple commands with help of daughter speaking Cantonese. Home medications are: Plavix, amlodipine-ibresartan, metoprolol, rosuvastatin, Ceumid (aka Keppra), omeprezole, jarinu, folic acid, B complex, ferrous sulfate. After discussions with stroke/neuro-IR fellows and re-assessment, CTA showing concerning occlusion in L MCA distribution. Although NIHSS improving to 5 (mild dysarthria, aphasia, droop, RLE/RUE drift), patient taken for diagnostic cerebral angiogram. Family aware of diagnosis and provided written consent to plan.    HOSPITAL COURSE:  7/23 Adm NSCU s/p angiogram showing R M2/MCA occlusion with distal retrograde reconstitution, aspiration thrombectomy attempted without recanalization.   7/24: no acute overnight events  7/25: Aphasic when SBP dropped to 110 overnight, daughter reports slowed speech this morning   7/26: Pressure dependent exam, exam worsened when SBP <160  7/27: Mild confusion over night. Negative balance. Will put her on maintenance fluids to decrease the pressors requirements  7/28: No acute overnight events. Failed a challenge with a lower BP parameter: 120-160. Increase midodrine to 15 q8  7/29: no acute overnight events. STA-MCA Bypass on Monday?  7/30 BP dropped < 160 mm her neuro exam got worse, phenylephrine   7/31: Patient still symptomatic on Low BP, responds extremely well to midodrine and BP rises significantly   8/1: On jose still, possible angio for STA-MCA bypass on 8/2.  8/2- No events.   8/3- Patient still pressure dependent. Exam worsens when Bp drops <160 mmHg. No other changes.  8/4- Patient off pressors. No changes in exam at /80.   8/5- No fluctuations in examination in the last 24 hours. BP liberalized and reaching higher than 160 at times, examined at 170 BP, with intact exam. ---OF NOTE: on  patient with word finding difficulty and R sided blurry vision   8/7- No overnight events.   8/8- Transitioned from [facility] to [facility]  8/9- Exam stable, worked with PT/OT, for home PT/OT, needs stair clearance from PT    7/23/23 s/p angiogram showing R M2/MCA occlusion with distal retrograde reconstitution, aspiration thrombectomy attempted without recanalization  8/2/23 s/p left craniotomy for STA-MCA bypass    NEURO:   - Continue neuro checks q 4  - Continue Aspirin 81mg daily for STA-MCA bypass  - Continue Keppra for seizure prophylaxis  - Continue Tylenol prn and Oxycodone prn  - PT/OT - home PT/OT w/ rolling walker, wheelchair, 3:1 commode - prescriptions given to . Cleared from OT but still needs to work with PT for stairs clearance. On 8/10 patient did stairs and cleared by PT    PULM:   - On room air, O2Sat>95%  - Incentive spirometry    CV:  - -160  - 7/24 TTE: EF 63%, normal left vent systolic fxn, normal left diastolic fxn, normal right vent systolic fxn, trace TR  - Was on Norvasc 2.5mg daily for HTN until this morning, switched to Metoprolol 20mg BID per Hospitalist (she was on Metoprolol at home)  - Continue Lipitor for HLD  - Appreciate Cardiology following    ENDO:   - A1c 8.5, continue Admelog, Lantus and ISS and CCD  - Fingersticks this morning was 88, Admelog was held at them time and f/u FS was 186  - Hospitalist consulted for assistance in glycemic control and discharge recommendations    HEME/ONC:             8/9 H/H stable, last PRBC transfusion for post-op anemia was on 8/3         DVT ppx: SQL, SCDs, 7/26 LE Dopp - negative    RENAL:   - NS@100  - 8/9 BMP - K 3.6, supplemented with KCl 40meq x 1    ID:   - Afebrile  - Received post-op abx    GI:    - Continue oral diet  - Continue senna and miralax for bowel regimen, last BM 8/8    Appreciate Hospitalist team following for assistance in medical co-management.     More than 30 minutes were spent educating the patient and family (daughter Debi) regarding condition, medications, follow up plans, signs and symptoms to be concerned with, preparing paperwork, and questions answered regarding discharge.     DISCHARGE PLANNING:   PT/OT - home PT/OT w/ rolling walker, wheelchair, 3:1 commode - prescriptions given to . Cleared from OT but still needs to work with PT for stairs clearance. On 8/10 patient did stairs and cleared by PT.     Plan to be discussed w/ Dr. Pimentel  [id number]    ASSESSMENT AND PLAN: 67y (1955) woman with a PMHx significant for CVA (on Plavix), seizure (on Keppra), HLD, HTN, who presents w/ CC of AMS. Patient unable to provide any history. Daughter at bedside assisting w/ history. Patient had stroke in past, but unclear when this happened and if any residual deficits. Per daughter, patient is usually independent, fully oriented, and speaks in normal conversation. However, over the past 2-3 weeks, she has been having 20-30 min episodes of sudden blank staring and unresponsiveness. She will be barely able to speak and the words will be nonsensical. She does not have generalized shaking, incontinence, or tongue biting. She comes back to her baseline afterwards. These episodes have generally occurred every other day. Daughter became concerned when she had episode starting at noon today that persisted for hours. Brought to ED for further evaluation. Code stroke called for AMS within 24h window. 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 Initial NIHSS 12. When asked orientation questions, she stutters an answer ("[age]") and continues speaking nonsense. She follows simple commands with help of daughter speaking Cantonese. Home medications are: Plavix, amlodipine-ibresartan, metoprolol, rosuvastatin, Ceumid (aka Keppra), omeprezole, jarinu, folic acid, B complex, ferrous sulfate. After discussions with stroke/neuro-IR fellows and re-assessment, CTA showing concerning occlusion in L MCA distribution. Although NIHSS improving to 5 (mild dysarthria, aphasia, droop, RLE/RUE drift), patient taken for diagnostic cerebral angiogram. Family aware of diagnosis and provided written consent to plan.    HOSPITAL COURSE:  7/23 Adm NSCU s/p angiogram showing R M2/MCA occlusion with distal retrograde reconstitution, aspiration thrombectomy attempted without recanalization.   7/24: no acute overnight events  7/25: Aphasic when SBP dropped to 110 overnight, daughter reports slowed speech this morning   7/26: Pressure dependent exam, exam worsened when SBP <160  7/27: Mild confusion over night. Negative balance. Will put her on maintenance fluids to decrease the pressors requirements  7/28: No acute overnight events. Failed a challenge with a lower BP parameter: 120-160. Increase midodrine to 15 q8  7/29: no acute overnight events. STA-MCA Bypass on Monday?  7/30 BP dropped < 160 mm her neuro exam got worse, phenylephrine   7/31: Patient still symptomatic on Low BP, responds extremely well to midodrine and BP rises significantly   8/1: On jose still, possible angio for STA-MCA bypass on 8/2.  8/2- No events.   8/3- Patient still pressure dependent. Exam worsens when Bp drops <160 mmHg. No other changes.  8/4- Patient off pressors. No changes in exam at /80.   8/5- No fluctuations in examination in the last 24 hours. BP liberalized and reaching higher than 160 at times, examined at 170 BP, with intact exam. ---OF NOTE: on  patient with word finding difficulty and R sided blurry vision   8/7- No overnight events.   8/8- Transitioned from [facility] to [facility]  8/9- Exam stable, worked with PT/OT, for home PT/OT, needs stair clearance from PT    7/23/23 s/p angiogram showing R M2/MCA occlusion with distal retrograde reconstitution, aspiration thrombectomy attempted without recanalization  8/2/23 s/p left craniotomy for STA-MCA bypass    NEURO:   - Continue neuro checks q 4  - Continue Aspirin 81mg daily for STA-MCA bypass  - Continue Keppra for seizure prophylaxis  - Continue Tylenol prn and Oxycodone prn  - PT/OT - home PT/OT w/ rolling walker, wheelchair, 3:1 commode - prescriptions given to . Cleared from OT but still needs to work with PT for stairs clearance. On 8/10 patient did stairs and cleared by PT    PULM:   - On room air, O2Sat>95%  - Incentive spirometry    CV:  - -160  - 7/24 TTE: EF 63%, normal left vent systolic fxn, normal left diastolic fxn, normal right vent systolic fxn, trace TR  - Was on Norvasc 2.5mg daily for HTN until this morning, switched to Metoprolol 20mg BID per Hospitalist (she was on Metoprolol at home) - patient tolerated  - Continue Lipitor for HLD  - Appreciate Cardiology following    ENDO:   - A1c 8.5, continue Admelog, Lantus and ISS and CCD  - Fingersticks this morning was 88, Admelog was held at them time and f/u FS was 186  - Hospitalist consulted for assistance in glycemic control and discharge recommendations, home Tresiba and ISS, hold Jardiance and started Metformin 500m mg daily    HEME/ONC:             8/9 H/H stable, last PRBC transfusion for post-op anemia was on 8/3         DVT ppx: SQL, SCDs, 7/26 LE Dopp - negative    RENAL:   - NS@100  - 8/9 BMP - K 3.6, supplemented with KCl 40meq x 1    ID:   - Afebrile  - Received post-op abx    GI:    - Continue oral diet  - Continue senna and miralax for bowel regimen, last BM 8/8    Appreciate Hospitalist team following for assistance in medical co-management.     More than 30 minutes were spent educating the patient and family (daughter Debi) regarding condition, medications, follow up plans, signs and symptoms to be concerned with, preparing paperwork, and questions answered regarding discharge.     DISCHARGE PLANNING:   PT/OT - home PT/OT w/ rolling walker, wheelchair, 3:1 commode - prescriptions given to . Cleared from OT but still needs to work with PT for stairs clearance. On 8/10 patient did stairs and cleared by PT.     Plan to be discussed w/ Dr. Pimentel  [phone]

## 2023-08-10 NOTE — CONSULT NOTE ADULT - PROBLEM SELECTOR RECOMMENDATION 9
STA-MCA bypass  Exam remains BP dependednt   SBP goal 140-160s   remains within parameter goal at present time. No IVP pushes overnight   Orders per NSCU team   BP orders as below
Clinical course above, now neurologically stable.     Continue asa, Plavix, statin.

## 2023-08-10 NOTE — PROGRESS NOTE ADULT - THIS PATIENT HAS THE FOLLOWING CONDITION(S)/DIAGNOSES ON THIS ADMISSION:
None
acute stroke
post-op anemia, stable/Acute Blood Loss Anemia
None
acute stroke
None
acute stroke
acute stroke
None
acute stroke
post-op anemia, stable currently/Acute Blood Loss Anemia
None
acute stroke
None
acute stroke

## 2023-08-10 NOTE — PROGRESS NOTE ADULT - PROBLEM SELECTOR PLAN 1
chronic M2 occlusion    - s/p L STA-MCA bypass  on ASA  on keppra  management as per NSCU

## 2023-08-23 PROBLEM — Z86.39 HISTORY OF HYPERLIPIDEMIA: Status: RESOLVED | Noted: 2023-08-23 | Resolved: 2023-08-23

## 2023-08-23 PROBLEM — Z86.73 HISTORY OF CEREBROVASCULAR ACCIDENT: Status: RESOLVED | Noted: 2023-08-23 | Resolved: 2023-08-23

## 2023-08-23 PROBLEM — Z86.79 HISTORY OF HYPERTENSION: Status: RESOLVED | Noted: 2023-08-23 | Resolved: 2023-08-23

## 2023-08-23 PROBLEM — Z87.898 HISTORY OF SEIZURE: Status: RESOLVED | Noted: 2023-08-23 | Resolved: 2023-08-23

## 2023-08-23 RX ORDER — CHROMIUM 200 MCG
TABLET ORAL
Refills: 0 | Status: ACTIVE | COMMUNITY

## 2023-08-23 RX ORDER — METOPROLOL TARTRATE 75 MG/1
TABLET, FILM COATED ORAL
Refills: 0 | Status: ACTIVE | COMMUNITY

## 2023-08-23 RX ORDER — CHLORHEXIDINE GLUCONATE 4 %
325 (65 FE) LIQUID (ML) TOPICAL
Refills: 0 | Status: ACTIVE | COMMUNITY

## 2023-08-23 RX ORDER — IRBESARTAN 300 MG/1
TABLET ORAL
Refills: 0 | Status: ACTIVE | COMMUNITY

## 2023-08-23 RX ORDER — OMEPRAZOLE 40 MG/1
40 CAPSULE, DELAYED RELEASE ORAL
Refills: 0 | Status: ACTIVE | COMMUNITY

## 2023-08-23 RX ORDER — AMLODIPINE AND OLMESARTAN MEDOXOMIL 10; 40 MG/1; MG/1
TABLET ORAL
Refills: 0 | Status: DISCONTINUED | COMMUNITY
End: 2023-08-23

## 2023-08-23 RX ORDER — B-COMPLEX WITH VITAMIN C
TABLET ORAL
Refills: 0 | Status: ACTIVE | COMMUNITY

## 2023-08-23 RX ORDER — CLOPIDOGREL 75 MG/1
75 TABLET, FILM COATED ORAL
Refills: 0 | Status: ACTIVE | COMMUNITY

## 2023-08-23 RX ORDER — LEVETIRACETAM 1000 MG/1
TABLET, FILM COATED ORAL
Refills: 0 | Status: ACTIVE | COMMUNITY

## 2023-08-25 ENCOUNTER — APPOINTMENT (OUTPATIENT)
Dept: NEUROSURGERY | Facility: CLINIC | Age: 68
End: 2023-08-25
Payer: MEDICAID

## 2023-08-25 DIAGNOSIS — Z86.39 PERSONAL HISTORY OF OTHER ENDOCRINE, NUTRITIONAL AND METABOLIC DISEASE: ICD-10-CM

## 2023-08-25 DIAGNOSIS — Z87.898 PERSONAL HISTORY OF OTHER SPECIFIED CONDITIONS: ICD-10-CM

## 2023-08-25 DIAGNOSIS — Z86.73 PERSONAL HISTORY OF TRANSIENT ISCHEMIC ATTACK (TIA), AND CEREBRAL INFARCTION W/OUT RESIDUAL DEFICITS: ICD-10-CM

## 2023-08-25 DIAGNOSIS — Z86.79 PERSONAL HISTORY OF OTHER DISEASES OF THE CIRCULATORY SYSTEM: ICD-10-CM

## 2023-08-27 ENCOUNTER — INPATIENT (INPATIENT)
Facility: HOSPITAL | Age: 68
LOS: 2 days | Discharge: HOME | DRG: 392 | End: 2023-08-30
Attending: INTERNAL MEDICINE | Admitting: INTERNAL MEDICINE
Payer: MEDICAID

## 2023-08-27 VITALS
HEIGHT: 60 IN | WEIGHT: 117.07 LBS | SYSTOLIC BLOOD PRESSURE: 106 MMHG | HEART RATE: 90 BPM | RESPIRATION RATE: 14 BRPM | TEMPERATURE: 98 F | OXYGEN SATURATION: 94 % | DIASTOLIC BLOOD PRESSURE: 68 MMHG

## 2023-08-27 DIAGNOSIS — R11.2 NAUSEA WITH VOMITING, UNSPECIFIED: ICD-10-CM

## 2023-08-27 LAB
ALBUMIN SERPL ELPH-MCNC: 4.6 G/DL — SIGNIFICANT CHANGE UP (ref 3.3–5)
ALP SERPL-CCNC: 114 U/L — SIGNIFICANT CHANGE UP (ref 40–120)
ALT FLD-CCNC: 35 U/L — SIGNIFICANT CHANGE UP (ref 10–45)
ANION GAP SERPL CALC-SCNC: 14 MMOL/L — SIGNIFICANT CHANGE UP (ref 5–17)
ANION GAP SERPL CALC-SCNC: 20 MMOL/L — HIGH (ref 5–17)
ANISOCYTOSIS BLD QL: SLIGHT — SIGNIFICANT CHANGE UP
APPEARANCE UR: ABNORMAL
AST SERPL-CCNC: 58 U/L — HIGH (ref 10–40)
BACTERIA # UR AUTO: ABNORMAL
BASE EXCESS BLDV CALC-SCNC: 5.6 MMOL/L — HIGH (ref -2–3)
BASOPHILS # BLD AUTO: 0.29 K/UL — HIGH (ref 0–0.2)
BASOPHILS NFR BLD AUTO: 2.6 % — HIGH (ref 0–2)
BILIRUB SERPL-MCNC: 0.4 MG/DL — SIGNIFICANT CHANGE UP (ref 0.2–1.2)
BILIRUB UR-MCNC: NEGATIVE — SIGNIFICANT CHANGE UP
BUN SERPL-MCNC: 33 MG/DL — HIGH (ref 7–23)
BUN SERPL-MCNC: 41 MG/DL — HIGH (ref 7–23)
CA-I SERPL-SCNC: 1.17 MMOL/L — SIGNIFICANT CHANGE UP (ref 1.15–1.33)
CALCIUM SERPL-MCNC: 10 MG/DL — SIGNIFICANT CHANGE UP (ref 8.4–10.5)
CALCIUM SERPL-MCNC: 9.1 MG/DL — SIGNIFICANT CHANGE UP (ref 8.4–10.5)
CHLORIDE BLDV-SCNC: 99 MMOL/L — SIGNIFICANT CHANGE UP (ref 96–108)
CHLORIDE SERPL-SCNC: 98 MMOL/L — SIGNIFICANT CHANGE UP (ref 96–108)
CHLORIDE SERPL-SCNC: 98 MMOL/L — SIGNIFICANT CHANGE UP (ref 96–108)
CO2 BLDV-SCNC: 33 MMOL/L — HIGH (ref 22–26)
CO2 SERPL-SCNC: 21 MMOL/L — LOW (ref 22–31)
CO2 SERPL-SCNC: 22 MMOL/L — SIGNIFICANT CHANGE UP (ref 22–31)
COLOR SPEC: SIGNIFICANT CHANGE UP
CREAT SERPL-MCNC: 1.04 MG/DL — SIGNIFICANT CHANGE UP (ref 0.5–1.3)
CREAT SERPL-MCNC: 1.28 MG/DL — SIGNIFICANT CHANGE UP (ref 0.5–1.3)
DIFF PNL FLD: ABNORMAL
EGFR: 46 ML/MIN/1.73M2 — LOW
EGFR: 59 ML/MIN/1.73M2 — LOW
ELLIPTOCYTES BLD QL SMEAR: SLIGHT — SIGNIFICANT CHANGE UP
EOSINOPHIL # BLD AUTO: 0 K/UL — SIGNIFICANT CHANGE UP (ref 0–0.5)
EOSINOPHIL NFR BLD AUTO: 0 % — SIGNIFICANT CHANGE UP (ref 0–6)
EPI CELLS # UR: 1 /HPF — SIGNIFICANT CHANGE UP
FLUAV AG NPH QL: SIGNIFICANT CHANGE UP
FLUBV AG NPH QL: SIGNIFICANT CHANGE UP
GAS PNL BLDV: 135 MMOL/L — LOW (ref 136–145)
GAS PNL BLDV: SIGNIFICANT CHANGE UP
GAS PNL BLDV: SIGNIFICANT CHANGE UP
GLUCOSE BLDV-MCNC: 152 MG/DL — HIGH (ref 70–99)
GLUCOSE SERPL-MCNC: 144 MG/DL — HIGH (ref 70–99)
GLUCOSE SERPL-MCNC: 213 MG/DL — HIGH (ref 70–99)
GLUCOSE UR QL: ABNORMAL
HCO3 BLDV-SCNC: 31 MMOL/L — HIGH (ref 22–29)
HCT VFR BLD CALC: 34.6 % — SIGNIFICANT CHANGE UP (ref 34.5–45)
HCT VFR BLDA CALC: 30 % — LOW (ref 34.5–46.5)
HGB BLD CALC-MCNC: 10 G/DL — LOW (ref 11.7–16.1)
HGB BLD-MCNC: 10.9 G/DL — LOW (ref 11.5–15.5)
HYALINE CASTS # UR AUTO: 0 /LPF — SIGNIFICANT CHANGE UP (ref 0–2)
KETONES UR-MCNC: NEGATIVE — SIGNIFICANT CHANGE UP
LACTATE BLDV-MCNC: 1.3 MMOL/L — SIGNIFICANT CHANGE UP (ref 0.5–2)
LEUKOCYTE ESTERASE UR-ACNC: ABNORMAL
LIDOCAIN IGE QN: 27 U/L — SIGNIFICANT CHANGE UP (ref 7–60)
LYMPHOCYTES # BLD AUTO: 1.47 K/UL — SIGNIFICANT CHANGE UP (ref 1–3.3)
LYMPHOCYTES # BLD AUTO: 13.3 % — SIGNIFICANT CHANGE UP (ref 13–44)
MAGNESIUM SERPL-MCNC: 2.3 MG/DL — SIGNIFICANT CHANGE UP (ref 1.6–2.6)
MANUAL SMEAR VERIFICATION: SIGNIFICANT CHANGE UP
MCHC RBC-ENTMCNC: 22.3 PG — LOW (ref 27–34)
MCHC RBC-ENTMCNC: 31.5 GM/DL — LOW (ref 32–36)
MCV RBC AUTO: 70.9 FL — LOW (ref 80–100)
MICROCYTES BLD QL: SLIGHT — SIGNIFICANT CHANGE UP
MONOCYTES # BLD AUTO: 0.3 K/UL — SIGNIFICANT CHANGE UP (ref 0–0.9)
MONOCYTES NFR BLD AUTO: 2.7 % — SIGNIFICANT CHANGE UP (ref 2–14)
NEUTROPHILS # BLD AUTO: 9.01 K/UL — HIGH (ref 1.8–7.4)
NEUTROPHILS NFR BLD AUTO: 81.4 % — HIGH (ref 43–77)
NITRITE UR-MCNC: NEGATIVE — SIGNIFICANT CHANGE UP
OVALOCYTES BLD QL SMEAR: SLIGHT — SIGNIFICANT CHANGE UP
PCO2 BLDV: 49 MMHG — HIGH (ref 39–42)
PH BLDV: 7.41 — SIGNIFICANT CHANGE UP (ref 7.32–7.43)
PH UR: 6 — SIGNIFICANT CHANGE UP (ref 5–8)
PHOSPHATE SERPL-MCNC: 4.5 MG/DL — SIGNIFICANT CHANGE UP (ref 2.5–4.5)
PLAT MORPH BLD: NORMAL — SIGNIFICANT CHANGE UP
PLATELET # BLD AUTO: 365 K/UL — SIGNIFICANT CHANGE UP (ref 150–400)
PO2 BLDV: 27 MMHG — SIGNIFICANT CHANGE UP (ref 25–45)
POIKILOCYTOSIS BLD QL AUTO: SIGNIFICANT CHANGE UP
POTASSIUM BLDV-SCNC: 4.9 MMOL/L — SIGNIFICANT CHANGE UP (ref 3.5–5.1)
POTASSIUM SERPL-MCNC: 4.3 MMOL/L — SIGNIFICANT CHANGE UP (ref 3.5–5.3)
POTASSIUM SERPL-MCNC: 5 MMOL/L — SIGNIFICANT CHANGE UP (ref 3.5–5.3)
POTASSIUM SERPL-SCNC: 4.3 MMOL/L — SIGNIFICANT CHANGE UP (ref 3.5–5.3)
POTASSIUM SERPL-SCNC: 5 MMOL/L — SIGNIFICANT CHANGE UP (ref 3.5–5.3)
PROCALCITONIN SERPL-MCNC: 0.48 NG/ML — HIGH (ref 0.02–0.1)
PROT SERPL-MCNC: 8.6 G/DL — HIGH (ref 6–8.3)
PROT UR-MCNC: ABNORMAL
RBC # BLD: 4.88 M/UL — SIGNIFICANT CHANGE UP (ref 3.8–5.2)
RBC # FLD: 17.7 % — HIGH (ref 10.3–14.5)
RBC BLD AUTO: ABNORMAL
RBC CASTS # UR COMP ASSIST: 2 /HPF — SIGNIFICANT CHANGE UP (ref 0–4)
RSV RNA NPH QL NAA+NON-PROBE: SIGNIFICANT CHANGE UP
SAO2 % BLDV: 40.2 % — LOW (ref 67–88)
SARS-COV-2 RNA SPEC QL NAA+PROBE: SIGNIFICANT CHANGE UP
SCHISTOCYTES BLD QL AUTO: SLIGHT — SIGNIFICANT CHANGE UP
SODIUM SERPL-SCNC: 134 MMOL/L — LOW (ref 135–145)
SODIUM SERPL-SCNC: 139 MMOL/L — SIGNIFICANT CHANGE UP (ref 135–145)
SP GR SPEC: 1.05 — HIGH (ref 1.01–1.02)
TARGETS BLD QL SMEAR: SLIGHT — SIGNIFICANT CHANGE UP
UROBILINOGEN FLD QL: NEGATIVE — SIGNIFICANT CHANGE UP
WBC # BLD: 11.07 K/UL — HIGH (ref 3.8–10.5)
WBC # FLD AUTO: 11.07 K/UL — HIGH (ref 3.8–10.5)
WBC UR QL: 196 /HPF — HIGH (ref 0–5)

## 2023-08-27 PROCEDURE — 99285 EMERGENCY DEPT VISIT HI MDM: CPT

## 2023-08-27 PROCEDURE — 70496 CT ANGIOGRAPHY HEAD: CPT | Mod: 26,MA

## 2023-08-27 PROCEDURE — 70450 CT HEAD/BRAIN W/O DYE: CPT | Mod: 26,MA,59

## 2023-08-27 RX ORDER — ACETAMINOPHEN 500 MG
650 TABLET ORAL EVERY 6 HOURS
Refills: 0 | Status: DISCONTINUED | OUTPATIENT
Start: 2023-08-27 | End: 2023-08-30

## 2023-08-27 RX ORDER — FAMOTIDINE 10 MG/ML
20 INJECTION INTRAVENOUS ONCE
Refills: 0 | Status: COMPLETED | OUTPATIENT
Start: 2023-08-27 | End: 2023-08-27

## 2023-08-27 RX ORDER — SENNA PLUS 8.6 MG/1
2 TABLET ORAL AT BEDTIME
Refills: 0 | Status: DISCONTINUED | OUTPATIENT
Start: 2023-08-27 | End: 2023-08-30

## 2023-08-27 RX ORDER — ACETAMINOPHEN 500 MG
1000 TABLET ORAL ONCE
Refills: 0 | Status: COMPLETED | OUTPATIENT
Start: 2023-08-27 | End: 2023-08-27

## 2023-08-27 RX ORDER — POLYETHYLENE GLYCOL 3350 17 G/17G
17 POWDER, FOR SOLUTION ORAL
Refills: 0 | Status: DISCONTINUED | OUTPATIENT
Start: 2023-08-27 | End: 2023-08-30

## 2023-08-27 RX ORDER — ASPIRIN/CALCIUM CARB/MAGNESIUM 324 MG
81 TABLET ORAL DAILY
Refills: 0 | Status: DISCONTINUED | OUTPATIENT
Start: 2023-08-27 | End: 2023-08-30

## 2023-08-27 RX ORDER — LEVETIRACETAM 250 MG/1
500 TABLET, FILM COATED ORAL
Refills: 0 | Status: DISCONTINUED | OUTPATIENT
Start: 2023-08-27 | End: 2023-08-30

## 2023-08-27 RX ORDER — SODIUM CHLORIDE 9 MG/ML
1000 INJECTION, SOLUTION INTRAVENOUS ONCE
Refills: 0 | Status: COMPLETED | OUTPATIENT
Start: 2023-08-27 | End: 2023-08-27

## 2023-08-27 RX ORDER — METOPROLOL TARTRATE 50 MG
25 TABLET ORAL EVERY 12 HOURS
Refills: 0 | Status: DISCONTINUED | OUTPATIENT
Start: 2023-08-27 | End: 2023-08-30

## 2023-08-27 RX ORDER — ATORVASTATIN CALCIUM 80 MG/1
80 TABLET, FILM COATED ORAL AT BEDTIME
Refills: 0 | Status: DISCONTINUED | OUTPATIENT
Start: 2023-08-27 | End: 2023-08-30

## 2023-08-27 RX ORDER — FERROUS SULFATE 325(65) MG
325 TABLET ORAL DAILY
Refills: 0 | Status: DISCONTINUED | OUTPATIENT
Start: 2023-08-27 | End: 2023-08-30

## 2023-08-27 RX ORDER — CEFTRIAXONE 500 MG/1
2000 INJECTION, POWDER, FOR SOLUTION INTRAMUSCULAR; INTRAVENOUS ONCE
Refills: 0 | Status: COMPLETED | OUTPATIENT
Start: 2023-08-27 | End: 2023-08-27

## 2023-08-27 RX ORDER — ONDANSETRON 8 MG/1
4 TABLET, FILM COATED ORAL ONCE
Refills: 0 | Status: COMPLETED | OUTPATIENT
Start: 2023-08-27 | End: 2023-08-27

## 2023-08-27 RX ADMIN — Medication 1000 MILLIGRAM(S): at 12:30

## 2023-08-27 RX ADMIN — FAMOTIDINE 20 MILLIGRAM(S): 10 INJECTION INTRAVENOUS at 12:00

## 2023-08-27 RX ADMIN — ONDANSETRON 4 MILLIGRAM(S): 8 TABLET, FILM COATED ORAL at 12:00

## 2023-08-27 RX ADMIN — Medication 400 MILLIGRAM(S): at 12:00

## 2023-08-27 RX ADMIN — CEFTRIAXONE 100 MILLIGRAM(S): 500 INJECTION, POWDER, FOR SOLUTION INTRAMUSCULAR; INTRAVENOUS at 19:26

## 2023-08-27 RX ADMIN — SENNA PLUS 2 TABLET(S): 8.6 TABLET ORAL at 21:57

## 2023-08-27 RX ADMIN — SODIUM CHLORIDE 1000 MILLILITER(S): 9 INJECTION, SOLUTION INTRAVENOUS at 13:15

## 2023-08-27 RX ADMIN — SODIUM CHLORIDE 2000 MILLILITER(S): 9 INJECTION, SOLUTION INTRAVENOUS at 12:00

## 2023-08-27 RX ADMIN — ATORVASTATIN CALCIUM 80 MILLIGRAM(S): 80 TABLET, FILM COATED ORAL at 21:57

## 2023-08-27 NOTE — ED PROVIDER NOTE - OBJECTIVE STATEMENT
67y (1955) woman with a PMHx significant for CVA (on Plavix), seizure (on Keppra), HLD, HTN, Recent prolonged admission to the NSICU and this facility just discharged 2 weeks ago after being treated for a M2 occlusion and is now status post aspiration thrombectomy and then eventually a angiographic STA to MCA bypass on August 2, Hospital course was complicated by labile pressures in the NSICU and she was ultimately discharged on 8/9 and sent home on oral midodrine. Today she presents brought in by her daughter for 24 hours of persistent intermittent nonbilious nonbloody emesis and nausea as well as altered mental status that the daughter reports has confusion from baseline and generalized weakness. Patient is primarily Cantonese speaking and prefers her daughter to provide translation services.She denies any headache, blurry vision, dizziness, localized weakness in an arm or leg, chest pain, shortness of breath, palpitations, abdominal pain, dysuria, urinary frequency, fevers, diarrhea, melena, hematochezia, sick contacts or other complaints at this time

## 2023-08-27 NOTE — ED PROVIDER NOTE - PROGRESS NOTE DETAILS
Boris Hernández MD (PGY-4):  Patient evaluated by neurosurgery CTA CT Noncon without acute neurosurgical process requiring intervention.  Patient resting comfortably without episodes of hypotension here in the ED.  Urinalysis consistent consistent with acute UTI given nausea, vomiting, weakness reported hypotension at home.  We will treat IV antibiotics and admit to medicine for supportive care, IV antibiotics and monitoring for improvement.  Case discussed with Dr. Radford who accepts admit

## 2023-08-27 NOTE — H&P ADULT - HISTORY OF PRESENT ILLNESS
67y  woman with a PMHx significant for CVA (on Plavix), seizure (on Keppra), HLD, HTN, Recent prolonged admission to the NSICU and this facility just discharged 2 weeks ago after being treated for a M2 occlusion and is now status post aspiration thrombectomy and then eventually a angiographic STA to MCA bypass on August 2, Hospital course was complicated by labile pressures in the NSICU and she was ultimately discharged on 8/9 and sent home on oral midodrine. Today she presents brought in by her daughter for 24 hours of persistent intermittent nonbilious nonbloody emesis and nausea as well as altered mental status that the daughter reports has confusion from baseline and generalized weakness. Patient is primarily Cantonese speaking and prefers her daughter to provide translation services.She denies any headache, blurry vision, dizziness, localized weakness in an arm or leg, chest pain, shortness of breath, palpitations, abdominal pain, dysuria, urinary frequency, fevers, diarrhea, melena, hematochezia, sick contacts or other complaints at this time

## 2023-08-27 NOTE — ED ADULT NURSE NOTE - NSFALLHARMRISKINTERV_ED_ALL_ED

## 2023-08-27 NOTE — ED PROVIDER NOTE - CLINICAL SUMMARY MEDICAL DECISION MAKING FREE TEXT BOX
see MD Note see MD Note    Boris Hernández MD (PGY-4):  67y (1955) woman with a PMHx significant for CVA (on Plavix), seizure (on Keppra), HLD, HTN, recent admission to this facility in the NSICU for an M2/MCA occlusion, Status post aspiration thrombectomy and eventual STA to MCA bypass with complicated course, who presentsFor 1 day of persistent intermittent nonbilious nonbloody emesis, nausea, generalized weakness and slight confusion from baseline per her daughter. Daughter also report reports low blood pressures at home as patient has been unable to tolerate p.o. midodrine.  On arrival BPs are 100s over 60s.  Patient is afebrile, weak but nontoxic in appearance.  Exam negative for focal neurologic deficit.  Concern for complication related to recent neurosurgical interventions.  While I have low suspicion for acute hemorrhagic stroke based on her clinical exam, given her reported low blood pressures at home and postoperative course significant for labile blood pressures and neurologic symptoms related to hypoperfusion.  There is concern for possible neurologic cause of her vomiting.  Other etiologies considered include gastroenteritis, gastritis, less likely pancreatitis given no epigastric tenderness to palpation, low suspicion of ACS given no chest pain no palpitations no shortness of breath.  Other etiologies not excluded.  Will obtain CBC, CMP coags, lipase, CT head Noncon, CTA head and neck treat with IV Ofirmev, IV antiemetic, reach out to neurosurgery and reassess

## 2023-08-27 NOTE — ED ADULT NURSE NOTE - OBJECTIVE STATEMENT
66 y/o female, A&O x3, presents to ED c/o low BP and vomiting. PMH CVA s/p thrombectomy on Plavix, HTN, and DM. Pt daughter translating at bedside. Pt endorses discharge 8/10 after being admitted for CVA. Pt endorses yesterday feeling fatigued and lightheaded accompanied with nausea and vomiting. Pt daughter states "BP was low at home with 80 systolic. Pt denies chest pain, SOB, headache, slurred speech, abdominal pain, diarrhea, impaired gait, and fever/chills. Pt endorse chronic right eye blindness. Pt comfortably resting in bed placed on cardiac monitor. Bed locked in lowest position with call bell in reach and daughter at bedside.

## 2023-08-27 NOTE — ED PROVIDER NOTE - CARE PLAN
1 Principal Discharge DX:	Nausea and vomiting   Principal Discharge DX:	Nausea and vomiting  Secondary Diagnosis:	Complicated UTI (urinary tract infection)  Secondary Diagnosis:	Moderate dehydration

## 2023-08-27 NOTE — H&P ADULT - ASSESSMENT
The patient is a 67y Female complaining of vomiting.    Nausea/Vomiting:  Improving  IV Zofran PRN    S/p L MCA-STA bypass:    Neuro Sx eval appreciated  CT head : no acute changes  N/V not likely from recent neurosx     Dw family

## 2023-08-27 NOTE — ED PROVIDER NOTE - ATTENDING CONTRIBUTION TO CARE
------------ATTENDING NOTE------------ ------------ATTENDING NOTE------------  pt w/ daughter (easily translating, both declining additional services) c/o nausea, small amts nbnb vomiting w/ anorexia, dehydrated on arrival, mild headache, complicated as recent CVA/neurosurgery, no fevers, clear chest w/o distress or pain/discomfort, CT imaging wnl, ED sign out 1500 pending NSGY recs, repeat labs, close reassessments for tx/planned admisison.  - Alexander Chaidez MD   -----------------------------------------------

## 2023-08-27 NOTE — CONSULT NOTE ADULT - ASSESSMENT
67F s/p L MCA-STA bypass with Dr. Pimentel on 8/2/23 brought in by daughter for nausea/vomiting + decreased PO intake x1d. Also states her SBP has been low to 80/90s, is following with her cardiologist for midodrine adjustment. CTH stable from 8/2, no heme, no stroke. CTA w/ patent L STA-MCA bypass.   Exam: intact, no drift, no facial, FRANCIS 5/5, SILT  - No acute nsgy intervention  - Vomiting/nausea likely not 2/2 neurosurgical cause   - Symptomatic tx for nausea   - F/u outpatient with Dr. Pimentel in 1-2 wks 67F s/p L MCA-STA bypass with Dr. Pimentel on 8/2/23 brought in by daughter for nausea/vomiting + decreased PO intake x1d. Also states her SBP has been low to 80/90s, is following with her cardiologist for midodrine adjustment. CTH stable from 8/2, no heme, no stroke. CTA w/ patent L STA-MCA bypass.   Exam: intact, no drift, no facial, FRANCIS 5/5, SILT  - No acute nsgy intervention  - Vomiting/nausea likely not 2/2 neurosurgical cause   - Symptomatic tx for nausea   - F/u outpatient with Dr. Pimentel as scheduled

## 2023-08-27 NOTE — ED PROVIDER NOTE - PHYSICAL EXAMINATION
PHYSICAL EXAM:  GENERAL: Sitting comfortable in bed, Appears fatigued and weak but nontoxic and in no acute distress  HENMT: Atraumatic, Dry mucous membranes, no oropharyngeal exudates or vesicles, uvula is midline EYES: Clear bilaterally, Pupils 2 mm and sluggishly reactive bilaterally, EOMs intact b/l  HEART: RRR, S1/S2, no murmur/gallops/rubs  RESPIRATORY: Clear to auscultation bilaterally, no wheezes/rhonchi/rales  ABDOMEN: +BS, soft, nontender, nondistended, no rebound, no guarding  EXTREMITIES: No lower extremity edema, +2 radial pulses b/l  NEURO:  A&Ox3, Cranial nerves II through XII grossly intact, strength grossly slightly diminished but symmetric in all extremities, sensation grossly intact and symmetric in extremities.  No other focal neurologic deficits  Heme/LYMPH: No ecchymosis or bruising  SKIN:  Skin normal color, warm, dry and intact. No evidence of rash.

## 2023-08-28 LAB
ANION GAP SERPL CALC-SCNC: 14 MMOL/L — SIGNIFICANT CHANGE UP (ref 5–17)
BUN SERPL-MCNC: 34 MG/DL — HIGH (ref 7–23)
CALCIUM SERPL-MCNC: 9.6 MG/DL — SIGNIFICANT CHANGE UP (ref 8.4–10.5)
CHLORIDE SERPL-SCNC: 100 MMOL/L — SIGNIFICANT CHANGE UP (ref 96–108)
CO2 SERPL-SCNC: 25 MMOL/L — SIGNIFICANT CHANGE UP (ref 22–31)
CREAT SERPL-MCNC: 1.03 MG/DL — SIGNIFICANT CHANGE UP (ref 0.5–1.3)
EGFR: 59 ML/MIN/1.73M2 — LOW
GLUCOSE BLDC GLUCOMTR-MCNC: 160 MG/DL — HIGH (ref 70–99)
GLUCOSE BLDC GLUCOMTR-MCNC: 175 MG/DL — HIGH (ref 70–99)
GLUCOSE BLDC GLUCOMTR-MCNC: 178 MG/DL — HIGH (ref 70–99)
GLUCOSE BLDC GLUCOMTR-MCNC: 217 MG/DL — HIGH (ref 70–99)
GLUCOSE SERPL-MCNC: 140 MG/DL — HIGH (ref 70–99)
HCT VFR BLD CALC: 30.8 % — LOW (ref 34.5–45)
HCV AB S/CO SERPL IA: 0.09 S/CO — SIGNIFICANT CHANGE UP (ref 0–0.99)
HCV AB SERPL-IMP: SIGNIFICANT CHANGE UP
HGB BLD-MCNC: 9.9 G/DL — LOW (ref 11.5–15.5)
MCHC RBC-ENTMCNC: 22.2 PG — LOW (ref 27–34)
MCHC RBC-ENTMCNC: 32.1 GM/DL — SIGNIFICANT CHANGE UP (ref 32–36)
MCV RBC AUTO: 69.1 FL — LOW (ref 80–100)
NRBC # BLD: 0 /100 WBCS — SIGNIFICANT CHANGE UP (ref 0–0)
PLATELET # BLD AUTO: 294 K/UL — SIGNIFICANT CHANGE UP (ref 150–400)
POTASSIUM SERPL-MCNC: 4.3 MMOL/L — SIGNIFICANT CHANGE UP (ref 3.5–5.3)
POTASSIUM SERPL-SCNC: 4.3 MMOL/L — SIGNIFICANT CHANGE UP (ref 3.5–5.3)
RBC # BLD: 4.46 M/UL — SIGNIFICANT CHANGE UP (ref 3.8–5.2)
RBC # FLD: 16.9 % — HIGH (ref 10.3–14.5)
SODIUM SERPL-SCNC: 139 MMOL/L — SIGNIFICANT CHANGE UP (ref 135–145)
WBC # BLD: 10.85 K/UL — HIGH (ref 3.8–10.5)
WBC # FLD AUTO: 10.85 K/UL — HIGH (ref 3.8–10.5)

## 2023-08-28 RX ORDER — INSULIN LISPRO 100/ML
VIAL (ML) SUBCUTANEOUS
Refills: 0 | Status: DISCONTINUED | OUTPATIENT
Start: 2023-08-28 | End: 2023-08-30

## 2023-08-28 RX ORDER — GLUCAGON INJECTION, SOLUTION 0.5 MG/.1ML
1 INJECTION, SOLUTION SUBCUTANEOUS ONCE
Refills: 0 | Status: DISCONTINUED | OUTPATIENT
Start: 2023-08-28 | End: 2023-08-30

## 2023-08-28 RX ORDER — DEXTROSE 50 % IN WATER 50 %
25 SYRINGE (ML) INTRAVENOUS ONCE
Refills: 0 | Status: DISCONTINUED | OUTPATIENT
Start: 2023-08-28 | End: 2023-08-30

## 2023-08-28 RX ORDER — CEFTRIAXONE 500 MG/1
1000 INJECTION, POWDER, FOR SOLUTION INTRAMUSCULAR; INTRAVENOUS EVERY 24 HOURS
Refills: 0 | Status: DISCONTINUED | OUTPATIENT
Start: 2023-08-28 | End: 2023-08-30

## 2023-08-28 RX ORDER — SODIUM CHLORIDE 9 MG/ML
1000 INJECTION, SOLUTION INTRAVENOUS
Refills: 0 | Status: DISCONTINUED | OUTPATIENT
Start: 2023-08-28 | End: 2023-08-30

## 2023-08-28 RX ORDER — INSULIN LISPRO 100/ML
VIAL (ML) SUBCUTANEOUS AT BEDTIME
Refills: 0 | Status: DISCONTINUED | OUTPATIENT
Start: 2023-08-28 | End: 2023-08-30

## 2023-08-28 RX ORDER — DEXTROSE 50 % IN WATER 50 %
12.5 SYRINGE (ML) INTRAVENOUS ONCE
Refills: 0 | Status: DISCONTINUED | OUTPATIENT
Start: 2023-08-28 | End: 2023-08-30

## 2023-08-28 RX ORDER — DEXTROSE 50 % IN WATER 50 %
15 SYRINGE (ML) INTRAVENOUS ONCE
Refills: 0 | Status: DISCONTINUED | OUTPATIENT
Start: 2023-08-28 | End: 2023-08-30

## 2023-08-28 RX ORDER — INSULIN GLARGINE 100 [IU]/ML
8 INJECTION, SOLUTION SUBCUTANEOUS AT BEDTIME
Refills: 0 | Status: DISCONTINUED | OUTPATIENT
Start: 2023-08-28 | End: 2023-08-30

## 2023-08-28 RX ADMIN — SENNA PLUS 2 TABLET(S): 8.6 TABLET ORAL at 21:41

## 2023-08-28 RX ADMIN — ATORVASTATIN CALCIUM 80 MILLIGRAM(S): 80 TABLET, FILM COATED ORAL at 21:42

## 2023-08-28 RX ADMIN — LEVETIRACETAM 500 MILLIGRAM(S): 250 TABLET, FILM COATED ORAL at 17:10

## 2023-08-28 RX ADMIN — Medication 1: at 17:26

## 2023-08-28 RX ADMIN — CEFTRIAXONE 100 MILLIGRAM(S): 500 INJECTION, POWDER, FOR SOLUTION INTRAMUSCULAR; INTRAVENOUS at 14:29

## 2023-08-28 RX ADMIN — Medication 25 MILLIGRAM(S): at 05:33

## 2023-08-28 RX ADMIN — POLYETHYLENE GLYCOL 3350 17 GRAM(S): 17 POWDER, FOR SOLUTION ORAL at 17:11

## 2023-08-28 RX ADMIN — LEVETIRACETAM 500 MILLIGRAM(S): 250 TABLET, FILM COATED ORAL at 05:34

## 2023-08-28 RX ADMIN — Medication 25 MILLIGRAM(S): at 17:10

## 2023-08-28 RX ADMIN — Medication 81 MILLIGRAM(S): at 11:58

## 2023-08-28 RX ADMIN — Medication 325 MILLIGRAM(S): at 11:59

## 2023-08-28 RX ADMIN — Medication 2: at 12:00

## 2023-08-28 NOTE — PHYSICAL THERAPY INITIAL EVALUATION ADULT - GENERAL OBSERVATIONS, REHAB EVAL
Received pt on stretcher in ED. See flowsheet for orthostatic BP. Daughter at bedside, providing translation

## 2023-08-28 NOTE — PHYSICAL THERAPY INITIAL EVALUATION ADULT - PERTINENT HX OF CURRENT PROBLEM, REHAB EVAL
67 y/oF admitted 7/27 with PMH  significant for CVA (on Plavix), seizure (on Keppra), HLD, HTN, Recent prolonged admission to the NSICU and this facility just discharged 2 weeks ago after being treated for a M2 occlusion and is now status post aspiration thrombectomy and then eventually a angiographic STA to MCA bypass on August 2, Hospital course was complicated by labile pressures in the NSICU and she was ultimately discharged on 8/9 and sent home on oral midodrine. Today she presents brought in by her daughter for 24 hours of persistent intermittent nonbilious nonbloody emesis and nausea as well as altered mental status that the daughter reports has confusion from baseline and generalized weakness. CT head with no acute changes. As per neurosurg consult, also states SBP has been low to 80/90s, is following with her cardiologist for midodrine adjustment. CTH stable from 8/2, no heme, no stroke. CTA w/ patent L STA-MCA bypass. Vomiting/nausea likely not 2/2 neurosurgical cause as per nsg.

## 2023-08-28 NOTE — PHYSICAL THERAPY INITIAL EVALUATION ADULT - ADDITIONAL COMMENTS
Pt lives with daughter in private house. Several stairs to enter. Bedroom on main level, shower on 2nd floor. Pt ambulates with rolling walker and someone standing next to her/assisting at times. Can negotiate stairs with assist. Owns commode (placed in shower), shower seat, rolling walker, wheelchair. Pt does not get up when is left alone for short periods of time. Daughter states has applied for HHA.

## 2023-08-28 NOTE — CONSULT NOTE ADULT - SUBJECTIVE AND OBJECTIVE BOX
[facility], Division of Infectious Diseases  RANDA Avelar S. Shah, Y. Patel, G. [facility]   [phone]  after hours and weekends [phone]    GABY HARDY  67y, Female  [id number]      HPI: history per daughter at bedside pt is Cantonese speaking    67yf h/o CVA (on Plavix), seizure (on Keppra), HLD, HTN, presents wtih vomiting for 2 days  per daugther unable to even keep down water.  Noted she had low blood pressure  no fever, no chills, no gu complaints, no diarrhea, no cough, no sob, no recent antibx   per daughter , pt has been better since the iv fluids she is eating and tolerating food now.  pt states she is having lots of gas and feels bloated     Recent prolonged admission to the NSICU and this facility just discharged 2 weeks ago after being treated for a M2 occlusion and is now status post aspiration thrombectomy and then eventually a angiographic STA to MCA bypass on August 2, Hospital course was complicated by labile pressures in the NSICU and she was ultimately discharged on 8/9 and sent home on oral midodrine.         PMH/PSH--  DM (diabetes mellitus)  HTN (hypertension)        Allergies--  NKDA    Medications--  Antibiotics: cefTRIAXone   IVPB 1000 milliGRAM(s) IV Intermittent every 24 hours    Immunologic:   Other: acetaminophen     Tablet .. PRN  aspirin  chewable  atorvastatin  dextrose 5%.  dextrose 5%.  dextrose 50% Injectable  dextrose 50% Injectable  dextrose 50% Injectable  dextrose Oral Gel PRN  ferrous    sulfate  glucagon  Injectable  insulin lispro (ADMELOG) corrective regimen sliding scale  insulin lispro (ADMELOG) corrective regimen sliding scale  levETIRAcetam  metoprolol tartrate  polyethylene glycol 3350  senna      Social History--  EtOH: denies ***  Tobacco: denies ***  Drug Use: denies ***    Family/Marital History--  nc      Travel/Environmental/Occupational History:       Review of Systems:  REVIEW OF SYSTEMS  as per hpi 	    Physical Exam--  Vital Signs: T(F): 98 (08-28-23 @ 12:55), Max: 98.2 (08-27-23 @ 22:00)  HR: 83 (08-28-23 @ 12:55)  BP: 153/90 (08-28-23 @ 12:55)  RR: 17 (08-28-23 @ 12:55)  SpO2: 100% (08-28-23 @ 12:55)  Wt(kg): --  General: Nontoxic-appearing Female in no acute distress.  HEENT: AT/NC.   Neck: Not rigid. No sense of mass.  Nodes: None palpable.  Lungs: Clear bilaterally without rales, wheezing or rhonchi  Heart: Regular rate and rhythm. No Murmur.   Abdomen: Bowel sounds present and normoactive. Soft. Nondistended. Nontender.  Back: No spinal tenderness. No costovertebral angle tenderness.   Extremities: No cyanosis or clubbing. No edema.   Skin: Warm. Dry. Good turgor. No rash. No vasculitic stigmata.  Psychiatric: Appropriate affect and mood for situation.         Laboratory & Imaging Data--  CBC                        9.9    10.85 )-----------( 294      ( 28 Aug 2023 07:13 )             30.8       Chemistries  08-28    139  |  100  |  34<H>  ----------------------------<  140<H>  4.3   |  25  |  1.03    Ca    9.6      28 Aug 2023 07:12  Phos  4.5     08-27  Mg     2.3     08-27    TPro  8.6<H>  /  Alb  4.6  /  TBili  0.4  /  DBili  x   /  AST  58<H>  /  ALT  35  /  AlkPhos  114  08-27      Culture Data      uUrinalysis + Microscopic Examination (08.27.23 @ 18:05)   pH Urine: 6.0  Urine Appearance: Slightly Turbid  Color: Light Yellow  Specific Gravity: 1.046  Protein, Urine: 30 mg/dL  Glucose Qualitative, Urine: 200 mg/dL  Ketone - Urine: Negative  Blood, Urine: Trace  Bilirubin: Negative  Urobilinogen: Negative  Leukocyte Esterase Concentration: Large  Nitrite: Negative  White Blood Cell - Urine: 196 /HPF  Red Blood Cell - Urine: 2 /hpf  Bacteria: Many  Hyaline Casts: 0 /lpf  Squamous Epithelial Cells: 1 /hpf< from: CT Angio Head w/ IV Cont (08.27.23 @ 12:32) >  An enlarged left superficial temporal artery is identified with a left   STA to MCA bypass identified appears patent.    The carotid and vertebral arteries enhance normally. The distal vertebral   arteries are well identifiedas are the posterior-inferior cerebellar   arteries bilaterally. The region of the vertebral basilar junction is   normal. The basilar artery is normal. The posterior cerebral and superior   cerebellar arteries are normal.    Evaluation of the carotid arteries demonstrate normal appearance to   distal cervical, petrous cavernous and supraclinoid internal carotid   arteries. The anterior cerebral arteries anterior communicating artery   and right middle cerebral arteries are normal. Left superior M2 stenosis   is identified. The middle cerebral artery vessels in the sylvian fissure   appear numerous and similar to the prior CTA of 8/2/2023.      The normal intracranial venous circulation is identified. The right   transverse sinus is dominant.The superior sagittal sinus, internal   cerebral veins, vein of Ricci, straight sinus, transverse sinuses,   sigmoid sinuses and internal jugular veins are normal. Cortical veins are   normal.    IMPRESSION: Left frontal temporal craniotomy with resolved postoperative   air compared with 8/3/2023. Patent left STA to MCA bypass graft.    < end of copied text >          
p (1480)     HPI: 67F s/p L MCA-STA bypass with Dr. Pimentel on 8/2/23 brought in by daughter for nausea/vomiting + decreased PO intake x1d. Also states her SBP has been low to 80/90s, is following with her cardiologist for midodrine adjustment. CTH stable from 8/2, no heme, no stroke. CTA w/ patent L STA-MCA bypass.     Exam: intact, no drift, no facial, FRANCIS 5/5, SILT    --Anticoagulation:    =====================  PAST MEDICAL HISTORY   DM (diabetes mellitus)    HTN (hypertension)      PAST SURGICAL HISTORY     No Known Allergies      MEDICATIONS:  Antibiotics:    Neuro:    Other:      SOCIAL HISTORY:   Occupation:   Marital Status:     FAMILY HISTORY:      ROS: Negative except per HPI    LABS:                          10.9   11.07 )-----------( 365      ( 27 Aug 2023 11:52 )             34.6     08-27    134<L>  |  98  |  33<H>  ----------------------------<  144<H>  4.3   |  22  |  1.04    Ca    9.1      27 Aug 2023 16:21  Phos  4.5     08-27  Mg     2.3     08-27    TPro  8.6<H>  /  Alb  4.6  /  TBili  0.4  /  DBili  x   /  AST  58<H>  /  ALT  35  /  AlkPhos  114  08-27

## 2023-08-28 NOTE — CONSULT NOTE ADULT - ASSESSMENT
67yf h/o CVA (on Plavix), seizure (on Keppra), HLD, HTN, presents with vomiting and low blood pressure for 2 days   cystitis    plan  pt without any gu complaints  and now symptoms resolved rapidly   pt nontoxic  afebrile      UA with pyuria  urine cx pending    d/w daughter low clinical suspicion of active uti  without gu symptoms     will complete ceftriaxone course 3 days for simple cystitis  strict aspiration precautions

## 2023-08-29 ENCOUNTER — TRANSCRIPTION ENCOUNTER (OUTPATIENT)
Age: 68
End: 2023-08-29

## 2023-08-29 ENCOUNTER — APPOINTMENT (OUTPATIENT)
Dept: ENDOCRINOLOGY | Facility: CLINIC | Age: 68
End: 2023-08-29

## 2023-08-29 LAB
GLUCOSE BLDC GLUCOMTR-MCNC: 157 MG/DL — HIGH (ref 70–99)
GLUCOSE BLDC GLUCOMTR-MCNC: 181 MG/DL — HIGH (ref 70–99)
GLUCOSE BLDC GLUCOMTR-MCNC: 181 MG/DL — HIGH (ref 70–99)
GLUCOSE BLDC GLUCOMTR-MCNC: 184 MG/DL — HIGH (ref 70–99)
GLUCOSE BLDC GLUCOMTR-MCNC: 234 MG/DL — HIGH (ref 70–99)

## 2023-08-29 RX ADMIN — SENNA PLUS 2 TABLET(S): 8.6 TABLET ORAL at 22:52

## 2023-08-29 RX ADMIN — POLYETHYLENE GLYCOL 3350 17 GRAM(S): 17 POWDER, FOR SOLUTION ORAL at 05:56

## 2023-08-29 RX ADMIN — Medication 325 MILLIGRAM(S): at 12:29

## 2023-08-29 RX ADMIN — Medication 1: at 12:30

## 2023-08-29 RX ADMIN — Medication 1: at 08:42

## 2023-08-29 RX ADMIN — Medication 2: at 17:47

## 2023-08-29 RX ADMIN — Medication 25 MILLIGRAM(S): at 05:55

## 2023-08-29 RX ADMIN — POLYETHYLENE GLYCOL 3350 17 GRAM(S): 17 POWDER, FOR SOLUTION ORAL at 17:43

## 2023-08-29 RX ADMIN — Medication 25 MILLIGRAM(S): at 17:43

## 2023-08-29 RX ADMIN — LEVETIRACETAM 500 MILLIGRAM(S): 250 TABLET, FILM COATED ORAL at 17:43

## 2023-08-29 RX ADMIN — LEVETIRACETAM 500 MILLIGRAM(S): 250 TABLET, FILM COATED ORAL at 05:55

## 2023-08-29 RX ADMIN — CEFTRIAXONE 100 MILLIGRAM(S): 500 INJECTION, POWDER, FOR SOLUTION INTRAMUSCULAR; INTRAVENOUS at 14:32

## 2023-08-29 RX ADMIN — Medication 81 MILLIGRAM(S): at 12:29

## 2023-08-29 RX ADMIN — INSULIN GLARGINE 8 UNIT(S): 100 INJECTION, SOLUTION SUBCUTANEOUS at 22:51

## 2023-08-29 RX ADMIN — ATORVASTATIN CALCIUM 80 MILLIGRAM(S): 80 TABLET, FILM COATED ORAL at 22:52

## 2023-08-29 NOTE — DISCHARGE NOTE PROVIDER - NSDCFUSCHEDAPPT_GEN_ALL_CORE_FT
Nay Morris  [facility]  NEUROSURG [address] [facility]  Scheduled Appointment: 08/31/2023    Elin Monique  [facility]  NEUROLOGY [address]  Scheduled Appointment: 09/27/2023     Nay Morris  [facility]  NEUROSURG [address] [facility]  Scheduled Appointment: 09/06/2023    Elni Monique  [facility]  NEUROLOGY [address]  Scheduled Appointment: 09/27/2023

## 2023-08-29 NOTE — DISCHARGE NOTE PROVIDER - NSDCCPCAREPLAN_GEN_ALL_CORE_FT
PRINCIPAL DISCHARGE DIAGNOSIS  Diagnosis: Nausea and vomiting  Assessment and Plan of Treatment: Resolved  Follow up with your primary medical doctor within 3-4 days of discharge.      SECONDARY DISCHARGE DIAGNOSES  Diagnosis: Complicated UTI (urinary tract infection)  Assessment and Plan of Treatment: HOME CARE INSTRUCTIONS  You completed your course of antibiotics  Drink enough water and fluids to keep your urine clear or pale yellow.  Avoid caffeine, tea, and carbonated beverages. They tend to irritate your bladder.  Empty your bladder often. Avoid holding urine for long periods of time.  Empty your bladder before and after sexual intercourse.  After a bowel movement, women should cleanse from front to back. Use each tissue only once.  SEEK MEDICAL CARE IF:  You have back pain.  You develop a fever.  Your symptoms do not begin to resolve within 3 days.  SEEK IMMEDIATE MEDICAL CARE IF:  You have severe back pain or lower abdominal pain.  You develop chills.  You have nausea or vomiting.  You have continued burning or discomfort with urination.      Diagnosis: Moderate dehydration  Assessment and Plan of Treatment: Improved     PRINCIPAL DISCHARGE DIAGNOSIS  Diagnosis: Nausea and vomiting  Assessment and Plan of Treatment: Resolved  Follow up with your primary medical doctor within 3-4 days of discharge.      SECONDARY DISCHARGE DIAGNOSES  Diagnosis: Complicated UTI (urinary tract infection)  Assessment and Plan of Treatment: HOME CARE INSTRUCTIONS  You completed your course of antibiotics  Drink enough water and fluids to keep your urine clear or pale yellow.  Avoid caffeine, tea, and carbonated beverages. They tend to irritate your bladder.  Empty your bladder often. Avoid holding urine for long periods of time.  Empty your bladder before and after sexual intercourse.  After a bowel movement, women should cleanse from front to back. Use each tissue only once.  SEEK MEDICAL CARE IF:  You have back pain.  You develop a fever.  Your symptoms do not begin to resolve within 3 days.  SEEK IMMEDIATE MEDICAL CARE IF:  You have severe back pain or lower abdominal pain.  You develop chills.  You have nausea or vomiting.  You have continued burning or discomfort with urination.      Diagnosis: Moderate dehydration  Assessment and Plan of Treatment: Improved    Diagnosis: Diabetes  Assessment and Plan of Treatment: HgA1C in July was 8.5.  You must follow up with your primary medical doctor within 3-4 days of discharge - at this appointment, you must discuss if any changes should be made to your medication regimen.  Check your blood glucose before meals and at bedtime.  It's important not to skip any meals.  Keep a log of your blood glucose results and always take it with you to your doctor appointments.  Keep a list of your current medications including injectables and over the counter medications and bring this medication list with you to all your doctor appointments.  If you have not seen your ophthalmologist this year call for appointment.  Check your feet daily for redness, sores, or openings. Do not self treat. If no improvement in two days call your primary care physician for an appointment.  Low blood sugar (hypoglycemia) is a blood sugar below 70mg/dl. Check your blood sugar if you feel signs/symptoms of hypoglycemia. If your blood sugar is below 70 take 15 grams of carbohydrates (ex 4 oz of apple juice, 3-4 glucose tablets, or 4-6 oz of regular soda) wait 15 minutes and repeat blood sugar to make sure it comes up above 70.  If your blood sugar is above 70 and you are due for a meal, have a meal.  If you are not due for a meal have a snack.  This snack helps keeps your blood sugar at a safe range.      Diagnosis: H/O ischemic left MCA stroke  Assessment and Plan of Treatment: You had an M2 occlusion and are s/p a left STA-MCA bypass.  You must follow up with your Neurosurgeon, Dr. Pimentel, within one week of discharge.

## 2023-08-29 NOTE — DISCHARGE NOTE PROVIDER - CARE PROVIDER_API CALL
Jones Radford  Internal Medicine  [address], [address]st [place] [address]  Phone: [phone]  Fax: [phone]  Follow Up Time:     Jennifer Pimentel  Neurosurgery  [address]  [place] [address]  Phone: [phone]  Fax: [phone]  Follow Up Time:     [facility],   Phone: [phone]  Fax: (   )    -  Follow Up Time:

## 2023-08-29 NOTE — DISCHARGE NOTE PROVIDER - HOSPITAL COURSE
The patient is a 67y Female complaining of vomiting.    Nausea/Vomiting:  Improving  IV Zofran PRN    S/p L MCA-STA bypass:    Neuro Sx eval appreciated  CT head : no acute changes  N/V not likely from recent neurosx     UTI:    Completed Ceftriaxone   ID f/up appreciated   The patient is a 67y Female, with PMHx of CVA, seizure, HTN, and HLD, coming to the ED with complaints of vomiting.    Nausea/Vomiting:  Improving  IV Zofran PRN    S/p L MCA-STA bypass  Neuro Sx eval appreciated  CT head : no acute changes  N/V not likely from recent neurosx     UTI:  ID consulted  Completed Ceftriaxone     Patient medically cleared for discharge, by Dr. Radford, with PCP and Neurosurgery follow up on discharge.

## 2023-08-29 NOTE — DISCHARGE NOTE PROVIDER - PROVIDER TOKENS
PROVIDER:[TOKEN:[[id number]]],PROVIDER:[TOKEN:[[id number]]],FREE:[LAST:[[facility]],PHONE:[phone]],FAX:[(   )    -]]

## 2023-08-29 NOTE — DISCHARGE NOTE PROVIDER - NSDCMRMEDTOKEN_GEN_ALL_CORE_FT
3:1 Commode: Please use as directed.  acetaminophen 325 mg oral tablet: 2 tab(s) orally every 6 hours As needed Mild Pain (1 - 3)  aspirin 81 mg oral tablet, chewable: 1 tab(s) orally once a day  atorvastatin 80 mg oral tablet: 1 tab(s) orally once a day (at bedtime)  ferrous sulfate 325 mg (65 mg elemental iron) oral tablet: 1 tab(s) orally Monday, Wednesday, and Friday  home PT: as directed  levETIRAcetam 500 mg oral tablet: 1 tab(s) orally 2 times a day  metFORMIN 500 mg oral tablet: 1 tab(s) orally once a day  metoprolol tartrate 25 mg oral tablet: 1 tab(s) orally every 12 hours  polyethylene glycol 3350 oral powder for reconstitution: 17 gram(s) orally 2 times a day  Rolling Walker: Please use as prescribed.  senna leaf extract oral tablet: 2 tab(s) orally once a day (at bedtime)  Wheelchair: Please use as prescribed.   acetaminophen 325 mg oral tablet: 2 tab(s) orally every 6 hours As needed Mild Pain (1 - 3)  aspirin 81 mg oral tablet, chewable: 1 tab(s) orally once a day  atorvastatin 80 mg oral tablet: 1 tab(s) orally once a day (at bedtime)  ferrous sulfate 325 mg (65 mg elemental iron) oral tablet: 1 tab(s) orally Monday, Wednesday, and Friday  levETIRAcetam 500 mg oral tablet: 1 tab(s) orally 2 times a day  metFORMIN 500 mg oral tablet: 1 tab(s) orally once a day  metoprolol tartrate 25 mg oral tablet: 1 tab(s) orally every 12 hours  polyethylene glycol 3350 oral powder for reconstitution: 17 gram(s) orally 2 times a day  senna leaf extract oral tablet: 2 tab(s) orally once a day (at bedtime)

## 2023-08-29 NOTE — DISCHARGE NOTE PROVIDER - CARE PROVIDERS DIRECT ADDRESSES
[email or web address]

## 2023-08-29 NOTE — DISCHARGE NOTE PROVIDER - NSDCFUADDAPPT_GEN_ALL_CORE_FT
You must follow up with your primary medical doctor within 3-4 days of discharge - please call to make an appointment.  At this appointment, you must discuss your HgbA1c of 8.5 (in July 2023) and see if any changes need to be made.    You must follow up with your Neurosurgeon, Dr. Pimentel, within one week of discharge - please call to make an appointment.                  APPTS ARE READY TO BE MADE: [X ] YES    Best Family or Patient Contact (if needed):    Additional Information about above appointments (if needed):    1: Primary medical doctor  2: Neurosurgeon  3:     Other comments or requests:    You must follow up with your primary medical doctor within 3-4 days of discharge - please call to make an appointment.  At this appointment, you must discuss your HgbA1c of 8.5 (in July 2023) and see if any changes need to be made.    You must follow up with your Neurosurgeon, Dr. Pimentel, within one week of discharge - please call to make an appointment.                  APPTS ARE READY TO BE MADE: [X ] YES    Best Family or Patient Contact (if needed):    Additional Information about above appointments (if needed):    1: Primary medical doctor  2: Neurosurgeon  3:     Other comments or requests:   Patient was previously scheduled on 9/6/23 at 10:30am at [address] [place] [address] with NP Nay Peralta  You must follow up with your primary medical doctor within 3-4 days of discharge - please call to make an appointment.  At this appointment, you must discuss your HgbA1c of 8.5 (in July 2023) and see if any changes need to be made.    You must follow up with your Neurosurgeon, Dr. Pimentel, within one week of discharge - please call to make an appointment.    Pt is brian with Dr. Morris on 9/06, [address] [place] [address] location.              APPTS ARE READY TO BE MADE: [X ] YES    Best Family or Patient Contact (if needed):    Additional Information about above appointments (if needed):    1: Primary medical doctor  2: Neurosurgeon  3:     Other comments or requests:   Patient was previously scheduled on 9/6/23 at 10:30am at [address] [place] [address] with RIA Peralta

## 2023-08-30 ENCOUNTER — TRANSCRIPTION ENCOUNTER (OUTPATIENT)
Age: 68
End: 2023-08-30

## 2023-08-30 VITALS
RESPIRATION RATE: 18 BRPM | HEART RATE: 80 BPM | SYSTOLIC BLOOD PRESSURE: 148 MMHG | OXYGEN SATURATION: 96 % | TEMPERATURE: 98 F | DIASTOLIC BLOOD PRESSURE: 80 MMHG

## 2023-08-30 LAB
GLUCOSE BLDC GLUCOMTR-MCNC: 151 MG/DL — HIGH (ref 70–99)
GLUCOSE BLDC GLUCOMTR-MCNC: 195 MG/DL — HIGH (ref 70–99)

## 2023-08-30 PROCEDURE — 84145 PROCALCITONIN (PCT): CPT

## 2023-08-30 PROCEDURE — 87637 SARSCOV2&INF A&B&RSV AMP PRB: CPT

## 2023-08-30 PROCEDURE — 85025 COMPLETE CBC W/AUTO DIFF WBC: CPT

## 2023-08-30 PROCEDURE — 82435 ASSAY OF BLOOD CHLORIDE: CPT

## 2023-08-30 PROCEDURE — 83690 ASSAY OF LIPASE: CPT

## 2023-08-30 PROCEDURE — 80048 BASIC METABOLIC PNL TOTAL CA: CPT

## 2023-08-30 PROCEDURE — 84132 ASSAY OF SERUM POTASSIUM: CPT

## 2023-08-30 PROCEDURE — 80053 COMPREHEN METABOLIC PANEL: CPT

## 2023-08-30 PROCEDURE — 82330 ASSAY OF CALCIUM: CPT

## 2023-08-30 PROCEDURE — 70496 CT ANGIOGRAPHY HEAD: CPT | Mod: MA

## 2023-08-30 PROCEDURE — 87086 URINE CULTURE/COLONY COUNT: CPT

## 2023-08-30 PROCEDURE — 96375 TX/PRO/DX INJ NEW DRUG ADDON: CPT

## 2023-08-30 PROCEDURE — 97161 PT EVAL LOW COMPLEX 20 MIN: CPT

## 2023-08-30 PROCEDURE — 99285 EMERGENCY DEPT VISIT HI MDM: CPT | Mod: 25

## 2023-08-30 PROCEDURE — 82010 KETONE BODYS QUAN: CPT

## 2023-08-30 PROCEDURE — 84295 ASSAY OF SERUM SODIUM: CPT

## 2023-08-30 PROCEDURE — 36415 COLL VENOUS BLD VENIPUNCTURE: CPT

## 2023-08-30 PROCEDURE — 81001 URINALYSIS AUTO W/SCOPE: CPT

## 2023-08-30 PROCEDURE — 86803 HEPATITIS C AB TEST: CPT

## 2023-08-30 PROCEDURE — 96365 THER/PROPH/DIAG IV INF INIT: CPT

## 2023-08-30 PROCEDURE — 85027 COMPLETE CBC AUTOMATED: CPT

## 2023-08-30 PROCEDURE — 82962 GLUCOSE BLOOD TEST: CPT

## 2023-08-30 PROCEDURE — 83735 ASSAY OF MAGNESIUM: CPT

## 2023-08-30 PROCEDURE — 82947 ASSAY GLUCOSE BLOOD QUANT: CPT

## 2023-08-30 PROCEDURE — 84100 ASSAY OF PHOSPHORUS: CPT

## 2023-08-30 PROCEDURE — 93005 ELECTROCARDIOGRAM TRACING: CPT

## 2023-08-30 PROCEDURE — 87077 CULTURE AEROBIC IDENTIFY: CPT

## 2023-08-30 PROCEDURE — 82803 BLOOD GASES ANY COMBINATION: CPT

## 2023-08-30 PROCEDURE — 85018 HEMOGLOBIN: CPT

## 2023-08-30 PROCEDURE — 83605 ASSAY OF LACTIC ACID: CPT

## 2023-08-30 PROCEDURE — 87186 SC STD MICRODIL/AGAR DIL: CPT

## 2023-08-30 PROCEDURE — 85014 HEMATOCRIT: CPT

## 2023-08-30 PROCEDURE — 70450 CT HEAD/BRAIN W/O DYE: CPT | Mod: MA

## 2023-08-30 RX ADMIN — Medication 325 MILLIGRAM(S): at 08:54

## 2023-08-30 RX ADMIN — Medication 1: at 08:53

## 2023-08-30 RX ADMIN — Medication 81 MILLIGRAM(S): at 08:55

## 2023-08-30 RX ADMIN — Medication 1: at 12:46

## 2023-08-30 RX ADMIN — LEVETIRACETAM 500 MILLIGRAM(S): 250 TABLET, FILM COATED ORAL at 06:58

## 2023-08-30 RX ADMIN — Medication 25 MILLIGRAM(S): at 06:58

## 2023-08-30 RX ADMIN — POLYETHYLENE GLYCOL 3350 17 GRAM(S): 17 POWDER, FOR SOLUTION ORAL at 05:37

## 2023-08-30 NOTE — DISCHARGE NOTE NURSING/CASE MANAGEMENT/SOCIAL WORK - PATIENT PORTAL LINK FT
You can access the FollowMyHealth Patient Portal offered by [facility] by registering at the following website: [email or web address]. By joining [employer]’s FollowMyHealth portal, you will also be able to view your health information using other applications (apps) compatible with our system.

## 2023-08-30 NOTE — PROGRESS NOTE ADULT - REASON FOR ADMISSION
The patient is a 67y Female complaining of vomiting.

## 2023-08-30 NOTE — PROGRESS NOTE ADULT - SUBJECTIVE AND OBJECTIVE BOX
Patient is a 67y old  Female who presents with a chief complaint of The patient is a 67y Female complaining of vomiting. (28 Aug 2023 13:18)      SUBJECTIVE / OVERNIGHT EVENTS:    Events noted.  CONSTITUTIONAL: No fever,  or fatigue  RESPIRATORY: No cough, wheezing,  No shortness of breath  CARDIOVASCULAR: No chest pain, palpitations, dizziness, or leg swelling  GASTROINTESTINAL: No abdominal or epigastric pain. No nausea, vomiting.  NEUROLOGICAL: No headache    MEDICATIONS  (STANDING):  aspirin  chewable 81 milliGRAM(s) Oral daily  atorvastatin 80 milliGRAM(s) Oral at bedtime  cefTRIAXone   IVPB 1000 milliGRAM(s) IV Intermittent every 24 hours  dextrose 5%. 1000 milliLiter(s) (100 mL/Hr) IV Continuous <Continuous>  dextrose 5%. 1000 milliLiter(s) (50 mL/Hr) IV Continuous <Continuous>  dextrose 50% Injectable 25 Gram(s) IV Push once  dextrose 50% Injectable 12.5 Gram(s) IV Push once  dextrose 50% Injectable 25 Gram(s) IV Push once  ferrous    sulfate 325 milliGRAM(s) Oral daily  glucagon  Injectable 1 milliGRAM(s) IntraMuscular once  insulin glargine Injectable (LANTUS) 8 Unit(s) SubCutaneous at bedtime  insulin lispro (ADMELOG) corrective regimen sliding scale   SubCutaneous three times a day before meals  insulin lispro (ADMELOG) corrective regimen sliding scale   SubCutaneous at bedtime  levETIRAcetam 500 milliGRAM(s) Oral two times a day  metoprolol tartrate 25 milliGRAM(s) Oral every 12 hours  polyethylene glycol 3350 17 Gram(s) Oral two times a day  senna 2 Tablet(s) Oral at bedtime    MEDICATIONS  (PRN):  acetaminophen     Tablet .. 650 milliGRAM(s) Oral every 6 hours PRN Temp greater or equal to 38C (100.4F), Mild Pain (1 - 3)  dextrose Oral Gel 15 Gram(s) Oral once PRN Blood Glucose LESS THAN 70 milliGRAM(s)/deciliter        CAPILLARY BLOOD GLUCOSE      POCT Blood Glucose.: 178 mg/dL (28 Aug 2023 17:18)  POCT Blood Glucose.: 217 mg/dL (28 Aug 2023 11:34)  POCT Blood Glucose.: 160 mg/dL (28 Aug 2023 07:44)    I&O's Summary      T(C): 36.7 (08-28-23 @ 17:43), Max: 36.8 (08-27-23 @ 22:00)  HR: 84 (08-28-23 @ 17:43) (83 - 90)  BP: 180/84 (08-28-23 @ 17:43) (135/77 - 180/84)  RR: 18 (08-28-23 @ 17:43) (15 - 18)  SpO2: 98% (08-28-23 @ 17:43) (97% - 100%)    PHYSICAL EXAM:  GENERAL: NAD  NECK: Supple, No JVD  CHEST/LUNG: Clear to auscultation bilaterally; No wheezing.  HEART: Regular rate and rhythm; No murmurs, rubs, or gallops  ABDOMEN: Soft, Nontender, Nondistended; Bowel sounds present  EXTREMITIES:   No edema  NEUROLOGY: AAO       LABS:                        9.9    10.85 )-----------( 294      ( 28 Aug 2023 07:13 )             30.8     08-28    139  |  100  |  34<H>  ----------------------------<  140<H>  4.3   |  25  |  1.03    Ca    9.6      28 Aug 2023 07:12  Phos  4.5     08-27  Mg     2.3     08-27    TPro  8.6<H>  /  Alb  4.6  /  TBili  0.4  /  DBili  x   /  AST  58<H>  /  ALT  35  /  AlkPhos  114  08-27          Urinalysis Basic - ( 28 Aug 2023 07:12 )    Color: x / Appearance: x / SG: x / pH: x  Gluc: 140 mg/dL / Ketone: x  / Bili: x / Urobili: x   Blood: x / Protein: x / Nitrite: x   Leuk Esterase: x / RBC: x / WBC x   Sq Epi: x / Non Sq Epi: x / Bacteria: x      CAPILLARY BLOOD GLUCOSE      POCT Blood Glucose.: 178 mg/dL (28 Aug 2023 17:18)  POCT Blood Glucose.: 217 mg/dL (28 Aug 2023 11:34)  POCT Blood Glucose.: 160 mg/dL (28 Aug 2023 07:44)        RADIOLOGY & ADDITIONAL TESTS:    Imaging Personally Reviewed:    Consultant(s) Notes Reviewed:      Care Discussed with Consultants/Other Providers:    Jones Radford MD, CMD, FACP    [address] [place] [address]  Office Tel: [phone]  Cell: [phone]  
Patient is a 67y old  Female who presents with a chief complaint of The patient is a 67y Female complaining of vomiting. (28 Aug 2023 13:18)      SUBJECTIVE / OVERNIGHT EVENTS:    Events noted.  CONSTITUTIONAL: No fever,  or fatigue  RESPIRATORY: No cough, wheezing,  No shortness of breath  CARDIOVASCULAR: No chest pain, palpitations, dizziness, or leg swelling  GASTROINTESTINAL: No abdominal or epigastric pain. No nausea, vomiting.  NEUROLOGICAL: No headache    MEDICATIONS  (STANDING):  aspirin  chewable 81 milliGRAM(s) Oral daily  atorvastatin 80 milliGRAM(s) Oral at bedtime  cefTRIAXone   IVPB 1000 milliGRAM(s) IV Intermittent every 24 hours  dextrose 5%. 1000 milliLiter(s) (100 mL/Hr) IV Continuous <Continuous>  dextrose 5%. 1000 milliLiter(s) (50 mL/Hr) IV Continuous <Continuous>  dextrose 50% Injectable 25 Gram(s) IV Push once  dextrose 50% Injectable 12.5 Gram(s) IV Push once  dextrose 50% Injectable 25 Gram(s) IV Push once  ferrous    sulfate 325 milliGRAM(s) Oral daily  glucagon  Injectable 1 milliGRAM(s) IntraMuscular once  insulin glargine Injectable (LANTUS) 8 Unit(s) SubCutaneous at bedtime  insulin lispro (ADMELOG) corrective regimen sliding scale   SubCutaneous three times a day before meals  insulin lispro (ADMELOG) corrective regimen sliding scale   SubCutaneous at bedtime  levETIRAcetam 500 milliGRAM(s) Oral two times a day  metoprolol tartrate 25 milliGRAM(s) Oral every 12 hours  polyethylene glycol 3350 17 Gram(s) Oral two times a day  senna 2 Tablet(s) Oral at bedtime    MEDICATIONS  (PRN):  acetaminophen     Tablet .. 650 milliGRAM(s) Oral every 6 hours PRN Temp greater or equal to 38C (100.4F), Mild Pain (1 - 3)  dextrose Oral Gel 15 Gram(s) Oral once PRN Blood Glucose LESS THAN 70 milliGRAM(s)/deciliter        CAPILLARY BLOOD GLUCOSE      POCT Blood Glucose.: 178 mg/dL (28 Aug 2023 17:18)  POCT Blood Glucose.: 217 mg/dL (28 Aug 2023 11:34)  POCT Blood Glucose.: 160 mg/dL (28 Aug 2023 07:44)    I&O's Summary      T(C): 36.7 (08-28-23 @ 17:43), Max: 36.8 (08-27-23 @ 22:00)  HR: 84 (08-28-23 @ 17:43) (83 - 90)  BP: 180/84 (08-28-23 @ 17:43) (135/77 - 180/84)  RR: 18 (08-28-23 @ 17:43) (15 - 18)  SpO2: 98% (08-28-23 @ 17:43) (97% - 100%)    PHYSICAL EXAM:  GENERAL: NAD  NECK: Supple, No JVD  CHEST/LUNG: Clear to auscultation bilaterally; No wheezing.  HEART: Regular rate and rhythm; No murmurs, rubs, or gallops  ABDOMEN: Soft, Nontender, Nondistended; Bowel sounds present  EXTREMITIES:   No edema  NEUROLOGY: AAO       LABS:                        9.9    10.85 )-----------( 294      ( 28 Aug 2023 07:13 )             30.8     08-28    139  |  100  |  34<H>  ----------------------------<  140<H>  4.3   |  25  |  1.03    Ca    9.6      28 Aug 2023 07:12  Phos  4.5     08-27  Mg     2.3     08-27    TPro  8.6<H>  /  Alb  4.6  /  TBili  0.4  /  DBili  x   /  AST  58<H>  /  ALT  35  /  AlkPhos  114  08-27          Urinalysis Basic - ( 28 Aug 2023 07:12 )    Color: x / Appearance: x / SG: x / pH: x  Gluc: 140 mg/dL / Ketone: x  / Bili: x / Urobili: x   Blood: x / Protein: x / Nitrite: x   Leuk Esterase: x / RBC: x / WBC x   Sq Epi: x / Non Sq Epi: x / Bacteria: x      CAPILLARY BLOOD GLUCOSE      POCT Blood Glucose.: 178 mg/dL (28 Aug 2023 17:18)  POCT Blood Glucose.: 217 mg/dL (28 Aug 2023 11:34)  POCT Blood Glucose.: 160 mg/dL (28 Aug 2023 07:44)        RADIOLOGY & ADDITIONAL TESTS:    Imaging Personally Reviewed:    Consultant(s) Notes Reviewed:      Care Discussed with Consultants/Other Providers:    Jones Radford MD, CMD, FACP    [address] [place] [address]  Office Tel: [phone]  Cell: [phone]  
[facility], Division of Infectious   Dr White, Dr Mcgowan, Dr Garrison, JABARI Weaver Maurice  [phone]  after hours and weekends [phone]    Name: GABY HARDY  Age: 67y  Gender: Female  MRN: [id number]    Interval History--  Notes reviewed  no vomiting  ate a lot         Allergies    No Known Allergies    Intolerances        Medications--  Antibiotics:  cefTRIAXone   IVPB 1000 milliGRAM(s) IV Intermittent every 24 hours    Immunologic:    Other:  acetaminophen     Tablet .. PRN  aspirin  chewable  atorvastatin  dextrose 5%.  dextrose 5%.  dextrose 50% Injectable  dextrose 50% Injectable  dextrose 50% Injectable  dextrose Oral Gel PRN  ferrous    sulfate  glucagon  Injectable  insulin glargine Injectable (LANTUS)  insulin lispro (ADMELOG) corrective regimen sliding scale  insulin lispro (ADMELOG) corrective regimen sliding scale  levETIRAcetam  metoprolol tartrate  polyethylene glycol 3350  senna      Review of Systems--  A 10-point review of systems was obtained.     Pertinent positives and negatives--  Constitutional: No fevers. No Chills. No Rigors.   Cardiovascular: No chest pain. No palpitations.  Respiratory: No shortness of breath. No cough.  Gastrointestinal: No nausea or vomiting. No diarrhea or constipation.   Psychiatric: Pleasant. Appropriate affect.    Review of systems otherwise negative except as previously noted.    Physical Examination--  Vital Signs: T(F): 98.3 (08-29-23 @ 11:47), Max: 98.3 (08-29-23 @ 11:47)  HR: 77 (08-29-23 @ 11:47)  BP: 170/95 (08-29-23 @ 11:47)  RR: 18 (08-29-23 @ 11:47)  SpO2: 96% (08-29-23 @ 11:47)  Wt(kg): --  General: Nontoxic-appearing Female in no acute distress.  HEENT: AT/NC.   Neck: Not rigid. No sense of mass.  Nodes: None palpable.  Lungs: Clear bilaterally without rales, wheezing or rhonchi  Heart: Regular rate and rhythm.   Abdomen: Bowel sounds present and normoactive. Soft. Nondistended. Nontender  Back: No spinal tenderness. No costovertebral angle tenderness.   Extremities: No cyanosis or clubbing. No edema.   Skin: Warm. Dry. Good turgor. No rash. No vasculitic stigmata.  Psychiatric: Appropriate affect and mood for situation.         Laboratory Studies--  CBC                        9.9    10.85 )-----------( 294      ( 28 Aug 2023 07:13 )             30.8       Chemistries  08-28    139  |  100  |  34<H>  ----------------------------<  140<H>  4.3   |  25  |  1.03    Ca    9.6      28 Aug 2023 07:12      Culture - Urine (08.27.23 @ 18:05)   Specimen Source: Clean Catch Clean Catch (Midstream)  Culture Results:   >100,000 CFU/ml Klebsiella pneumoniae            
Patient is a 67y old  Female who presents with a chief complaint of The patient is a 67y Female complaining of vomiting. (28 Aug 2023 13:18)      SUBJECTIVE / OVERNIGHT EVENTS:    Events noted.  CONSTITUTIONAL: No fever,  or fatigue  RESPIRATORY: No cough, wheezing,  No shortness of breath  CARDIOVASCULAR: No chest pain, palpitations, dizziness, or leg swelling  GASTROINTESTINAL: No abdominal or epigastric pain. No nausea, vomiting.  NEUROLOGICAL: No headache    MEDICATIONS  (STANDING):  aspirin  chewable 81 milliGRAM(s) Oral daily  atorvastatin 80 milliGRAM(s) Oral at bedtime  cefTRIAXone   IVPB 1000 milliGRAM(s) IV Intermittent every 24 hours  dextrose 5%. 1000 milliLiter(s) (100 mL/Hr) IV Continuous <Continuous>  dextrose 5%. 1000 milliLiter(s) (50 mL/Hr) IV Continuous <Continuous>  dextrose 50% Injectable 25 Gram(s) IV Push once  dextrose 50% Injectable 12.5 Gram(s) IV Push once  dextrose 50% Injectable 25 Gram(s) IV Push once  ferrous    sulfate 325 milliGRAM(s) Oral daily  glucagon  Injectable 1 milliGRAM(s) IntraMuscular once  insulin glargine Injectable (LANTUS) 8 Unit(s) SubCutaneous at bedtime  insulin lispro (ADMELOG) corrective regimen sliding scale   SubCutaneous three times a day before meals  insulin lispro (ADMELOG) corrective regimen sliding scale   SubCutaneous at bedtime  levETIRAcetam 500 milliGRAM(s) Oral two times a day  metoprolol tartrate 25 milliGRAM(s) Oral every 12 hours  polyethylene glycol 3350 17 Gram(s) Oral two times a day  senna 2 Tablet(s) Oral at bedtime    MEDICATIONS  (PRN):  acetaminophen     Tablet .. 650 milliGRAM(s) Oral every 6 hours PRN Temp greater or equal to 38C (100.4F), Mild Pain (1 - 3)  dextrose Oral Gel 15 Gram(s) Oral once PRN Blood Glucose LESS THAN 70 milliGRAM(s)/deciliter        CAPILLARY BLOOD GLUCOSE      POCT Blood Glucose.: 178 mg/dL (28 Aug 2023 17:18)  POCT Blood Glucose.: 217 mg/dL (28 Aug 2023 11:34)  POCT Blood Glucose.: 160 mg/dL (28 Aug 2023 07:44)    I&O's Summary      T(C): 36.7 (08-28-23 @ 17:43), Max: 36.8 (08-27-23 @ 22:00)  HR: 84 (08-28-23 @ 17:43) (83 - 90)  BP: 180/84 (08-28-23 @ 17:43) (135/77 - 180/84)  RR: 18 (08-28-23 @ 17:43) (15 - 18)  SpO2: 98% (08-28-23 @ 17:43) (97% - 100%)    PHYSICAL EXAM:  GENERAL: NAD  NECK: Supple, No JVD  CHEST/LUNG: Clear to auscultation bilaterally; No wheezing.  HEART: Regular rate and rhythm; No murmurs, rubs, or gallops  ABDOMEN: Soft, Nontender, Nondistended; Bowel sounds present  EXTREMITIES:   No edema  NEUROLOGY: AAO       LABS:                        9.9    10.85 )-----------( 294      ( 28 Aug 2023 07:13 )             30.8     08-28    139  |  100  |  34<H>  ----------------------------<  140<H>  4.3   |  25  |  1.03    Ca    9.6      28 Aug 2023 07:12  Phos  4.5     08-27  Mg     2.3     08-27    TPro  8.6<H>  /  Alb  4.6  /  TBili  0.4  /  DBili  x   /  AST  58<H>  /  ALT  35  /  AlkPhos  114  08-27          Urinalysis Basic - ( 28 Aug 2023 07:12 )    Color: x / Appearance: x / SG: x / pH: x  Gluc: 140 mg/dL / Ketone: x  / Bili: x / Urobili: x   Blood: x / Protein: x / Nitrite: x   Leuk Esterase: x / RBC: x / WBC x   Sq Epi: x / Non Sq Epi: x / Bacteria: x      CAPILLARY BLOOD GLUCOSE      POCT Blood Glucose.: 178 mg/dL (28 Aug 2023 17:18)  POCT Blood Glucose.: 217 mg/dL (28 Aug 2023 11:34)  POCT Blood Glucose.: 160 mg/dL (28 Aug 2023 07:44)        RADIOLOGY & ADDITIONAL TESTS:    Imaging Personally Reviewed:    Consultant(s) Notes Reviewed:      Care Discussed with Consultants/Other Providers:    Jones Radford MD, CMD, FACP    [address] [place] [address]  Office Tel: [phone]  Cell: [phone]

## 2023-08-30 NOTE — CHART NOTE - NSCHARTNOTEFT_GEN_A_CORE
Request from Dr. Radford to facilitate patient discharge.  Medication reconciliation reviewed, revised, and resolved with Dr. Radford, who has medically cleared patient for discharge with follow up as advised.  Please refer to discharge note for detailed hospital course.

## 2023-08-30 NOTE — DISCHARGE NOTE NURSING/CASE MANAGEMENT/SOCIAL WORK - NSDCFUADDAPPT_GEN_ALL_CORE_FT
You must follow up with your primary medical doctor within 3-4 days of discharge - please call to make an appointment.  At this appointment, you must discuss your HgbA1c of 8.5 (in July 2023) and see if any changes need to be made.    You must follow up with your Neurosurgeon, Dr. Pimentel, within one week of discharge - please call to make an appointment.                  APPTS ARE READY TO BE MADE: [X ] YES    Best Family or Patient Contact (if needed):    Additional Information about above appointments (if needed):    1: Primary medical doctor  2: Neurosurgeon  3:     Other comments or requests:

## 2023-08-30 NOTE — PROGRESS NOTE ADULT - ASSESSMENT
67yf h/o CVA (on Plavix), seizure (on Keppra), HLD, HTN, presents with vomiting and low blood pressure for 2 days   cystitis    plan  pt without any gu complaints  and now symptoms resolved rapidly   pt nontoxic  afebrile  vomiting resolved       UA with pyuria  urine cx klebsiella sensitivity pending  clinically improving on ceftriaxone today is day 3 of 3   complete antibx today   no ID objection to discharge           
The patient is a 67y Female complaining of vomiting.    Nausea/Vomiting:  Improving  IV Zofran PRN    S/p L MCA-STA bypass:    Neuro Sx eval appreciated  CT head : no acute changes  N/V not likely from recent neurosx     UTI:    IV Ceftriaxone for 3 days  ID eval appreciated    Dw family
The patient is a 67y Female complaining of vomiting.    Nausea/Vomiting:  Improving  IV Zofran PRN    S/p L MCA-STA bypass:    Neuro Sx eval appreciated  CT head : no acute changes  N/V not likely from recent neurosx     UTI:    Completed Ceftriaxone   ID f/up appreciated    Dw family  Dc planning
The patient is a 67y Female complaining of vomiting.    Nausea/Vomiting:  Improving  IV Zofran PRN    S/p L MCA-STA bypass:    Neuro Sx eval appreciated  CT head : no acute changes  N/V not likely from recent neurosx     UTI:    Completed Ceftriaxone   ID f/up appreciated    Dw family  Dc planning

## 2023-08-31 NOTE — HISTORY OF PRESENT ILLNESS
[FreeTextEntry1] : This 67-year-old woman presents with a left M2 occlusion for which a thrombectomy attempt was failed.  She presents with intermittent.  She presented with some henny insular identified stroke and a zone of hyperperfusion identified on the CT perfusion with increased MTT volume of about 50 mL at TM ax 4 seconds and about 7 mL at TMax 6 seconds.  The patient was in the ICU and we tried to wean her from vasopressors, but every time with a blood pressure of less than 160, she became significantly symptomatic with aphasia and right-sided weakness.  We therefore after several attempts of weaning the pressors and considering the area of hypoperfusion on CT perfusion, we decided for a targeted bypass to that region.  We identified an appropriate targeted vessel which is the continuation of the occluded M2 on the surface of the posterior MCA territory and that vessel was segmented and identified with navigation which were used for surgery.   On 8/2/23 she underwent a left parietal branch of the superficial temporal artery-middle cerebral artery direct microanastomosis.  Today she presents for her first post op visit.

## 2023-08-31 NOTE — ASSESSMENT
[FreeTextEntry1] : IMPRESSION: Cerebral angiogram on 7/23/23 showed chronic appearing occlusion at the origin of the posterior division of the left MCA with distal retrograde reconstitution to the M2 segment. Attempted mechanical thrombectomy, no recanalization of the posterior division of the left MCA following catheter aspiration.   PLAN:

## 2023-08-31 NOTE — HISTORY OF PRESENT ILLNESS
[FreeTextEntry1] : Hospital Course: Discharge Date 10-Aug-2023 Admission Date 23-Jul-2023 19:08 Reason for Admission 7/23/23 s/p angiogram showing R M2/MCA occlusion with distal retrograde reconstitution, aspiration thrombectomy attempted without recanalization 8/2/23 s/p left craniotomy for STA-MCA bypass Hospital Course  67y (1955) woman with a PMHx significant for CVA (on Plavix), seizure (on Keppra), HLD, HTN, who presents w/ CC of AMS. Patient unable to provide any history. Daughter at bedside assisting w/ history. Patient had stroke in past, but unclear when this happened and if any residual deficits. Per daughter, patient is usually independent, fully oriented, and speaks in normal conversation. However, over the past 2-3 weeks, she has been having 20-30 min episodes of sudden blank staring and unresponsiveness. She will be barely able to speak and the words will be nonsensical. She does not have generalized shaking, incontinence, or tongue biting. She comes back to her baseline afterwards. These episodes have generally occurred every other day. Daughter became concerned when she had episode starting at noon today that persisted for hours. Brought to ED for further evaluation. Code stroke called for AMS within 24h window. 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 Initial NIHSS 12. When asked orientation questions, she stutters an answer ("[age] and continues speaking nonsense. She follows simple commands with help of daughter speaking Cantonese. Home medications are: Plavix, amlodipine-irbesartan, metoprolol, rosuvastatin, Ceumid (aka Keppra), omeprezole, jarinu, folic acid, B complex, ferrous sulfate. After discussions with stroke/neuro-IR fellows and re-assessment, CTA showing concerning occlusion in L MCA distribution. Although NIHSS improving to 5 (mild dysarthria, aphasia, droop, RLE/RUE drift), patient taken for diagnostic cerebral angiogram. Family aware of diagnosis and provided written consent to plan.  Cerebral angiogram on 7/23/23 showed chronic appearing occlusion at the origin of the posterior division of the left MCA with distal retrograde reconstitution to the M2 segment. Attempted mechanical thrombectomy, no recanalization of the posterior division of the left MCA following catheter aspiration.   
Principal Discharge DX:	Intracranial hypertension

## 2023-09-06 ENCOUNTER — NON-APPOINTMENT (OUTPATIENT)
Age: 68
End: 2023-09-06

## 2023-09-06 ENCOUNTER — APPOINTMENT (OUTPATIENT)
Dept: NEUROSURGERY | Facility: CLINIC | Age: 68
End: 2023-09-06
Payer: MEDICAID

## 2023-09-06 VITALS
DIASTOLIC BLOOD PRESSURE: 91 MMHG | HEART RATE: 80 BPM | SYSTOLIC BLOOD PRESSURE: 163 MMHG | OXYGEN SATURATION: 100 % | TEMPERATURE: 97.9 F

## 2023-09-06 DIAGNOSIS — I63.9 CEREBRAL INFARCTION, UNSPECIFIED: ICD-10-CM

## 2023-09-06 PROCEDURE — 99024 POSTOP FOLLOW-UP VISIT: CPT

## 2023-09-06 RX ORDER — AMLODIPINE BESYLATE 5 MG/1
TABLET ORAL
Refills: 0 | Status: DISCONTINUED | COMMUNITY
End: 2023-09-06

## 2023-09-06 RX ORDER — MIDODRINE HYDROCHLORIDE 10 MG/1
10 TABLET ORAL
Refills: 0 | Status: ACTIVE | COMMUNITY

## 2023-09-06 NOTE — PHYSICAL EXAM
[General Appearance - Alert] : alert [Clean] : clean [Dry] : dry [Healing Well] : healing well [No Drainage] : without drainage [Normal Skin] : normal [Neck Appearance] : the appearance of the neck was normal [] : no respiratory distress [Respiration, Rhythm And Depth] : normal respiratory rhythm and effort [Exaggerated Use Of Accessory Muscles For Inspiration] : no accessory muscle use [Heart Rate And Rhythm] : heart rate was normal and rhythm regular [Erythema] : not erythematous [Tender] : not tender [FreeTextEntry1] : left temporal area [FreeTextEntry8] : came to visit via wheelchair

## 2023-09-06 NOTE — ASSESSMENT
[FreeTextEntry1] : IMPRESSION: 67-year-old female with PMHx of CVA, seizure, HTN, and HLD s/p 7/23/23 s/p angiogram showing R M2/MCA occlusion with distal retrograde reconstitution, aspiration thrombectomy attempted without recanalization s/p 8/2/23 s/p left craniotomy for STA-MCA bypass.    Today surgical incision is well approximated without any evidence of infection.   PLAN: 1. Advised patient to continue ASA 81 mg daily. 2. Advised patient to remain well hydrated at all times and to avoid any tight-fitting paraphernalia that can compress the left temporal area. 3. Maintain good blood pressure and cholesterol control. 4. F/U with Neurologist for seizure management. 5. F/U with PCP and cardiologist. 6. F/U with Dr. Pimentel and NP in 4 weeks.

## 2023-09-06 NOTE — HISTORY OF PRESENT ILLNESS
[FreeTextEntry1] : This 67-year-old woman presents with a left M2 occlusion for which a thrombectomy attempt was  failed.  She presents with intermittent.  She presented with some henny insular identified stroke and a zone of hyper perfusion identified on the CT perfusion with increased MTT volume of about 50 mL at TM ax 4 seconds and about 7 mL at TMax 6 seconds.  The patient was in the ICU and we tried to wean her from vasopressors, but every time with a blood pressure of less than 160, she became significantly symptomatic with aphasia and right-sided weakness.  We therefore after several attempts of weaning the pressors and considering the area of hypoperfusion on CT perfusion, we decided for a targeted bypass to that region.  We identified an appropriate targeted vessel which is the continuation of the occluded M2 on the surface of the posterior MCA territory and that vessel was segmented and identified with navigation which were used for surgery.  On 8/2/23 she underwent a left parietal branch of the superficial temporal artery-middle cerebral artery direct microanastomosis.  She presents for her first post op visit today via wheelchair accompanied by her daughter.  She states that she is feeling well without any specific complaints.  Surgical incision is feeling well without any evidence of infection.  She is compliant with ASA and Plavix, Atorvastatin therapy.  Daughter reports that pt's BP is low in the morning with SBPs in the 80's.  Pt has followed up with cardiologist Dr. Ferrari and is on Midodrine 20 mg 2 x day.

## 2023-09-07 ENCOUNTER — APPOINTMENT (OUTPATIENT)
Dept: ENDOCRINOLOGY | Facility: CLINIC | Age: 68
End: 2023-09-07
Payer: MEDICAID

## 2023-09-07 VITALS
OXYGEN SATURATION: 98 % | SYSTOLIC BLOOD PRESSURE: 110 MMHG | BODY MASS INDEX: 22.97 KG/M2 | DIASTOLIC BLOOD PRESSURE: 70 MMHG | HEART RATE: 80 BPM | HEIGHT: 60 IN | WEIGHT: 117 LBS

## 2023-09-07 PROCEDURE — 95251 CONT GLUC MNTR ANALYSIS I&R: CPT

## 2023-09-07 PROCEDURE — 99205 OFFICE O/P NEW HI 60 MIN: CPT

## 2023-09-07 PROCEDURE — 95249 CONT GLUC MNTR PT PROV EQP: CPT

## 2023-09-07 RX ORDER — METFORMIN ER 500 MG 500 MG/1
500 TABLET ORAL
Qty: 180 | Refills: 3 | Status: ACTIVE | COMMUNITY
Start: 2023-09-07 | End: 1900-01-01

## 2023-09-07 NOTE — ASSESSMENT
[FreeTextEntry1] : 1. Diabetes Mellitus  Type: 2 A1c: 8.5% from 7/2023 Current Regimen: Tresiba 10 units twice daily, NovoLog sliding scale 2:50>150 in the afternoon, metformin 500 mg daily  PLAN:  - Regimen:       Consolidate Tresiba to taking 20 units daily, they deny fasting hypoglycemia.  If fasting blood sugars >150 persistently, can increase to 22 units (or even to 24 units as needed).       Continue NovoLog only as needed, sliding scale provided: 1:50>150 before meals.      Increase metformin to 500 mg twice daily, EGFR 59 from 8/2023      Start Jardiance 10 mg daily. We discussed benefits, side effects and risks of SGLT2-inhibitors including but not limited to risk of dehydration (increased thirst and polyuria), genital mycotic infections, rare risk of Elaine's gangrene and also of euglycemic DKA. Patient verbalized understanding of information.  Would not start GLP-1 in this patient with BMI of 22.  - BG monitoring: Continue fingersticks 3 times daily.  Freestyle zabrina 3 sample provided today, I will ask diabetes educator to assist with getting them CGM for home use.  The patient will benefit from CGM given multiple daily insulin injections.  - Labs: A1c, urine ACR, CMP, TSH, vitamin B12  - Preventive:         Nephropathy screening: Due, check today        Retinopathy screening: Due, recommended to make an appointment  - Counseling: We discussed diabetes foot care, long term complications of diabetes including but not limited to neuropathy, nephropathy, retinopathy and cardiovascular disease and the benefits of good glycemic control in preventing said complications. We discussed the risks and benefits of diabetes medications and/or insulin as relevant for today, prevention and management of hypoglycemia, importance of medication compliance and blood glucose monitoring.  2. Hypertension BP goal <130/90 Current medications: None.  She was previously on metoprolol, but has not been taking as they have noticed low blood pressures at home. - Continue management and medication titration with PCP  3. Hyperlipidemia Last LDL: 59 from 7/2023 LDL goal: <70 given CVA - Continue atorvastatin 80mg daily    Return to clinic in 3 months with NP, 6 months with me.  Malgorzata Robertson MD [facility] at [address], [address] Ph: [phone] Fax: [phone]

## 2023-09-07 NOTE — HISTORY OF PRESENT ILLNESS
[FreeTextEntry1] : CHIEF COMPLAINT: Type 2 diabetes REFERRED BY: Recently discharged from the hospital. Patient speaks Cantonese in [place]an, prefers her daughter's to translate for her.  HISTORY OF PRESENTING ILLNESS: The patient is a 67-year-old female being seen in the office today for evaluation of diabetes.  Also with hypertension, hyperlipidemia. She was recently hospitalized twice in 7/20/2023 and 8/20/2023.  7/2023 admission was for CVA, after which she has been weak and largely wheelchair-bound.  8/2023 admission was for vomiting, dehydration.  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  During the day blood sugars are often in the 200s. Hypoglycemia: Denies  Diet: Reports poor appetite Exercise: Minimal, she has been wheelchair-bound after CVA in 7/2023  Hyperlipidemia: Takes atorvastatin 80 mg daily Hypertension: Supposed to take metoprolol, but they have noticed that blood pressures are low at home so she has stopped taking this.  Labs from [facility]: 7/2023: A1c 8.5%, LDL 59, TSH 1.45 8/2023: eGFR 59

## 2023-09-07 NOTE — REVIEW OF SYSTEMS
[TextEntry] : REVIEW OF SYSTEMS: General: +fatigue, no weight gain, no weight loss HEENT: No headaches, no hearing loss Respiratory: No cough, no shortness of breath  Cardiovascular: No chest pain, no palpitations, no leg swelling  Neurologic: No tremors, no syncopal episodes Psychiatric: The patient is not currently nervous or anxious  Endocrine: No thyroid/neck swelling, no polydipsia, no heat/cold intolerance   The review of systems is otherwise negative except as noted in HPI.

## 2023-09-07 NOTE — PHYSICAL EXAM
[TextEntry] : PHYSICAL EXAMINATION: Vital signs from today's encounter reviewed.  GENERAL: No acute distress, clinically eukinetic, normal appearance HEAD: Normocephalic, atraumatic EYES: conjunctivae are pink and moist, no icterus, no proptosis  NECK: thyroid is not enlarged/nodular on palpation, non-tender, no adenopathy CARDIOVASCULAR: well-perfused extremities, no peripheral edema RESPIRATORY: normal chest expansion with good pulmonary effort, no acute respiratory distress SKIN: no pallor, no icterus, no rash  NEUROLOGIC: alert and oriented, no evident focal deficits, no tremors  PSYCHIATRIC: mood and affect are normal ENDOCRINE: No obvious stigmata of Cushing's or acromegaly present

## 2023-09-07 NOTE — ADDENDUM
[FreeTextEntry1] : Patient and 2 daughters present for teaching regarding CGM system Claudia 3. Offered  services for patient but patient denied and elected daughters to help in translation. Dickson downloaded to daughters phone, she states when it is time to change the sensor, they will download the dickson on the moms smartphone. Review of the dickson/ sensor placement with patient and daughters. Hypoglycemia protocol and the rule of 15 also reviewed with patient and daughters. Teach back method utilized regarding todays teaching session successfully. Encouraged patient and daughters to call us back with any concerns/ questions- they verbalized understanding. Will follow up withg patient and daughters as necessary. Pallavi Martin RN.

## 2023-09-08 LAB
ALBUMIN SERPL ELPH-MCNC: 4.8 G/DL
ALP BLD-CCNC: 109 U/L
ALT SERPL-CCNC: 27 U/L
ANION GAP SERPL CALC-SCNC: 18 MMOL/L
AST SERPL-CCNC: 30 U/L
BILIRUB SERPL-MCNC: 0.4 MG/DL
BUN SERPL-MCNC: 37 MG/DL
CALCIUM SERPL-MCNC: 10 MG/DL
CHLORIDE SERPL-SCNC: 96 MMOL/L
CO2 SERPL-SCNC: 24 MMOL/L
CREAT SERPL-MCNC: 1.4 MG/DL
CREAT SPEC-SCNC: 32 MG/DL
EGFR: 41 ML/MIN/1.73M2
ESTIMATED AVERAGE GLUCOSE: 177 MG/DL
GLUCOSE SERPL-MCNC: 168 MG/DL
HBA1C MFR BLD HPLC: 7.8 %
MICROALBUMIN 24H UR DL<=1MG/L-MCNC: 12.5 MG/DL
MICROALBUMIN/CREAT 24H UR-RTO: 385 MG/G
POTASSIUM SERPL-SCNC: 5.3 MMOL/L
PROT SERPL-MCNC: 8.2 G/DL
SODIUM SERPL-SCNC: 138 MMOL/L
TSH SERPL-ACNC: 1.1 UIU/ML
VIT B12 SERPL-MCNC: 1427 PG/ML

## 2023-09-21 ENCOUNTER — TRANSCRIPTION ENCOUNTER (OUTPATIENT)
Age: 68
End: 2023-09-21

## 2023-09-21 ENCOUNTER — APPOINTMENT (OUTPATIENT)
Dept: ENDOCRINOLOGY | Facility: CLINIC | Age: 68
End: 2023-09-21
Payer: MEDICAID

## 2023-09-21 PROCEDURE — 95251 CONT GLUC MNTR ANALYSIS I&R: CPT

## 2023-09-21 PROCEDURE — G0108 DIAB MANAGE TRN  PER INDIV: CPT

## 2023-09-27 ENCOUNTER — APPOINTMENT (OUTPATIENT)
Dept: NEUROLOGY | Facility: CLINIC | Age: 68
End: 2023-09-27
Payer: MEDICAID

## 2023-09-27 VITALS
WEIGHT: 117 LBS | TEMPERATURE: 98 F | DIASTOLIC BLOOD PRESSURE: 91 MMHG | OXYGEN SATURATION: 98 % | HEART RATE: 97 BPM | HEIGHT: 60 IN | BODY MASS INDEX: 22.97 KG/M2 | SYSTOLIC BLOOD PRESSURE: 183 MMHG

## 2023-09-27 DIAGNOSIS — F32.A DEPRESSION, UNSPECIFIED: ICD-10-CM

## 2023-09-27 PROCEDURE — 99215 OFFICE O/P EST HI 40 MIN: CPT

## 2023-09-27 RX ORDER — ASPIRIN 325 MG/1
325 TABLET, FILM COATED ORAL
Refills: 0 | Status: DISCONTINUED | COMMUNITY
End: 2023-09-27

## 2023-09-27 RX ORDER — KRILL/OM-3/DHA/EPA/PHOSPHO/AST 1000-230MG
81 CAPSULE ORAL
Refills: 0 | Status: ACTIVE | COMMUNITY

## 2023-10-13 ENCOUNTER — APPOINTMENT (OUTPATIENT)
Dept: NEUROSURGERY | Facility: CLINIC | Age: 68
End: 2023-10-13
Payer: MEDICAID

## 2023-10-13 ENCOUNTER — NON-APPOINTMENT (OUTPATIENT)
Age: 68
End: 2023-10-13

## 2023-10-13 VITALS
DIASTOLIC BLOOD PRESSURE: 88 MMHG | SYSTOLIC BLOOD PRESSURE: 159 MMHG | BODY MASS INDEX: 22.97 KG/M2 | HEART RATE: 94 BPM | OXYGEN SATURATION: 99 % | HEIGHT: 60 IN | WEIGHT: 117 LBS

## 2023-10-13 DIAGNOSIS — I67.9 CEREBROVASCULAR DISEASE, UNSPECIFIED: ICD-10-CM

## 2023-10-13 PROCEDURE — 99024 POSTOP FOLLOW-UP VISIT: CPT

## 2023-10-16 PROBLEM — I67.9 INTRACRANIAL VASCULAR STENOSIS: Status: ACTIVE | Noted: 2023-10-16

## 2023-10-27 ENCOUNTER — APPOINTMENT (OUTPATIENT)
Dept: NEPHROLOGY | Facility: CLINIC | Age: 68
End: 2023-10-27
Payer: MEDICAID

## 2023-10-27 PROCEDURE — 99205 OFFICE O/P NEW HI 60 MIN: CPT | Mod: 95

## 2023-10-30 ENCOUNTER — LABORATORY RESULT (OUTPATIENT)
Age: 68
End: 2023-10-30

## 2023-11-10 LAB
25(OH)D3 SERPL-MCNC: 28.2 NG/ML
ALBUMIN MFR SERPL ELPH: 55.2 %
ALBUMIN SERPL ELPH-MCNC: 4.7 G/DL
ALBUMIN SERPL-MCNC: 4.5 G/DL
ALBUMIN/GLOB SERPL: 1.2 RATIO
ALPHA1 GLOB MFR SERPL ELPH: 3.9 %
ALPHA1 GLOB SERPL ELPH-MCNC: 0.3 G/DL
ALPHA2 GLOB MFR SERPL ELPH: 10.8 %
ALPHA2 GLOB SERPL ELPH-MCNC: 0.9 G/DL
ANION GAP SERPL CALC-SCNC: 14 MMOL/L
APPEARANCE: ABNORMAL
B-GLOBULIN MFR SERPL ELPH: 11.5 %
B-GLOBULIN SERPL ELPH-MCNC: 0.9 G/DL
BACTERIA: ABNORMAL /HPF
BILIRUBIN URINE: NEGATIVE
BLOOD URINE: ABNORMAL
BUN SERPL-MCNC: 35 MG/DL
CALCIUM SERPL-MCNC: 10.6 MG/DL
CAST: 4 /LPF
CHLORIDE SERPL-SCNC: 103 MMOL/L
CO2 SERPL-SCNC: 25 MMOL/L
COLOR: YELLOW
CREAT SERPL-MCNC: 1.31 MG/DL
CREAT SPEC-SCNC: 43 MG/DL
CREAT/PROT UR: 0.9 RATIO
DEPRECATED KAPPA LC FREE/LAMBDA SER: 1.63 RATIO
DSDNA AB SER-ACNC: <12 IU/ML
EGFR: 44 ML/MIN/1.73M2
EPITHELIAL CELLS: 1 /HPF
FERRITIN SERPL-MCNC: 1208 NG/ML
GAMMA GLOB FLD ELPH-MCNC: 1.5 G/DL
GAMMA GLOB MFR SERPL ELPH: 18.6 %
GLUCOSE QUALITATIVE U: >=1000 MG/DL
GLUCOSE SERPL-MCNC: 73 MG/DL
HBV SURFACE AG SER QL: NONREACTIVE
HCV AB SER QL: NONREACTIVE
HCV S/CO RATIO: 0.08 S/CO
HYALINE CASTS: PRESENT
INTERPRETATION SERPL IEP-IMP: NORMAL
IRON SATN MFR SERPL: 52 %
IRON SERPL-MCNC: 153 UG/DL
KAPPA LC CSF-MCNC: 2.5 MG/DL
KAPPA LC SERPL-MCNC: 4.07 MG/DL
KETONES URINE: NEGATIVE MG/DL
LEUKOCYTE ESTERASE URINE: ABNORMAL
MICROSCOPIC-UA: NORMAL
NITRITE URINE: NEGATIVE
PH URINE: 6.5
PHOSPHATE SERPL-MCNC: 3.4 MG/DL
POTASSIUM SERPL-SCNC: 4.9 MMOL/L
PROT SERPL-MCNC: 8.1 G/DL
PROT SERPL-MCNC: 8.1 G/DL
PROT UR-MCNC: 41 MG/DL
PROTEIN URINE: 30 MG/DL
RED BLOOD CELLS URINE: 1 /HPF
REVIEW: NORMAL
SODIUM SERPL-SCNC: 142 MMOL/L
SPECIFIC GRAVITY URINE: 1.02
TIBC SERPL-MCNC: 292 UG/DL
UIBC SERPL-MCNC: 139 UG/DL
UROBILINOGEN URINE: 0.2 MG/DL
WHITE BLOOD CELLS URINE: 299 /HPF

## 2023-11-13 ENCOUNTER — NON-APPOINTMENT (OUTPATIENT)
Age: 68
End: 2023-11-13

## 2023-11-14 ENCOUNTER — NON-APPOINTMENT (OUTPATIENT)
Age: 68
End: 2023-11-14

## 2023-11-20 RX ORDER — INSULIN DEGLUDEC INJECTION 100 U/ML
100 INJECTION, SOLUTION SUBCUTANEOUS DAILY
Qty: 3 | Refills: 2 | Status: ACTIVE | COMMUNITY
Start: 2023-10-31 | End: 1900-01-01

## 2023-12-07 ENCOUNTER — APPOINTMENT (OUTPATIENT)
Dept: ENDOCRINOLOGY | Facility: CLINIC | Age: 68
End: 2023-12-07
Payer: MEDICAID

## 2023-12-07 VITALS
HEART RATE: 96 BPM | SYSTOLIC BLOOD PRESSURE: 118 MMHG | HEIGHT: 60 IN | BODY MASS INDEX: 22.38 KG/M2 | OXYGEN SATURATION: 98 % | DIASTOLIC BLOOD PRESSURE: 64 MMHG | WEIGHT: 114 LBS

## 2023-12-07 DIAGNOSIS — E11.9 TYPE 2 DIABETES MELLITUS W/OUT COMPLICATIONS: ICD-10-CM

## 2023-12-07 DIAGNOSIS — E78.5 HYPERLIPIDEMIA, UNSPECIFIED: ICD-10-CM

## 2023-12-07 DIAGNOSIS — I10 ESSENTIAL (PRIMARY) HYPERTENSION: ICD-10-CM

## 2023-12-07 DIAGNOSIS — N18.31 CHRONIC KIDNEY DISEASE, STAGE 3A: ICD-10-CM

## 2023-12-07 LAB
GLUCOSE BLDC GLUCOMTR-MCNC: 145
HBA1C MFR BLD HPLC: 6.7

## 2023-12-07 PROCEDURE — 83036 HEMOGLOBIN GLYCOSYLATED A1C: CPT | Mod: QW

## 2023-12-07 PROCEDURE — 99214 OFFICE O/P EST MOD 30 MIN: CPT | Mod: 25

## 2023-12-07 PROCEDURE — 82962 GLUCOSE BLOOD TEST: CPT

## 2024-01-08 ENCOUNTER — OUTPATIENT (OUTPATIENT)
Dept: OUTPATIENT SERVICES | Facility: HOSPITAL | Age: 69
LOS: 1 days | End: 2024-01-08
Payer: MEDICAID

## 2024-01-08 VITALS
WEIGHT: 111.99 LBS | DIASTOLIC BLOOD PRESSURE: 88 MMHG | OXYGEN SATURATION: 98 % | HEIGHT: 58 IN | HEART RATE: 93 BPM | RESPIRATION RATE: 16 BRPM | TEMPERATURE: 98 F | SYSTOLIC BLOOD PRESSURE: 150 MMHG

## 2024-01-08 DIAGNOSIS — I67.9 CEREBROVASCULAR DISEASE, UNSPECIFIED: ICD-10-CM

## 2024-01-08 DIAGNOSIS — Z96.641 PRESENCE OF RIGHT ARTIFICIAL HIP JOINT: Chronic | ICD-10-CM

## 2024-01-08 DIAGNOSIS — Z86.73 PERSONAL HISTORY OF TRANSIENT ISCHEMIC ATTACK (TIA), AND CEREBRAL INFARCTION WITHOUT RESIDUAL DEFICITS: ICD-10-CM

## 2024-01-08 DIAGNOSIS — Z01.818 ENCOUNTER FOR OTHER PREPROCEDURAL EXAMINATION: ICD-10-CM

## 2024-01-08 DIAGNOSIS — Z98.890 OTHER SPECIFIED POSTPROCEDURAL STATES: Chronic | ICD-10-CM

## 2024-01-08 DIAGNOSIS — E78.5 HYPERLIPIDEMIA, UNSPECIFIED: ICD-10-CM

## 2024-01-08 DIAGNOSIS — I63.9 CEREBRAL INFARCTION, UNSPECIFIED: ICD-10-CM

## 2024-01-08 DIAGNOSIS — E11.9 TYPE 2 DIABETES MELLITUS WITHOUT COMPLICATIONS: ICD-10-CM

## 2024-01-08 LAB
A1C WITH ESTIMATED AVERAGE GLUCOSE RESULT: 6.4 % — HIGH (ref 4–5.6)
A1C WITH ESTIMATED AVERAGE GLUCOSE RESULT: 6.4 % — HIGH (ref 4–5.6)
ANION GAP SERPL CALC-SCNC: 11 MMOL/L — SIGNIFICANT CHANGE UP (ref 5–17)
ANION GAP SERPL CALC-SCNC: 11 MMOL/L — SIGNIFICANT CHANGE UP (ref 5–17)
BLD GP AB SCN SERPL QL: NEGATIVE — SIGNIFICANT CHANGE UP
BLD GP AB SCN SERPL QL: NEGATIVE — SIGNIFICANT CHANGE UP
BUN SERPL-MCNC: 29 MG/DL — HIGH (ref 7–23)
BUN SERPL-MCNC: 29 MG/DL — HIGH (ref 7–23)
CALCIUM SERPL-MCNC: 10 MG/DL — SIGNIFICANT CHANGE UP (ref 8.4–10.5)
CALCIUM SERPL-MCNC: 10 MG/DL — SIGNIFICANT CHANGE UP (ref 8.4–10.5)
CHLORIDE SERPL-SCNC: 104 MMOL/L — SIGNIFICANT CHANGE UP (ref 96–108)
CHLORIDE SERPL-SCNC: 104 MMOL/L — SIGNIFICANT CHANGE UP (ref 96–108)
CO2 SERPL-SCNC: 25 MMOL/L — SIGNIFICANT CHANGE UP (ref 22–31)
CO2 SERPL-SCNC: 25 MMOL/L — SIGNIFICANT CHANGE UP (ref 22–31)
CREAT SERPL-MCNC: 0.8 MG/DL — SIGNIFICANT CHANGE UP (ref 0.5–1.3)
CREAT SERPL-MCNC: 0.8 MG/DL — SIGNIFICANT CHANGE UP (ref 0.5–1.3)
EGFR: 80 ML/MIN/1.73M2 — SIGNIFICANT CHANGE UP
EGFR: 80 ML/MIN/1.73M2 — SIGNIFICANT CHANGE UP
ESTIMATED AVERAGE GLUCOSE: 137 MG/DL — HIGH (ref 68–114)
ESTIMATED AVERAGE GLUCOSE: 137 MG/DL — HIGH (ref 68–114)
GLUCOSE SERPL-MCNC: 96 MG/DL — SIGNIFICANT CHANGE UP (ref 70–99)
GLUCOSE SERPL-MCNC: 96 MG/DL — SIGNIFICANT CHANGE UP (ref 70–99)
HCT VFR BLD CALC: 30.9 % — LOW (ref 34.5–45)
HCT VFR BLD CALC: 30.9 % — LOW (ref 34.5–45)
HGB BLD-MCNC: 9.9 G/DL — LOW (ref 11.5–15.5)
HGB BLD-MCNC: 9.9 G/DL — LOW (ref 11.5–15.5)
MCHC RBC-ENTMCNC: 21.1 PG — LOW (ref 27–34)
MCHC RBC-ENTMCNC: 21.1 PG — LOW (ref 27–34)
MCHC RBC-ENTMCNC: 32 GM/DL — SIGNIFICANT CHANGE UP (ref 32–36)
MCHC RBC-ENTMCNC: 32 GM/DL — SIGNIFICANT CHANGE UP (ref 32–36)
MCV RBC AUTO: 65.7 FL — LOW (ref 80–100)
MCV RBC AUTO: 65.7 FL — LOW (ref 80–100)
NRBC # BLD: 0 /100 WBCS — SIGNIFICANT CHANGE UP (ref 0–0)
NRBC # BLD: 0 /100 WBCS — SIGNIFICANT CHANGE UP (ref 0–0)
PLATELET # BLD AUTO: 298 K/UL — SIGNIFICANT CHANGE UP (ref 150–400)
PLATELET # BLD AUTO: 298 K/UL — SIGNIFICANT CHANGE UP (ref 150–400)
POTASSIUM SERPL-MCNC: 4.3 MMOL/L — SIGNIFICANT CHANGE UP (ref 3.5–5.3)
POTASSIUM SERPL-MCNC: 4.3 MMOL/L — SIGNIFICANT CHANGE UP (ref 3.5–5.3)
POTASSIUM SERPL-SCNC: 4.3 MMOL/L — SIGNIFICANT CHANGE UP (ref 3.5–5.3)
POTASSIUM SERPL-SCNC: 4.3 MMOL/L — SIGNIFICANT CHANGE UP (ref 3.5–5.3)
RBC # BLD: 4.7 M/UL — SIGNIFICANT CHANGE UP (ref 3.8–5.2)
RBC # BLD: 4.7 M/UL — SIGNIFICANT CHANGE UP (ref 3.8–5.2)
RBC # FLD: 17.6 % — HIGH (ref 10.3–14.5)
RBC # FLD: 17.6 % — HIGH (ref 10.3–14.5)
RH IG SCN BLD-IMP: POSITIVE — SIGNIFICANT CHANGE UP
RH IG SCN BLD-IMP: POSITIVE — SIGNIFICANT CHANGE UP
SODIUM SERPL-SCNC: 140 MMOL/L — SIGNIFICANT CHANGE UP (ref 135–145)
SODIUM SERPL-SCNC: 140 MMOL/L — SIGNIFICANT CHANGE UP (ref 135–145)
WBC # BLD: 7.16 K/UL — SIGNIFICANT CHANGE UP (ref 3.8–10.5)
WBC # BLD: 7.16 K/UL — SIGNIFICANT CHANGE UP (ref 3.8–10.5)
WBC # FLD AUTO: 7.16 K/UL — SIGNIFICANT CHANGE UP (ref 3.8–10.5)
WBC # FLD AUTO: 7.16 K/UL — SIGNIFICANT CHANGE UP (ref 3.8–10.5)

## 2024-01-08 PROCEDURE — G0463: CPT

## 2024-01-08 PROCEDURE — 86850 RBC ANTIBODY SCREEN: CPT

## 2024-01-08 PROCEDURE — 80048 BASIC METABOLIC PNL TOTAL CA: CPT

## 2024-01-08 PROCEDURE — 86901 BLOOD TYPING SEROLOGIC RH(D): CPT

## 2024-01-08 PROCEDURE — 85027 COMPLETE CBC AUTOMATED: CPT

## 2024-01-08 PROCEDURE — 86900 BLOOD TYPING SEROLOGIC ABO: CPT

## 2024-01-08 PROCEDURE — 83036 HEMOGLOBIN GLYCOSYLATED A1C: CPT

## 2024-01-08 RX ORDER — ELECTROLYTES/DEXTROSE
32G X 4 MM SOLUTION, ORAL ORAL
Qty: 4 | Refills: 4 | Status: ACTIVE | COMMUNITY
Start: 2023-09-07 | End: 1900-01-01

## 2024-01-08 RX ORDER — MIDODRINE HYDROCHLORIDE 2.5 MG/1
1 TABLET ORAL
Refills: 0 | DISCHARGE

## 2024-01-08 RX ORDER — EMPAGLIFLOZIN 10 MG/1
1 TABLET, FILM COATED ORAL
Refills: 0 | DISCHARGE

## 2024-01-08 RX ORDER — INSULIN DEGLUDEC 100 U/ML
18 INJECTION, SOLUTION SUBCUTANEOUS
Refills: 0 | DISCHARGE

## 2024-01-08 RX ORDER — EMPAGLIFLOZIN 10 MG/1
10 TABLET, FILM COATED ORAL
Qty: 90 | Refills: 3 | Status: ACTIVE | COMMUNITY
Start: 2023-09-07 | End: 1900-01-01

## 2024-01-08 RX ORDER — INSULIN ASPART 100 [IU]/ML
0 INJECTION, SOLUTION SUBCUTANEOUS
Refills: 0 | DISCHARGE

## 2024-01-08 NOTE — H&P PST ADULT - PROBLEM SELECTOR PLAN 1
Cerebral angio on 1/11/24  Pre-op education provided - all questions answered. Pt and family verbalized understanding

## 2024-01-08 NOTE — H&P PST ADULT - ASSESSMENT
DASI: able to go up one flight of stairs or walk 1-2 blocks with some difficulty h/o CVA   Loose teeth: denies

## 2024-01-08 NOTE — H&P PST ADULT - HISTORY OF PRESENT ILLNESS
69 y/o Cantonese speaking F with h/o CVA on ASA (mild right side weakness), Seizure (on Keppra), Hypotension on Midodrine, T2DM and HLD s/p angiogram showing R M2/MCA occlusion with distal retrograde reconstitution (7/2023), aspiration thrombectomy attempted without recanalization  s/p left craniotomy for STA-MCA bypass (8/2023). Today she presents to PST accompanied by daughter for scheduled Cerebral Angiogram on 1/11/24. Denies any palpitations, SOB, N/V, fever or chills.

## 2024-01-08 NOTE — H&P PST ADULT - PROBLEM SELECTOR PLAN 2
Finger stick on day of surgery   hold Insulin DOS  Hold Metformin day of surgery   Hold Jardiance 3 days prior to surgery

## 2024-01-08 NOTE — H&P PST ADULT - NSICDXPASTMEDICALHX_GEN_ALL_CORE_FT
PAST MEDICAL HISTORY:  2019 novel coronavirus disease (COVID-19)     DM (diabetes mellitus)     H/O hypotension     HLD (hyperlipidemia)     Stroke due to thrombosis

## 2024-01-08 NOTE — H&P PST ADULT - NSANTHOSAYNRD_GEN_A_CORE
No. STEPHENIE screening performed.  STOP BANG Legend: 0-2 = LOW Risk; 3-4 = INTERMEDIATE Risk; 5-8 = HIGH Risk

## 2024-01-11 ENCOUNTER — APPOINTMENT (OUTPATIENT)
Dept: NEUROSURGERY | Facility: HOSPITAL | Age: 69
End: 2024-01-11

## 2024-01-18 NOTE — PROGRESS NOTE ADULT - SUBJECTIVE AND OBJECTIVE BOX
Subjective: Patient seen and examined. No new events except as noted.   Remains in NSCU.     REVIEW OF SYSTEMS:    CONSTITUTIONAL: + weakness, fevers or chills  EYES/ENT: No visual changes;  No vertigo or throat pain   NECK: No pain or stiffness  RESPIRATORY: No cough, wheezing, hemoptysis; No shortness of breath  CARDIOVASCULAR: No chest pain or palpitations  GASTROINTESTINAL: No abdominal or epigastric pain. No nausea, vomiting, or hematemesis; No diarrhea or constipation. No melena or hematochezia.  GENITOURINARY: No dysuria, frequency or hematuria  NEUROLOGICAL: No numbness or weakness  SKIN: No itching, burning, rashes, or lesions   All other review of systems is negative unless indicated above.    MEDICATIONS:  MEDICATIONS  (STANDING):  amLODIPine   Tablet 2.5 milliGRAM(s) Oral daily  aspirin  chewable 81 milliGRAM(s) Oral daily  atorvastatin 80 milliGRAM(s) Oral at bedtime  chlorhexidine 4% Liquid 1 Application(s) Topical daily  dextrose 5%. 1000 milliLiter(s) (100 mL/Hr) IV Continuous <Continuous>  enoxaparin Injectable 40 milliGRAM(s) SubCutaneous <User Schedule>  ferrous    sulfate 325 milliGRAM(s) Oral <User Schedule>  glucagon  Injectable 1 milliGRAM(s) IntraMuscular once  insulin glargine Injectable (LANTUS) 15 Unit(s) SubCutaneous at bedtime  insulin lispro (ADMELOG) corrective regimen sliding scale   SubCutaneous Before meals and at bedtime  insulin lispro Injectable (ADMELOG) 5 Unit(s) SubCutaneous three times a day before meals  levETIRAcetam 500 milliGRAM(s) Oral two times a day  polyethylene glycol 3350 17 Gram(s) Oral two times a day  senna 2 Tablet(s) Oral at bedtime  sodium chloride 0.9%. 1000 milliLiter(s) (100 mL/Hr) IV Continuous <Continuous>    PHYSICAL EXAM:  T(C): 36.9 (08-07-23 @ 11:00), Max: 37.3 (08-07-23 @ 07:00)  HR: 90 (08-07-23 @ 11:00) (83 - 106)  BP: --  RR: 16 (08-07-23 @ 11:00) (12 - 20)  SpO2: 96% (08-07-23 @ 11:00) (94% - 100%)  Wt(kg): --  I&O's Summary    06 Aug 2023 07:01  -  07 Aug 2023 07:00  --------------------------------------------------------  IN: 3380 mL / OUT: 4700 mL / NET: -1320 mL    07 Aug 2023 07:01  -  07 Aug 2023 12:12  --------------------------------------------------------  IN: 880 mL / OUT: 0 mL / NET: 880 mL    Appearance: Normal	  HEENT: Normal oral mucosa, PERRL, EOMI	  Lymphatic: No lymphadenopathy , no edema  Cardiovascular: Normal S1 S2, No JVD, No murmurs , Peripheral pulses palpable 2+ bilaterally  Respiratory: Lungs clear to auscultation, normal effort 	  Gastrointestinal:  Soft, Non-tender, + BS	  Skin: No rashes, No ecchymoses, No cyanosis, warm to touch  Neurological: axo3, EO to voice, 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 Speech clear, no word finding difficulty.  Motor exam:           Upper extremity                         Delt     Bicep     Tricep    HG                                                 R         5/5       5/5        5/5        5/5                                               L          5/5       5/5        5/5        5/5             Lower extremity                        HF         KF        KE       DF         PF                                                  R        5/5       5/5        5/5        5/5      5/5                                               L         5/5       5/5        5/5        5/5      5/5   Ext: No edema    LABS:    CARDIAC MARKERS:                        8.5    8.62  )-----------( 315      ( 07 Aug 2023 04:23 )             26.6     08-07    139  |  106  |  16  ----------------------------<  114<H>  3.6   |  22  |  0.65    Ca    8.5      07 Aug 2023 04:23  Phos  3.4     08-07  Mg     1.9     08-07    proBNP:   Lipid Profile:   HgA1c:   TSH:     TELEMETRY: SR    ECG:  	  RADIOLOGY:   DIAGNOSTIC TESTING:  [ ] Echocardiogram:  [ ]  Catheterization:  [ ] Stress Test:    OTHER:  Him/He

## 2024-02-27 NOTE — CONSULT NOTE ADULT - PROBLEM SELECTOR RECOMMENDATION 3
confirmed  
RISS
A1C 8.5. Continue home Tresiba and sliding scale. Start Metformin 500mg/d. Hold Jardiance for now. F/u w PMD.

## 2024-02-28 ENCOUNTER — APPOINTMENT (OUTPATIENT)
Dept: NEUROLOGY | Facility: CLINIC | Age: 69
End: 2024-02-28

## 2024-03-05 ENCOUNTER — RX RENEWAL (OUTPATIENT)
Age: 69
End: 2024-03-05

## 2024-03-07 ENCOUNTER — APPOINTMENT (OUTPATIENT)
Dept: ENDOCRINOLOGY | Facility: CLINIC | Age: 69
End: 2024-03-07

## 2024-03-20 NOTE — ED ADULT TRIAGE NOTE - WEIGHT IN KG
Initiating treatment with red yeast rice, 1200 mg daily.  Continue omega-3 fatty acids  
Nicely controlled on lisinopril 30 mg daily.  
Start red yeast rice supplements 1200 mg daily.  Follow-up labs in 6 months.  Patient was also given papers for heart healthy diet  
56.7

## 2024-03-28 ENCOUNTER — APPOINTMENT (OUTPATIENT)
Dept: NEUROLOGY | Facility: CLINIC | Age: 69
End: 2024-03-28

## 2024-04-11 ENCOUNTER — RX RENEWAL (OUTPATIENT)
Age: 69
End: 2024-04-11

## 2024-04-11 RX ORDER — LEVETIRACETAM 500 MG/1
500 TABLET, FILM COATED ORAL TWICE DAILY
Qty: 60 | Refills: 10 | Status: ACTIVE | COMMUNITY
Start: 2023-11-02 | End: 1900-01-01

## 2024-05-07 ENCOUNTER — RX RENEWAL (OUTPATIENT)
Age: 69
End: 2024-05-07

## 2024-05-07 RX ORDER — INSULIN ASPART 100 [IU]/ML
100 INJECTION, SOLUTION INTRAVENOUS; SUBCUTANEOUS
Qty: 2 | Refills: 10 | Status: ACTIVE | COMMUNITY
Start: 2023-10-31 | End: 1900-01-01

## 2024-05-14 ENCOUNTER — RX RENEWAL (OUTPATIENT)
Age: 69
End: 2024-05-14

## 2024-05-20 ENCOUNTER — RX RENEWAL (OUTPATIENT)
Age: 69
End: 2024-05-20

## 2024-05-24 ENCOUNTER — RX RENEWAL (OUTPATIENT)
Age: 69
End: 2024-05-24

## 2024-05-27 ENCOUNTER — RX RENEWAL (OUTPATIENT)
Age: 69
End: 2024-05-27

## 2024-06-03 ENCOUNTER — RX RENEWAL (OUTPATIENT)
Age: 69
End: 2024-06-03

## 2024-06-09 ENCOUNTER — RX RENEWAL (OUTPATIENT)
Age: 69
End: 2024-06-09

## 2024-06-09 RX ORDER — BLOOD-GLUCOSE SENSOR
EACH MISCELLANEOUS
Qty: 6 | Refills: 10 | Status: ACTIVE | COMMUNITY
Start: 2023-09-07 | End: 1900-01-01

## 2024-06-13 ENCOUNTER — APPOINTMENT (OUTPATIENT)
Dept: ENDOCRINOLOGY | Facility: CLINIC | Age: 69
End: 2024-06-13

## 2024-11-04 ENCOUNTER — APPOINTMENT (OUTPATIENT)
Dept: NEUROLOGY | Facility: CLINIC | Age: 69
End: 2024-11-04

## 2025-04-24 NOTE — STROKE CODE NOTE - NIH STROKE SCALE DATE
You were seen at Urgent Care today and diagnosed with strep throat. Please treat as discussed. Please take medications as prescribed. Monitor for red flags which we spoke about, If your symptoms change, worsen or become concerning in any way, please go to the emergency room immediately, otherwise you can followup with your PCP in 2-3 days as needed   23-Jul-2023 16:21

## 2025-05-23 NOTE — PROGRESS NOTE ADULT - ASSESSMENT
Catheter removed. ASSESSMENT/PLAN: chronic M2 occlusion with recurrent TIAs    NEURO:  blood pressure augmentation and Q1 hr neurochecks  cont keppra home dose  stroke core measures, stroke neurology following   ASA, hold plavix for bypass  Possible MCA bypass next week  Activity: [x] mobilize as tolerated [] Bedrest [x] PT [x] OT [] PMNR    PULM:  incentive spirometry as able  O2sat>92%    CV:  SBP goal 120-160  midodrine 10 q8  Hold Phenylephrine  TTE - 63% ejection fraction  cont atorvastatin  hold all home antihypertensives  Serial Troponins and EKG are WNL  Wean pressors as tolerated    RENAL:  Fluids: Plasmalyte @ 75/hr    GI:  Diet: CCD  GI prophylaxis [] not indicated [x] PPI home med [] other:  Bowel regimen [] colace [x] senna [x] other: miralax     ENDO:   Goal euglycemia (-180)  A1C 8.5  TSH WNL  EDISON    HEME/ONC:  VTE prophylaxis: [x] SCDs [x] chemoprophylaxis [] hold chemoprophylaxis due to: [] high risk of DVT/PE on admission due to:  Start iron for microcytic anemia, previously on iron o/p for AJAY    ID: monitor for fever

## (undated) DEVICE — LUBRICATING JELLY ONESHOT 1.25OZ

## (undated) DEVICE — GOWN TRIMAX LG

## (undated) DEVICE — TUBING SUCTION 20FT

## (undated) DEVICE — GLV 6.5 PROTEXIS (WHITE)

## (undated) DEVICE — MERCIAN VISABILITY BACKROUND YELLOW

## (undated) DEVICE — MINI DOPPLER PROBE

## (undated) DEVICE — MIDAS REX LEGEND TAPERED SM BORE 1.1MM X 8CM

## (undated) DEVICE — ELCTR SUBDERMAL NDL CLASSIC 1.5M X 59" (6 COLOR)

## (undated) DEVICE — DRAPE 1/2 SHEET 40X57"

## (undated) DEVICE — FOLEY TRAY 16FR LF URINE METER SURESTEP

## (undated) DEVICE — DRAPE MICROSCOPE ZEISS

## (undated) DEVICE — DRAIN LIMITORR DRAIN SYSTEM 30ML

## (undated) DEVICE — MARKING PEN W RULER

## (undated) DEVICE — AESCULAP SCALPFIX 10 CLIPS

## (undated) DEVICE — GLV 7 PROTEXIS (WHITE)

## (undated) DEVICE — ELCTR 4-DISC 20MM 49" (RED, BLUE, GREEN, BLACK)

## (undated) DEVICE — CODMAN PERFORATOR 14MM (BLUE)

## (undated) DEVICE — PREP CHLORAPREP HI-LITE ORANGE 10.5ML

## (undated) DEVICE — DRAPE 3/4 SHEET W REINFORCEMENT 56X77"

## (undated) DEVICE — SUT NYLON 9-0 5" BV130-5

## (undated) DEVICE — MIDAS REX MR8 TAPERED SM BORE 1.MM X 7CM

## (undated) DEVICE — DRSG STOCKINETTE IMPERVIOUS XL

## (undated) DEVICE — GLV 7.5 PROTEXIS (WHITE)

## (undated) DEVICE — WOUND IRR SURGIPHOR

## (undated) DEVICE — SYR ASEPTO

## (undated) DEVICE — ELCTR BOVIE TIP BLADE INSULATED 2.75" EDGE WITH SAFETY

## (undated) DEVICE — SYR SAFE TB 1CC 27G X 0.5

## (undated) DEVICE — ARACHNOID BACKCUTTING SUPERFICIAL HANDLE 5"

## (undated) DEVICE — ELCTR MONOPOLAR STIMULATOR PROBE FLUSH-TIP

## (undated) DEVICE — GLV 8.5 PROTEXIS (WHITE)

## (undated) DEVICE — DRAPE TOWEL BLUE 17" X 24"

## (undated) DEVICE — CANNULA ALCON PROVISC 27G THREADED HUB

## (undated) DEVICE — ELCTR PEDICLE SCREW PROBE 3MM BALL 1.8MM X 100MM

## (undated) DEVICE — SUT NUROLON 4-0 8-18" TF (POP-OFF)

## (undated) DEVICE — ELCTR COLORADO 3CM

## (undated) DEVICE — ELCTR SUBDERMAL CORKSCREW NDL 1.2MM

## (undated) DEVICE — WARMING BLANKET LOWER ADULT

## (undated) DEVICE — SUT ETHILON 9-0 5" BV100-4

## (undated) DEVICE — Device

## (undated) DEVICE — SUT VICRYL 3-0 18" X-1 (POP-OFF)

## (undated) DEVICE — SUT ETHIBOND 10-0 5" BV130-3

## (undated) DEVICE — DRSG TELFA .5 X 3

## (undated) DEVICE — SYR TB 1CC 25G X 5/8 (BLUE)

## (undated) DEVICE — SOL IRR POUR H2O 250ML

## (undated) DEVICE — MIDAS REX MR8 TAPERED SM BORE 2.3MM X 7CM FOOTED

## (undated) DEVICE — CANNULA ALCON HYDRODISSECTION 25G X 8MM

## (undated) DEVICE — ELCTR BIPOLAR CORD AESCULAP 12FT DISP

## (undated) DEVICE — MIDAS REX MR8 BALL FLUTED SM BORE 3MM X 10CM

## (undated) DEVICE — STAPLER SKIN VISI-STAT 35 WIDE

## (undated) DEVICE — SUT ETHILON 8-0 5" BV130-4

## (undated) DEVICE — SUT VICRYL 2-0 18" CP-2 UNDYED (POP-OFF)

## (undated) DEVICE — SOL IRR POUR NS 0.9% 500ML

## (undated) DEVICE — SUT ETHILON 9-0 5" BV130-4

## (undated) DEVICE — VENODYNE/SCD SLEEVE CALF LARGE

## (undated) DEVICE — GLV 8 PROTEXIS (WHITE)

## (undated) DEVICE — LONE STAR ELASTIC STAY HOOK 12MM BLUNT

## (undated) DEVICE — POSITIONER FOAM EGG CRATE ULNAR 2PCS (PINK)

## (undated) DEVICE — ELCTR BIPOLAR PROBE

## (undated) DEVICE — ELCTR SUBDERMAL NDL 27G X 1/2" WITH TWISTED PAIR

## (undated) DEVICE — SPECIMEN CONTAINER 100ML